# Patient Record
Sex: MALE | Race: WHITE | Employment: OTHER | URBAN - METROPOLITAN AREA
[De-identification: names, ages, dates, MRNs, and addresses within clinical notes are randomized per-mention and may not be internally consistent; named-entity substitution may affect disease eponyms.]

---

## 2017-01-02 ENCOUNTER — HOSPITAL ENCOUNTER (OUTPATIENT)
Dept: PHYSICAL THERAPY | Age: 72
Discharge: HOME | End: 2017-01-02
Payer: MEDICARE

## 2017-01-02 PROCEDURE — 97164 PT RE-EVAL EST PLAN CARE: CPT

## 2017-01-02 PROCEDURE — G8981 BODY POS CURRENT STATUS: HCPCS

## 2017-01-02 PROCEDURE — G8982 BODY POS GOAL STATUS: HCPCS

## 2017-01-02 PROCEDURE — 97110 THERAPEUTIC EXERCISES: CPT

## 2017-01-02 NOTE — PROGRESS NOTES
Angie Real   (ADZ:[id number]) [address]  at  [facility]  [address] [place] [address]  [phone]   [phone]           Outpatient PHYSICAL THERAPY: Daily Note, Re-evaluation and Recertification  Fall Risk Score: 2 (? 5 = High Risk)       ICD-10: Treatment Diagnosis: Low back pain, M54.5  Difficulty walking, not elsewhere classified, R26.2    REFERRING PHYSICIAN: Sonia Felix MD MD Orders: Evaluate and Treat  Return Physician Appointment: not known  MEDICAL/REFERRING DIAGNOSIS: Lumbosacral stenosis with neurogenic claudication, DDD, Lumbosacral spondylosis without myelopathy, other symptoms referrable to back  DATE OF ONSET: Chronic   PRIOR LEVEL OF FUNCTION: independent  PRECAUTIONS/ALLERGIES:   Allergies   Allergen Reactions    Other Medication Other (comments)     \"Some BP meds\" cause \"  SWELLING OF LEGS with Procardia and with current medicine    Statins-Hmg-Coa Reductase Inhibitors Other (comments)     Extreme nerve pain       ASSESSMENT:  ?????? ? ? This section established at most recent assessment??????????  Angie Real has been seen for 31 visits from 12/11/15 to 1/2/2017 for lumbar spine pain and spinal stenosis. Patient has performed therapeutic exercises, activities, and had manual therapy to increased strength, ROM and function. Patient has also used modalities for pain control in order to increase function. He is becoming more consistent with his workout program at the gym and is seeing benefits. He still has lack of core strength at this time. Balance has also become a difficulty for this patient due to neuropathy. Patient has shown an increase in function per the Modified Oswestry Disability Index score with scores of 15/50. Patient has progressed well toward their goals and will benefit from continuing skilled PT in order to address their impairments.   Huey Monterroso, PT, DPT, OCS  1/2/2017      PROBLEM LIST (Impairments causing functional limitations):  1. Decreased Strength affecting function  2. Decreased ADL/Functional Activities  3. Decreased Flexibility/joint mobility  4. Decreased transfer abilities  5. Increased Pain affecting function  6. Decreased Activity tolerance   7. Increased Fatigue affecting function  GOALS: (Goals have been discussed and agreed upon with patient.)  1. DISCHARGE GOALS: Time Frame: 12 weeks   2. Patient will report a greater than 50% improvement in overall symptoms in order to return to increased activity level(MET-4/25/16)  3. Patient will be independent in advanced core training exercises to return to functional mobility (ongoing)  4. Patient will show a greater than 5 point decrease on the Modified Oswestry Disability Index score in order to show an increase in function (ongoing)  5. Patient will report standing for greater than 30 min with work activity (MET-11/7/16)  6. NEW GOAL:  7. Patient will walk greater than 12 laps in the gym during gym session. (ongoing)  8. Patient will perform core exercises as part of his routine greater than 4 times a week  REHABILITATION POTENTIAL FOR STATED GOALS: GoodPLAN OF CARE:  INTERVENTIONS PLANNED: (Benefits and precautions of physical therapy have been discussed with the patient.)  1. balance exercise  2. bed mobility  3. cold  4. electrical stimulation  5. gait training  6. heat  7. manual therapy (including instrument assisted soft tissue mobilization)  8. neuromuscular re-education/strengthening  9. range of motion: active/assisted/passive  10. therapeutic activities  11. therapeutic exercise/strengthening  12. transfer training  13. Other: Aquatics  TREATMENT PLAN EFFECTIVE DATES: 1/2/17 TO 4/2/17  FREQUENCY/DURATION: Follow patient 1 time every other week for 12 weeks to address above goals. Regarding James Potocki's therapy, I certify that the treatment plan above will be carried out by a therapist or under their direction.   Thank you for this referral,  Chase Temple Blanca Moore, PT     Referring Physician Signature: Anitra Gage MD          Date                                SUBJECTIVE:    History of Present Injury/Illness (Reason for Referral): Patient report that he has had a long history of back issues. He first started to have health and difficulty functioning with a presence of gout for about a year, then had his hip replaced in April of this year. Now, his is trying to regain his back strength and function. He has difficulty walking for long periods and standing in one place. His goal is to become more active and functional as well as control his health. Patient had an injection a few weeks ago and had some relief from that. MRI results: Copy in paper chart showing:  Abdominal hernia surgery performed in   Present Symptoms: Patient reports that he is still consistent with the exercises, and just seems to have a little more pain in the right hip the last 3-4 days. He reports that he has not taken as many steps to incorporate his core work into his program, but is going to change that Pain at 2/10  Dominant Side: right  Past Medical History:    Active Ambulatory Problems     Diagnosis Date Noted    S/P total hip arthroplasty 04/06/2015    HTN (hypertension) 04/06/2015    HLD (hyperlipidemia) 04/06/2015    BPH (benign prostatic hyperplasia) 04/06/2015    Gout 04/06/2015    Anxiety 04/06/2015    Diabetes mellitus ([address] 04/06/2015     Resolved Ambulatory Problems     Diagnosis Date Noted    No Resolved Ambulatory Problems     Past Medical History   Diagnosis Date    Edema of both legs     Enlarged prostate     Hip pain     History of elevated glucose     History of seasonal allergies     Hypercholesterolemia     Hypertension     Keratoacanthoma     Skin neoplasm     Spinal stenosis      Past Surgical History   Procedure Laterality Date    Hx tonsillectomy  as child    Hx wisdom teeth extraction  as teenager     Current Medications:   Current Outpatient Prescriptions:     HYDROmorphone (DILAUDID) 2 mg tablet, Take 1 Tab by mouth every four (4) hours as needed. Max Daily Amount: 12 mg., Disp: 40 Tab, Rfl: 0    prazosin (MINIPRESS) 1 mg capsule, Take 1 mg by mouth three (3) times daily. TAKE AM OF SURGERY WITH SMALL SIP OF WATER   Indications: BENIGN PROSTATIC HYPERTROPHY, Disp: , Rfl:     amLODIPine (NORVASC) 10 mg tablet, Take 10 mg by mouth daily. TAKE AM OF SURGERY WITH SMALL SIP OF WATER, Disp: , Rfl:     labetalol (NORMODYNE) 200 mg tablet, Take 200 mg by mouth two (2) times a day. TAKE AM OF SURGERY WITH SMALL SIP OF WATER, Disp: , Rfl:     cloNIDine HCl (CATAPRES) 0.2 mg tablet, Take 0.2 mg by mouth nightly. Indications: HYPERTENSION, Disp: , Rfl:     finasteride (PROSCAR) 5 mg tablet, Take 5 mg by mouth daily. TAKE AM OF SURGERY WITH SMALL SIP OF WATER   Indications: BENIGN PROSTATIC HYPERTROPHY, Disp: , Rfl:     furosemide (LASIX) 40 mg tablet, Take 40 mg by mouth daily. , Disp: , Rfl:     Febuxostat (ULORIC) 80 mg tab tablet, Take 80 mg by mouth daily. TAKE AM OF SURGERY WITH SMALL SIP OF WATER   Indications: GOUT, Disp: , Rfl:     LORazepam (ATIVAN) 0.5 mg tablet, Take 0.5 mg by mouth daily as needed for Anxiety. TAKES 1/2 OF 1MG TABLET DAILY AS NEEDED, \"USUALLY EVERY DAY\", Disp: , Rfl:      Date Last Reviewed: 1/2/2017    Social History/Home Situation:   Social History     Social History    Marital status:      Spouse name: N/A    Number of children: N/A    Years of education: N/A     Occupational History    Not on file. Social History Main Topics    Smoking status: Never Smoker    Smokeless tobacco: Not on file    Alcohol use No    Drug use: Not on file    Sexual activity: Not on file     Other Topics Concern    Not on file     Social History Narrative           Work/Activity History: consultation  OBJECTIVE:    Outcome Measure:    Tool Used: Modified Oswestry Low Back Pain Questionnaire  Score:  Initial: 12/50 Most Recent: 15/50 (Date: 1/2/17 )   Interpretation of Score: Each section is scored on a 0-5 scale, 5 representing the greatest disability. The scores of each section are added together for a total score of 50. Score 0 1-10 11-20 21-30 31-40 41-49 50   Modifier CH CI CJ CK CL CM CN     ? Changing and Maintaining Body Position:     - CURRENT STATUS: CJ - 20%-39% impaired, limited or restricted    - GOAL STATUS:   - 0% impaired, limited or restricted    - D/C STATUS:  ---------------To be determined---------------  Tool Used: Lower Extremity Functional Scale (LEFS)  Score:  Initial: 49/80 Most Recent: 53/80 (Date: 1/2/17 )   Interpretation of Score: 20 questions each scored on a 5 point scale with 0 representing \"extreme difficulty or unable to perform\" and 4 representing \"no difficulty\". The lower the score, the greater the functional disability. 80/80 represents no disability. Minimal detectable change is 9 points. Score 80 79-63 62-48 47-32 31-16 15-1 0   Modifier CH CI CJ CK CL CM CN         Observation/Orthostatic Postural Assessment:    Patient is obese individual with increased abdominal mass and lordosis in standing. Patient shows slight right rotation from thoracic spine in standing. He reports that the popping has subsided during some of his activity. Palpation:    (re-cert-1/2/17) . Patient shows decreased mobility in right hip due to replacement earlier this year. He does show decreased R LE edema compared to his previous PT treatments. Core strength tests:  Vertical compression test (VCT)- grade 3/5 with dysfunction at TL junction and right rotation (able to show improved posture and strength with testing)  Elbow flexion test (EFT)- Grade 2/5 with sluggish initiation, and endurance present  Lumbar protective mechanism (LPM)- Left A-P is sluggish grade 2, P-A grade 2 and present; Right A-P is grade 2 sluggish, endurance present, P-A is 2- and present.   ROM: Slight limitation in hip range of motion, but mostly limited in right rotation and side bending left                           Strength: Good and functional LE strength                 Special Tests: Vertical compression test (VCT)- grade 3/5 with dysfunction at TL junction and right rotation  Elbow flexion test (EFT)- Grade 2+/5 with sluggish initiation, and endurance present  Lumbar protective mechanism (LPM)- Left A-P is sluggish grade 2, P-A grade 2 and present; Right A-P is grade 1 sluggish, endurance present, P-A is 2- and present. Neurological Screen:   Myotomes: intact                  Dermatomes: intact                  Reflexes: intact                  Neural Tension Tests: (-)  Functional Mobility:       Patient has difficulty with functional mobility, standing for greater than 30 min and with exercise activity  Balance:    fair- Patient unable to stand greater than 1 sec in single leg stance Bilaterally  TREATMENT:    (In addition to Assessment/Re-Assessment sessions the following treatments were rendered)  Therapeutic Exercise: (30 Minutes):  Exercises per grid below to improve mobility, strength, balance and coordination. Required minimal visual, verbal and manual cues to promote proper body alignment, promote proper body posture and promote proper body mechanics. Progressed resistance, range and repetitions as indicated.    Date:  1/2/2017     Activity/Exercise Parameters   SCI FIT Not today   Stretches  Hamstring bilaterally 3 x 30 sec  Piriformis bilaterally 3 x 20 sec  Hip flexor stretch   Education -15 min discussion on last two weeks workouts and where he can make a change and difference.  -Discussion using the RPE (rate of perceived exertion) scale for his workout instead of heartrate   Core training  Initiation x 2 min  -Bridge with glut set x 5 min  Marches-alternating with good core focus x 5 min   Treadmill (not today) Discussed x 2 min   Step up X 10 B    Glut training (not today) Bent over table with focus on form x 5 min   Sit to stand X 10 with good form   Balance            HEP-education on posture  Manual Therapy (    Soft Tissue Mobilization Duration  Duration: 5 Minutes):  Right and left hip stretches. (Used abbreviations: MET - muscle energy technique; PNF - proprioceptive neuromuscular facilitation; NMR - neuromuscular re-education; a/p - anterior to posterior; p/a - posterior to anterior)   Therapeutic Modalities:                                                                                               HEP: As above; handouts given to patient for all exercises. ______________________________________________________________________________________________________    Treatment Assessment:  Patient shows improved core initiation. Pain at 1/10. Started balance training and added to treatment diagnoses  Progression/Medical Necessity:   · Patient is expected to demonstrate progress in strength, range of motion, balance, coordination and functional technique to improve functional mobility. · Patient demonstrates good rehab potential due to higher previous functional level. · Skilled intervention continues to be required due to core weakness and instability. Compliance with Program/Exercises: compliant all of the time. Reason for Continuation of Services/Other Comments:  · Patient continues to require skilled intervention due to decreased function. Recommendations/Intent for next treatment session: \"Treatment next visit will focus on core training, endurance\".  And balance   Total Treatment Duration:  PT Patient Time In/Time Out  Time In: 1400  Time Out: [address] [facility], PT, DPT, OCS

## 2017-01-17 ENCOUNTER — HOSPITAL ENCOUNTER (OUTPATIENT)
Dept: PHYSICAL THERAPY | Age: 72
Discharge: HOME | End: 2017-01-17
Payer: MEDICARE

## 2017-01-17 PROCEDURE — 97110 THERAPEUTIC EXERCISES: CPT

## 2017-01-17 NOTE — PROGRESS NOTES
Wayne Hill   (EXD:[id number]) [address]  at  [facility]  [address] [place] [address]  [phone]   [phone]           Outpatient PHYSICAL THERAPY: Daily Note  Fall Risk Score: 2 (? 5 = High Risk)       ICD-10: Treatment Diagnosis: Low back pain, M54.5  Difficulty walking, not elsewhere classified, R26.2    REFERRING PHYSICIAN: Leia Gonzales MD MD Orders: Evaluate and Treat  Return Physician Appointment: not known  MEDICAL/REFERRING DIAGNOSIS: Lumbosacral stenosis with neurogenic claudication, DDD, Lumbosacral spondylosis without myelopathy, other symptoms referrable to back  DATE OF ONSET: Chronic   PRIOR LEVEL OF FUNCTION: independent  PRECAUTIONS/ALLERGIES:   Allergies   Allergen Reactions    Other Medication Other (comments)     \"Some BP meds\" cause \"  SWELLING OF LEGS with Procardia and with current medicine    Statins-Hmg-Coa Reductase Inhibitors Other (comments)     Extreme nerve pain       ASSESSMENT:  ?????? ? ? This section established at most recent assessment??????????  Wayne Hill has been seen for 31 visits from 12/11/15 to 1/17/2017 for lumbar spine pain and spinal stenosis. Patient has performed therapeutic exercises, activities, and had manual therapy to increased strength, ROM and function. Patient has also used modalities for pain control in order to increase function. He is becoming more consistent with his workout program at the gym and is seeing benefits. He still has lack of core strength at this time. Balance has also become a difficulty for this patient due to neuropathy. Patient has shown an increase in function per the Modified Oswestry Disability Index score with scores of 15/50. Patient has progressed well toward their goals and will benefit from continuing skilled PT in order to address their impairments. Hanh Worrell, PT, DPT, OCS  1/17/2017      PROBLEM LIST (Impairments causing functional limitations):  1.  Decreased Strength affecting function  2. Decreased ADL/Functional Activities  3. Decreased Flexibility/joint mobility  4. Decreased transfer abilities  5. Increased Pain affecting function  6. Decreased Activity tolerance   7. Increased Fatigue affecting function  GOALS: (Goals have been discussed and agreed upon with patient.)  1. DISCHARGE GOALS: Time Frame: 12 weeks   2. Patient will report a greater than 50% improvement in overall symptoms in order to return to increased activity level(MET-4/25/16)  3. Patient will be independent in advanced core training exercises to return to functional mobility (ongoing)  4. Patient will show a greater than 5 point decrease on the Modified Oswestry Disability Index score in order to show an increase in function (ongoing)  5. Patient will report standing for greater than 30 min with work activity (MET-11/7/16)  6. NEW GOAL:  7. Patient will walk greater than 12 laps in the gym during gym session. (ongoing)  8. Patient will perform core exercises as part of his routine greater than 4 times a week  REHABILITATION POTENTIAL FOR STATED GOALS: GoodPLAN OF CARE:  INTERVENTIONS PLANNED: (Benefits and precautions of physical therapy have been discussed with the patient.)  1. balance exercise  2. bed mobility  3. cold  4. electrical stimulation  5. gait training  6. heat  7. manual therapy (including instrument assisted soft tissue mobilization)  8. neuromuscular re-education/strengthening  9. range of motion: active/assisted/passive  10. therapeutic activities  11. therapeutic exercise/strengthening  12. transfer training  13. Other: Aquatics  TREATMENT PLAN EFFECTIVE DATES: 1/2/17 TO 4/2/17  FREQUENCY/DURATION: Follow patient 1 time every other week for 12 weeks to address above goals. Regarding James Potocki's therapy, I certify that the treatment plan above will be carried out by a therapist or under their direction.   Thank you for this referral,  Mary Anne Caicedo, PT     Referring Physician Signature: Les Llanes MD          Date                                SUBJECTIVE:    History of Present Injury/Illness (Reason for Referral): Patient report that he has had a long history of back issues. He first started to have health and difficulty functioning with a presence of gout for about a year, then had his hip replaced in April of this year. Now, his is trying to regain his back strength and function. He has difficulty walking for long periods and standing in one place. His goal is to become more active and functional as well as control his health. Patient had an injection a few weeks ago and had some relief from that. MRI results: Copy in paper chart showing:  Abdominal hernia surgery performed in   Present Symptoms: Patient reports that he is planning to go see his specialist soon to get some questions answered about how he is feeling. Pain at 2/10  Dominant Side: right  Past Medical History: Active Ambulatory Problems     Diagnosis Date Noted    S/P total hip arthroplasty 04/06/2015    HTN (hypertension) 04/06/2015    HLD (hyperlipidemia) 04/06/2015    BPH (benign prostatic hyperplasia) 04/06/2015    Gout 04/06/2015    Anxiety 04/06/2015    Diabetes mellitus ([facility] [address] 04/06/2015     Resolved Ambulatory Problems     Diagnosis Date Noted    No Resolved Ambulatory Problems     Past Medical History   Diagnosis Date    Edema of both legs     Enlarged prostate     Hip pain     History of elevated glucose     History of seasonal allergies     Hypercholesterolemia     Hypertension     Keratoacanthoma     Skin neoplasm     Spinal stenosis      Past Surgical History   Procedure Laterality Date    Hx tonsillectomy  as child    Hx wisdom teeth extraction  as teenager     Current Medications:   Current Outpatient Prescriptions:     HYDROmorphone (DILAUDID) 2 mg tablet, Take 1 Tab by mouth every four (4) hours as needed.  Max Daily Amount: 12 mg., Disp: 40 Tab, Rfl: 0   prazosin (MINIPRESS) 1 mg capsule, Take 1 mg by mouth three (3) times daily. TAKE AM OF SURGERY WITH SMALL SIP OF WATER   Indications: BENIGN PROSTATIC HYPERTROPHY, Disp: , Rfl:     amLODIPine (NORVASC) 10 mg tablet, Take 10 mg by mouth daily. TAKE AM OF SURGERY WITH SMALL SIP OF WATER, Disp: , Rfl:     labetalol (NORMODYNE) 200 mg tablet, Take 200 mg by mouth two (2) times a day. TAKE AM OF SURGERY WITH SMALL SIP OF WATER, Disp: , Rfl:     cloNIDine HCl (CATAPRES) 0.2 mg tablet, Take 0.2 mg by mouth nightly. Indications: HYPERTENSION, Disp: , Rfl:     finasteride (PROSCAR) 5 mg tablet, Take 5 mg by mouth daily. TAKE AM OF SURGERY WITH SMALL SIP OF WATER   Indications: BENIGN PROSTATIC HYPERTROPHY, Disp: , Rfl:     furosemide (LASIX) 40 mg tablet, Take 40 mg by mouth daily. , Disp: , Rfl:     Febuxostat (ULORIC) 80 mg tab tablet, Take 80 mg by mouth daily. TAKE AM OF SURGERY WITH SMALL SIP OF WATER   Indications: GOUT, Disp: , Rfl:     LORazepam (ATIVAN) 0.5 mg tablet, Take 0.5 mg by mouth daily as needed for Anxiety. TAKES 1/2 OF 1MG TABLET DAILY AS NEEDED, \"USUALLY EVERY DAY\", Disp: , Rfl:      Date Last Reviewed: 1/17/2017    Social History/Home Situation:   Social History     Social History    Marital status:      Spouse name: N/A    Number of children: N/A    Years of education: N/A     Occupational History    Not on file. Social History Main Topics    Smoking status: Never Smoker    Smokeless tobacco: Not on file    Alcohol use No    Drug use: Not on file    Sexual activity: Not on file     Other Topics Concern    Not on file     Social History Narrative           Work/Activity History: consultation  OBJECTIVE:    Outcome Measure: Tool Used: Modified Oswestry Low Back Pain Questionnaire  Score:  Initial: 12/50  Most Recent: 15/50 (Date: 1/2/17 )   Interpretation of Score: Each section is scored on a 0-5 scale, 5 representing the greatest disability.   The scores of each section are added together for a total score of 50. Score 0 1-10 11-20 21-30 31-40 41-49 50   Modifier CH CI CJ CK CL CM CN     ? Changing and Maintaining Body Position:     - CURRENT STATUS:  - 20%-39% impaired, limited or restricted    - GOAL STATUS:   - 0% impaired, limited or restricted    - D/C STATUS:  ---------------To be determined---------------  Tool Used: Lower Extremity Functional Scale (LEFS)  Score:  Initial: 49/80 Most Recent: 53/80 (Date: 1/2/17 )   Interpretation of Score: 20 questions each scored on a 5 point scale with 0 representing \"extreme difficulty or unable to perform\" and 4 representing \"no difficulty\". The lower the score, the greater the functional disability. 80/80 represents no disability. Minimal detectable change is 9 points. Score 80 79-63 62-48 47-32 31-16 15-1 0   Modifier CH CI CJ CK CL CM CN         Observation/Orthostatic Postural Assessment:    Patient is obese individual with increased abdominal mass and lordosis in standing. Patient shows slight right rotation from thoracic spine in standing. He reports that the popping has subsided during some of his activity. Palpation:    (re-cert-1/2/17) . Patient shows decreased mobility in right hip due to replacement earlier this year. He does show decreased R LE edema compared to his previous PT treatments. Core strength tests:  Vertical compression test (VCT)- grade 3/5 with dysfunction at TL junction and right rotation (able to show improved posture and strength with testing)  Elbow flexion test (EFT)- Grade 2/5 with sluggish initiation, and endurance present  Lumbar protective mechanism (LPM)- Left A-P is sluggish grade 2, P-A grade 2 and present; Right A-P is grade 2 sluggish, endurance present, P-A is 2- and present.   ROM: Slight limitation in hip range of motion, but mostly limited in right rotation and side bending left                           Strength: Good and functional LE strength Special Tests: Vertical compression test (VCT)- grade 3/5 with dysfunction at TL junction and right rotation  Elbow flexion test (EFT)- Grade 2+/5 with sluggish initiation, and endurance present  Lumbar protective mechanism (LPM)- Left A-P is sluggish grade 2, P-A grade 2 and present; Right A-P is grade 1 sluggish, endurance present, P-A is 2- and present. Neurological Screen:   Myotomes: intact                  Dermatomes: intact                  Reflexes: intact                  Neural Tension Tests: (-)  Functional Mobility:       Patient has difficulty with functional mobility, standing for greater than 30 min and with exercise activity  Balance:    fair- Patient unable to stand greater than 1 sec in single leg stance Bilaterally  TREATMENT:    (In addition to Assessment/Re-Assessment sessions the following treatments were rendered)  Therapeutic Exercise: (20 Minutes):  Exercises per grid below to improve mobility, strength, balance and coordination. Required minimal visual, verbal and manual cues to promote proper body alignment, promote proper body posture and promote proper body mechanics. Progressed resistance, range and repetitions as indicated. Date:  1/17/2017     Activity/Exercise Parameters   SCI FIT Not today   Stretches (reviewed) Hamstring bilaterally 3 x 30 sec  Piriformis bilaterally 3 x 20 sec  Hip flexor stretch   Education -20 min discussion on last two weeks workouts and where he can make a change and difference. Discussion on medication and effects with his body and with exercises.   -Discussion using the RPE (rate of perceived exertion) scale for his workout instead of heartrate   Core training (not today) Initiation x 2 min  -Bridge with glut set x 5 min  Marches-alternating with good core focus x 5 min   Treadmill (not today) Discussed x 2 min   Step up (not today) X 10 B    Glut training (not today) Bent over table with focus on form x 5 min   Sit to stand (not today) X 10 with good form   Balance            HEP-education on posture  Manual Therapy (     ):  Right and left hip stretches. (Used abbreviations: MET - muscle energy technique; PNF - proprioceptive neuromuscular facilitation; NMR - neuromuscular re-education; a/p - anterior to posterior; p/a - posterior to anterior)   Therapeutic Modalities:                                                                                               HEP: As above; handouts given to patient for all exercises. ______________________________________________________________________________________________________    Treatment Assessment:  Patient shows continued work on home program with discussion today on healthcare providers and going to see his specialist for his back and for his hear to get some things answered. PT reviewed medication and function with exercises and symptoms he is perceiving at this time. Progression/Medical Necessity:   · Patient is expected to demonstrate progress in strength, range of motion, balance, coordination and functional technique to improve functional mobility. · Patient demonstrates good rehab potential due to higher previous functional level. · Skilled intervention continues to be required due to core weakness and instability. Compliance with Program/Exercises: compliant all of the time. Reason for Continuation of Services/Other Comments:  · Patient continues to require skilled intervention due to decreased function. Recommendations/Intent for next treatment session: \"Treatment next visit will focus on core training, endurance\".  And balance   Total Treatment Duration:  PT Patient Time In/Time Out  Time In: 1357  Time Out: 1500    Hanh Worrell, PT, DPT, OCS

## 2017-01-30 ENCOUNTER — HOSPITAL ENCOUNTER (OUTPATIENT)
Dept: PHYSICAL THERAPY | Age: 72
Discharge: HOME | End: 2017-01-30
Payer: MEDICARE

## 2017-01-30 PROCEDURE — 97110 THERAPEUTIC EXERCISES: CPT

## 2017-01-30 PROCEDURE — 97140 MANUAL THERAPY 1/> REGIONS: CPT

## 2017-01-30 NOTE — PROGRESS NOTES
Ramila Braden   ([id number]) [address]  at  [facility]  [address] [place] [address]  [phone]   [phone]           Outpatient PHYSICAL THERAPY: Daily Note  Fall Risk Score: 2 (? 5 = High Risk)       ICD-10: Treatment Diagnosis: Low back pain, M54.5  Difficulty walking, not elsewhere classified, R26.2    REFERRING PHYSICIAN: Kourtney Byers MD MD Orders: Evaluate and Treat  Return Physician Appointment: not known  MEDICAL/REFERRING DIAGNOSIS: Lumbosacral stenosis with neurogenic claudication, DDD, Lumbosacral spondylosis without myelopathy, other symptoms referrable to back  DATE OF ONSET: Chronic   PRIOR LEVEL OF FUNCTION: independent  PRECAUTIONS/ALLERGIES:   Allergies   Allergen Reactions    Other Medication Other (comments)     \"Some BP meds\" cause \"  SWELLING OF LEGS with Procardia and with current medicine    Statins-Hmg-Coa Reductase Inhibitors Other (comments)     Extreme nerve pain       ASSESSMENT:  ?????? ? ? This section established at most recent assessment??????????  Ramila Braden has been seen for 31 visits from 12/11/15 to 2017 for lumbar spine pain and spinal stenosis. Patient has performed therapeutic exercises, activities, and had manual therapy to increased strength, ROM and function. Patient has also used modalities for pain control in order to increase function. He is becoming more consistent with his workout program at the gym and is seeing benefits. He still has lack of core strength at this time. Balance has also become a difficulty for this patient due to neuropathy. Patient has shown an increase in function per the Modified Oswestry Disability Index score with scores of 15/50. Patient has progressed well toward their goals and will benefit from continuing skilled PT in order to address their impairments. Ayala Shore, PT, DPT, OCS  2017      PROBLEM LIST (Impairments causing functional limitations):  1.  Decreased Strength affecting function  2. Decreased ADL/Functional Activities  3. Decreased Flexibility/joint mobility  4. Decreased transfer abilities  5. Increased Pain affecting function  6. Decreased Activity tolerance   7. Increased Fatigue affecting function  GOALS: (Goals have been discussed and agreed upon with patient.)  1. DISCHARGE GOALS: Time Frame: 12 weeks   2. Patient will report a greater than 50% improvement in overall symptoms in order to return to increased activity level(MET-4/25/16)  3. Patient will be independent in advanced core training exercises to return to functional mobility (ongoing)  4. Patient will show a greater than 5 point decrease on the Modified Oswestry Disability Index score in order to show an increase in function (ongoing)  5. Patient will report standing for greater than 30 min with work activity (MET-11/7/16)  6. NEW GOAL:  7. Patient will walk greater than 12 laps in the gym during gym session. (ongoing)  8. Patient will perform core exercises as part of his routine greater than 4 times a week  REHABILITATION POTENTIAL FOR STATED GOALS: GoodPLAN OF CARE:  INTERVENTIONS PLANNED: (Benefits and precautions of physical therapy have been discussed with the patient.)  1. balance exercise  2. bed mobility  3. cold  4. electrical stimulation  5. gait training  6. heat  7. manual therapy (including instrument assisted soft tissue mobilization)  8. neuromuscular re-education/strengthening  9. range of motion: active/assisted/passive  10. therapeutic activities  11. therapeutic exercise/strengthening  12. transfer training  13. Other: Aquatics  TREATMENT PLAN EFFECTIVE DATES: 1/2/17 TO 4/2/17  FREQUENCY/DURATION: Follow patient 1 time every other week for 12 weeks to address above goals. Regarding James Potocki's therapy, I certify that the treatment plan above will be carried out by a therapist or under their direction.   Thank you for this referral,  Barbara Morrell, PT     Referring Physician Signature: Estelle Hunt MD          Date                                SUBJECTIVE:    History of Present Injury/Illness (Reason for Referral): Patient report that he has had a long history of back issues. He first started to have health and difficulty functioning with a presence of gout for about a year, then had his hip replaced in April of this year. Now, his is trying to regain his back strength and function. He has difficulty walking for long periods and standing in one place. His goal is to become more active and functional as well as control his health. Patient had an injection a few weeks ago and had some relief from that. MRI results: Copy in paper chart showing:  Abdominal hernia surgery performed in   Present Symptoms: Patient reports that he felt a pull in his adductor or groin last week and did not know if he injured it. It has gotten better, so I think I am ok, but it still bothered me. I go back to the spine doctor next week after an MRI. Pain at 2/10  Dominant Side: right  Past Medical History:    Active Ambulatory Problems     Diagnosis Date Noted    S/P total hip arthroplasty 04/06/2015    HTN (hypertension) 04/06/2015    HLD (hyperlipidemia) 04/06/2015    BPH (benign prostatic hyperplasia) 04/06/2015    Gout 04/06/2015    Anxiety 04/06/2015    Diabetes mellitus ([facility] [address] 04/06/2015     Resolved Ambulatory Problems     Diagnosis Date Noted    No Resolved Ambulatory Problems     Past Medical History   Diagnosis Date    Edema of both legs     Enlarged prostate     Hip pain     History of elevated glucose     History of seasonal allergies     Hypercholesterolemia     Hypertension     Keratoacanthoma     Skin neoplasm     Spinal stenosis      Past Surgical History   Procedure Laterality Date    Hx tonsillectomy  as child    Hx wisdom teeth extraction  as teenager     Current Medications:   Current Outpatient Prescriptions:     HYDROmorphone (DILAUDID) 2 mg tablet, Take 1 Tab by mouth every four (4) hours as needed. Max Daily Amount: 12 mg., Disp: 40 Tab, Rfl: 0    prazosin (MINIPRESS) 1 mg capsule, Take 1 mg by mouth three (3) times daily. TAKE AM OF SURGERY WITH SMALL SIP OF WATER   Indications: BENIGN PROSTATIC HYPERTROPHY, Disp: , Rfl:     amLODIPine (NORVASC) 10 mg tablet, Take 10 mg by mouth daily. TAKE AM OF SURGERY WITH SMALL SIP OF WATER, Disp: , Rfl:     labetalol (NORMODYNE) 200 mg tablet, Take 200 mg by mouth two (2) times a day. TAKE AM OF SURGERY WITH SMALL SIP OF WATER, Disp: , Rfl:     cloNIDine HCl (CATAPRES) 0.2 mg tablet, Take 0.2 mg by mouth nightly. Indications: HYPERTENSION, Disp: , Rfl:     finasteride (PROSCAR) 5 mg tablet, Take 5 mg by mouth daily. TAKE AM OF SURGERY WITH SMALL SIP OF WATER   Indications: BENIGN PROSTATIC HYPERTROPHY, Disp: , Rfl:     furosemide (LASIX) 40 mg tablet, Take 40 mg by mouth daily. , Disp: , Rfl:     Febuxostat (ULORIC) 80 mg tab tablet, Take 80 mg by mouth daily. TAKE AM OF SURGERY WITH SMALL SIP OF WATER   Indications: GOUT, Disp: , Rfl:     LORazepam (ATIVAN) 0.5 mg tablet, Take 0.5 mg by mouth daily as needed for Anxiety. TAKES 1/2 OF 1MG TABLET DAILY AS NEEDED, \"USUALLY EVERY DAY\", Disp: , Rfl:      Date Last Reviewed: 1/30/2017    Social History/Home Situation:   Social History     Social History    Marital status:      Spouse name: N/A    Number of children: N/A    Years of education: N/A     Occupational History    Not on file. Social History Main Topics    Smoking status: Never Smoker    Smokeless tobacco: Not on file    Alcohol use No    Drug use: Not on file    Sexual activity: Not on file     Other Topics Concern    Not on file     Social History Narrative           Work/Activity History: consultation  OBJECTIVE:    Outcome Measure:    Tool Used: Modified Oswestry Low Back Pain Questionnaire  Score:  Initial: 12/50  Most Recent: 15/50 (Date: 1/2/17 )   Interpretation of Score: Each section is scored on a 0-5 scale, 5 representing the greatest disability. The scores of each section are added together for a total score of 50. Score 0 1-10 11-20 21-30 31-40 41-49 50   Modifier CH CI CJ CK CL CM CN     ? Changing and Maintaining Body Position:     - CURRENT STATUS:  - 20%-39% impaired, limited or restricted    - GOAL STATUS:   - 0% impaired, limited or restricted    - D/C STATUS:  ---------------To be determined---------------  Tool Used: Lower Extremity Functional Scale (LEFS)  Score:  Initial: 49/80 Most Recent: 53/80 (Date: 1/2/17 )   Interpretation of Score: 20 questions each scored on a 5 point scale with 0 representing \"extreme difficulty or unable to perform\" and 4 representing \"no difficulty\". The lower the score, the greater the functional disability. 80/80 represents no disability. Minimal detectable change is 9 points. Score 80 79-63 62-48 47-32 31-16 15-1 0   Modifier CH CI CJ CK CL CM CN         Observation/Orthostatic Postural Assessment:    Patient is obese individual with increased abdominal mass and lordosis in standing. Patient shows slight right rotation from thoracic spine in standing. He reports that the popping has subsided during some of his activity. Palpation:    (re-cert-1/2/17) . Patient shows decreased mobility in right hip due to replacement earlier this year. He does show decreased R LE edema compared to his previous PT treatments. Core strength tests:  Vertical compression test (VCT)- grade 3/5 with dysfunction at TL junction and right rotation (able to show improved posture and strength with testing)  Elbow flexion test (EFT)- Grade 2/5 with sluggish initiation, and endurance present  Lumbar protective mechanism (LPM)- Left A-P is sluggish grade 2, P-A grade 2 and present; Right A-P is grade 2 sluggish, endurance present, P-A is 2- and present.   ROM: Slight limitation in hip range of motion, but mostly limited in right rotation and side bending left                           Strength: Good and functional LE strength                 Special Tests: Vertical compression test (VCT)- grade 3/5 with dysfunction at TL junction and right rotation  Elbow flexion test (EFT)- Grade 2+/5 with sluggish initiation, and endurance present  Lumbar protective mechanism (LPM)- Left A-P is sluggish grade 2, P-A grade 2 and present; Right A-P is grade 1 sluggish, endurance present, P-A is 2- and present. Neurological Screen:   Myotomes: intact                  Dermatomes: intact                  Reflexes: intact                  Neural Tension Tests: (-)  Functional Mobility:       Patient has difficulty with functional mobility, standing for greater than 30 min and with exercise activity  Balance:    fair- Patient unable to stand greater than 1 sec in single leg stance Bilaterally  TREATMENT:    (In addition to Assessment/Re-Assessment sessions the following treatments were rendered)  Therapeutic Exercise: (20 Minutes):  Exercises per grid below to improve mobility, strength, balance and coordination. Required minimal visual, verbal and manual cues to promote proper body alignment, promote proper body posture and promote proper body mechanics. Progressed resistance, range and repetitions as indicated. Date:  1/30/2017     Activity/Exercise Parameters   SCI FIT Not today   Stretches  Hamstring bilaterally 3 x 30 sec  Piriformis bilaterally 3 x 20 sec  Hip flexor stretch   Education -10 min discussion on last two weeks workouts and where he can make a change and difference. Discussion on medication and effects with his body and with exercises.   -Discussion using the RPE (rate of perceived exertion) scale for his workout instead of heartrate   Core training  Initiation x 2 min  -pelvic tilt  Marches-alternating with good core focus x 5 min   Treadmill (not today) Discussed x 2 min   Step up (not today) X 10 B    Glut training (not today) Bent over table with focus on form x 5 min   Sit to stand (not today) X 10 with good form   Balance Talked about challenging balance           HEP-education on posture  Manual Therapy (    Soft Tissue Mobilization Duration  Duration: 25 Minutes):  Right and left hip stretches. STM to adductor and hamstring border. (Used abbreviations: MET - muscle energy technique; PNF - proprioceptive neuromuscular facilitation; NMR - neuromuscular re-education; a/p - anterior to posterior; p/a - posterior to anterior)   Therapeutic Modalities:                                                                                               HEP: As above; handouts given to patient for all exercises. ______________________________________________________________________________________________________    Treatment Assessment:  Patient shows improved core control and activation. Continue with balance and stability, Pain at 0/10  Progression/Medical Necessity:   · Patient is expected to demonstrate progress in strength, range of motion, balance, coordination and functional technique to improve functional mobility. · Patient demonstrates good rehab potential due to higher previous functional level. · Skilled intervention continues to be required due to core weakness and instability. Compliance with Program/Exercises: compliant all of the time. Reason for Continuation of Services/Other Comments:  · Patient continues to require skilled intervention due to decreased function. Recommendations/Intent for next treatment session: \"Treatment next visit will focus on core training, endurance\".  And balance   Total Treatment Duration:  PT Patient Time In/Time Out  Time In: 1400  Time Out: [address] Zapien, PT, DPT, OCS

## 2017-02-13 ENCOUNTER — HOSPITAL ENCOUNTER (OUTPATIENT)
Dept: PHYSICAL THERAPY | Age: 72
Discharge: HOME | End: 2017-02-13
Payer: MEDICARE

## 2017-02-13 PROCEDURE — 97110 THERAPEUTIC EXERCISES: CPT

## 2017-02-13 PROCEDURE — 97140 MANUAL THERAPY 1/> REGIONS: CPT

## 2017-02-13 NOTE — PROGRESS NOTES
Jarod Gann   ([id number]) [id number]  at  [facility]  [address] [place] [address]  [phone]   [phone]           Outpatient PHYSICAL THERAPY: Daily Note  Fall Risk Score: 2 (? 5 = High Risk)       ICD-10: Treatment Diagnosis: Low back pain, M54.5  Difficulty walking, not elsewhere classified, R26.2    REFERRING PHYSICIAN: Tiarra Price MD MD Orders: Evaluate and Treat  Return Physician Appointment: not known  MEDICAL/REFERRING DIAGNOSIS: Lumbosacral stenosis with neurogenic claudication, DDD, Lumbosacral spondylosis without myelopathy, other symptoms referrable to back  DATE OF ONSET: Chronic   PRIOR LEVEL OF FUNCTION: independent  PRECAUTIONS/ALLERGIES:   Allergies   Allergen Reactions    Other Medication Other (comments)     \"Some BP meds\" cause \"  SWELLING OF LEGS with Procardia and with current medicine    Statins-Hmg-Coa Reductase Inhibitors Other (comments)     Extreme nerve pain       ASSESSMENT:  ?????? ? ? This section established at most recent assessment??????????  Jarod Gann has been seen for 31 visits from 12/11/15 to 2/13/2017 for lumbar spine pain and spinal stenosis. Patient has performed therapeutic exercises, activities, and had manual therapy to increased strength, ROM and function. Patient has also used modalities for pain control in order to increase function. He is becoming more consistent with his workout program at the gym and is seeing benefits. He still has lack of core strength at this time. Balance has also become a difficulty for this patient due to neuropathy. Patient has shown an increase in function per the Modified Oswestry Disability Index score with scores of 15/50. Patient has progressed well toward their goals and will benefit from continuing skilled PT in order to address their impairments. Malaika Roland, PT, DPT, OCS  2/13/2017      PROBLEM LIST (Impairments causing functional limitations):  1.  Decreased Strength affecting function  2. Decreased ADL/Functional Activities  3. Decreased Flexibility/joint mobility  4. Decreased transfer abilities  5. Increased Pain affecting function  6. Decreased Activity tolerance   7. Increased Fatigue affecting function  GOALS: (Goals have been discussed and agreed upon with patient.)  1. DISCHARGE GOALS: Time Frame: 12 weeks   2. Patient will report a greater than 50% improvement in overall symptoms in order to return to increased activity level(MET-4/25/16)  3. Patient will be independent in advanced core training exercises to return to functional mobility (ongoing)  4. Patient will show a greater than 5 point decrease on the Modified Oswestry Disability Index score in order to show an increase in function (ongoing)  5. Patient will report standing for greater than 30 min with work activity (MET-11/7/16)  6. NEW GOAL:  7. Patient will walk greater than 12 laps in the gym during gym session. (ongoing)  8. Patient will perform core exercises as part of his routine greater than 4 times a week  REHABILITATION POTENTIAL FOR STATED GOALS: GoodPLAN OF CARE:  INTERVENTIONS PLANNED: (Benefits and precautions of physical therapy have been discussed with the patient.)  1. balance exercise  2. bed mobility  3. cold  4. electrical stimulation  5. gait training  6. heat  7. manual therapy (including instrument assisted soft tissue mobilization)  8. neuromuscular re-education/strengthening  9. range of motion: active/assisted/passive  10. therapeutic activities  11. therapeutic exercise/strengthening  12. transfer training  13. Other: Aquatics  TREATMENT PLAN EFFECTIVE DATES: 1/2/17 TO 4/2/17  FREQUENCY/DURATION: Follow patient 1 time every other week for 12 weeks to address above goals. Regarding James Potocki's therapy, I certify that the treatment plan above will be carried out by a therapist or under their direction.   Thank you for this referral,  Deann Diaz, PT     Referring Physician Signature: Carlitos Graff MD          Date                                SUBJECTIVE:    History of Present Injury/Illness (Reason for Referral): Patient report that he has had a long history of back issues. He first started to have health and difficulty functioning with a presence of gout for about a year, then had his hip replaced in April of this year. Now, his is trying to regain his back strength and function. He has difficulty walking for long periods and standing in one place. His goal is to become more active and functional as well as control his health. Patient had an injection a few weeks ago and had some relief from that. MRI results: Copy in paper chart showing:  Abdominal hernia surgery performed in   Present Symptoms: Patient reports that the right hip felt great after last time and the left glut area is feeling tight and sore the last few days. I haven't pushed anything more and I am maintaining my exercises though  Pain at 2/10  Dominant Side: right  Past Medical History:    Active Ambulatory Problems     Diagnosis Date Noted    S/P total hip arthroplasty 04/06/2015    HTN (hypertension) 04/06/2015    HLD (hyperlipidemia) 04/06/2015    BPH (benign prostatic hyperplasia) 04/06/2015    Gout 04/06/2015    Anxiety 04/06/2015    Diabetes mellitus ([facility] [address] 04/06/2015     Resolved Ambulatory Problems     Diagnosis Date Noted    No Resolved Ambulatory Problems     Past Medical History   Diagnosis Date    Edema of both legs     Enlarged prostate     Hip pain     History of elevated glucose     History of seasonal allergies     Hypercholesterolemia     Hypertension     Keratoacanthoma     Skin neoplasm     Spinal stenosis      Past Surgical History   Procedure Laterality Date    Hx tonsillectomy  as child    Hx wisdom teeth extraction  as teenager     Current Medications:   Current Outpatient Prescriptions:     HYDROmorphone (DILAUDID) 2 mg tablet, Take 1 Tab by mouth every four (4) hours as needed. Max Daily Amount: 12 mg., Disp: 40 Tab, Rfl: 0    prazosin (MINIPRESS) 1 mg capsule, Take 1 mg by mouth three (3) times daily. TAKE AM OF SURGERY WITH SMALL SIP OF WATER   Indications: BENIGN PROSTATIC HYPERTROPHY, Disp: , Rfl:     amLODIPine (NORVASC) 10 mg tablet, Take 10 mg by mouth daily. TAKE AM OF SURGERY WITH SMALL SIP OF WATER, Disp: , Rfl:     labetalol (NORMODYNE) 200 mg tablet, Take 200 mg by mouth two (2) times a day. TAKE AM OF SURGERY WITH SMALL SIP OF WATER, Disp: , Rfl:     cloNIDine HCl (CATAPRES) 0.2 mg tablet, Take 0.2 mg by mouth nightly. Indications: HYPERTENSION, Disp: , Rfl:     finasteride (PROSCAR) 5 mg tablet, Take 5 mg by mouth daily. TAKE AM OF SURGERY WITH SMALL SIP OF WATER   Indications: BENIGN PROSTATIC HYPERTROPHY, Disp: , Rfl:     furosemide (LASIX) 40 mg tablet, Take 40 mg by mouth daily. , Disp: , Rfl:     Febuxostat (ULORIC) 80 mg tab tablet, Take 80 mg by mouth daily. TAKE AM OF SURGERY WITH SMALL SIP OF WATER   Indications: GOUT, Disp: , Rfl:     LORazepam (ATIVAN) 0.5 mg tablet, Take 0.5 mg by mouth daily as needed for Anxiety. TAKES 1/2 OF 1MG TABLET DAILY AS NEEDED, \"USUALLY EVERY DAY\", Disp: , Rfl:      Date Last Reviewed: 2/13/2017    Social History/Home Situation:   Social History     Social History    Marital status:      Spouse name: N/A    Number of children: N/A    Years of education: N/A     Occupational History    Not on file. Social History Main Topics    Smoking status: Never Smoker    Smokeless tobacco: Not on file    Alcohol use No    Drug use: Not on file    Sexual activity: Not on file     Other Topics Concern    Not on file     Social History Narrative           Work/Activity History: consultation  OBJECTIVE:    Outcome Measure:    Tool Used: Modified Oswestry Low Back Pain Questionnaire  Score:  Initial: 12/50  Most Recent: 15/50 (Date: 1/2/17 )   Interpretation of Score: Each section is scored on a 0-5 scale, 5 representing the greatest disability. The scores of each section are added together for a total score of 50. Score 0 1-10 11-20 21-30 31-40 41-49 50   Modifier CH CI CJ CK CL CM CN     ? Changing and Maintaining Body Position:     - CURRENT STATUS:  - 20%-39% impaired, limited or restricted    - GOAL STATUS:   - 0% impaired, limited or restricted    - D/C STATUS:  ---------------To be determined---------------  Tool Used: Lower Extremity Functional Scale (LEFS)  Score:  Initial: 49/80 Most Recent: 53/80 (Date: 1/2/17 )   Interpretation of Score: 20 questions each scored on a 5 point scale with 0 representing \"extreme difficulty or unable to perform\" and 4 representing \"no difficulty\". The lower the score, the greater the functional disability. 80/80 represents no disability. Minimal detectable change is 9 points. Score 80 79-63 62-48 47-32 31-16 15-1 0   Modifier CH CI CJ CK CL CM CN         Observation/Orthostatic Postural Assessment:    Patient is obese individual with increased abdominal mass and lordosis in standing. Patient shows slight right rotation from thoracic spine in standing. He reports that the popping has subsided during some of his activity. Palpation:    (re-cert-1/2/17) . Patient shows decreased mobility in right hip due to replacement earlier this year. He does show decreased R LE edema compared to his previous PT treatments. Core strength tests:  Vertical compression test (VCT)- grade 3/5 with dysfunction at TL junction and right rotation (able to show improved posture and strength with testing)  Elbow flexion test (EFT)- Grade 2/5 with sluggish initiation, and endurance present  Lumbar protective mechanism (LPM)- Left A-P is sluggish grade 2, P-A grade 2 and present; Right A-P is grade 2 sluggish, endurance present, P-A is 2- and present.   ROM: Slight limitation in hip range of motion, but mostly limited in right rotation and side bending left Strength: Good and functional LE strength                 Special Tests: Vertical compression test (VCT)- grade 3/5 with dysfunction at TL junction and right rotation  Elbow flexion test (EFT)- Grade 2+/5 with sluggish initiation, and endurance present  Lumbar protective mechanism (LPM)- Left A-P is sluggish grade 2, P-A grade 2 and present; Right A-P is grade 1 sluggish, endurance present, P-A is 2- and present. Neurological Screen:   Myotomes: intact                  Dermatomes: intact                  Reflexes: intact                  Neural Tension Tests: (-)  Functional Mobility:       Patient has difficulty with functional mobility, standing for greater than 30 min and with exercise activity  Balance:    fair- Patient unable to stand greater than 1 sec in single leg stance Bilaterally  TREATMENT:    (In addition to Assessment/Re-Assessment sessions the following treatments were rendered)  Therapeutic Exercise: (15 Minutes):  Exercises per grid below to improve mobility, strength, balance and coordination. Required minimal visual, verbal and manual cues to promote proper body alignment, promote proper body posture and promote proper body mechanics. Progressed resistance, range and repetitions as indicated. Date:  2/13/2017     Activity/Exercise Parameters   SCI FIT Not today   Stretches  Hamstring bilaterally 3 x 30 sec  Piriformis bilaterally 3 x 20 sec  Hip flexor stretch   Education -5 min discussion on last two weeks workouts and where he can make a change and difference. Discussion on medication and effects with his body and with exercises.      Core training  Initiation x 2 min  -pelvic tilt  Marches-alternating with good core focus x 5 min   Treadmill (not today) Discussed x 2 min   Step up (not today) X 10 B    Glut training (not today) Bent over table with focus on form x 5 min   Sit to stand (not today) X 10 with good form   Balance Talked about challenging balance HEP-education on posture  Manual Therapy (    Soft Tissue Mobilization Duration  Duration: 30 Minutes):  Right and left hip stretches. STM to left glut med and min to border and bony contours  Worked border of sacrum and sacral sulcus  (Used abbreviations: MET - muscle energy technique; PNF - proprioceptive neuromuscular facilitation; NMR - neuromuscular re-education; a/p - anterior to posterior; p/a - posterior to anterior)   Therapeutic Modalities:                                                                                               HEP: As above; handouts given to patient for all exercises. ______________________________________________________________________________________________________    Treatment Assessment:  Patient shows improved core control and activation. Continue with balance and stability next treatment Pain at 0/10  Progression/Medical Necessity:   · Patient is expected to demonstrate progress in strength, range of motion, balance, coordination and functional technique to improve functional mobility. · Patient demonstrates good rehab potential due to higher previous functional level. · Skilled intervention continues to be required due to core weakness and instability. Compliance with Program/Exercises: compliant all of the time. Reason for Continuation of Services/Other Comments:  · Patient continues to require skilled intervention due to decreased function. Recommendations/Intent for next treatment session: \"Treatment next visit will focus on core training, endurance\".  And balance   Total Treatment Duration:  PT Patient Time In/Time Out  Time In: 1403  Time Out: 1700 Anselmo Mayovd, PT, DPT, OCS

## 2017-02-27 ENCOUNTER — HOSPITAL ENCOUNTER (OUTPATIENT)
Dept: PHYSICAL THERAPY | Age: 72
Discharge: HOME | End: 2017-02-27
Payer: MEDICARE

## 2017-02-27 PROCEDURE — 97110 THERAPEUTIC EXERCISES: CPT

## 2017-02-27 NOTE — PROGRESS NOTES
Celestino Howell   (KGQ:[id number]) [address]  at  [facility]  [address] [place] [address]  [phone]   [phone]           Outpatient PHYSICAL THERAPY: Daily Note  Fall Risk Score: 2 (? 5 = High Risk)       ICD-10: Treatment Diagnosis: Low back pain, M54.5  Difficulty walking, not elsewhere classified, R26.2    REFERRING PHYSICIAN: New Barahona MD MD Orders: Evaluate and Treat  Return Physician Appointment: not known  MEDICAL/REFERRING DIAGNOSIS: Lumbosacral stenosis with neurogenic claudication, DDD, Lumbosacral spondylosis without myelopathy, other symptoms referrable to back  DATE OF ONSET: Chronic   PRIOR LEVEL OF FUNCTION: independent  PRECAUTIONS/ALLERGIES:   Allergies   Allergen Reactions    Other Medication Other (comments)     \"Some BP meds\" cause \"  SWELLING OF LEGS with Procardia and with current medicine    Statins-Hmg-Coa Reductase Inhibitors Other (comments)     Extreme nerve pain       ASSESSMENT:  ?????? ? ? This section established at most recent assessment??????????  Celestino Howell has been seen for 31 visits from 12/11/15 to 2/27/2017 for lumbar spine pain and spinal stenosis. Patient has performed therapeutic exercises, activities, and had manual therapy to increased strength, ROM and function. Patient has also used modalities for pain control in order to increase function. He is becoming more consistent with his workout program at the gym and is seeing benefits. He still has lack of core strength at this time. Balance has also become a difficulty for this patient due to neuropathy. Patient has shown an increase in function per the Modified Oswestry Disability Index score with scores of 15/50. Patient has progressed well toward their goals and will benefit from continuing skilled PT in order to address their impairments. Jamaal Slaughter, PT, DPT, OCS  2/27/2017      PROBLEM LIST (Impairments causing functional limitations):  1.  Decreased Strength affecting function  2. Decreased ADL/Functional Activities  3. Decreased Flexibility/joint mobility  4. Decreased transfer abilities  5. Increased Pain affecting function  6. Decreased Activity tolerance   7. Increased Fatigue affecting function  GOALS: (Goals have been discussed and agreed upon with patient.)  1. DISCHARGE GOALS: Time Frame: 12 weeks   2. Patient will report a greater than 50% improvement in overall symptoms in order to return to increased activity level(MET-4/25/16)  3. Patient will be independent in advanced core training exercises to return to functional mobility (ongoing)  4. Patient will show a greater than 5 point decrease on the Modified Oswestry Disability Index score in order to show an increase in function (ongoing)  5. Patient will report standing for greater than 30 min with work activity (MET-11/7/16)  6. NEW GOAL:  7. Patient will walk greater than 12 laps in the gym during gym session. (ongoing)  8. Patient will perform core exercises as part of his routine greater than 4 times a week  REHABILITATION POTENTIAL FOR STATED GOALS: GoodPLAN OF CARE:  INTERVENTIONS PLANNED: (Benefits and precautions of physical therapy have been discussed with the patient.)  1. balance exercise  2. bed mobility  3. cold  4. electrical stimulation  5. gait training  6. heat  7. manual therapy (including instrument assisted soft tissue mobilization)  8. neuromuscular re-education/strengthening  9. range of motion: active/assisted/passive  10. therapeutic activities  11. therapeutic exercise/strengthening  12. transfer training  13. Other: Aquatics  TREATMENT PLAN EFFECTIVE DATES: 1/2/17 TO 4/2/17  FREQUENCY/DURATION: Follow patient 1 time every other week for 12 weeks to address above goals. Regarding James Potocki's therapy, I certify that the treatment plan above will be carried out by a therapist or under their direction.   Thank you for this referral,  Juan Pascual, PT     Referring Physician Signature: New Barahona MD          Date                                SUBJECTIVE:    History of Present Injury/Illness (Reason for Referral): Patient report that he has had a long history of back issues. He first started to have health and difficulty functioning with a presence of gout for about a year, then had his hip replaced in April of this year. Now, his is trying to regain his back strength and function. He has difficulty walking for long periods and standing in one place. His goal is to become more active and functional as well as control his health. Patient had an injection a few weeks ago and had some relief from that. MRI results: Copy in paper chart showing:  Abdominal hernia surgery performed in   Present Symptoms: Patient reports that he talked with Dr. Harpreet Simpson and he got the new MRI results showing some disc bulging at L5 into the nerve root. Overall I feel ok and still working hard on my other stuff. The balance is what is affecting me more now. Pain at 2/10  Dominant Side: right  Past Medical History:    Active Ambulatory Problems     Diagnosis Date Noted    S/P total hip arthroplasty 04/06/2015    HTN (hypertension) 04/06/2015    HLD (hyperlipidemia) 04/06/2015    BPH (benign prostatic hyperplasia) 04/06/2015    Gout 04/06/2015    Anxiety 04/06/2015    Diabetes mellitus ([facility] [address] 04/06/2015     Resolved Ambulatory Problems     Diagnosis Date Noted    No Resolved Ambulatory Problems     Past Medical History:   Diagnosis Date    Anxiety     Edema of both legs     Enlarged prostate     Gout     Hip pain     History of elevated glucose     History of seasonal allergies     Hypercholesterolemia     Hypertension     Keratoacanthoma     S/P total hip arthroplasty 4/6/2015    Skin neoplasm     Spinal stenosis      Past Surgical History:   Procedure Laterality Date    HX TONSILLECTOMY  as child    HX WISDOM TEETH EXTRACTION  as teenager     Current Medications: Current Outpatient Prescriptions:     HYDROmorphone (DILAUDID) 2 mg tablet, Take 1 Tab by mouth every four (4) hours as needed. Max Daily Amount: 12 mg., Disp: 40 Tab, Rfl: 0    prazosin (MINIPRESS) 1 mg capsule, Take 1 mg by mouth three (3) times daily. TAKE AM OF SURGERY WITH SMALL SIP OF WATER   Indications: BENIGN PROSTATIC HYPERTROPHY, Disp: , Rfl:     amLODIPine (NORVASC) 10 mg tablet, Take 10 mg by mouth daily. TAKE AM OF SURGERY WITH SMALL SIP OF WATER, Disp: , Rfl:     labetalol (NORMODYNE) 200 mg tablet, Take 200 mg by mouth two (2) times a day. TAKE AM OF SURGERY WITH SMALL SIP OF WATER, Disp: , Rfl:     cloNIDine HCl (CATAPRES) 0.2 mg tablet, Take 0.2 mg by mouth nightly. Indications: HYPERTENSION, Disp: , Rfl:     finasteride (PROSCAR) 5 mg tablet, Take 5 mg by mouth daily. TAKE AM OF SURGERY WITH SMALL SIP OF WATER   Indications: BENIGN PROSTATIC HYPERTROPHY, Disp: , Rfl:     furosemide (LASIX) 40 mg tablet, Take 40 mg by mouth daily. , Disp: , Rfl:     Febuxostat (ULORIC) 80 mg tab tablet, Take 80 mg by mouth daily. TAKE AM OF SURGERY WITH SMALL SIP OF WATER   Indications: GOUT, Disp: , Rfl:     LORazepam (ATIVAN) 0.5 mg tablet, Take 0.5 mg by mouth daily as needed for Anxiety. TAKES 1/2 OF 1MG TABLET DAILY AS NEEDED, \"USUALLY EVERY DAY\", Disp: , Rfl:      Date Last Reviewed: 2/27/2017    Social History/Home Situation:   Social History     Social History    Marital status:      Spouse name: N/A    Number of children: N/A    Years of education: N/A     Occupational History    Not on file. Social History Main Topics    Smoking status: Never Smoker    Smokeless tobacco: Not on file    Alcohol use No    Drug use: Not on file    Sexual activity: Not on file     Other Topics Concern    Not on file     Social History Narrative           Work/Activity History: consultation  OBJECTIVE:    Outcome Measure:    Tool Used: Modified Oswestry Low Back Pain Questionnaire  Score: Initial: 12/50  Most Recent: 15/50 (Date: 1/2/17 )   Interpretation of Score: Each section is scored on a 0-5 scale, 5 representing the greatest disability. The scores of each section are added together for a total score of 50. Score 0 1-10 11-20 21-30 31-40 41-49 50   Modifier CH CI CJ CK CL CM CN     ? Changing and Maintaining Body Position:     - CURRENT STATUS:  - 20%-39% impaired, limited or restricted    - GOAL STATUS:   - 0% impaired, limited or restricted    - D/C STATUS:  ---------------To be determined---------------  Tool Used: Lower Extremity Functional Scale (LEFS)  Score:  Initial: 49/80 Most Recent: 53/80 (Date: 1/2/17 )   Interpretation of Score: 20 questions each scored on a 5 point scale with 0 representing \"extreme difficulty or unable to perform\" and 4 representing \"no difficulty\". The lower the score, the greater the functional disability. 80/80 represents no disability. Minimal detectable change is 9 points. Score 80 79-63 62-48 47-32 31-16 15-1 0   Modifier CH CI CJ CK CL CM CN         Observation/Orthostatic Postural Assessment:    Patient is obese individual with increased abdominal mass and lordosis in standing. Patient shows slight right rotation from thoracic spine in standing. He reports that the popping has subsided during some of his activity. Palpation:    (re-cert-1/2/17) . Patient shows decreased mobility in right hip due to replacement earlier this year. He does show decreased R LE edema compared to his previous PT treatments. Core strength tests:  Vertical compression test (VCT)- grade 3/5 with dysfunction at TL junction and right rotation (able to show improved posture and strength with testing)  Elbow flexion test (EFT)- Grade 2/5 with sluggish initiation, and endurance present  Lumbar protective mechanism (LPM)- Left A-P is sluggish grade 2, P-A grade 2 and present; Right A-P is grade 2 sluggish, endurance present, P-A is 2- and present.   ROM: Slight limitation in hip range of motion, but mostly limited in right rotation and side bending left                           Strength: Good and functional LE strength                 Special Tests: Vertical compression test (VCT)- grade 3/5 with dysfunction at TL junction and right rotation  Elbow flexion test (EFT)- Grade 2+/5 with sluggish initiation, and endurance present  Lumbar protective mechanism (LPM)- Left A-P is sluggish grade 2, P-A grade 2 and present; Right A-P is grade 1 sluggish, endurance present, P-A is 2- and present. Neurological Screen:   Myotomes: intact                  Dermatomes: intact                  Reflexes: intact                  Neural Tension Tests: (-)  Functional Mobility:       Patient has difficulty with functional mobility, standing for greater than 30 min and with exercise activity  Balance:    fair- Patient unable to stand greater than 1 sec in single leg stance Bilaterally  TREATMENT:    (In addition to Assessment/Re-Assessment sessions the following treatments were rendered)  Therapeutic Exercise: (45 Minutes):  Exercises per grid below to improve mobility, strength, balance and coordination. Required minimal visual, verbal and manual cues to promote proper body alignment, promote proper body posture and promote proper body mechanics. Progressed resistance, range and repetitions as indicated. Date:  2/27/2017     Activity/Exercise Parameters   SCI FIT Not today   Stretches  Hamstring bilaterally 3 x 30 sec  Piriformis bilaterally 3 x 20 sec  Hip flexor stretch   Education -25 min discussion on last two weeks workouts and where he can make a change and difference. Discussion on medication and effects with his body and with exercises.      Core training  Initiation x 2 min  -pelvic tilt  Marches-alternating with good core focus x 5 min   Treadmill (not today) Discussed x 2 min   Step up (not today) X 10 B    Glut training (not today) Bent over table with focus on form x 5 min   Sit to stand (not today) X 10 with good form   Balance Talked about challenging balance x 5 min           HEP-education on posture  Manual Therapy (     ):  Right and left hip stretches. STM to left glut med and min to border and bony contours  Worked border of sacrum and sacral sulcus  (Used abbreviations: MET - muscle energy technique; PNF - proprioceptive neuromuscular facilitation; NMR - neuromuscular re-education; a/p - anterior to posterior; p/a - posterior to anterior)   Therapeutic Modalities:                                                                                               HEP: As above; handouts given to patient for all exercises. ______________________________________________________________________________________________________    Treatment Assessment:  Patient shows some increase in right hamstring and knee pain. Worked to mobilize that today. Will discuss and show balance activities next treatment. Pain at 0/10  Progression/Medical Necessity:   · Patient is expected to demonstrate progress in strength, range of motion, balance, coordination and functional technique to improve functional mobility. · Patient demonstrates good rehab potential due to higher previous functional level. · Skilled intervention continues to be required due to core weakness and instability. Compliance with Program/Exercises: compliant all of the time. Reason for Continuation of Services/Other Comments:  · Patient continues to require skilled intervention due to decreased function. Recommendations/Intent for next treatment session: \"Treatment next visit will focus on core training, endurance\".  And balance   Total Treatment Duration:  PT Patient Time In/Time Out  Time In: 1400  Time Out: 1500    Kian Banerjee, PT, DPT, OCS

## 2017-03-13 ENCOUNTER — HOSPITAL ENCOUNTER (OUTPATIENT)
Dept: PHYSICAL THERAPY | Age: 72
Discharge: HOME | End: 2017-03-13
Payer: MEDICARE

## 2017-03-13 PROCEDURE — 97140 MANUAL THERAPY 1/> REGIONS: CPT

## 2017-03-13 PROCEDURE — 97110 THERAPEUTIC EXERCISES: CPT

## 2017-03-13 NOTE — PROGRESS NOTES
Benoit Beasley   ([id number]) [address]  at  [facility]  [address] [place] [address]  [phone]   [phone]           Outpatient PHYSICAL THERAPY: Daily Note  Fall Risk Score: 2 (? 5 = High Risk)       ICD-10: Treatment Diagnosis: Low back pain, M54.5  Difficulty walking, not elsewhere classified, R26.2    REFERRING PHYSICIAN: Les Llanes MD MD Orders: Evaluate and Treat  Return Physician Appointment: not known  MEDICAL/REFERRING DIAGNOSIS: Lumbosacral stenosis with neurogenic claudication, DDD, Lumbosacral spondylosis without myelopathy, other symptoms referrable to back  DATE OF ONSET: Chronic   PRIOR LEVEL OF FUNCTION: independent  PRECAUTIONS/ALLERGIES:   Allergies   Allergen Reactions    Other Medication Other (comments)     \"Some BP meds\" cause \"  SWELLING OF LEGS with Procardia and with current medicine    Statins-Hmg-Coa Reductase Inhibitors Other (comments)     Extreme nerve pain       ASSESSMENT:  ?????? ? ? This section established at most recent assessment??????????  Benoit Beasley has been seen for 31 visits from 12/11/15 to 3/13/2017 for lumbar spine pain and spinal stenosis. Patient has performed therapeutic exercises, activities, and had manual therapy to increased strength, ROM and function. Patient has also used modalities for pain control in order to increase function. He is becoming more consistent with his workout program at the gym and is seeing benefits. He still has lack of core strength at this time. Balance has also become a difficulty for this patient due to neuropathy. Patient has shown an increase in function per the Modified Oswestry Disability Index score with scores of 15/50. Patient has progressed well toward their goals and will benefit from continuing skilled PT in order to address their impairments. Carlos Alvarado, PT, DPT, OCS  3/13/2017      PROBLEM LIST (Impairments causing functional limitations):  1.  Decreased Strength affecting function  2. Decreased ADL/Functional Activities  3. Decreased Flexibility/joint mobility  4. Decreased transfer abilities  5. Increased Pain affecting function  6. Decreased Activity tolerance   7. Increased Fatigue affecting function  GOALS: (Goals have been discussed and agreed upon with patient.)  1. DISCHARGE GOALS: Time Frame: 12 weeks   2. Patient will report a greater than 50% improvement in overall symptoms in order to return to increased activity level(MET-4/25/16)  3. Patient will be independent in advanced core training exercises to return to functional mobility (ongoing)  4. Patient will show a greater than 5 point decrease on the Modified Oswestry Disability Index score in order to show an increase in function (ongoing)  5. Patient will report standing for greater than 30 min with work activity (MET-11/7/16)  6. NEW GOAL:  7. Patient will walk greater than 12 laps in the gym during gym session. (ongoing)  8. Patient will perform core exercises as part of his routine greater than 4 times a week  REHABILITATION POTENTIAL FOR STATED GOALS: GoodPLAN OF CARE:  INTERVENTIONS PLANNED: (Benefits and precautions of physical therapy have been discussed with the patient.)  1. balance exercise  2. bed mobility  3. cold  4. electrical stimulation  5. gait training  6. heat  7. manual therapy (including instrument assisted soft tissue mobilization)  8. neuromuscular re-education/strengthening  9. range of motion: active/assisted/passive  10. therapeutic activities  11. therapeutic exercise/strengthening  12. transfer training  13. Other: Aquatics  TREATMENT PLAN EFFECTIVE DATES: 1/2/17 TO 4/2/17  FREQUENCY/DURATION: Follow patient 1 time every other week for 12 weeks to address above goals. Regarding James Potocki's therapy, I certify that the treatment plan above will be carried out by a therapist or under their direction.   Thank you for this referral,  Tena Regan, PT     Referring Physician Signature: Dalila Addison MD          Date                                SUBJECTIVE:    History of Present Injury/Illness (Reason for Referral): Patient report that he has had a long history of back issues. He first started to have health and difficulty functioning with a presence of gout for about a year, then had his hip replaced in April of this year. Now, his is trying to regain his back strength and function. He has difficulty walking for long periods and standing in one place. His goal is to become more active and functional as well as control his health. Patient had an injection a few weeks ago and had some relief from that. MRI results: Copy in paper chart showing:  Abdominal hernia surgery performed in   Present Symptoms: Patient reports he went to the foot doctor who only really gave him some suggestions of surgeries he could do and did not offer other suggestions for his left foot pain. Pain at 2/10  Dominant Side: right  Past Medical History:    Active Ambulatory Problems     Diagnosis Date Noted    S/P total hip arthroplasty 04/06/2015    HTN (hypertension) 04/06/2015    HLD (hyperlipidemia) 04/06/2015    BPH (benign prostatic hyperplasia) 04/06/2015    Gout 04/06/2015    Anxiety 04/06/2015    Diabetes mellitus ([facility] [address] 04/06/2015     Resolved Ambulatory Problems     Diagnosis Date Noted    No Resolved Ambulatory Problems     Past Medical History:   Diagnosis Date    Anxiety     Edema of both legs     Enlarged prostate     Gout     Hip pain     History of elevated glucose     History of seasonal allergies     Hypercholesterolemia     Hypertension     Keratoacanthoma     S/P total hip arthroplasty 4/6/2015    Skin neoplasm     Spinal stenosis      Past Surgical History:   Procedure Laterality Date    HX TONSILLECTOMY  as child    HX WISDOM TEETH EXTRACTION  as teenager     Current Medications:   Current Outpatient Prescriptions:     HYDROmorphone (DILAUDID) 2 mg tablet, Take 1 Tab by mouth every four (4) hours as needed. Max Daily Amount: 12 mg., Disp: 40 Tab, Rfl: 0    prazosin (MINIPRESS) 1 mg capsule, Take 1 mg by mouth three (3) times daily. TAKE AM OF SURGERY WITH SMALL SIP OF WATER   Indications: BENIGN PROSTATIC HYPERTROPHY, Disp: , Rfl:     amLODIPine (NORVASC) 10 mg tablet, Take 10 mg by mouth daily. TAKE AM OF SURGERY WITH SMALL SIP OF WATER, Disp: , Rfl:     labetalol (NORMODYNE) 200 mg tablet, Take 200 mg by mouth two (2) times a day. TAKE AM OF SURGERY WITH SMALL SIP OF WATER, Disp: , Rfl:     cloNIDine HCl (CATAPRES) 0.2 mg tablet, Take 0.2 mg by mouth nightly. Indications: HYPERTENSION, Disp: , Rfl:     finasteride (PROSCAR) 5 mg tablet, Take 5 mg by mouth daily. TAKE AM OF SURGERY WITH SMALL SIP OF WATER   Indications: BENIGN PROSTATIC HYPERTROPHY, Disp: , Rfl:     furosemide (LASIX) 40 mg tablet, Take 40 mg by mouth daily. , Disp: , Rfl:     Febuxostat (ULORIC) 80 mg tab tablet, Take 80 mg by mouth daily. TAKE AM OF SURGERY WITH SMALL SIP OF WATER   Indications: GOUT, Disp: , Rfl:     LORazepam (ATIVAN) 0.5 mg tablet, Take 0.5 mg by mouth daily as needed for Anxiety. TAKES 1/2 OF 1MG TABLET DAILY AS NEEDED, \"USUALLY EVERY DAY\", Disp: , Rfl:      Date Last Reviewed: 3/13/2017    Social History/Home Situation:   Social History     Social History    Marital status:      Spouse name: N/A    Number of children: N/A    Years of education: N/A     Occupational History    Not on file. Social History Main Topics    Smoking status: Never Smoker    Smokeless tobacco: Not on file    Alcohol use No    Drug use: Not on file    Sexual activity: Not on file     Other Topics Concern    Not on file     Social History Narrative           Work/Activity History: consultation  OBJECTIVE:    Outcome Measure:    Tool Used: Modified Oswestry Low Back Pain Questionnaire  Score:  Initial: 12/50  Most Recent: 15/50 (Date: 1/2/17 )   Interpretation of Score: Each section is scored on a 0-5 scale, 5 representing the greatest disability. The scores of each section are added together for a total score of 50. Score 0 1-10 11-20 21-30 31-40 41-49 50   Modifier CH CI CJ CK CL CM CN     ? Changing and Maintaining Body Position:     - CURRENT STATUS:  - 20%-39% impaired, limited or restricted    - GOAL STATUS:   - 0% impaired, limited or restricted    - D/C STATUS:  ---------------To be determined---------------  Tool Used: Lower Extremity Functional Scale (LEFS)  Score:  Initial: 49/80 Most Recent: 53/80 (Date: 1/2/17 )   Interpretation of Score: 20 questions each scored on a 5 point scale with 0 representing \"extreme difficulty or unable to perform\" and 4 representing \"no difficulty\". The lower the score, the greater the functional disability. 80/80 represents no disability. Minimal detectable change is 9 points. Score 80 79-63 62-48 47-32 31-16 15-1 0   Modifier CH CI CJ CK CL CM CN         Observation/Orthostatic Postural Assessment:    Patient is obese individual with increased abdominal mass and lordosis in standing. Patient shows slight right rotation from thoracic spine in standing. He reports that the popping has subsided during some of his activity. Palpation:    (re-cert-1/2/17) . Patient shows decreased mobility in right hip due to replacement earlier this year. He does show decreased R LE edema compared to his previous PT treatments. Core strength tests:  Vertical compression test (VCT)- grade 3/5 with dysfunction at TL junction and right rotation (able to show improved posture and strength with testing)  Elbow flexion test (EFT)- Grade 2/5 with sluggish initiation, and endurance present  Lumbar protective mechanism (LPM)- Left A-P is sluggish grade 2, P-A grade 2 and present; Right A-P is grade 2 sluggish, endurance present, P-A is 2- and present.   ROM: Slight limitation in hip range of motion, but mostly limited in right rotation and side bending left                           Strength: Good and functional LE strength                 Special Tests: Vertical compression test (VCT)- grade 3/5 with dysfunction at TL junction and right rotation  Elbow flexion test (EFT)- Grade 2+/5 with sluggish initiation, and endurance present  Lumbar protective mechanism (LPM)- Left A-P is sluggish grade 2, P-A grade 2 and present; Right A-P is grade 1 sluggish, endurance present, P-A is 2- and present. Neurological Screen:   Myotomes: intact                  Dermatomes: intact                  Reflexes: intact                  Neural Tension Tests: (-)  Functional Mobility:       Patient has difficulty with functional mobility, standing for greater than 30 min and with exercise activity  Balance:    fair- Patient unable to stand greater than 1 sec in single leg stance Bilaterally  TREATMENT:    (In addition to Assessment/Re-Assessment sessions the following treatments were rendered)  Therapeutic Exercise: (30 Minutes):  Exercises per grid below to improve mobility, strength, balance and coordination. Required minimal visual, verbal and manual cues to promote proper body alignment, promote proper body posture and promote proper body mechanics. Progressed resistance, range and repetitions as indicated.    Date:  3/13/2017     Activity/Exercise Parameters   SCI FIT Not today   Stretches  Hamstring bilaterally 3 x 30 sec  Piriformis bilaterally 3 x 20 sec  Hip flexor stretch   Education -15 min discussion on shoe fit,wear, orthotics and his foot issues that lead to his back pain     Core training  reviewed   Treadmill (not today) Discussed x 2 min   Step up (not today) X 10 B    Glut training (not today) Bent over table with focus on form x 5 min   Sit to stand (not today) X 10 with good form   Balance Talked about challenging balance x 5 min  Tandem stance work x 5 min           HEP-education on posture  Manual Therapy (    Soft Tissue Mobilization Duration  Duration: 15 Minutes):  Right and left hip stretches. STM to left glut med and min to border and bony contours  Worked border of sacrum and sacral sulcus  (Used abbreviations: MET - muscle energy technique; PNF - proprioceptive neuromuscular facilitation; NMR - neuromuscular re-education; a/p - anterior to posterior; p/a - posterior to anterior)   Therapeutic Modalities:                                                                                               HEP: As above; handouts given to patient for all exercises. ______________________________________________________________________________________________________    Treatment Assessment:  Patient shows understanding on what to start with on progression for balance and core training. Pain at 0/10  Progression/Medical Necessity:   · Patient is expected to demonstrate progress in strength, range of motion, balance, coordination and functional technique to improve functional mobility. · Patient demonstrates good rehab potential due to higher previous functional level. · Skilled intervention continues to be required due to core weakness and instability. Compliance with Program/Exercises: compliant all of the time. Reason for Continuation of Services/Other Comments:  · Patient continues to require skilled intervention due to decreased function. Recommendations/Intent for next treatment session: \"Treatment next visit will focus on core training, endurance\".  And balance   Total Treatment Duration:  PT Patient Time In/Time Out  Time In: 1355  Time Out: 1500    Annmarie Rodriguez, PT, DPT, OCS

## 2017-03-27 ENCOUNTER — APPOINTMENT (OUTPATIENT)
Dept: PHYSICAL THERAPY | Age: 72
End: 2017-03-27
Payer: MEDICARE

## 2017-04-04 ENCOUNTER — HOSPITAL ENCOUNTER (OUTPATIENT)
Dept: PHYSICAL THERAPY | Age: 72
Discharge: HOME | End: 2017-04-04
Payer: MEDICARE

## 2017-04-04 PROCEDURE — 97110 THERAPEUTIC EXERCISES: CPT

## 2017-04-04 PROCEDURE — 97164 PT RE-EVAL EST PLAN CARE: CPT

## 2017-04-04 PROCEDURE — G8982 BODY POS GOAL STATUS: HCPCS

## 2017-04-04 PROCEDURE — G8981 BODY POS CURRENT STATUS: HCPCS

## 2017-04-04 NOTE — PROGRESS NOTES
Marcela Patterson   (TFD:[id number]) [address]  at  [facility]  [address] [place] [address]  [phone]   [phone]           Outpatient PHYSICAL THERAPY: Daily Note, Re-evaluation and Recertification  Fall Risk Score: 2 (? 5 = High Risk)       ICD-10: Treatment Diagnosis: Low back pain, M54.5  Difficulty walking, not elsewhere classified, R26.2    REFERRING PHYSICIAN: Araceli Queen MD MD Orders: Evaluate and Treat  Return Physician Appointment: not known  MEDICAL/REFERRING DIAGNOSIS: Lumbosacral stenosis with neurogenic claudication, DDD, Lumbosacral spondylosis without myelopathy, other symptoms referrable to back  DATE OF ONSET: Chronic   PRIOR LEVEL OF FUNCTION: independent  PRECAUTIONS/ALLERGIES:   Allergies   Allergen Reactions    Other Medication Other (comments)     \"Some BP meds\" cause \"  SWELLING OF LEGS with Procardia and with current medicine    Statins-Hmg-Coa Reductase Inhibitors Other (comments)     Extreme nerve pain       ASSESSMENT:  ?????? ? ? This section established at most recent assessment??????????  Marcela Patterson has been seen for 37 visits from 12/11/15 to 4/4/2017 for lumbar spine pain and spinal stenosis. Patient has performed therapeutic exercises, activities, and had manual therapy to increased strength, ROM and function. Patient has also used modalities for pain control in order to increase function. He is becoming more consistent with his workout program at the gym and is seeing benefits. He still has lack of core strength at this time. Balance has also become a difficulty for this patient due to neuropathy. Patient has shown no change in function per the Modified Oswestry Disability Index score with scores of 18/50. Patient has progressed well toward their goals and will benefit from continuing skilled PT in order to address their impairments.   Leontine Angelucci, PT, DPT, OCS  4/4/2017      PROBLEM LIST (Impairments causing functional limitations):  1. Decreased Strength affecting function  2. Decreased ADL/Functional Activities  3. Decreased Flexibility/joint mobility  4. Decreased transfer abilities  5. Increased Pain affecting function  6. Decreased Activity tolerance   7. Increased Fatigue affecting function  GOALS: (Goals have been discussed and agreed upon with patient.)  1. DISCHARGE GOALS: Time Frame: 12 weeks   2. Patient will be independent in advanced core training exercises to return to functional mobility (ongoing)  3. Patient will show a greater than 5 point decrease on the Modified Oswestry Disability Index score in order to show an increase in function (ongoing)  4. Patient will report standing for greater than 30 min with work activity (MET-11/7/16)  5. NEW GOAL:  6. Patient will walk greater than 12 laps in the gym during gym session. (ongoing)  7. Patient will perform core exercises as part of his routine greater than 4 times a week (ongoing)  REHABILITATION POTENTIAL FOR STATED GOALS: GoodPLAN OF CARE:  INTERVENTIONS PLANNED: (Benefits and precautions of physical therapy have been discussed with the patient.)  1. balance exercise  2. bed mobility  3. cold  4. electrical stimulation  5. gait training  6. heat  7. manual therapy (including instrument assisted soft tissue mobilization)  8. neuromuscular re-education/strengthening  9. range of motion: active/assisted/passive  10. therapeutic activities  11. therapeutic exercise/strengthening  12. transfer training  13. Other: Aquatics  TREATMENT PLAN EFFECTIVE DATES: 4/4/17 TO 7/4/17  FREQUENCY/DURATION: Follow patient 1 time every other week for 12 weeks to address above goals. Regarding James Potocki's therapy, I certify that the treatment plan above will be carried out by a therapist or under their direction.   Thank you for this referral,  Charlotte Vela PT     Referring Physician Signature: Tunde Everett MD          Date SUBJECTIVE:    History of Present Injury/Illness (Reason for Referral): Patient report that he has had a long history of back issues. He first started to have health and difficulty functioning with a presence of gout for about a year, then had his hip replaced in April of this year. Now, his is trying to regain his back strength and function. He has difficulty walking for long periods and standing in one place. His goal is to become more active and functional as well as control his health. Patient had an injection a few weeks ago and had some relief from that. MRI results: Copy in paper chart showing:  Abdominal hernia surgery performed in   Present Symptoms: Patient reports he went to the foot doctor who only really gave him some suggestions of surgeries he could do and did not offer other suggestions for his left foot pain. Pain at 2/10  Dominant Side: right  Past Medical History: Active Ambulatory Problems     Diagnosis Date Noted    S/P total hip arthroplasty 04/06/2015    HTN (hypertension) 04/06/2015    HLD (hyperlipidemia) 04/06/2015    BPH (benign prostatic hyperplasia) 04/06/2015    Gout 04/06/2015    Anxiety 04/06/2015    Diabetes mellitus ([facility] [address] 04/06/2015     Resolved Ambulatory Problems     Diagnosis Date Noted    No Resolved Ambulatory Problems     Past Medical History:   Diagnosis Date    Anxiety     Edema of both legs     Enlarged prostate     Gout     Hip pain     History of elevated glucose     History of seasonal allergies     Hypercholesterolemia     Hypertension     Keratoacanthoma     S/P total hip arthroplasty 4/6/2015    Skin neoplasm     Spinal stenosis      Past Surgical History:   Procedure Laterality Date    HX TONSILLECTOMY  as child    HX WISDOM TEETH EXTRACTION  as teenager     Current Medications:   Current Outpatient Prescriptions:     HYDROmorphone (DILAUDID) 2 mg tablet, Take 1 Tab by mouth every four (4) hours as needed.  Max Daily Amount: 12 mg., Disp: 40 Tab, Rfl: 0    prazosin (MINIPRESS) 1 mg capsule, Take 1 mg by mouth three (3) times daily. TAKE AM OF SURGERY WITH SMALL SIP OF WATER   Indications: BENIGN PROSTATIC HYPERTROPHY, Disp: , Rfl:     amLODIPine (NORVASC) 10 mg tablet, Take 10 mg by mouth daily. TAKE AM OF SURGERY WITH SMALL SIP OF WATER, Disp: , Rfl:     labetalol (NORMODYNE) 200 mg tablet, Take 200 mg by mouth two (2) times a day. TAKE AM OF SURGERY WITH SMALL SIP OF WATER, Disp: , Rfl:     cloNIDine HCl (CATAPRES) 0.2 mg tablet, Take 0.2 mg by mouth nightly. Indications: HYPERTENSION, Disp: , Rfl:     finasteride (PROSCAR) 5 mg tablet, Take 5 mg by mouth daily. TAKE AM OF SURGERY WITH SMALL SIP OF WATER   Indications: BENIGN PROSTATIC HYPERTROPHY, Disp: , Rfl:     furosemide (LASIX) 40 mg tablet, Take 40 mg by mouth daily. , Disp: , Rfl:     Febuxostat (ULORIC) 80 mg tab tablet, Take 80 mg by mouth daily. TAKE AM OF SURGERY WITH SMALL SIP OF WATER   Indications: GOUT, Disp: , Rfl:     LORazepam (ATIVAN) 0.5 mg tablet, Take 0.5 mg by mouth daily as needed for Anxiety. TAKES 1/2 OF 1MG TABLET DAILY AS NEEDED, \"USUALLY EVERY DAY\", Disp: , Rfl:      Date Last Reviewed: 4/4/2017    Social History/Home Situation:   Social History     Social History    Marital status:      Spouse name: N/A    Number of children: N/A    Years of education: N/A     Occupational History    Not on file. Social History Main Topics    Smoking status: Never Smoker    Smokeless tobacco: Not on file    Alcohol use No    Drug use: Not on file    Sexual activity: Not on file     Other Topics Concern    Not on file     Social History Narrative           Work/Activity History: consultation  OBJECTIVE:    Outcome Measure: Tool Used: Modified Oswestry Low Back Pain Questionnaire  Score:  Initial: 12/50  Most Recent: 18/50 (Date: 4/4/17 )   Interpretation of Score: Each section is scored on a 0-5 scale, 5 representing the greatest disability.   The scores of each section are added together for a total score of 50. Score 0 1-10 11-20 21-30 31-40 41-49 50   Modifier CH CI CJ CK CL CM CN     ? Changing and Maintaining Body Position:     - CURRENT STATUS: CJ - 20%-39% impaired, limited or restricted    - GOAL STATUS:  CH - 0% impaired, limited or restricted    - D/C STATUS:  ---------------To be determined---------------  Tool Used: Lower Extremity Functional Scale (LEFS)  Score:  Initial: 49/80 Most Recent: 54/80 (Date: 4/4/17 )   Interpretation of Score: 20 questions each scored on a 5 point scale with 0 representing \"extreme difficulty or unable to perform\" and 4 representing \"no difficulty\". The lower the score, the greater the functional disability. 80/80 represents no disability. Minimal detectable change is 9 points. Score 80 79-63 62-48 47-32 31-16 15-1 0   Modifier CH CI CJ CK CL CM CN         Observation/Orthostatic Postural Assessment:    Patient is obese individual with increased abdominal mass and lordosis in standing. Patient shows slight right rotation from thoracic spine in standing. He reports that the popping has subsided during some of his activity. Palpation:    (re-cert-4/4/17) . Patient shows decreased mobility in right hip due to replacement earlier this year. He does show decreased R LE edema compared to his previous PT treatments. Core strength tests:  Vertical compression test (VCT)- grade 3/5 with dysfunction at TL junction and right rotation (able to show improved posture and strength with testing)  Elbow flexion test (EFT)- Grade 2/5 with sluggish initiation, and endurance present  Lumbar protective mechanism (LPM)- Left A-P is sluggish grade 2, P-A grade 2 and present; Right A-P is grade 2 sluggish, endurance present, P-A is 2- and present.   ROM: Slight limitation in hip range of motion, but mostly limited in right rotation and side bending left                           Strength: Good and functional LE strength                 Special Tests: Vertical compression test (VCT)- grade 3/5 with dysfunction at TL junction and right rotation  Elbow flexion test (EFT)- Grade 2+/5 with sluggish initiation, and endurance present  Lumbar protective mechanism (LPM)- Left A-P is sluggish grade 2, P-A grade 2 and present; Right A-P is grade 1 sluggish, endurance present, P-A is 2- and present. Neurological Screen:   Myotomes: intact                  Dermatomes: intact                  Reflexes: intact                  Neural Tension Tests: (-)  Functional Mobility:       Patient has difficulty with functional mobility, standing for greater than 30 min and with exercise activity  Balance:    fair- Patient unable to stand greater than 1 sec in single leg stance Bilaterally  TREATMENT:    (In addition to Assessment/Re-Assessment sessions the following treatments were rendered)  Therapeutic Exercise: (15 Minutes):  Exercises per grid below to improve mobility, strength, balance and coordination. Required minimal visual, verbal and manual cues to promote proper body alignment, promote proper body posture and promote proper body mechanics. Progressed resistance, range and repetitions as indicated. Date:  4/4/2017     Activity/Exercise Parameters   SCI FIT Not today   Stretches  Hamstring bilaterally 3 x 30 sec  Piriformis bilaterally 3 x 20 sec  Hip flexor stretch   Education      Core training  reviewed   Treadmill (not today) Discussed x 2 min   Step up (not today) X 10 B    Glut training (not today) Bent over table with focus on form x 5 min   Sit to stand (not today) X 10 with good form   Balance Talked about challenging balance x 5 min  Tandem stance work x 5 min           HEP-education on posture  Manual Therapy (     ):  Right and left hip stretches.   STM to left glut med and min to border and bony contours  Worked border of sacrum and sacral sulcus  (Used abbreviations: MET - muscle energy technique; PNF - proprioceptive neuromuscular facilitation; NMR - neuromuscular re-education; a/p - anterior to posterior; p/a - posterior to anterior)   Therapeutic Modalities:                                                                                               HEP: As above; handouts given to patient for all exercises. ______________________________________________________________________________________________________    Treatment Assessment:  Patient shows understanding on what to start with on progression for balance and core training. Continue to see patient every other week for continuation of core training Pain at 1/10  Progression/Medical Necessity:   · Patient is expected to demonstrate progress in strength, range of motion, balance, coordination and functional technique to improve functional mobility. · Patient demonstrates good rehab potential due to higher previous functional level. · Skilled intervention continues to be required due to core weakness and instability. Compliance with Program/Exercises: compliant all of the time. Reason for Continuation of Services/Other Comments:  · Patient continues to require skilled intervention due to decreased function. Recommendations/Intent for next treatment session: \"Treatment next visit will focus on core training, endurance\".  And balance   Total Treatment Duration:  PT Patient Time In/Time Out  Time In: 1032  Time Out: Emerson Ratliff PT, DPT, OCS

## 2017-04-18 ENCOUNTER — HOSPITAL ENCOUNTER (OUTPATIENT)
Dept: PHYSICAL THERAPY | Age: 72
Discharge: HOME | End: 2017-04-18
Payer: MEDICARE

## 2017-04-18 PROCEDURE — 97140 MANUAL THERAPY 1/> REGIONS: CPT

## 2017-04-18 PROCEDURE — 97110 THERAPEUTIC EXERCISES: CPT

## 2017-04-18 NOTE — PROGRESS NOTES
Jah Blake   ([id number]) [address]  at  [facility]  [address] [place] [address]  [phone]   [phone]           Outpatient PHYSICAL THERAPY: Daily Note  Fall Risk Score: 2 (? 5 = High Risk)       ICD-10: Treatment Diagnosis: Low back pain, M54.5  Difficulty walking, not elsewhere classified, R26.2    REFERRING PHYSICIAN: Lupe Pandya MD MD Orders: Evaluate and Treat  Return Physician Appointment: not known  MEDICAL/REFERRING DIAGNOSIS: Lumbosacral stenosis with neurogenic claudication, DDD, Lumbosacral spondylosis without myelopathy, other symptoms referrable to back  DATE OF ONSET: Chronic   PRIOR LEVEL OF FUNCTION: independent  PRECAUTIONS/ALLERGIES:   Allergies   Allergen Reactions    Other Medication Other (comments)     \"Some BP meds\" cause \"  SWELLING OF LEGS with Procardia and with current medicine    Statins-Hmg-Coa Reductase Inhibitors Other (comments)     Extreme nerve pain       ASSESSMENT:  ?????? ? ? This section established at most recent assessment??????????  Jah Blake has been seen for 37 visits from 12/11/15 to 4/18/2017 for lumbar spine pain and spinal stenosis. Patient has performed therapeutic exercises, activities, and had manual therapy to increased strength, ROM and function. Patient has also used modalities for pain control in order to increase function. He is becoming more consistent with his workout program at the gym and is seeing benefits. He still has lack of core strength at this time. Balance has also become a difficulty for this patient due to neuropathy. Patient has shown no change in function per the Modified Oswestry Disability Index score with scores of 18/50. Patient has progressed well toward their goals and will benefit from continuing skilled PT in order to address their impairments. Haseeb Little, PT, DPT, OCS  4/18/2017      PROBLEM LIST (Impairments causing functional limitations):  1.  Decreased Strength affecting function  2. Decreased ADL/Functional Activities  3. Decreased Flexibility/joint mobility  4. Decreased transfer abilities  5. Increased Pain affecting function  6. Decreased Activity tolerance   7. Increased Fatigue affecting function  GOALS: (Goals have been discussed and agreed upon with patient.)  1. DISCHARGE GOALS: Time Frame: 12 weeks   2. Patient will be independent in advanced core training exercises to return to functional mobility (ongoing)  3. Patient will show a greater than 5 point decrease on the Modified Oswestry Disability Index score in order to show an increase in function (ongoing)  4. Patient will report standing for greater than 30 min with work activity (MET-11/7/16)  5. NEW GOAL:  6. Patient will walk greater than 12 laps in the gym during gym session. (ongoing)  7. Patient will perform core exercises as part of his routine greater than 4 times a week (ongoing)  REHABILITATION POTENTIAL FOR STATED GOALS: GoodPLAN OF CARE:  INTERVENTIONS PLANNED: (Benefits and precautions of physical therapy have been discussed with the patient.)  1. balance exercise  2. bed mobility  3. cold  4. electrical stimulation  5. gait training  6. heat  7. manual therapy (including instrument assisted soft tissue mobilization)  8. neuromuscular re-education/strengthening  9. range of motion: active/assisted/passive  10. therapeutic activities  11. therapeutic exercise/strengthening  12. transfer training  13. Other: Aquatics  TREATMENT PLAN EFFECTIVE DATES: 4/4/17 TO 7/4/17  FREQUENCY/DURATION: Follow patient 1 time every other week for 12 weeks to address above goals. Regarding James Potocki's therapy, I certify that the treatment plan above will be carried out by a therapist or under their direction.   Thank you for this referral,  Glen Mena PT     Referring Physician Signature: Jameel Nava MD          Date                                SUBJECTIVE:    History of Present Injury/Illness (Reason for Referral): Patient report that he has had a long history of back issues. He first started to have health and difficulty functioning with a presence of gout for about a year, then had his hip replaced in April of this year. Now, his is trying to regain his back strength and function. He has difficulty walking for long periods and standing in one place. His goal is to become more active and functional as well as control his health. Patient had an injection a few weeks ago and had some relief from that. MRI results: Copy in paper chart showing:  Abdominal hernia surgery performed in   Present Symptoms: Patient reports he is doing pretty good with some right knee pain, but his activity is going steady. Pain at 2/10  Dominant Side: right  Past Medical History: Active Ambulatory Problems     Diagnosis Date Noted    S/P total hip arthroplasty 04/06/2015    HTN (hypertension) 04/06/2015    HLD (hyperlipidemia) 04/06/2015    BPH (benign prostatic hyperplasia) 04/06/2015    Gout 04/06/2015    Anxiety 04/06/2015    Diabetes mellitus ([facility] [address] 04/06/2015     Resolved Ambulatory Problems     Diagnosis Date Noted    No Resolved Ambulatory Problems     Past Medical History:   Diagnosis Date    Anxiety     Edema of both legs     Enlarged prostate     Gout     Hip pain     History of elevated glucose     History of seasonal allergies     Hypercholesterolemia     Hypertension     Keratoacanthoma     S/P total hip arthroplasty 4/6/2015    Skin neoplasm     Spinal stenosis      Past Surgical History:   Procedure Laterality Date    HX TONSILLECTOMY  as child    HX WISDOM TEETH EXTRACTION  as teenager     Current Medications:   Current Outpatient Prescriptions:     HYDROmorphone (DILAUDID) 2 mg tablet, Take 1 Tab by mouth every four (4) hours as needed. Max Daily Amount: 12 mg., Disp: 40 Tab, Rfl: 0    prazosin (MINIPRESS) 1 mg capsule, Take 1 mg by mouth three (3) times daily.  TAKE AM OF SURGERY WITH SMALL SIP OF WATER   Indications: BENIGN PROSTATIC HYPERTROPHY, Disp: , Rfl:     amLODIPine (NORVASC) 10 mg tablet, Take 10 mg by mouth daily. TAKE AM OF SURGERY WITH SMALL SIP OF WATER, Disp: , Rfl:     labetalol (NORMODYNE) 200 mg tablet, Take 200 mg by mouth two (2) times a day. TAKE AM OF SURGERY WITH SMALL SIP OF WATER, Disp: , Rfl:     cloNIDine HCl (CATAPRES) 0.2 mg tablet, Take 0.2 mg by mouth nightly. Indications: HYPERTENSION, Disp: , Rfl:     finasteride (PROSCAR) 5 mg tablet, Take 5 mg by mouth daily. TAKE AM OF SURGERY WITH SMALL SIP OF WATER   Indications: BENIGN PROSTATIC HYPERTROPHY, Disp: , Rfl:     furosemide (LASIX) 40 mg tablet, Take 40 mg by mouth daily. , Disp: , Rfl:     Febuxostat (ULORIC) 80 mg tab tablet, Take 80 mg by mouth daily. TAKE AM OF SURGERY WITH SMALL SIP OF WATER   Indications: GOUT, Disp: , Rfl:     LORazepam (ATIVAN) 0.5 mg tablet, Take 0.5 mg by mouth daily as needed for Anxiety. TAKES 1/2 OF 1MG TABLET DAILY AS NEEDED, \"USUALLY EVERY DAY\", Disp: , Rfl:      Date Last Reviewed: 4/18/2017    Social History/Home Situation:   Social History     Social History    Marital status:      Spouse name: N/A    Number of children: N/A    Years of education: N/A     Occupational History    Not on file. Social History Main Topics    Smoking status: Never Smoker    Smokeless tobacco: Not on file    Alcohol use No    Drug use: Not on file    Sexual activity: Not on file     Other Topics Concern    Not on file     Social History Narrative           Work/Activity History: consultation  OBJECTIVE:    Outcome Measure: Tool Used: Modified Oswestry Low Back Pain Questionnaire  Score:  Initial: 12/50  Most Recent: 18/50 (Date: 4/4/17 )   Interpretation of Score: Each section is scored on a 0-5 scale, 5 representing the greatest disability. The scores of each section are added together for a total score of 50.     Score 0 1-10 11-20 21-30 31-40 41-49 50   Modifier CH CI CJ CK CL CM CN     ? Changing and Maintaining Body Position:     - CURRENT STATUS: CJ - 20%-39% impaired, limited or restricted    - GOAL STATUS:  CH - 0% impaired, limited or restricted    - D/C STATUS:  ---------------To be determined---------------  Tool Used: Lower Extremity Functional Scale (LEFS)  Score:  Initial: 49/80 Most Recent: 54/80 (Date: 4/4/17 )   Interpretation of Score: 20 questions each scored on a 5 point scale with 0 representing \"extreme difficulty or unable to perform\" and 4 representing \"no difficulty\". The lower the score, the greater the functional disability. 80/80 represents no disability. Minimal detectable change is 9 points. Score 80 79-63 62-48 47-32 31-16 15-1 0   Modifier CH CI CJ CK CL CM CN         Observation/Orthostatic Postural Assessment:    Patient is obese individual with increased abdominal mass and lordosis in standing. Patient shows slight right rotation from thoracic spine in standing. He reports that the popping has subsided during some of his activity. Palpation:    (re-cert-4/4/17) . Patient shows decreased mobility in right hip due to replacement earlier this year. He does show decreased R LE edema compared to his previous PT treatments. Core strength tests:  Vertical compression test (VCT)- grade 3/5 with dysfunction at TL junction and right rotation (able to show improved posture and strength with testing)  Elbow flexion test (EFT)- Grade 2/5 with sluggish initiation, and endurance present  Lumbar protective mechanism (LPM)- Left A-P is sluggish grade 2, P-A grade 2 and present; Right A-P is grade 2 sluggish, endurance present, P-A is 2- and present.   ROM: Slight limitation in hip range of motion, but mostly limited in right rotation and side bending left                           Strength: Good and functional LE strength                 Special Tests: Vertical compression test (VCT)- grade 3/5 with dysfunction at TL junction and right rotation  Elbow flexion test (EFT)- Grade 2+/5 with sluggish initiation, and endurance present  Lumbar protective mechanism (LPM)- Left A-P is sluggish grade 2, P-A grade 2 and present; Right A-P is grade 1 sluggish, endurance present, P-A is 2- and present. Neurological Screen:   Myotomes: intact                  Dermatomes: intact                  Reflexes: intact                  Neural Tension Tests: (-)  Functional Mobility:       Patient has difficulty with functional mobility, standing for greater than 30 min and with exercise activity  Balance:    fair- Patient unable to stand greater than 1 sec in single leg stance Bilaterally  TREATMENT:    (In addition to Assessment/Re-Assessment sessions the following treatments were rendered)  Therapeutic Exercise: (15 Minutes):  Exercises per grid below to improve mobility, strength, balance and coordination. Required minimal visual, verbal and manual cues to promote proper body alignment, promote proper body posture and promote proper body mechanics. Progressed resistance, range and repetitions as indicated. Date:  4/18/2017     Activity/Exercise Parameters   SCI FIT Not today   Stretches  Hamstring bilaterally 3 x 30 sec  Piriformis bilaterally 3 x 20 sec  Hip flexor stretch   Education      Core training  Reviewed x 5 min   Treadmill (not today) Discussed x 2 min   Step up (not today) X 10 B    Glut training (not today) Bent over table with focus on form x 5 min   Sit to stand (not today) X 10 with good form   Balance Talked about challenging balance x 5 min  Tandem stance work x 5 min           HEP-education on posture  Manual Therapy (    Soft Tissue Mobilization Duration  Duration: 25 Minutes):  Right and left hip stretches. STM to left glut med and min to border and bony contours  -STM to right fibula, tibialis anterior and fibular mobilizations.   (Used abbreviations: MET - muscle energy technique; PNF - proprioceptive neuromuscular facilitation; NMR - neuromuscular re-education; a/p - anterior to posterior; p/a - posterior to anterior)   Therapeutic Modalities:                                                                                               HEP: As above; handouts given to patient for all exercises. ______________________________________________________________________________________________________    Treatment Assessment:  Patient shows understanding on what to start with on progression for balance and core training. Look more at right knee function next visit. Continue to see patient every other week for continuation of core training Pain at 1/10  Progression/Medical Necessity:   · Patient is expected to demonstrate progress in strength, range of motion, balance, coordination and functional technique to improve functional mobility. · Patient demonstrates good rehab potential due to higher previous functional level. · Skilled intervention continues to be required due to core weakness and instability. Compliance with Program/Exercises: compliant all of the time. Reason for Continuation of Services/Other Comments:  · Patient continues to require skilled intervention due to decreased function. Recommendations/Intent for next treatment session: \"Treatment next visit will focus on core training, endurance\".  And balance   Total Treatment Duration:  PT Patient Time In/Time Out  Time In: 1400  Time Out: [address] [facility], PT, DPT, OCS

## 2017-05-02 ENCOUNTER — HOSPITAL ENCOUNTER (OUTPATIENT)
Dept: PHYSICAL THERAPY | Age: 72
Discharge: HOME | End: 2017-05-02
Payer: MEDICARE

## 2017-05-02 PROCEDURE — 97110 THERAPEUTIC EXERCISES: CPT

## 2017-05-02 PROCEDURE — 97140 MANUAL THERAPY 1/> REGIONS: CPT

## 2017-05-02 NOTE — PROGRESS NOTES
Tank Pinon   (OIP:0/12/3928) [address]  at  [facility]  [address] [place] [address]  [phone]   [phone]           Outpatient PHYSICAL THERAPY: Daily Note  Fall Risk Score: 2 (? 5 = High Risk)       ICD-10: Treatment Diagnosis: Low back pain, M54.5  Difficulty walking, not elsewhere classified, R26.2    REFERRING PHYSICIAN: Lan Gonzales MD MD Orders: Evaluate and Treat  Return Physician Appointment: not known  MEDICAL/REFERRING DIAGNOSIS: Lumbosacral stenosis with neurogenic claudication, DDD, Lumbosacral spondylosis without myelopathy, other symptoms referrable to back  DATE OF ONSET: Chronic   PRIOR LEVEL OF FUNCTION: independent  PRECAUTIONS/ALLERGIES:   Allergies   Allergen Reactions    Other Medication Other (comments)     \"Some BP meds\" cause \"  SWELLING OF LEGS with Procardia and with current medicine    Statins-Hmg-Coa Reductase Inhibitors Other (comments)     Extreme nerve pain       ASSESSMENT:  ?????? ? ? This section established at most recent assessment??????????  Tank Pinon has been seen for 37 visits from 12/11/15 to 5/2/2017 for lumbar spine pain and spinal stenosis. Patient has performed therapeutic exercises, activities, and had manual therapy to increased strength, ROM and function. Patient has also used modalities for pain control in order to increase function. He is becoming more consistent with his workout program at the gym and is seeing benefits. He still has lack of core strength at this time. Balance has also become a difficulty for this patient due to neuropathy. Patient has shown no change in function per the Modified Oswestry Disability Index score with scores of 18/50. Patient has progressed well toward their goals and will benefit from continuing skilled PT in order to address their impairments. Renzo Bailey, PT, DPT, OCS  5/2/2017      PROBLEM LIST (Impairments causing functional limitations):  1.  Decreased Strength affecting function  2. Decreased ADL/Functional Activities  3. Decreased Flexibility/joint mobility  4. Decreased transfer abilities  5. Increased Pain affecting function  6. Decreased Activity tolerance   7. Increased Fatigue affecting function  GOALS: (Goals have been discussed and agreed upon with patient.)  1. DISCHARGE GOALS: Time Frame: 12 weeks   2. Patient will be independent in advanced core training exercises to return to functional mobility (ongoing)  3. Patient will show a greater than 5 point decrease on the Modified Oswestry Disability Index score in order to show an increase in function (ongoing)  4. Patient will report standing for greater than 30 min with work activity (MET-11/7/16)  5. NEW GOAL:  6. Patient will walk greater than 12 laps in the gym during gym session. (ongoing)  7. Patient will perform core exercises as part of his routine greater than 4 times a week (ongoing)  REHABILITATION POTENTIAL FOR STATED GOALS: GoodPLAN OF CARE:  INTERVENTIONS PLANNED: (Benefits and precautions of physical therapy have been discussed with the patient.)  1. balance exercise  2. bed mobility  3. cold  4. electrical stimulation  5. gait training  6. heat  7. manual therapy (including instrument assisted soft tissue mobilization)  8. neuromuscular re-education/strengthening  9. range of motion: active/assisted/passive  10. therapeutic activities  11. therapeutic exercise/strengthening  12. transfer training  13. Other: Aquatics  TREATMENT PLAN EFFECTIVE DATES: 4/4/17 TO 7/4/17  FREQUENCY/DURATION: Follow patient 1 time every other week for 12 weeks to address above goals. Regarding James Potocki's therapy, I certify that the treatment plan above will be carried out by a therapist or under their direction.   Thank you for this referral,  Hakeem Brock PT     Referring Physician Signature: Blake Koenig MD          Date                                SUBJECTIVE:    History of Present Injury/Illness (Reason for Referral): Patient report that he has had a long history of back issues. He first started to have health and difficulty functioning with a presence of gout for about a year, then had his hip replaced in April of this year. Now, his is trying to regain his back strength and function. He has difficulty walking for long periods and standing in one place. His goal is to become more active and functional as well as control his health. Patient had an injection a few weeks ago and had some relief from that. MRI results: Copy in paper chart showing:  Abdominal hernia surgery performed in   Present Symptoms: Patient reports he is doing pretty good and bumping up the training a little. I went to the doctor and have so far lost 30 lbs   Pain at 2/10  Dominant Side: right  Past Medical History: Active Ambulatory Problems     Diagnosis Date Noted    S/P total hip arthroplasty 04/06/2015    HTN (hypertension) 04/06/2015    HLD (hyperlipidemia) 04/06/2015    BPH (benign prostatic hyperplasia) 04/06/2015    Gout 04/06/2015    Anxiety 04/06/2015    Diabetes mellitus ([facility] [address] 04/06/2015     Resolved Ambulatory Problems     Diagnosis Date Noted    No Resolved Ambulatory Problems     Past Medical History:   Diagnosis Date    Anxiety     Edema of both legs     Enlarged prostate     Gout     Hip pain     History of elevated glucose     History of seasonal allergies     Hypercholesterolemia     Hypertension     Keratoacanthoma     S/P total hip arthroplasty 4/6/2015    Skin neoplasm     Spinal stenosis      Past Surgical History:   Procedure Laterality Date    HX TONSILLECTOMY  as child    HX WISDOM TEETH EXTRACTION  as teenager     Current Medications:   Current Outpatient Prescriptions:     HYDROmorphone (DILAUDID) 2 mg tablet, Take 1 Tab by mouth every four (4) hours as needed.  Max Daily Amount: 12 mg., Disp: 40 Tab, Rfl: 0    prazosin (MINIPRESS) 1 mg capsule, Take 1 mg by mouth three (3) times daily. TAKE AM OF SURGERY WITH SMALL SIP OF WATER   Indications: BENIGN PROSTATIC HYPERTROPHY, Disp: , Rfl:     amLODIPine (NORVASC) 10 mg tablet, Take 10 mg by mouth daily. TAKE AM OF SURGERY WITH SMALL SIP OF WATER, Disp: , Rfl:     labetalol (NORMODYNE) 200 mg tablet, Take 200 mg by mouth two (2) times a day. TAKE AM OF SURGERY WITH SMALL SIP OF WATER, Disp: , Rfl:     cloNIDine HCl (CATAPRES) 0.2 mg tablet, Take 0.2 mg by mouth nightly. Indications: HYPERTENSION, Disp: , Rfl:     finasteride (PROSCAR) 5 mg tablet, Take 5 mg by mouth daily. TAKE AM OF SURGERY WITH SMALL SIP OF WATER   Indications: BENIGN PROSTATIC HYPERTROPHY, Disp: , Rfl:     furosemide (LASIX) 40 mg tablet, Take 40 mg by mouth daily. , Disp: , Rfl:     Febuxostat (ULORIC) 80 mg tab tablet, Take 80 mg by mouth daily. TAKE AM OF SURGERY WITH SMALL SIP OF WATER   Indications: GOUT, Disp: , Rfl:     LORazepam (ATIVAN) 0.5 mg tablet, Take 0.5 mg by mouth daily as needed for Anxiety. TAKES 1/2 OF 1MG TABLET DAILY AS NEEDED, \"USUALLY EVERY DAY\", Disp: , Rfl:      Date Last Reviewed: 5/2/2017    Social History/Home Situation:   Social History     Social History    Marital status:      Spouse name: N/A    Number of children: N/A    Years of education: N/A     Occupational History    Not on file. Social History Main Topics    Smoking status: Never Smoker    Smokeless tobacco: Not on file    Alcohol use No    Drug use: Not on file    Sexual activity: Not on file     Other Topics Concern    Not on file     Social History Narrative           Work/Activity History: consultation  OBJECTIVE:    Outcome Measure: Tool Used: Modified Oswestry Low Back Pain Questionnaire  Score:  Initial: 12/50  Most Recent: 18/50 (Date: 4/4/17 )   Interpretation of Score: Each section is scored on a 0-5 scale, 5 representing the greatest disability. The scores of each section are added together for a total score of 50.     Score 0 1-10 11-20 21-30 31-40 41-49 50   Modifier CH CI CJ CK CL CM CN     ? Changing and Maintaining Body Position:     - CURRENT STATUS: CJ - 20%-39% impaired, limited or restricted    - GOAL STATUS:   - 0% impaired, limited or restricted    - D/C STATUS:  ---------------To be determined---------------  Tool Used: Lower Extremity Functional Scale (LEFS)  Score:  Initial: 49/80 Most Recent: 54/80 (Date: 4/4/17 )   Interpretation of Score: 20 questions each scored on a 5 point scale with 0 representing \"extreme difficulty or unable to perform\" and 4 representing \"no difficulty\". The lower the score, the greater the functional disability. 80/80 represents no disability. Minimal detectable change is 9 points. Score 80 79-63 62-48 47-32 31-16 15-1 0   Modifier CH CI CJ CK CL CM CN         Observation/Orthostatic Postural Assessment:    Patient is obese individual with increased abdominal mass and lordosis in standing. Patient shows slight right rotation from thoracic spine in standing. He reports that the popping has subsided during some of his activity. Palpation:    (re-cert-4/4/17) . Patient shows decreased mobility in right hip due to replacement earlier this year. He does show decreased R LE edema compared to his previous PT treatments. Core strength tests:  Vertical compression test (VCT)- grade 3/5 with dysfunction at TL junction and right rotation (able to show improved posture and strength with testing)  Elbow flexion test (EFT)- Grade 2/5 with sluggish initiation, and endurance present  Lumbar protective mechanism (LPM)- Left A-P is sluggish grade 2, P-A grade 2 and present; Right A-P is grade 2 sluggish, endurance present, P-A is 2- and present.   ROM: Slight limitation in hip range of motion, but mostly limited in right rotation and side bending left                           Strength: Good and functional LE strength                 Special Tests: Vertical compression test (VCT)- grade 3/5 with dysfunction at TL junction and right rotation  Elbow flexion test (EFT)- Grade 2+/5 with sluggish initiation, and endurance present  Lumbar protective mechanism (LPM)- Left A-P is sluggish grade 2, P-A grade 2 and present; Right A-P is grade 1 sluggish, endurance present, P-A is 2- and present. Neurological Screen:   Myotomes: intact                  Dermatomes: intact                  Reflexes: intact                  Neural Tension Tests: (-)  Functional Mobility:       Patient has difficulty with functional mobility, standing for greater than 30 min and with exercise activity  Balance:    fair- Patient unable to stand greater than 1 sec in single leg stance Bilaterally  TREATMENT:    (In addition to Assessment/Re-Assessment sessions the following treatments were rendered)  Therapeutic Exercise: (15 Minutes):  Exercises per grid below to improve mobility, strength, balance and coordination. Required minimal visual, verbal and manual cues to promote proper body alignment, promote proper body posture and promote proper body mechanics. Progressed resistance, range and repetitions as indicated. Date:  5/2/2017     Activity/Exercise Parameters   SCI FIT Not today   Stretches  Hamstring bilaterally 3 x 30 sec  Piriformis bilaterally 3 x 20 sec  Hip flexor stretch   Education      Core training  Reviewed x 5 min   Treadmill (not today) Discussed x 2 min   Step up (not today) X 10 B    Glut training (not today) Bent over table with focus on form x 5 min   Sit to stand (not today) X 10 with good form   Balance Talked about challenging balance x 5 min  Tandem stance work x 5 min   Hamstring activation Hook lying x 2 min right knee       HEP-education on posture  Manual Therapy (    Soft Tissue Mobilization Duration  Duration: 30 Minutes):  Right and left hip stretches. STM to left glut med and min to border and bony contours  -STM to right fibula, tibialis anterior and fibular mobilizations.   Mini-plunger to right patella  -STM to IT band  (Used abbreviations: MET - muscle energy technique; PNF - proprioceptive neuromuscular facilitation; NMR - neuromuscular re-education; a/p - anterior to posterior; p/a - posterior to anterior)   Therapeutic Modalities:                                                                                               HEP: As above; handouts given to patient for all exercises. ______________________________________________________________________________________________________    Treatment Assessment:  Patient shows improvement in adherance to core training. Continue to treat right knee for pain and mobility Pain at 1/10  Progression/Medical Necessity:   · Patient is expected to demonstrate progress in strength, range of motion, balance, coordination and functional technique to improve functional mobility. · Patient demonstrates good rehab potential due to higher previous functional level. · Skilled intervention continues to be required due to core weakness and instability. Compliance with Program/Exercises: compliant all of the time. Reason for Continuation of Services/Other Comments:  · Patient continues to require skilled intervention due to decreased function. Recommendations/Intent for next treatment session: \"Treatment next visit will focus on core training, endurance\".  And balance   Total Treatment Duration:  PT Patient Time In/Time Out  Time In: 4429  Time Out: 2578 [facility], PT, DPT, OCS

## 2017-05-16 ENCOUNTER — HOSPITAL ENCOUNTER (OUTPATIENT)
Dept: PHYSICAL THERAPY | Age: 72
Discharge: HOME | End: 2017-05-16
Payer: MEDICARE

## 2017-05-16 PROCEDURE — 97110 THERAPEUTIC EXERCISES: CPT

## 2017-05-16 NOTE — PROGRESS NOTES
Jenna Owen   ([facility]:[id number]) [address]  at  [facility]  [address] [place] [address]  [phone]   [phone]           Outpatient PHYSICAL THERAPY: Daily Note  Fall Risk Score: 2 (? 5 = High Risk)       ICD-10: Treatment Diagnosis: Low back pain, M54.5  Difficulty walking, not elsewhere classified, R26.2    REFERRING PHYSICIAN: Kevin Dunn MD MD Orders: Evaluate and Treat  Return Physician Appointment: not known  MEDICAL/REFERRING DIAGNOSIS: Lumbosacral stenosis with neurogenic claudication, DDD, Lumbosacral spondylosis without myelopathy, other symptoms referrable to back  DATE OF ONSET: Chronic   PRIOR LEVEL OF FUNCTION: independent  PRECAUTIONS/ALLERGIES:   Allergies   Allergen Reactions    Other Medication Other (comments)     \"Some BP meds\" cause \"  SWELLING OF LEGS with Procardia and with current medicine    Statins-Hmg-Coa Reductase Inhibitors Other (comments)     Extreme nerve pain       ASSESSMENT:  ?????? ? ? This section established at most recent assessment??????????  Jenna Owen has been seen for 37 visits from 12/11/15 to 5/16/2017 for lumbar spine pain and spinal stenosis. Patient has performed therapeutic exercises, activities, and had manual therapy to increased strength, ROM and function. Patient has also used modalities for pain control in order to increase function. He is becoming more consistent with his workout program at the gym and is seeing benefits. He still has lack of core strength at this time. Balance has also become a difficulty for this patient due to neuropathy. Patient has shown no change in function per the Modified Oswestry Disability Index score with scores of 18/50. Patient has progressed well toward their goals and will benefit from continuing skilled PT in order to address their impairments. [place], PT, DPT, OCS  5/16/2017      PROBLEM LIST (Impairments causing functional limitations):  1.  Decreased Strength affecting function  2. Decreased ADL/Functional Activities  3. Decreased Flexibility/joint mobility  4. Decreased transfer abilities  5. Increased Pain affecting function  6. Decreased Activity tolerance   7. Increased Fatigue affecting function  GOALS: (Goals have been discussed and agreed upon with patient.)  1. DISCHARGE GOALS: Time Frame: 12 weeks   2. Patient will be independent in advanced core training exercises to return to functional mobility (ongoing)  3. Patient will show a greater than 5 point decrease on the Modified Oswestry Disability Index score in order to show an increase in function (ongoing)  4. Patient will report standing for greater than 30 min with work activity (MET-11/7/16)  5. NEW GOAL:  6. Patient will walk greater than 12 laps in the gym during gym session. (ongoing)  7. Patient will perform core exercises as part of his routine greater than 4 times a week (ongoing)  REHABILITATION POTENTIAL FOR STATED GOALS: GoodPLAN OF CARE:  INTERVENTIONS PLANNED: (Benefits and precautions of physical therapy have been discussed with the patient.)  1. balance exercise  2. bed mobility  3. cold  4. electrical stimulation  5. gait training  6. heat  7. manual therapy (including instrument assisted soft tissue mobilization)  8. neuromuscular re-education/strengthening  9. range of motion: active/assisted/passive  10. therapeutic activities  11. therapeutic exercise/strengthening  12. transfer training  13. Other: Aquatics  TREATMENT PLAN EFFECTIVE DATES: 4/4/17 TO 7/4/17  FREQUENCY/DURATION: Follow patient 1 time every other week for 12 weeks to address above goals. Regarding Dexter Gossmandeep's therapy, I certify that the treatment plan above will be carried out by a therapist or under their direction.   Thank you for this referral,  Verna Holly PT     Referring Physician Signature: Lan Gonzales MD          Date                                SUBJECTIVE:    History of Present Injury/Illness (Reason for Referral): Patient report that he has had a long history of back issues. He first started to have health and difficulty functioning with a presence of gout for about a year, then had his hip replaced in April of this year. Now, his is trying to regain his back strength and function. He has difficulty walking for long periods and standing in one place. His goal is to become more active and functional as well as control his health. Patient had an injection a few weeks ago and had some relief from that. MRI results: Copy in paper chart showing:  Abdominal hernia surgery performed in   Present Symptoms: Patient reports he is doing well after his trip to [place] and was a little sore in the back, but he has kept up his exercises. He had some difficulty with the uneven terrain, but he was able to do it. Pain at 2/10  Dominant Side: right  Past Medical History: Active Ambulatory Problems     Diagnosis Date Noted    S/P total hip arthroplasty 04/06/2015    HTN (hypertension) 04/06/2015    HLD (hyperlipidemia) 04/06/2015    BPH (benign prostatic hyperplasia) 04/06/2015    Gout 04/06/2015    Anxiety 04/06/2015    Diabetes mellitus ([facility] [address] 04/06/2015     Resolved Ambulatory Problems     Diagnosis Date Noted    No Resolved Ambulatory Problems     Past Medical History:   Diagnosis Date    Anxiety     Edema of both legs     Enlarged prostate     Gout     Hip pain     History of elevated glucose     History of seasonal allergies     Hypercholesterolemia     Hypertension     Keratoacanthoma     S/P total hip arthroplasty 4/6/2015    Skin neoplasm     Spinal stenosis      Past Surgical History:   Procedure Laterality Date    HX TONSILLECTOMY  as child    HX WISDOM TEETH EXTRACTION  as teenager     Current Medications:   Current Outpatient Prescriptions:     HYDROmorphone (DILAUDID) 2 mg tablet, Take 1 Tab by mouth every four (4) hours as needed.  Max Daily Amount: 12 mg., Disp: 40 Tab, Rfl: 0    prazosin (MINIPRESS) 1 mg capsule, Take 1 mg by mouth three (3) times daily. TAKE AM OF SURGERY WITH SMALL SIP OF WATER   Indications: BENIGN PROSTATIC HYPERTROPHY, Disp: , Rfl:     amLODIPine (NORVASC) 10 mg tablet, Take 10 mg by mouth daily. TAKE AM OF SURGERY WITH SMALL SIP OF WATER, Disp: , Rfl:     labetalol (NORMODYNE) 200 mg tablet, Take 200 mg by mouth two (2) times a day. TAKE AM OF SURGERY WITH SMALL SIP OF WATER, Disp: , Rfl:     cloNIDine HCl (CATAPRES) 0.2 mg tablet, Take 0.2 mg by mouth nightly. Indications: HYPERTENSION, Disp: , Rfl:     finasteride (PROSCAR) 5 mg tablet, Take 5 mg by mouth daily. TAKE AM OF SURGERY WITH SMALL SIP OF WATER   Indications: BENIGN PROSTATIC HYPERTROPHY, Disp: , Rfl:     furosemide (LASIX) 40 mg tablet, Take 40 mg by mouth daily. , Disp: , Rfl:     Febuxostat (ULORIC) 80 mg tab tablet, Take 80 mg by mouth daily. TAKE AM OF SURGERY WITH SMALL SIP OF WATER   Indications: GOUT, Disp: , Rfl:     LORazepam (ATIVAN) 0.5 mg tablet, Take 0.5 mg by mouth daily as needed for Anxiety. TAKES 1/2 OF 1MG TABLET DAILY AS NEEDED, \"USUALLY EVERY DAY\", Disp: , Rfl:      Date Last Reviewed: 5/16/2017    Social History/Home Situation:   Social History     Social History    Marital status:      Spouse name: N/A    Number of children: N/A    Years of education: N/A     Occupational History    Not on file. Social History Main Topics    Smoking status: Never Smoker    Smokeless tobacco: Not on file    Alcohol use No    Drug use: Not on file    Sexual activity: Not on file     Other Topics Concern    Not on file     Social History Narrative           Work/Activity History: consultation  OBJECTIVE:    Outcome Measure: Tool Used: Modified Oswestry Low Back Pain Questionnaire  Score:  Initial: 12/50  Most Recent: 18/50 (Date: 4/4/17 )   Interpretation of Score: Each section is scored on a 0-5 scale, 5 representing the greatest disability.   The scores of each section are added together for a total score of 50. Score 0 1-10 11-20 21-30 31-40 41-49 50   Modifier CH CI CJ CK CL CM CN     ? Changing and Maintaining Body Position:     - CURRENT STATUS:  - 20%-39% impaired, limited or restricted    - GOAL STATUS:   - 0% impaired, limited or restricted    - D/C STATUS:  ---------------To be determined---------------  Tool Used: Lower Extremity Functional Scale (LEFS)  Score:  Initial: 49/80 Most Recent: 54/80 (Date: 4/4/17 )   Interpretation of Score: 20 questions each scored on a 5 point scale with 0 representing \"extreme difficulty or unable to perform\" and 4 representing \"no difficulty\". The lower the score, the greater the functional disability. 80/80 represents no disability. Minimal detectable change is 9 points. Score 80 79-63 62-48 47-32 31-16 15-1 0   Modifier CH CI CJ CK CL CM CN         Observation/Orthostatic Postural Assessment:    Patient is obese individual with increased abdominal mass and lordosis in standing. Patient shows slight right rotation from thoracic spine in standing. He reports that the popping has subsided during some of his activity. Palpation:    (re-cert-4/4/17) . Patient shows decreased mobility in right hip due to replacement earlier this year. He does show decreased R LE edema compared to his previous PT treatments. Core strength tests:  Vertical compression test (VCT)- grade 3/5 with dysfunction at TL junction and right rotation (able to show improved posture and strength with testing)  Elbow flexion test (EFT)- Grade 2/5 with sluggish initiation, and endurance present  Lumbar protective mechanism (LPM)- Left A-P is sluggish grade 2, P-A grade 2 and present; Right A-P is grade 2 sluggish, endurance present, P-A is 2- and present.   ROM: Slight limitation in hip range of motion, but mostly limited in right rotation and side bending left                           Strength: Good and functional LE strength                 Special Tests: Vertical compression test (VCT)- grade 3/5 with dysfunction at TL junction and right rotation  Elbow flexion test (EFT)- Grade 2+/5 with sluggish initiation, and endurance present  Lumbar protective mechanism (LPM)- Left A-P is sluggish grade 2, P-A grade 2 and present; Right A-P is grade 1 sluggish, endurance present, P-A is 2- and present. Neurological Screen:   Myotomes: intact                  Dermatomes: intact                  Reflexes: intact                  Neural Tension Tests: (-)  Functional Mobility:       Patient has difficulty with functional mobility, standing for greater than 30 min and with exercise activity  Balance:    fair- Patient unable to stand greater than 1 sec in single leg stance Bilaterally  TREATMENT:    (In addition to Assessment/Re-Assessment sessions the following treatments were rendered)  Therapeutic Exercise: (30 Minutes):  Exercises per grid below to improve mobility, strength, balance and coordination. Required minimal visual, verbal and manual cues to promote proper body alignment, promote proper body posture and promote proper body mechanics. Progressed resistance, range and repetitions as indicated. Date:  5/16/2017     Activity/Exercise Parameters   SCI FIT Not today   Stretches  Hamstring bilaterally 3 x 30 sec  Piriformis bilaterally 3 x 20 sec  Hip flexor stretch   Education 15 min on continuing his exercises and starting to work some exercises in at home and not just the gym     Core training  Reviewed x 5 min   Treadmill (not today) Discussed x 2 min   Step up (not today) X 10 B    Glut training (not today) Bent over table with focus on form x 5 min   Sit to stand (not today) X 10 with good form   Balance Talked about challenging balance x 5 min  Tandem stance work x 5 min   Hamstring activation Hook lying x 2 min right knee       HEP-education on posture  Manual Therapy (     ):  Right and left hip stretches.   STM to left glut med and min to border and bony contours  -STM to right fibula, tibialis anterior and fibular mobilizations. Mini-plunger to right patella  -STM to IT band  (Used abbreviations: MET - muscle energy technique; PNF - proprioceptive neuromuscular facilitation; NMR - neuromuscular re-education; a/p - anterior to posterior; p/a - posterior to anterior)   Therapeutic Modalities:                                                                                               HEP: As above; handouts given to patient for all exercises. ______________________________________________________________________________________________________    Treatment Assessment:  Patient shows improvement in adherance to core training. Continue to treat right knee for pain and mobility Pain at 1/10  Progression/Medical Necessity:   · Patient is expected to demonstrate progress in strength, range of motion, balance, coordination and functional technique to improve functional mobility. · Patient demonstrates good rehab potential due to higher previous functional level. · Skilled intervention continues to be required due to core weakness and instability. Compliance with Program/Exercises: compliant all of the time. Reason for Continuation of Services/Other Comments:  · Patient continues to require skilled intervention due to decreased function. Recommendations/Intent for next treatment session: \"Treatment next visit will focus on core training, endurance\".  And balance   Total Treatment Duration:  PT Patient Time In/Time Out  Time In: 1400  Time Out: 1500    Josefa Portillo, PT, DPT, OCS

## 2017-05-30 ENCOUNTER — HOSPITAL ENCOUNTER (OUTPATIENT)
Dept: PHYSICAL THERAPY | Age: 72
Discharge: HOME | End: 2017-05-30
Payer: MEDICARE

## 2017-05-30 PROCEDURE — 97140 MANUAL THERAPY 1/> REGIONS: CPT

## 2017-05-30 PROCEDURE — 97110 THERAPEUTIC EXERCISES: CPT

## 2017-05-30 NOTE — PROGRESS NOTES
Emma Solorzano   ([facility]:[id number]) [address]  at  [facility]  [address] [place] [address]  [phone]   [phone]           Outpatient PHYSICAL THERAPY: Daily Note  Fall Risk Score: 2 (? 5 = High Risk)       ICD-10: Treatment Diagnosis: Low back pain, M54.5  Difficulty walking, not elsewhere classified, R26.2    REFERRING PHYSICIAN: Tunde Everett MD MD Orders: Evaluate and Treat  Return Physician Appointment: not known  MEDICAL/REFERRING DIAGNOSIS: Lumbosacral stenosis with neurogenic claudication, DDD, Lumbosacral spondylosis without myelopathy, other symptoms referrable to back  DATE OF ONSET: Chronic   PRIOR LEVEL OF FUNCTION: independent  PRECAUTIONS/ALLERGIES:   Allergies   Allergen Reactions    Other Medication Other (comments)     \"Some BP meds\" cause \"  SWELLING OF LEGS with Procardia and with current medicine    Statins-Hmg-Coa Reductase Inhibitors Other (comments)     Extreme nerve pain       ASSESSMENT:  ?????? ? ? This section established at most recent assessment??????????  Emma Solorzano has been seen for 37 visits from 12/11/15 to 5/30/2017 for lumbar spine pain and spinal stenosis. Patient has performed therapeutic exercises, activities, and had manual therapy to increased strength, ROM and function. Patient has also used modalities for pain control in order to increase function. He is becoming more consistent with his workout program at the gym and is seeing benefits. He still has lack of core strength at this time. Balance has also become a difficulty for this patient due to neuropathy. Patient has shown no change in function per the Modified Oswestry Disability Index score with scores of 18/50. Patient has progressed well toward their goals and will benefit from continuing skilled PT in order to address their impairments. Jackson Farris, PT, DPT, OCS  5/30/2017      PROBLEM LIST (Impairments causing functional limitations):  1.  Decreased Strength affecting function  2. Decreased ADL/Functional Activities  3. Decreased Flexibility/joint mobility  4. Decreased transfer abilities  5. Increased Pain affecting function  6. Decreased Activity tolerance   7. Increased Fatigue affecting function  GOALS: (Goals have been discussed and agreed upon with patient.)  1. DISCHARGE GOALS: Time Frame: 12 weeks   2. Patient will be independent in advanced core training exercises to return to functional mobility (ongoing)  3. Patient will show a greater than 5 point decrease on the Modified Oswestry Disability Index score in order to show an increase in function (ongoing)  4. Patient will report standing for greater than 30 min with work activity (MET-11/7/16)  5. NEW GOAL:  6. Patient will walk greater than 12 laps in the gym during gym session. (ongoing)  7. Patient will perform core exercises as part of his routine greater than 4 times a week (ongoing)  REHABILITATION POTENTIAL FOR STATED GOALS: GoodPLAN OF CARE:  INTERVENTIONS PLANNED: (Benefits and precautions of physical therapy have been discussed with the patient.)  1. balance exercise  2. bed mobility  3. cold  4. electrical stimulation  5. gait training  6. heat  7. manual therapy (including instrument assisted soft tissue mobilization)  8. neuromuscular re-education/strengthening  9. range of motion: active/assisted/passive  10. therapeutic activities  11. therapeutic exercise/strengthening  12. transfer training  13. Other: Aquatics  TREATMENT PLAN EFFECTIVE DATES: 4/4/17 TO 7/4/17  FREQUENCY/DURATION: Follow patient 1 time every other week for 12 weeks to address above goals. Regarding James Potocki's therapy, I certify that the treatment plan above will be carried out by a therapist or under their direction.   Thank you for this referral,  Lew Rodrigez PT     Referring Physician Signature: Kevin Dunn MD          Date                                SUBJECTIVE:    History of Present Injury/Illness (Reason for Referral): Patient report that he has had a long history of back issues. He first started to have health and difficulty functioning with a presence of gout for about a year, then had his hip replaced in April of this year. Now, his is trying to regain his back strength and function. He has difficulty walking for long periods and standing in one place. His goal is to become more active and functional as well as control his health. Patient had an injection a few weeks ago and had some relief from that. MRI results: Copy in paper chart showing:  Abdominal hernia surgery performed in   Present Symptoms: Patient reports he is doing well with his program and wants to try to figure out how to push it more  Pain at 2/10  Dominant Side: right  Past Medical History: Active Ambulatory Problems     Diagnosis Date Noted    S/P total hip arthroplasty 04/06/2015    HTN (hypertension) 04/06/2015    HLD (hyperlipidemia) 04/06/2015    BPH (benign prostatic hyperplasia) 04/06/2015    Gout 04/06/2015    Anxiety 04/06/2015    Diabetes mellitus ([facility] [address] 04/06/2015     Resolved Ambulatory Problems     Diagnosis Date Noted    No Resolved Ambulatory Problems     Past Medical History:   Diagnosis Date    Anxiety     Edema of both legs     Enlarged prostate     Gout     Hip pain     History of elevated glucose     History of seasonal allergies     Hypercholesterolemia     Hypertension     Keratoacanthoma     S/P total hip arthroplasty 4/6/2015    Skin neoplasm     Spinal stenosis      Past Surgical History:   Procedure Laterality Date    HX TONSILLECTOMY  as child    HX WISDOM TEETH EXTRACTION  as teenager     Current Medications:   Current Outpatient Prescriptions:     HYDROmorphone (DILAUDID) 2 mg tablet, Take 1 Tab by mouth every four (4) hours as needed. Max Daily Amount: 12 mg., Disp: 40 Tab, Rfl: 0    prazosin (MINIPRESS) 1 mg capsule, Take 1 mg by mouth three (3) times daily.  TAKE AM OF SURGERY WITH SMALL SIP OF WATER   Indications: BENIGN PROSTATIC HYPERTROPHY, Disp: , Rfl:     amLODIPine (NORVASC) 10 mg tablet, Take 10 mg by mouth daily. TAKE AM OF SURGERY WITH SMALL SIP OF WATER, Disp: , Rfl:     labetalol (NORMODYNE) 200 mg tablet, Take 200 mg by mouth two (2) times a day. TAKE AM OF SURGERY WITH SMALL SIP OF WATER, Disp: , Rfl:     cloNIDine HCl (CATAPRES) 0.2 mg tablet, Take 0.2 mg by mouth nightly. Indications: HYPERTENSION, Disp: , Rfl:     finasteride (PROSCAR) 5 mg tablet, Take 5 mg by mouth daily. TAKE AM OF SURGERY WITH SMALL SIP OF WATER   Indications: BENIGN PROSTATIC HYPERTROPHY, Disp: , Rfl:     furosemide (LASIX) 40 mg tablet, Take 40 mg by mouth daily. , Disp: , Rfl:     Febuxostat (ULORIC) 80 mg tab tablet, Take 80 mg by mouth daily. TAKE AM OF SURGERY WITH SMALL SIP OF WATER   Indications: GOUT, Disp: , Rfl:     LORazepam (ATIVAN) 0.5 mg tablet, Take 0.5 mg by mouth daily as needed for Anxiety. TAKES 1/2 OF 1MG TABLET DAILY AS NEEDED, \"USUALLY EVERY DAY\", Disp: , Rfl:      Date Last Reviewed: 5/30/2017    Social History/Home Situation:   Social History     Social History    Marital status:      Spouse name: N/A    Number of children: N/A    Years of education: N/A     Occupational History    Not on file. Social History Main Topics    Smoking status: Never Smoker    Smokeless tobacco: Not on file    Alcohol use No    Drug use: Not on file    Sexual activity: Not on file     Other Topics Concern    Not on file     Social History Narrative           Work/Activity History: consultation  OBJECTIVE:    Outcome Measure: Tool Used: Modified Oswestry Low Back Pain Questionnaire  Score:  Initial: 12/50  Most Recent: 18/50 (Date: 4/4/17 )   Interpretation of Score: Each section is scored on a 0-5 scale, 5 representing the greatest disability. The scores of each section are added together for a total score of 50.     Score 0 1-10 11-20 21-30 31-40 41-49 50   Modifier CH CI CJ CK CL CM CN     ? Changing and Maintaining Body Position:     - CURRENT STATUS: CJ - 20%-39% impaired, limited or restricted    - GOAL STATUS:  CH - 0% impaired, limited or restricted    - D/C STATUS:  ---------------To be determined---------------  Tool Used: Lower Extremity Functional Scale (LEFS)  Score:  Initial: 49/80 Most Recent: 54/80 (Date: 4/4/17 )   Interpretation of Score: 20 questions each scored on a 5 point scale with 0 representing \"extreme difficulty or unable to perform\" and 4 representing \"no difficulty\". The lower the score, the greater the functional disability. 80/80 represents no disability. Minimal detectable change is 9 points. Score 80 79-63 62-48 47-32 31-16 15-1 0   Modifier CH CI CJ CK CL CM CN         Observation/Orthostatic Postural Assessment:    Patient is obese individual with increased abdominal mass and lordosis in standing. Patient shows slight right rotation from thoracic spine in standing. He reports that the popping has subsided during some of his activity. Palpation:    (re-cert-4/4/17) . Patient shows decreased mobility in right hip due to replacement earlier this year. He does show decreased R LE edema compared to his previous PT treatments. Core strength tests:  Vertical compression test (VCT)- grade 3/5 with dysfunction at TL junction and right rotation (able to show improved posture and strength with testing)  Elbow flexion test (EFT)- Grade 2/5 with sluggish initiation, and endurance present  Lumbar protective mechanism (LPM)- Left A-P is sluggish grade 2, P-A grade 2 and present; Right A-P is grade 2 sluggish, endurance present, P-A is 2- and present.   ROM: Slight limitation in hip range of motion, but mostly limited in right rotation and side bending left                           Strength: Good and functional LE strength                 Special Tests: Vertical compression test (VCT)- grade 3/5 with dysfunction at TL junction and right rotation  Elbow flexion test (EFT)- Grade 2+/5 with sluggish initiation, and endurance present  Lumbar protective mechanism (LPM)- Left A-P is sluggish grade 2, P-A grade 2 and present; Right A-P is grade 1 sluggish, endurance present, P-A is 2- and present. Neurological Screen:   Myotomes: intact                  Dermatomes: intact                  Reflexes: intact                  Neural Tension Tests: (-)  Functional Mobility:       Patient has difficulty with functional mobility, standing for greater than 30 min and with exercise activity  Balance:    fair- Patient unable to stand greater than 1 sec in single leg stance Bilaterally  TREATMENT:    (In addition to Assessment/Re-Assessment sessions the following treatments were rendered)  Therapeutic Exercise: (30 Minutes):  Exercises per grid below to improve mobility, strength, balance and coordination. Required minimal visual, verbal and manual cues to promote proper body alignment, promote proper body posture and promote proper body mechanics. Progressed resistance, range and repetitions as indicated. Date:  5/30/2017     Activity/Exercise Parameters   SCI FIT Not today   Stretches  Hamstring bilaterally 3 x 30 sec  Piriformis bilaterally 3 x 20 sec  Hip flexor stretch   Education 15 min on continuing his exercises and starting to work some exercises in at home and not just the gym     Core training  Reviewed x 5 min   Treadmill (not today) Discussed x 2 min   Step up (not today) X 10 B    Glut training (not today) Bent over table with focus on form x 5 min   Sit to stand (not today) X 10 with good form   Balance Talked about challenging balance x 5 min  Tandem stance work x 5 min   Hamstring activation Hook lying x 2 min right knee       HEP-education on posture  Manual Therapy (    Soft Tissue Mobilization Duration  Duration: 15 Minutes):  Right and left hip stretches.   STM to left glut med and min to border and bony contours  -STM to right fibula, tibialis anterior and fibular mobilizations. Mini-plunger to right patella  -STM to IT band  -Left side lie pelvic mobility and facilitation.  -Mass trunk flexion work for core training  (Used abbreviations: MET - muscle energy technique; PNF - proprioceptive neuromuscular facilitation; NMR - neuromuscular re-education; a/p - anterior to posterior; p/a - posterior to anterior)   Therapeutic Modalities:                                                                                               HEP: As above; handouts given to patient for all exercises. ______________________________________________________________________________________________________    Treatment Assessment:  Patient shows improvement in adherance to core training. Pain at 1/10  Progression/Medical Necessity:   · Patient is expected to demonstrate progress in strength, range of motion, balance, coordination and functional technique to improve functional mobility. · Patient demonstrates good rehab potential due to higher previous functional level. · Skilled intervention continues to be required due to core weakness and instability. Compliance with Program/Exercises: compliant all of the time. Reason for Continuation of Services/Other Comments:  · Patient continues to require skilled intervention due to decreased function. Recommendations/Intent for next treatment session: \"Treatment next visit will focus on core training, endurance\".  And balance   Total Treatment Duration:  PT Patient Time In/Time Out  Time In: 1357  Time Out: 1500    Trang Garcia, PT, DPT, OCS

## 2017-06-13 ENCOUNTER — HOSPITAL ENCOUNTER (OUTPATIENT)
Dept: PHYSICAL THERAPY | Age: 72
Discharge: HOME | End: 2017-06-13
Payer: MEDICARE

## 2017-06-13 PROCEDURE — 97110 THERAPEUTIC EXERCISES: CPT

## 2017-06-13 PROCEDURE — 97140 MANUAL THERAPY 1/> REGIONS: CPT

## 2017-06-13 NOTE — PROGRESS NOTES
Gogo Mendieta   (FTV:[id number]) [address]  at  [facility]  [address] [place] [address]  [phone]   [phone]           Outpatient PHYSICAL THERAPY: Daily Note  Fall Risk Score: 2 (? 5 = High Risk)       ICD-10: Treatment Diagnosis: Low back pain, M54.5  Difficulty walking, not elsewhere classified, R26.2    REFERRING PHYSICIAN: Tomy May MD MD Orders: Evaluate and Treat  Return Physician Appointment: not known  MEDICAL/REFERRING DIAGNOSIS: Lumbosacral stenosis with neurogenic claudication, DDD, Lumbosacral spondylosis without myelopathy, other symptoms referrable to back  DATE OF ONSET: Chronic   PRIOR LEVEL OF FUNCTION: independent  PRECAUTIONS/ALLERGIES:   Allergies   Allergen Reactions    Other Medication Other (comments)     \"Some BP meds\" cause \"  SWELLING OF LEGS with Procardia and with current medicine    Statins-Hmg-Coa Reductase Inhibitors Other (comments)     Extreme nerve pain       ASSESSMENT:  ?????? ? ? This section established at most recent assessment??????????  Gogo Mendieta has been seen for 37 visits from 12/11/15 to 6/13/2017 for lumbar spine pain and spinal stenosis. Patient has performed therapeutic exercises, activities, and had manual therapy to increased strength, ROM and function. Patient has also used modalities for pain control in order to increase function. He is becoming more consistent with his workout program at the gym and is seeing benefits. He still has lack of core strength at this time. Balance has also become a difficulty for this patient due to neuropathy. Patient has shown no change in function per the Modified Oswestry Disability Index score with scores of 18/50. Patient has progressed well toward their goals and will benefit from continuing skilled PT in order to address their impairments. Yesika Navarrete, PT, DPT, OCS  6/13/2017      PROBLEM LIST (Impairments causing functional limitations):  1.  Decreased Strength affecting function  2. Decreased ADL/Functional Activities  3. Decreased Flexibility/joint mobility  4. Decreased transfer abilities  5. Increased Pain affecting function  6. Decreased Activity tolerance   7. Increased Fatigue affecting function  GOALS: (Goals have been discussed and agreed upon with patient.)  1. DISCHARGE GOALS: Time Frame: 12 weeks   2. Patient will be independent in advanced core training exercises to return to functional mobility (ongoing)  3. Patient will show a greater than 5 point decrease on the Modified Oswestry Disability Index score in order to show an increase in function (ongoing)  4. Patient will report standing for greater than 30 min with work activity (MET-11/7/16)  5. NEW GOAL:  6. Patient will walk greater than 12 laps in the gym during gym session. (ongoing)  7. Patient will perform core exercises as part of his routine greater than 4 times a week (ongoing)  REHABILITATION POTENTIAL FOR STATED GOALS: GoodPLAN OF CARE:  INTERVENTIONS PLANNED: (Benefits and precautions of physical therapy have been discussed with the patient.)  1. balance exercise  2. bed mobility  3. cold  4. electrical stimulation  5. gait training  6. heat  7. manual therapy (including instrument assisted soft tissue mobilization)  8. neuromuscular re-education/strengthening  9. range of motion: active/assisted/passive  10. therapeutic activities  11. therapeutic exercise/strengthening  12. transfer training  13. Other: Aquatics  TREATMENT PLAN EFFECTIVE DATES: 4/4/17 TO 7/4/17  FREQUENCY/DURATION: Follow patient 1 time every other week for 12 weeks to address above goals. Regarding Dexter Gossmandeep's therapy, I certify that the treatment plan above will be carried out by a therapist or under their direction.   Thank you for this referral,  Keyonna Oreilly PT     Referring Physician Signature: Evgeny Mcmillan MD          Date                                SUBJECTIVE:    History of Present Injury/Illness (Reason for Referral): Patient report that he has had a long history of back issues. He first started to have health and difficulty functioning with a presence of gout for about a year, then had his hip replaced in April of this year. Now, his is trying to regain his back strength and function. He has difficulty walking for long periods and standing in one place. His goal is to become more active and functional as well as control his health. Patient had an injection a few weeks ago and had some relief from that. MRI results: Copy in paper chart showing:  Abdominal hernia surgery performed in   Present Symptoms: Patient reports he is doing well with his program He reports some cramp feeling from the right leg up to the shoulder blade more  Pain at 2/10  Dominant Side: right  Past Medical History: Active Ambulatory Problems     Diagnosis Date Noted    S/P total hip arthroplasty 04/06/2015    HTN (hypertension) 04/06/2015    HLD (hyperlipidemia) 04/06/2015    BPH (benign prostatic hyperplasia) 04/06/2015    Gout 04/06/2015    Anxiety 04/06/2015    Diabetes mellitus ([facility] [address] 04/06/2015     Resolved Ambulatory Problems     Diagnosis Date Noted    No Resolved Ambulatory Problems     Past Medical History:   Diagnosis Date    Anxiety     Edema of both legs     Enlarged prostate     Gout     Hip pain     History of elevated glucose     History of seasonal allergies     Hypercholesterolemia     Hypertension     Keratoacanthoma     S/P total hip arthroplasty 4/6/2015    Skin neoplasm     Spinal stenosis      Past Surgical History:   Procedure Laterality Date    HX TONSILLECTOMY  as child    HX WISDOM TEETH EXTRACTION  as teenager     Current Medications:   Current Outpatient Prescriptions:     HYDROmorphone (DILAUDID) 2 mg tablet, Take 1 Tab by mouth every four (4) hours as needed.  Max Daily Amount: 12 mg., Disp: 40 Tab, Rfl: 0    prazosin (MINIPRESS) 1 mg capsule, Take 1 mg by mouth three (3) times daily. TAKE AM OF SURGERY WITH SMALL SIP OF WATER   Indications: BENIGN PROSTATIC HYPERTROPHY, Disp: , Rfl:     amLODIPine (NORVASC) 10 mg tablet, Take 10 mg by mouth daily. TAKE AM OF SURGERY WITH SMALL SIP OF WATER, Disp: , Rfl:     labetalol (NORMODYNE) 200 mg tablet, Take 200 mg by mouth two (2) times a day. TAKE AM OF SURGERY WITH SMALL SIP OF WATER, Disp: , Rfl:     cloNIDine HCl (CATAPRES) 0.2 mg tablet, Take 0.2 mg by mouth nightly. Indications: HYPERTENSION, Disp: , Rfl:     finasteride (PROSCAR) 5 mg tablet, Take 5 mg by mouth daily. TAKE AM OF SURGERY WITH SMALL SIP OF WATER   Indications: BENIGN PROSTATIC HYPERTROPHY, Disp: , Rfl:     furosemide (LASIX) 40 mg tablet, Take 40 mg by mouth daily. , Disp: , Rfl:     Febuxostat (ULORIC) 80 mg tab tablet, Take 80 mg by mouth daily. TAKE AM OF SURGERY WITH SMALL SIP OF WATER   Indications: GOUT, Disp: , Rfl:     LORazepam (ATIVAN) 0.5 mg tablet, Take 0.5 mg by mouth daily as needed for Anxiety. TAKES 1/2 OF 1MG TABLET DAILY AS NEEDED, \"USUALLY EVERY DAY\", Disp: , Rfl:      Date Last Reviewed: 6/13/2017    Social History/Home Situation:   Social History     Social History    Marital status:      Spouse name: N/A    Number of children: N/A    Years of education: N/A     Occupational History    Not on file. Social History Main Topics    Smoking status: Never Smoker    Smokeless tobacco: Not on file    Alcohol use No    Drug use: Not on file    Sexual activity: Not on file     Other Topics Concern    Not on file     Social History Narrative           Work/Activity History: consultation  OBJECTIVE:    Outcome Measure: Tool Used: Modified Oswestry Low Back Pain Questionnaire  Score:  Initial: 12/50  Most Recent: 18/50 (Date: 4/4/17 )   Interpretation of Score: Each section is scored on a 0-5 scale, 5 representing the greatest disability. The scores of each section are added together for a total score of 50.     Score 0 1-10 11-20 21-30 31-40 41-49 50   Modifier CH CI CJ CK CL CM CN     ? Changing and Maintaining Body Position:     - CURRENT STATUS: CJ - 20%-39% impaired, limited or restricted    - GOAL STATUS:  CH - 0% impaired, limited or restricted    - D/C STATUS:  ---------------To be determined---------------  Tool Used: Lower Extremity Functional Scale (LEFS)  Score:  Initial: 49/80 Most Recent: 54/80 (Date: 4/4/17 )   Interpretation of Score: 20 questions each scored on a 5 point scale with 0 representing \"extreme difficulty or unable to perform\" and 4 representing \"no difficulty\". The lower the score, the greater the functional disability. 80/80 represents no disability. Minimal detectable change is 9 points. Score 80 79-63 62-48 47-32 31-16 15-1 0   Modifier CH CI CJ CK CL CM CN         Observation/Orthostatic Postural Assessment:    Patient is obese individual with increased abdominal mass and lordosis in standing. Patient shows slight right rotation from thoracic spine in standing. He reports that the popping has subsided during some of his activity. Palpation:    (re-cert-4/4/17) . Patient shows decreased mobility in right hip due to replacement earlier this year. He does show decreased R LE edema compared to his previous PT treatments. Core strength tests:  Vertical compression test (VCT)- grade 3/5 with dysfunction at TL junction and right rotation (able to show improved posture and strength with testing)  Elbow flexion test (EFT)- Grade 2/5 with sluggish initiation, and endurance present  Lumbar protective mechanism (LPM)- Left A-P is sluggish grade 2, P-A grade 2 and present; Right A-P is grade 2 sluggish, endurance present, P-A is 2- and present.   ROM: Slight limitation in hip range of motion, but mostly limited in right rotation and side bending left                           Strength: Good and functional LE strength                 Special Tests: Vertical compression test (VCT)- grade 3/5 with dysfunction at TL junction and right rotation  Elbow flexion test (EFT)- Grade 2+/5 with sluggish initiation, and endurance present  Lumbar protective mechanism (LPM)- Left A-P is sluggish grade 2, P-A grade 2 and present; Right A-P is grade 1 sluggish, endurance present, P-A is 2- and present. Neurological Screen:   Myotomes: intact                  Dermatomes: intact                  Reflexes: intact                  Neural Tension Tests: (-)  Functional Mobility:       Patient has difficulty with functional mobility, standing for greater than 30 min and with exercise activity  Balance:    fair- Patient unable to stand greater than 1 sec in single leg stance Bilaterally  TREATMENT:    (In addition to Assessment/Re-Assessment sessions the following treatments were rendered)  Therapeutic Exercise: (15 Minutes):  Exercises per grid below to improve mobility, strength, balance and coordination. Required minimal visual, verbal and manual cues to promote proper body alignment, promote proper body posture and promote proper body mechanics. Progressed resistance, range and repetitions as indicated.    Date:  6/13/2017     Activity/Exercise Parameters   SCI FIT Not today   Stretches  Hamstring bilaterally 3 x 30 sec  Piriformis bilaterally 3 x 20 sec  Hip flexor stretch   Education 1 min on continuing his exercises and starting to work some exercises in at home and not just the gym     Core training  Reviewed x 5 min   Treadmill (not today) Discussed x 2 min   Step up (not today) X 10 B    Glut training (not today) Bent over table with focus on form x 5 min   Sit to stand (not today) X 10 with good form   Balance Talked about challenging balance x 5 min  Tandem stance work x 5 min   Hamstring activation Hook lying x 2 min right knee     Physioball Hamstring curls x 20  Wall squat x 10           HEP-education on posture  Manual Therapy (    Soft Tissue Mobilization Duration  Duration: 30 Minutes):  Right and left hip stretches. STM to left glut med and min to border and bony contours  -STM to right fibula, tibialis anterior and fibular mobilizations. -STM to IT band  -Left side lie pelvic mobility and facilitation.  -Mass trunk flexion work for core training  (Used abbreviations: MET - muscle energy technique; PNF - proprioceptive neuromuscular facilitation; NMR - neuromuscular re-education; a/p - anterior to posterior; p/a - posterior to anterior)   Therapeutic Modalities:                                                                                               HEP: As above; handouts given to patient for all exercises. ______________________________________________________________________________________________________    Treatment Assessment:  Patient shows improvement in adherance to core training. Continue to progress as tolerated  Pain at 1/10  Progression/Medical Necessity:   · Patient is expected to demonstrate progress in strength, range of motion, balance, coordination and functional technique to improve functional mobility. · Patient demonstrates good rehab potential due to higher previous functional level. · Skilled intervention continues to be required due to core weakness and instability. Compliance with Program/Exercises: compliant all of the time. Reason for Continuation of Services/Other Comments:  · Patient continues to require skilled intervention due to decreased function. Recommendations/Intent for next treatment session: \"Treatment next visit will focus on core training, endurance\".  And balance   Total Treatment Duration:  PT Patient Time In/Time Out  Time In: 1354  Time Out: 1500    Jl Rouse, PT, DPT, OCS

## 2017-06-27 ENCOUNTER — HOSPITAL ENCOUNTER (OUTPATIENT)
Dept: PHYSICAL THERAPY | Age: 72
Discharge: HOME | End: 2017-06-27
Payer: MEDICARE

## 2017-06-27 PROCEDURE — 97164 PT RE-EVAL EST PLAN CARE: CPT

## 2017-06-27 PROCEDURE — G8982 BODY POS GOAL STATUS: HCPCS

## 2017-06-27 PROCEDURE — G8981 BODY POS CURRENT STATUS: HCPCS

## 2017-06-27 NOTE — PROGRESS NOTES
Castro Arizmendi   (T:[id number]) [address]  at  [facility]  [address] [place] [address]  [phone]   [phone]           Outpatient PHYSICAL THERAPY: Daily Note, Re-evaluation and Recertification  Fall Risk Score: 2 (? 5 = High Risk)       ICD-10: Treatment Diagnosis: Low back pain, M54.5  Difficulty walking, not elsewhere classified, R26.2  Cervicalgia, M54.2  REFERRING PHYSICIAN: Corrin Litten, MD MD Orders: Evaluate and Treat  Return Physician Appointment: not known  MEDICAL/REFERRING DIAGNOSIS: Lumbosacral stenosis with neurogenic claudication, DDD, Lumbosacral spondylosis without myelopathy, other symptoms referrable to back  DATE OF ONSET: Chronic   PRIOR LEVEL OF FUNCTION: independent  PRECAUTIONS/ALLERGIES:   Allergies   Allergen Reactions    Other Medication Other (comments)     \"Some BP meds\" cause \"  SWELLING OF LEGS with Procardia and with current medicine    Statins-Hmg-Coa Reductase Inhibitors Other (comments)     Extreme nerve pain       ASSESSMENT:  ?????? ? ? This section established at most recent assessment??????????  Castro Arizmendi has been seen for 37 visits from 12/11/15 to 6/27/2017 for lumbar spine pain and spinal stenosis. Patient has performed therapeutic exercises, activities, and had manual therapy to increased strength, ROM and function. Patient has also used modalities for pain control in order to increase function. He is becoming more consistent with his workout program at the gym and is seeing benefits. He still has lack of core strength at this time. Balance has also become a difficulty for this patient due to neuropathy. Patient has shown no change in function per the Modified Oswestry Disability Index score with scores of 18/50. He shows significant improvement in his weight loss goals as well as his balance goals.  Patient has progressed well toward their goals and will benefit from continuing skilled PT in order to address their impairments. Russell Friday, PT, DPT, OCS  6/27/2017      PROBLEM LIST (Impairments causing functional limitations):  1. Decreased Strength affecting function  2. Decreased ADL/Functional Activities  3. Decreased Flexibility/joint mobility  4. Decreased transfer abilities  5. Increased Pain affecting function  6. Decreased Activity tolerance   7. Increased Fatigue affecting function  GOALS: (Goals have been discussed and agreed upon with patient.)  1. DISCHARGE GOALS: Time Frame: 12 weeks   2. Patient will be independent in advanced core training exercises to return to functional mobility (ongoing)  3. Patient will show a greater than 5 point decrease on the Modified Oswestry Disability Index score in order to show an increase in function (ongoing)  4. Patient will report standing for greater than 30 min with work activity (MET-11/7/16)  5. NEW GOAL:  6. Patient will walk greater than 12 laps in the gym during gym session. (ongoing)  7. Patient will perform core exercises as part of his routine greater than 4 times a week (ongoing)  8. Patient will drive for longer than 2 hours without pain increase   REHABILITATION POTENTIAL FOR STATED GOALS: GoodPLAN OF CARE:  INTERVENTIONS PLANNED: (Benefits and precautions of physical therapy have been discussed with the patient.)  1. balance exercise  2. bed mobility  3. cold  4. electrical stimulation  5. gait training  6. heat  7. manual therapy (including instrument assisted soft tissue mobilization)  8. neuromuscular re-education/strengthening  9. range of motion: active/assisted/passive  10. therapeutic activities  11. therapeutic exercise/strengthening  12. transfer training  13. Other: Aquatics  TREATMENT PLAN EFFECTIVE DATES: 6/27/17 TO 9/27/17  FREQUENCY/DURATION: Follow patient 1 time a month for 3 months to address above goals.   Regarding Greg Hopes Potocki's therapy, I certify that the treatment plan above will be carried out by a therapist or under their direction. Thank you for this referral,  Zoya Young PT     Referring Physician Signature: Milana Phillips MD          Date                                SUBJECTIVE:    History of Present Injury/Illness (Reason for Referral): Patient report that he has had a long history of back issues. He first started to have health and difficulty functioning with a presence of gout for about a year, then had his hip replaced in April of this year. Now, his is trying to regain his back strength and function. He has difficulty walking for long periods and standing in one place. His goal is to become more active and functional as well as control his health. Patient had an injection a few weeks ago and had some relief from that. MRI results: Copy in paper chart showing:  Abdominal hernia surgery performed in   Present Symptoms: Patient reports he has a little more pain in the shoulder and neck today, but still has seen progress in his function from the balance and the strength training. Pain at 2/10  Dominant Side: right  Past Medical History:    Active Ambulatory Problems     Diagnosis Date Noted    S/P total hip arthroplasty 04/06/2015    HTN (hypertension) 04/06/2015    HLD (hyperlipidemia) 04/06/2015    BPH (benign prostatic hyperplasia) 04/06/2015    Gout 04/06/2015    Anxiety 04/06/2015    Diabetes mellitus ([facility] [address] 04/06/2015     Resolved Ambulatory Problems     Diagnosis Date Noted    No Resolved Ambulatory Problems     Past Medical History:   Diagnosis Date    Anxiety     Edema of both legs     Enlarged prostate     Gout     Hip pain     History of elevated glucose     History of seasonal allergies     Hypercholesterolemia     Hypertension     Keratoacanthoma     S/P total hip arthroplasty 4/6/2015    Skin neoplasm     Spinal stenosis      Past Surgical History:   Procedure Laterality Date    HX TONSILLECTOMY  as child    HX WISDOM TEETH EXTRACTION  as teenager     Current Medications: Current Outpatient Prescriptions:     HYDROmorphone (DILAUDID) 2 mg tablet, Take 1 Tab by mouth every four (4) hours as needed. Max Daily Amount: 12 mg., Disp: 40 Tab, Rfl: 0    prazosin (MINIPRESS) 1 mg capsule, Take 1 mg by mouth three (3) times daily. TAKE AM OF SURGERY WITH SMALL SIP OF WATER   Indications: BENIGN PROSTATIC HYPERTROPHY, Disp: , Rfl:     amLODIPine (NORVASC) 10 mg tablet, Take 10 mg by mouth daily. TAKE AM OF SURGERY WITH SMALL SIP OF WATER, Disp: , Rfl:     labetalol (NORMODYNE) 200 mg tablet, Take 200 mg by mouth two (2) times a day. TAKE AM OF SURGERY WITH SMALL SIP OF WATER, Disp: , Rfl:     cloNIDine HCl (CATAPRES) 0.2 mg tablet, Take 0.2 mg by mouth nightly. Indications: HYPERTENSION, Disp: , Rfl:     finasteride (PROSCAR) 5 mg tablet, Take 5 mg by mouth daily. TAKE AM OF SURGERY WITH SMALL SIP OF WATER   Indications: BENIGN PROSTATIC HYPERTROPHY, Disp: , Rfl:     furosemide (LASIX) 40 mg tablet, Take 40 mg by mouth daily. , Disp: , Rfl:     Febuxostat (ULORIC) 80 mg tab tablet, Take 80 mg by mouth daily. TAKE AM OF SURGERY WITH SMALL SIP OF WATER   Indications: GOUT, Disp: , Rfl:     LORazepam (ATIVAN) 0.5 mg tablet, Take 0.5 mg by mouth daily as needed for Anxiety. TAKES 1/2 OF 1MG TABLET DAILY AS NEEDED, \"USUALLY EVERY DAY\", Disp: , Rfl:      Date Last Reviewed: 6/27/2017    Social History/Home Situation:   Social History     Social History    Marital status:      Spouse name: N/A    Number of children: N/A    Years of education: N/A     Occupational History    Not on file. Social History Main Topics    Smoking status: Never Smoker    Smokeless tobacco: Not on file    Alcohol use No    Drug use: Not on file    Sexual activity: Not on file     Other Topics Concern    Not on file     Social History Narrative           Work/Activity History: consultation  OBJECTIVE:    Outcome Measure:    Tool Used: Modified Oswestry Low Back Pain Questionnaire  Score: Initial: 12/50  Most Recent: 14/50 (Date:6/27/17 )   Interpretation of Score: Each section is scored on a 0-5 scale, 5 representing the greatest disability. The scores of each section are added together for a total score of 50. Score 0 1-10 11-20 21-30 31-40 41-49 50   Modifier CH CI CJ CK CL CM CN     ? Changing and Maintaining Body Position:     - CURRENT STATUS:  - 20%-39% impaired, limited or restricted    - GOAL STATUS:   - 0% impaired, limited or restricted    - D/C STATUS:  ---------------To be determined---------------  Tool Used: Lower Extremity Functional Scale (LEFS)  Score:  Initial: 49/80 Most Recent: 52/80 (Date: 6/27/17 )   Interpretation of Score: 20 questions each scored on a 5 point scale with 0 representing \"extreme difficulty or unable to perform\" and 4 representing \"no difficulty\". The lower the score, the greater the functional disability. 80/80 represents no disability. Minimal detectable change is 9 points. Score 80 79-63 62-48 47-32 31-16 15-1 0   Modifier CH CI CJ CK CL CM CN         Observation/Orthostatic Postural Assessment:    Patient is obese individual with increased abdominal mass and lordosis in standing. Patient shows slight right rotation from thoracic spine in standing. He reports that the popping has subsided during some of his activity. Palpation:    (re-cert-6/27/17) . He does show decreased R LE edema compared to his previous PT treatments. Core strength tests:  Vertical compression test (VCT)- grade 3/5 with dysfunction at TL junction and right rotation (able to show improved posture and strength with testing)  Elbow flexion test (EFT)- Grade 2/5 with sluggish initiation, and endurance present  Lumbar protective mechanism (LPM)- Left A-P is sluggish grade 2, P-A grade 2 and present; Right A-P is grade 2 sluggish, endurance present, P-A is 2- and present.   ROM: Slight limitation in hip range of motion, but mostly limited in right rotation and side bending left   Cervical ROM shows 50% to the right and 70% left cervical rotation. Strength: Good and functional LE strength                 Special Tests: Vertical compression test (VCT)- grade 3/5 with dysfunction at TL junction and right rotation  Elbow flexion test (EFT)- Grade 2+/5 with sluggish initiation, and endurance present  Lumbar protective mechanism (LPM)- Left A-P is sluggish grade 2, P-A grade 2 and present; Right A-P is grade 1 sluggish, endurance present, P-A is 2- and present. Neurological Screen:   Myotomes: intact                  Dermatomes: intact                  Reflexes: intact                  Neural Tension Tests: (-)  Functional Mobility:       Patient has difficulty with functional mobility, standing for greater than 30 min and with exercise activity  Balance:    fair- Patient unable to stand greater than 1 sec in single leg stance Bilaterally  TREATMENT:    (In addition to Assessment/Re-Assessment sessions the following treatments were rendered)  Therapeutic Exercise: ( ):  Not todayExercises per grid below to improve mobility, strength, balance and coordination. Required minimal visual, verbal and manual cues to promote proper body alignment, promote proper body posture and promote proper body mechanics. Progressed resistance, range and repetitions as indicated.    Date:  6/27/2017     Activity/Exercise Parameters   SCI FIT Not today   Stretches  Hamstring bilaterally 3 x 30 sec  Piriformis bilaterally 3 x 20 sec  Hip flexor stretch   Education 1 min on continuing his exercises and starting to work some exercises in at home and not just the gym     Core training  Reviewed x 5 min   Treadmill (not today) Discussed x 2 min   Step up (not today) X 10 B    Glut training (not today) Bent over table with focus on form x 5 min   Sit to stand (not today) X 10 with good form   Balance Talked about challenging balance x 5 min  Tandem stance work x 5 min Hamstring activation Hook lying x 2 min right knee     Physioball Hamstring curls x 20  Wall squat x 10           HEP-education on posture  Manual Therapy (     ):(not today  Right and left hip stretches. STM to left glut med and min to border and bony contours  -STM to right fibula, tibialis anterior and fibular mobilizations. -STM to IT band  -Left side lie pelvic mobility and facilitation.  -Mass trunk flexion work for core training  (Used abbreviations: MET - muscle energy technique; PNF - proprioceptive neuromuscular facilitation; NMR - neuromuscular re-education; a/p - anterior to posterior; p/a - posterior to anterior)   Therapeutic Modalities:                                                                                               HEP: As above; handouts given to patient for all exercises. ______________________________________________________________________________________________________    Treatment Assessment:  Patient shows improvement in adherance to core training. Continue to progress as tolerated with more challenging exercises. He is benefiting from therapy to follow up and keep him motivated to lose weight, gain strength and return to functional activities like driving, fishing etc. Follow up once a month Pain at 1/10  Progression/Medical Necessity:   · Patient is expected to demonstrate progress in strength, range of motion, balance, coordination and functional technique to improve functional mobility. · Patient demonstrates good rehab potential due to higher previous functional level. · Skilled intervention continues to be required due to core weakness and instability. Compliance with Program/Exercises: compliant all of the time. Reason for Continuation of Services/Other Comments:  · Patient continues to require skilled intervention due to decreased function. Recommendations/Intent for next treatment session: \"Treatment next visit will focus on core training, endurance\".  And balance   Total Treatment Duration:Re-evaluation for entire session today (60 min)  PT Patient Time In/Time Out  Time In: 1400  Time Out: 1500    Arielle Matamoros, PT, DPT, OCS

## 2017-07-11 ENCOUNTER — HOSPITAL ENCOUNTER (OUTPATIENT)
Dept: PHYSICAL THERAPY | Age: 72
Discharge: HOME | End: 2017-07-11
Payer: MEDICARE

## 2017-07-11 PROCEDURE — 97110 THERAPEUTIC EXERCISES: CPT

## 2017-07-11 NOTE — PROGRESS NOTES
Elsy Mathias   ([id number]) [id number]  at  [facility]  [address] [place] [address]  [phone]   [phone]           Outpatient PHYSICAL THERAPY: Daily Note  Fall Risk Score: 2 (? 5 = High Risk)       ICD-10: Treatment Diagnosis: Low back pain, M54.5  Difficulty walking, not elsewhere classified, R26.2  Cervicalgia, M54.2  REFERRING PHYSICIAN: Madelin Ordaz MD MD Orders: Evaluate and Treat  Return Physician Appointment: not known  MEDICAL/REFERRING DIAGNOSIS: Lumbosacral stenosis with neurogenic claudication, DDD, Lumbosacral spondylosis without myelopathy, other symptoms referrable to back  DATE OF ONSET: Chronic   PRIOR LEVEL OF FUNCTION: independent  PRECAUTIONS/ALLERGIES:   Allergies   Allergen Reactions    Other Medication Other (comments)     \"Some BP meds\" cause \"  SWELLING OF LEGS with Procardia and with current medicine    Statins-Hmg-Coa Reductase Inhibitors Other (comments)     Extreme nerve pain       ASSESSMENT:  ?????? ? ? This section established at most recent assessment??????????  Elsy Mathias has been seen for 37 visits from 12/11/15 to 7/11/2017 for lumbar spine pain and spinal stenosis. Patient has performed therapeutic exercises, activities, and had manual therapy to increased strength, ROM and function. Patient has also used modalities for pain control in order to increase function. He is becoming more consistent with his workout program at the gym and is seeing benefits. He still has lack of core strength at this time. Balance has also become a difficulty for this patient due to neuropathy. Patient has shown no change in function per the Modified Oswestry Disability Index score with scores of 18/50. He shows significant improvement in his weight loss goals as well as his balance goals. Patient has progressed well toward their goals and will benefit from continuing skilled PT in order to address their impairments.   Leonela Salinas, PT, JIN, XAVIER  7/11/2017      PROBLEM LIST (Impairments causing functional limitations):  1. Decreased Strength affecting function  2. Decreased ADL/Functional Activities  3. Decreased Flexibility/joint mobility  4. Decreased transfer abilities  5. Increased Pain affecting function  6. Decreased Activity tolerance   7. Increased Fatigue affecting function  GOALS: (Goals have been discussed and agreed upon with patient.)  1. DISCHARGE GOALS: Time Frame: 12 weeks   2. Patient will be independent in advanced core training exercises to return to functional mobility (ongoing)  3. Patient will show a greater than 5 point decrease on the Modified Oswestry Disability Index score in order to show an increase in function (ongoing)  4. Patient will report standing for greater than 30 min with work activity (MET-11/7/16)  5. NEW GOAL:  6. Patient will walk greater than 12 laps in the gym during gym session. (ongoing)  7. Patient will perform core exercises as part of his routine greater than 4 times a week (ongoing)  8. Patient will drive for longer than 2 hours without pain increase   REHABILITATION POTENTIAL FOR STATED GOALS: GoodPLAN OF CARE:  INTERVENTIONS PLANNED: (Benefits and precautions of physical therapy have been discussed with the patient.)  1. balance exercise  2. bed mobility  3. cold  4. electrical stimulation  5. gait training  6. heat  7. manual therapy (including instrument assisted soft tissue mobilization)  8. neuromuscular re-education/strengthening  9. range of motion: active/assisted/passive  10. therapeutic activities  11. therapeutic exercise/strengthening  12. transfer training  13. Other: Aquatics  TREATMENT PLAN EFFECTIVE DATES: 6/27/17 TO 9/27/17  FREQUENCY/DURATION: Follow patient 1 time a month for 3 months to address above goals. Regarding James Potocki's therapy, I certify that the treatment plan above will be carried out by a therapist or under their direction.   Thank you for this referral,  Doug Beyer Lauren Mortensen, PT     Referring Physician Signature: Logan Unger MD          Date                                SUBJECTIVE:    History of Present Injury/Illness (Reason for Referral): Patient report that he has had a long history of back issues. He first started to have health and difficulty functioning with a presence of gout for about a year, then had his hip replaced in April of this year. Now, his is trying to regain his back strength and function. He has difficulty walking for long periods and standing in one place. His goal is to become more active and functional as well as control his health. Patient had an injection a few weeks ago and had some relief from that. MRI results: Copy in paper chart showing:  Abdominal hernia surgery performed in   Present Symptoms: Patient reports he has increased his treadmill time to 22 min at this time and is feeling pretty good. He has looked into starting with a  to continue to make improvements with his health and fitness. Pain at 2/10  Dominant Side: right  Past Medical History:    Active Ambulatory Problems     Diagnosis Date Noted    S/P total hip arthroplasty 04/06/2015    HTN (hypertension) 04/06/2015    HLD (hyperlipidemia) 04/06/2015    BPH (benign prostatic hyperplasia) 04/06/2015    Gout 04/06/2015    Anxiety 04/06/2015    Diabetes mellitus ([address] 04/06/2015     Resolved Ambulatory Problems     Diagnosis Date Noted    No Resolved Ambulatory Problems     Past Medical History:   Diagnosis Date    Anxiety     Edema of both legs     Enlarged prostate     Gout     Hip pain     History of elevated glucose     History of seasonal allergies     Hypercholesterolemia     Hypertension     Keratoacanthoma     S/P total hip arthroplasty 4/6/2015    Skin neoplasm     Spinal stenosis      Past Surgical History:   Procedure Laterality Date    HX TONSILLECTOMY  as child    HX WISDOM TEETH EXTRACTION  as teenager     Current Medications: Current Outpatient Prescriptions:     HYDROmorphone (DILAUDID) 2 mg tablet, Take 1 Tab by mouth every four (4) hours as needed. Max Daily Amount: 12 mg., Disp: 40 Tab, Rfl: 0    prazosin (MINIPRESS) 1 mg capsule, Take 1 mg by mouth three (3) times daily. TAKE AM OF SURGERY WITH SMALL SIP OF WATER   Indications: BENIGN PROSTATIC HYPERTROPHY, Disp: , Rfl:     amLODIPine (NORVASC) 10 mg tablet, Take 10 mg by mouth daily. TAKE AM OF SURGERY WITH SMALL SIP OF WATER, Disp: , Rfl:     labetalol (NORMODYNE) 200 mg tablet, Take 200 mg by mouth two (2) times a day. TAKE AM OF SURGERY WITH SMALL SIP OF WATER, Disp: , Rfl:     cloNIDine HCl (CATAPRES) 0.2 mg tablet, Take 0.2 mg by mouth nightly. Indications: HYPERTENSION, Disp: , Rfl:     finasteride (PROSCAR) 5 mg tablet, Take 5 mg by mouth daily. TAKE AM OF SURGERY WITH SMALL SIP OF WATER   Indications: BENIGN PROSTATIC HYPERTROPHY, Disp: , Rfl:     furosemide (LASIX) 40 mg tablet, Take 40 mg by mouth daily. , Disp: , Rfl:     Febuxostat (ULORIC) 80 mg tab tablet, Take 80 mg by mouth daily. TAKE AM OF SURGERY WITH SMALL SIP OF WATER   Indications: GOUT, Disp: , Rfl:     LORazepam (ATIVAN) 0.5 mg tablet, Take 0.5 mg by mouth daily as needed for Anxiety. TAKES 1/2 OF 1MG TABLET DAILY AS NEEDED, \"USUALLY EVERY DAY\", Disp: , Rfl:      Date Last Reviewed: 7/11/2017    Social History/Home Situation:   Social History     Social History    Marital status:      Spouse name: N/A    Number of children: N/A    Years of education: N/A     Occupational History    Not on file. Social History Main Topics    Smoking status: Never Smoker    Smokeless tobacco: Not on file    Alcohol use No    Drug use: Not on file    Sexual activity: Not on file     Other Topics Concern    Not on file     Social History Narrative           Work/Activity History: consultation  OBJECTIVE:    Outcome Measure:    Tool Used: Modified Oswestry Low Back Pain Questionnaire  Score: Initial: 12/50  Most Recent: 14/50 (Date:6/27/17 )   Interpretation of Score: Each section is scored on a 0-5 scale, 5 representing the greatest disability. The scores of each section are added together for a total score of 50. Score 0 1-10 11-20 21-30 31-40 41-49 50   Modifier CH CI CJ CK CL CM CN     ? Changing and Maintaining Body Position:     - CURRENT STATUS:  - 20%-39% impaired, limited or restricted    - GOAL STATUS:   - 0% impaired, limited or restricted    - D/C STATUS:  ---------------To be determined---------------  Tool Used: Lower Extremity Functional Scale (LEFS)  Score:  Initial: 49/80 Most Recent: 52/80 (Date: 6/27/17 )   Interpretation of Score: 20 questions each scored on a 5 point scale with 0 representing \"extreme difficulty or unable to perform\" and 4 representing \"no difficulty\". The lower the score, the greater the functional disability. 80/80 represents no disability. Minimal detectable change is 9 points. Score 80 79-63 62-48 47-32 31-16 15-1 0   Modifier CH CI CJ CK CL CM CN         Observation/Orthostatic Postural Assessment:    Patient is obese individual with increased abdominal mass and lordosis in standing. Patient shows slight right rotation from thoracic spine in standing. He reports that the popping has subsided during some of his activity. Palpation:    (re-cert-6/27/17) . He does show decreased R LE edema compared to his previous PT treatments. Core strength tests:  Vertical compression test (VCT)- grade 3/5 with dysfunction at TL junction and right rotation (able to show improved posture and strength with testing)  Elbow flexion test (EFT)- Grade 2/5 with sluggish initiation, and endurance present  Lumbar protective mechanism (LPM)- Left A-P is sluggish grade 2, P-A grade 2 and present; Right A-P is grade 2 sluggish, endurance present, P-A is 2- and present.   ROM: Slight limitation in hip range of motion, but mostly limited in right rotation and side bending left   Cervical ROM shows 50% to the right and 70% left cervical rotation. Strength: Good and functional LE strength                 Special Tests: Vertical compression test (VCT)- grade 3/5 with dysfunction at TL junction and right rotation  Elbow flexion test (EFT)- Grade 2+/5 with sluggish initiation, and endurance present  Lumbar protective mechanism (LPM)- Left A-P is sluggish grade 2, P-A grade 2 and present; Right A-P is grade 1 sluggish, endurance present, P-A is 2- and present. Neurological Screen:   Myotomes: intact                  Dermatomes: intact                  Reflexes: intact                  Neural Tension Tests: (-)  Functional Mobility:       Patient has difficulty with functional mobility, standing for greater than 30 min and with exercise activity  Balance:    fair- Patient unable to stand greater than 1 sec in single leg stance Bilaterally  TREATMENT:    (In addition to Assessment/Re-Assessment sessions the following treatments were rendered)  Therapeutic Exercise: (25 Minutes):  Not todayExercises per grid below to improve mobility, strength, balance and coordination. Required minimal visual, verbal and manual cues to promote proper body alignment, promote proper body posture and promote proper body mechanics. Progressed resistance, range and repetitions as indicated.    Date:  7/11/2017     Activity/Exercise Parameters   SCI FIT Not today   Stretches  Hamstring bilaterally 3 x 30 sec  Piriformis bilaterally 3 x 20 sec  Hip flexor stretch   Education 1 min on continuing his exercises and starting to work some exercises in at home and not just the gym     Core training  Reviewed x 5 min   Treadmill (not today) Discussed x 2 min   Step up (not today) X 10 B    Glut training (not today) Bent over table with focus on form x 5 min   Sit to stand (not today) X 10 with good form   Balance Talked about challenging balance x 5 min  Tandem stance work x 5 min Hamstring activation Hook lying x 2 min right knee     Physioball Hamstring curls x 20  Wall squat x 10       40 min discussion on health and wellness with gym exercises and how he can continue to accomplish his goals.  (indirect charge)    HEP-education on posture  Manual Therapy (     ):(not today)  Right and left hip stretches. STM to left glut med and min to border and bony contours  -STM to right fibula, tibialis anterior and fibular mobilizations. -STM to IT band  -Left side lie pelvic mobility and facilitation.  -Mass trunk flexion work for core training  (Used abbreviations: MET - muscle energy technique; PNF - proprioceptive neuromuscular facilitation; NMR - neuromuscular re-education; a/p - anterior to posterior; p/a - posterior to anterior)   Therapeutic Modalities:                                                                                               HEP: As above; handouts given to patient for all exercises. ______________________________________________________________________________________________________    Treatment Assessment:  Patient shows improvement with increases in exercise training. Pain at 1/10  Progression/Medical Necessity:   · Patient is expected to demonstrate progress in strength, range of motion, balance, coordination and functional technique to improve functional mobility. · Patient demonstrates good rehab potential due to higher previous functional level. · Skilled intervention continues to be required due to core weakness and instability. Compliance with Program/Exercises: compliant all of the time. Reason for Continuation of Services/Other Comments:  · Patient continues to require skilled intervention due to decreased function. Recommendations/Intent for next treatment session: \"Treatment next visit will focus on core training, endurance\".  And balance   Total Treatment Duration:  PT Patient Time In/Time Out  Time In: 1400  Time Out: 408 Tre Li, PT, DPT, OCS

## 2017-08-08 ENCOUNTER — HOSPITAL ENCOUNTER (OUTPATIENT)
Dept: PHYSICAL THERAPY | Age: 72
Discharge: HOME | End: 2017-08-08
Payer: MEDICARE

## 2017-08-08 PROCEDURE — 97140 MANUAL THERAPY 1/> REGIONS: CPT

## 2017-08-08 NOTE — PROGRESS NOTES
Gamal Plaza   (TTP:9/85/2212) [address]  at  [facility]  [address] [place] [address]  [phone]   [phone]           Outpatient PHYSICAL THERAPY: Daily Note  Fall Risk Score: 2 (? 5 = High Risk)       ICD-10: Treatment Diagnosis: Low back pain, M54.5  Difficulty walking, not elsewhere classified, R26.2  Cervicalgia, M54.2  REFERRING PHYSICIAN: Best Mederos MD MD Orders: Evaluate and Treat  Return Physician Appointment: not known  MEDICAL/REFERRING DIAGNOSIS: Lumbosacral stenosis with neurogenic claudication, DDD, Lumbosacral spondylosis without myelopathy, other symptoms referrable to back  DATE OF ONSET: Chronic   PRIOR LEVEL OF FUNCTION: independent  PRECAUTIONS/ALLERGIES:   Allergies   Allergen Reactions    Other Medication Other (comments)     \"Some BP meds\" cause \"  SWELLING OF LEGS with Procardia and with current medicine    Statins-Hmg-Coa Reductase Inhibitors Other (comments)     Extreme nerve pain       ASSESSMENT:  ?????? ? ? This section established at most recent assessment??????????  Gamal Plaza has been seen for 43 visits from 12/11/15 to 8/8/2017 for lumbar spine pain and spinal stenosis. Patient has performed therapeutic exercises, activities, and had manual therapy to increased strength, ROM and function. Patient has also used modalities for pain control in order to increase function. He is becoming more consistent with his workout program at the gym and is seeing benefits. He still has lack of core strength at this time. Balance has also become a difficulty for this patient due to neuropathy. Patient has shown no change in function per the Modified Oswestry Disability Index score with scores of 18/50. He shows significant improvement in his weight loss goals as well as his balance goals. Patient has progressed well toward their goals and will benefit from continuing skilled PT in order to address their impairments.   Jacob Umanzor, PT, JIN, XAVIER  8/8/2017      PROBLEM LIST (Impairments causing functional limitations):  1. Decreased Strength affecting function  2. Decreased ADL/Functional Activities  3. Decreased Flexibility/joint mobility  4. Decreased transfer abilities  5. Increased Pain affecting function  6. Decreased Activity tolerance   7. Increased Fatigue affecting function  GOALS: (Goals have been discussed and agreed upon with patient.)  1. DISCHARGE GOALS: Time Frame: 12 weeks   2. Patient will be independent in advanced core training exercises to return to functional mobility (ongoing)  3. Patient will show a greater than 5 point decrease on the Modified Oswestry Disability Index score in order to show an increase in function (ongoing)  4. Patient will report standing for greater than 30 min with work activity (MET-11/7/16)  5. NEW GOAL:  6. Patient will walk greater than 12 laps in the gym during gym session. (ongoing)  7. Patient will perform core exercises as part of his routine greater than 4 times a week (ongoing)  8. Patient will drive for longer than 2 hours without pain increase   REHABILITATION POTENTIAL FOR STATED GOALS: GoodPLAN OF CARE:  INTERVENTIONS PLANNED: (Benefits and precautions of physical therapy have been discussed with the patient.)  1. balance exercise  2. bed mobility  3. cold  4. electrical stimulation  5. gait training  6. heat  7. manual therapy (including instrument assisted soft tissue mobilization)  8. neuromuscular re-education/strengthening  9. range of motion: active/assisted/passive  10. therapeutic activities  11. therapeutic exercise/strengthening  12. transfer training  13. Other: Aquatics  TREATMENT PLAN EFFECTIVE DATES: 6/27/17 TO 9/27/17  FREQUENCY/DURATION: Follow patient 1 time a month for 3 months to address above goals. Regarding James Potocki's therapy, I certify that the treatment plan above will be carried out by a therapist or under their direction.   Thank you for this referral,  Maria Elena Hurtado Isis Munoz, SURINDER     Referring Physician Signature: Griselda Sellar, MD          Date                                SUBJECTIVE:    History of Present Injury/Illness (Reason for Referral): Patient report that he has had a long history of back issues. He first started to have health and difficulty functioning with a presence of gout for about a year, then had his hip replaced in April of this year. Now, his is trying to regain his back strength and function. He has difficulty walking for long periods and standing in one place. His goal is to become more active and functional as well as control his health. Patient had an injection a few weeks ago and had some relief from that. MRI results: Copy in paper chart showing:  Abdominal hernia surgery performed in   Present Symptoms: Patient reports he has increased his treadmill time to 22 min at this time and is feeling pretty good. He has looked into starting with a  to continue to make improvements with his health and fitness. Pain at 2/10  Dominant Side: right  Past Medical History:    Active Ambulatory Problems     Diagnosis Date Noted    S/P total hip arthroplasty 04/06/2015    HTN (hypertension) 04/06/2015    HLD (hyperlipidemia) 04/06/2015    BPH (benign prostatic hyperplasia) 04/06/2015    Gout 04/06/2015    Anxiety 04/06/2015    Diabetes mellitus ([address] 04/06/2015     Resolved Ambulatory Problems     Diagnosis Date Noted    No Resolved Ambulatory Problems     Past Medical History:   Diagnosis Date    Anxiety     Edema of both legs     Enlarged prostate     Gout     Hip pain     History of elevated glucose     History of seasonal allergies     Hypercholesterolemia     Hypertension     Keratoacanthoma     S/P total hip arthroplasty 4/6/2015    Skin neoplasm     Spinal stenosis      Past Surgical History:   Procedure Laterality Date    HX TONSILLECTOMY  as child    HX WISDOM TEETH EXTRACTION  as teenager     Current Medications: Current Outpatient Prescriptions:     HYDROmorphone (DILAUDID) 2 mg tablet, Take 1 Tab by mouth every four (4) hours as needed. Max Daily Amount: 12 mg., Disp: 40 Tab, Rfl: 0    prazosin (MINIPRESS) 1 mg capsule, Take 1 mg by mouth three (3) times daily. TAKE AM OF SURGERY WITH SMALL SIP OF WATER   Indications: BENIGN PROSTATIC HYPERTROPHY, Disp: , Rfl:     amLODIPine (NORVASC) 10 mg tablet, Take 10 mg by mouth daily. TAKE AM OF SURGERY WITH SMALL SIP OF WATER, Disp: , Rfl:     labetalol (NORMODYNE) 200 mg tablet, Take 200 mg by mouth two (2) times a day. TAKE AM OF SURGERY WITH SMALL SIP OF WATER, Disp: , Rfl:     cloNIDine HCl (CATAPRES) 0.2 mg tablet, Take 0.2 mg by mouth nightly. Indications: HYPERTENSION, Disp: , Rfl:     finasteride (PROSCAR) 5 mg tablet, Take 5 mg by mouth daily. TAKE AM OF SURGERY WITH SMALL SIP OF WATER   Indications: BENIGN PROSTATIC HYPERTROPHY, Disp: , Rfl:     furosemide (LASIX) 40 mg tablet, Take 40 mg by mouth daily. , Disp: , Rfl:     Febuxostat (ULORIC) 80 mg tab tablet, Take 80 mg by mouth daily. TAKE AM OF SURGERY WITH SMALL SIP OF WATER   Indications: GOUT, Disp: , Rfl:     LORazepam (ATIVAN) 0.5 mg tablet, Take 0.5 mg by mouth daily as needed for Anxiety. TAKES 1/2 OF 1MG TABLET DAILY AS NEEDED, \"USUALLY EVERY DAY\", Disp: , Rfl:      Date Last Reviewed: 8/8/2017    Social History/Home Situation:   Social History     Social History    Marital status:      Spouse name: N/A    Number of children: N/A    Years of education: N/A     Occupational History    Not on file. Social History Main Topics    Smoking status: Never Smoker    Smokeless tobacco: Not on file    Alcohol use No    Drug use: Not on file    Sexual activity: Not on file     Other Topics Concern    Not on file     Social History Narrative           Work/Activity History: consultation  OBJECTIVE:    Outcome Measure:    Tool Used: Modified Oswestry Low Back Pain Questionnaire  Score: Initial: 12/50  Most Recent: 14/50 (Date:6/27/17 )   Interpretation of Score: Each section is scored on a 0-5 scale, 5 representing the greatest disability. The scores of each section are added together for a total score of 50. Score 0 1-10 11-20 21-30 31-40 41-49 50   Modifier CH CI CJ CK CL CM CN     ? Changing and Maintaining Body Position:     - CURRENT STATUS:  - 20%-39% impaired, limited or restricted    - GOAL STATUS:   - 0% impaired, limited or restricted    - D/C STATUS:  ---------------To be determined---------------  Tool Used: Lower Extremity Functional Scale (LEFS)  Score:  Initial: 49/80 Most Recent: 52/80 (Date: 6/27/17 )   Interpretation of Score: 20 questions each scored on a 5 point scale with 0 representing \"extreme difficulty or unable to perform\" and 4 representing \"no difficulty\". The lower the score, the greater the functional disability. 80/80 represents no disability. Minimal detectable change is 9 points. Score 80 79-63 62-48 47-32 31-16 15-1 0   Modifier CH CI CJ CK CL CM CN         Observation/Orthostatic Postural Assessment:    Patient is obese individual with increased abdominal mass and lordosis in standing. Patient shows slight right rotation from thoracic spine in standing. He reports that the popping has subsided during some of his activity. Palpation:    (re-cert-6/27/17) . He does show decreased R LE edema compared to his previous PT treatments. Core strength tests:  Vertical compression test (VCT)- grade 3/5 with dysfunction at TL junction and right rotation (able to show improved posture and strength with testing)  Elbow flexion test (EFT)- Grade 2/5 with sluggish initiation, and endurance present  Lumbar protective mechanism (LPM)- Left A-P is sluggish grade 2, P-A grade 2 and present; Right A-P is grade 2 sluggish, endurance present, P-A is 2- and present.   ROM: Slight limitation in hip range of motion, but mostly limited in right rotation and side bending left   Cervical ROM shows 50% to the right and 70% left cervical rotation. Strength: Good and functional LE strength                 Special Tests: Vertical compression test (VCT)- grade 3/5 with dysfunction at TL junction and right rotation  Elbow flexion test (EFT)- Grade 2+/5 with sluggish initiation, and endurance present  Lumbar protective mechanism (LPM)- Left A-P is sluggish grade 2, P-A grade 2 and present; Right A-P is grade 1 sluggish, endurance present, P-A is 2- and present. Neurological Screen:   Myotomes: intact                  Dermatomes: intact                  Reflexes: intact                  Neural Tension Tests: (-)  Functional Mobility:       Patient has difficulty with functional mobility, standing for greater than 30 min and with exercise activity  Balance:    fair- Patient unable to stand greater than 1 sec in single leg stance Bilaterally  TREATMENT:    (In addition to Assessment/Re-Assessment sessions the following treatments were rendered)  Therapeutic Exercise: ( ):  Not todayExercises per grid below to improve mobility, strength, balance and coordination. Required minimal visual, verbal and manual cues to promote proper body alignment, promote proper body posture and promote proper body mechanics. Progressed resistance, range and repetitions as indicated.    Date:  8/8/2017     Activity/Exercise Parameters   SCI FIT Not today   Stretches  Hamstring bilaterally 3 x 30 sec  Piriformis bilaterally 3 x 20 sec  Hip flexor stretch   Education 1 min on continuing his exercises and starting to work some exercises in at home and not just the gym     Core training  Reviewed x 5 min   Treadmill (not today) Discussed x 2 min   Step up (not today) X 10 B    Glut training (not today) Bent over table with focus on form x 5 min   Sit to stand (not today) X 10 with good form   Balance Talked about challenging balance x 5 min  Tandem stance work x 5 min   Hamstring activation Hook lying x 2 min right knee     Physioball Hamstring curls x 20  Wall squat x 10       25 min discussion on health and wellness with gym exercises and how he can continue to accomplish his goals. Instruction to continue to progress his aerobic maintenance and continue to work on leg strength (indirect charge)    HEP-education on posture  Manual Therapy (    Soft Tissue Mobilization Duration  Duration: 30 Minutes):(not today)  Right and left hip stretches. STM to left glut med and min to border and bony contours  -STM to right fibula, tibialis anterior and fibular mobilizations. -STM to IT band  -Left side lie pelvic mobility and facilitation.  -Mass trunk flexion work for core training  -worked through upper spine mobility in supine as well  (Used abbreviations: MET - muscle energy technique; PNF - proprioceptive neuromuscular facilitation; NMR - neuromuscular re-education; a/p - anterior to posterior; p/a - posterior to anterior)   Therapeutic Modalities:                                                                                               HEP: As above; handouts given to patient for all exercises. ______________________________________________________________________________________________________    Treatment Assessment:  Patient shows some improvement with right knee and patella mobility today. Progression/Medical Necessity:   · Patient is expected to demonstrate progress in strength, range of motion, balance, coordination and functional technique to improve functional mobility. · Patient demonstrates good rehab potential due to higher previous functional level. · Skilled intervention continues to be required due to core weakness and instability. Compliance with Program/Exercises: compliant all of the time. Reason for Continuation of Services/Other Comments:  · Patient continues to require skilled intervention due to decreased function.   Recommendations/Intent for next treatment session: \"Treatment next visit will focus on core training, endurance\".  And balance   Total Treatment Duration:  PT Patient Time In/Time Out  Time In: 1405  Time Out: 1500    Connor Jones, PT, DPT, OCS

## 2017-09-05 ENCOUNTER — HOSPITAL ENCOUNTER (OUTPATIENT)
Dept: PHYSICAL THERAPY | Age: 72
Discharge: HOME | End: 2017-09-05
Payer: MEDICARE

## 2017-09-05 PROCEDURE — 97140 MANUAL THERAPY 1/> REGIONS: CPT

## 2017-09-05 PROCEDURE — 97110 THERAPEUTIC EXERCISES: CPT

## 2017-09-05 NOTE — PROGRESS NOTES
Patty Stover   ([id number]) [address]  at  [facility]  [address] [place] [address]  [phone]   [phone]           Outpatient PHYSICAL THERAPY: Daily Note  Fall Risk Score: 2 (? 5 = High Risk)       ICD-10: Treatment Diagnosis: Low back pain, M54.5  Difficulty walking, not elsewhere classified, R26.2  Cervicalgia, M54.2  REFERRING PHYSICIAN: Winnie Villanueva MD MD Orders: Evaluate and Treat  Return Physician Appointment: not known  MEDICAL/REFERRING DIAGNOSIS: Lumbosacral stenosis with neurogenic claudication, DDD, Lumbosacral spondylosis without myelopathy, other symptoms referrable to back  DATE OF ONSET: Chronic   PRIOR LEVEL OF FUNCTION: independent  PRECAUTIONS/ALLERGIES:   Allergies   Allergen Reactions    Other Medication Other (comments)     \"Some BP meds\" cause \"  SWELLING OF LEGS with Procardia and with current medicine    Statins-Hmg-Coa Reductase Inhibitors Other (comments)     Extreme nerve pain       ASSESSMENT:  ?????? ? ? This section established at most recent assessment??????????  Patty Stover has been seen for 43 visits from 12/11/15 to 9/5/2017 for lumbar spine pain and spinal stenosis. Patient has performed therapeutic exercises, activities, and had manual therapy to increased strength, ROM and function. Patient has also used modalities for pain control in order to increase function. He is becoming more consistent with his workout program at the gym and is seeing benefits. He still has lack of core strength at this time. Balance has also become a difficulty for this patient due to neuropathy. Patient has shown no change in function per the Modified Oswestry Disability Index score with scores of 18/50. He shows significant improvement in his weight loss goals as well as his balance goals. Patient has progressed well toward their goals and will benefit from continuing skilled PT in order to address their impairments.   Rocky Ricks, PT, [facility]  9/5/2017      PROBLEM LIST (Impairments causing functional limitations):  1. Decreased Strength affecting function  2. Decreased ADL/Functional Activities  3. Decreased Flexibility/joint mobility  4. Decreased transfer abilities  5. Increased Pain affecting function  6. Decreased Activity tolerance   7. Increased Fatigue affecting function  GOALS: (Goals have been discussed and agreed upon with patient.)  1. DISCHARGE GOALS: Time Frame: 12 weeks   2. Patient will be independent in advanced core training exercises to return to functional mobility (ongoing)  3. Patient will show a greater than 5 point decrease on the Modified Oswestry Disability Index score in order to show an increase in function (ongoing)  4. Patient will report standing for greater than 30 min with work activity (MET-11/7/16)  5. NEW GOAL:  6. Patient will walk greater than 12 laps in the gym during gym session. (ongoing)  7. Patient will perform core exercises as part of his routine greater than 4 times a week (ongoing)  8. Patient will drive for longer than 2 hours without pain increase   REHABILITATION POTENTIAL FOR STATED GOALS: GoodPLAN OF CARE:  INTERVENTIONS PLANNED: (Benefits and precautions of physical therapy have been discussed with the patient.)  1. balance exercise  2. bed mobility  3. cold  4. electrical stimulation  5. gait training  6. heat  7. manual therapy (including instrument assisted soft tissue mobilization)  8. neuromuscular re-education/strengthening  9. range of motion: active/assisted/passive  10. therapeutic activities  11. therapeutic exercise/strengthening  12. transfer training  13. Other: Aquatics  TREATMENT PLAN EFFECTIVE DATES: 6/27/17 TO 9/27/17  FREQUENCY/DURATION: Follow patient 1 time a month for 3 months to address above goals. Regarding James Potocki's therapy, I certify that the treatment plan above will be carried out by a therapist or under their direction.   Thank you for this referral,  Ashu Causey Kev Egan PT     Referring Physician Signature: Ty Casey MD          Date                                SUBJECTIVE:    History of Present Injury/Illness (Reason for Referral): Patient report that he has had a long history of back issues. He first started to have health and difficulty functioning with a presence of gout for about a year, then had his hip replaced in April of this year. Now, his is trying to regain his back strength and function. He has difficulty walking for long periods and standing in one place. His goal is to become more active and functional as well as control his health. Patient had an injection a few weeks ago and had some relief from that. MRI results: Copy in paper chart showing:  Abdominal hernia surgery performed in   Present Symptoms: Patient reports he has started his exercise program with [employer] and is wanting to make sure that the things he is doing are not hurting him. Pain at 2/10  Dominant Side: right  Past Medical History:    Active Ambulatory Problems     Diagnosis Date Noted    S/P total hip arthroplasty 04/06/2015    HTN (hypertension) 04/06/2015    HLD (hyperlipidemia) 04/06/2015    BPH (benign prostatic hyperplasia) 04/06/2015    Gout 04/06/2015    Anxiety 04/06/2015    Diabetes mellitus ([facility] [address] 04/06/2015     Resolved Ambulatory Problems     Diagnosis Date Noted    No Resolved Ambulatory Problems     Past Medical History:   Diagnosis Date    Anxiety     Edema of both legs     Enlarged prostate     Gout     Hip pain     History of elevated glucose     History of seasonal allergies     Hypercholesterolemia     Hypertension     Keratoacanthoma     S/P total hip arthroplasty 4/6/2015    Skin neoplasm     Spinal stenosis      Past Surgical History:   Procedure Laterality Date    HX TONSILLECTOMY  as child    HX WISDOM TEETH EXTRACTION  as teenager     Current Medications:   Current Outpatient Prescriptions:     HYDROmorphone (DILAUDID) 2 mg tablet, Take 1 Tab by mouth every four (4) hours as needed. Max Daily Amount: 12 mg., Disp: 40 Tab, Rfl: 0    prazosin (MINIPRESS) 1 mg capsule, Take 1 mg by mouth three (3) times daily. TAKE AM OF SURGERY WITH SMALL SIP OF WATER   Indications: BENIGN PROSTATIC HYPERTROPHY, Disp: , Rfl:     amLODIPine (NORVASC) 10 mg tablet, Take 10 mg by mouth daily. TAKE AM OF SURGERY WITH SMALL SIP OF WATER, Disp: , Rfl:     labetalol (NORMODYNE) 200 mg tablet, Take 200 mg by mouth two (2) times a day. TAKE AM OF SURGERY WITH SMALL SIP OF WATER, Disp: , Rfl:     cloNIDine HCl (CATAPRES) 0.2 mg tablet, Take 0.2 mg by mouth nightly. Indications: HYPERTENSION, Disp: , Rfl:     finasteride (PROSCAR) 5 mg tablet, Take 5 mg by mouth daily. TAKE AM OF SURGERY WITH SMALL SIP OF WATER   Indications: BENIGN PROSTATIC HYPERTROPHY, Disp: , Rfl:     furosemide (LASIX) 40 mg tablet, Take 40 mg by mouth daily. , Disp: , Rfl:     Febuxostat (ULORIC) 80 mg tab tablet, Take 80 mg by mouth daily. TAKE AM OF SURGERY WITH SMALL SIP OF WATER   Indications: GOUT, Disp: , Rfl:     LORazepam (ATIVAN) 0.5 mg tablet, Take 0.5 mg by mouth daily as needed for Anxiety. TAKES 1/2 OF 1MG TABLET DAILY AS NEEDED, \"USUALLY EVERY DAY\", Disp: , Rfl:      Date Last Reviewed: 9/5/2017    Social History/Home Situation:   Social History     Social History    Marital status:      Spouse name: N/A    Number of children: N/A    Years of education: N/A     Occupational History    Not on file. Social History Main Topics    Smoking status: Never Smoker    Smokeless tobacco: Not on file    Alcohol use No    Drug use: Not on file    Sexual activity: Not on file     Other Topics Concern    Not on file     Social History Narrative           Work/Activity History: consultation  OBJECTIVE:    Outcome Measure:    Tool Used: Modified Oswestry Low Back Pain Questionnaire  Score:  Initial: 12/50  Most Recent: 14/50 (Date:6/27/17 )   Interpretation of Score: Each section is scored on a 0-5 scale, 5 representing the greatest disability. The scores of each section are added together for a total score of 50. Score 0 1-10 11-20 21-30 31-40 41-49 50   Modifier CH CI CJ CK CL CM CN     ? Changing and Maintaining Body Position:     - CURRENT STATUS:  - 20%-39% impaired, limited or restricted    - GOAL STATUS:   - 0% impaired, limited or restricted    - D/C STATUS:  ---------------To be determined---------------  Tool Used: Lower Extremity Functional Scale (LEFS)  Score:  Initial: 49/80 Most Recent: 52/80 (Date: 6/27/17 )   Interpretation of Score: 20 questions each scored on a 5 point scale with 0 representing \"extreme difficulty or unable to perform\" and 4 representing \"no difficulty\". The lower the score, the greater the functional disability. 80/80 represents no disability. Minimal detectable change is 9 points. Score 80 79-63 62-48 47-32 31-16 15-1 0   Modifier CH CI CJ CK CL CM CN         Observation/Orthostatic Postural Assessment:    Patient is obese individual with increased abdominal mass and lordosis in standing. Patient shows slight right rotation from thoracic spine in standing. He reports that the popping has subsided during some of his activity. Palpation:    (re-cert-6/27/17) . He does show decreased R LE edema compared to his previous PT treatments. Core strength tests:  Vertical compression test (VCT)- grade 3/5 with dysfunction at TL junction and right rotation (able to show improved posture and strength with testing)  Elbow flexion test (EFT)- Grade 2/5 with sluggish initiation, and endurance present  Lumbar protective mechanism (LPM)- Left A-P is sluggish grade 2, P-A grade 2 and present; Right A-P is grade 2 sluggish, endurance present, P-A is 2- and present.   ROM: Slight limitation in hip range of motion, but mostly limited in right rotation and side bending left   Cervical ROM shows 50% to the right and 70% left cervical rotation. Strength: Good and functional LE strength                 Special Tests: Vertical compression test (VCT)- grade 3/5 with dysfunction at TL junction and right rotation  Elbow flexion test (EFT)- Grade 2+/5 with sluggish initiation, and endurance present  Lumbar protective mechanism (LPM)- Left A-P is sluggish grade 2, P-A grade 2 and present; Right A-P is grade 1 sluggish, endurance present, P-A is 2- and present. Neurological Screen:   Myotomes: intact                  Dermatomes: intact                  Reflexes: intact                  Neural Tension Tests: (-)  Functional Mobility:       Patient has difficulty with functional mobility, standing for greater than 30 min and with exercise activity  Balance:    fair- Patient unable to stand greater than 1 sec in single leg stance Bilaterally  TREATMENT:    (In addition to Assessment/Re-Assessment sessions the following treatments were rendered)  Therapeutic Exercise: (15 Minutes):  Not todayExercises per grid below to improve mobility, strength, balance and coordination. Required minimal visual, verbal and manual cues to promote proper body alignment, promote proper body posture and promote proper body mechanics. Progressed resistance, range and repetitions as indicated.    Date:  9/5/2017     Activity/Exercise Parameters   SCI FIT Not today   Stretches  Hamstring bilaterally 3 x 30 sec  Piriformis bilaterally 3 x 20 sec  Hip flexor stretch   Education 1 min on continuing his exercises and starting to work some exercises in at home and not just the gym     Core training  Reviewed x 5 min   Treadmill (not today) Discussed x 2 min   Step up (not today) X 10 B    Glut training (not today) Bent over table with focus on form x 5 min   Sit to stand (not today) X 10 with good form   Balance Talked about challenging balance x 5 min  Tandem stance work x 5 min   Hamstring activation Hook lying x 2 min right knee     Physioball Hamstring curls x 20  Wall squat x 10       15 min discussion on health and wellness with gym exercises and how he can continue to accomplish his goals. Instruction to continue to progress his aerobic maintenance and continue to work on leg strength (indirect charge)    HEP-education on posture  Manual Therapy (    Soft Tissue Mobilization Duration  Duration: 30 Minutes):(not today)  Right and left hip stretches. STM to left glut med and min to border and bony contours  -STM to right fibula, tibialis anterior and fibular mobilizations. -STM to IT band and patella with plunger  -worked through upper spine mobility in supine as well  (Used abbreviations: MET - muscle energy technique; PNF - proprioceptive neuromuscular facilitation; NMR - neuromuscular re-education; a/p - anterior to posterior; p/a - posterior to anterior)   Therapeutic Modalities:                                                                                               HEP: As above; handouts given to patient for all exercises. ______________________________________________________________________________________________________    Treatment Assessment:  Patient shows some improvement with right knee and patella mobility today. Follow up in three weeks for re-evaluation from new training routine. Pain at 1/10  Progression/Medical Necessity:   · Patient is expected to demonstrate progress in strength, range of motion, balance, coordination and functional technique to improve functional mobility. · Patient demonstrates good rehab potential due to higher previous functional level. · Skilled intervention continues to be required due to core weakness and instability. Compliance with Program/Exercises: compliant all of the time. Reason for Continuation of Services/Other Comments:  · Patient continues to require skilled intervention due to decreased function.   Recommendations/Intent for next treatment session: \"Treatment next visit will focus on core training, endurance\".  And balance   Total Treatment Duration:  PT Patient Time In/Time Out  Time In: 1402  Time Out: [address], PT, DPT, OCS

## 2017-09-19 ENCOUNTER — HOSPITAL ENCOUNTER (OUTPATIENT)
Dept: PHYSICAL THERAPY | Age: 72
Discharge: HOME | End: 2017-09-19
Payer: MEDICARE

## 2017-09-19 PROCEDURE — G8982 BODY POS GOAL STATUS: HCPCS

## 2017-09-19 PROCEDURE — G8981 BODY POS CURRENT STATUS: HCPCS

## 2017-09-19 PROCEDURE — 97164 PT RE-EVAL EST PLAN CARE: CPT

## 2017-09-19 PROCEDURE — 97140 MANUAL THERAPY 1/> REGIONS: CPT

## 2017-09-19 NOTE — PROGRESS NOTES
Phil Yoo   (FYJ:[birth year]) [address]  at  [facility]  [address] [place] [address]  [phone]   [phone]           Outpatient PHYSICAL THERAPY: Daily Note, Re-evaluation and Recertification  Fall Risk Score: 2 (? 5 = High Risk)       ICD-10: Treatment Diagnosis: Low back pain, M54.5  Difficulty walking, not elsewhere classified, R26.2  Cervicalgia, M54.2  REFERRING PHYSICIAN: Ivy Eden MD MD Orders: Evaluate and Treat  Return Physician Appointment: not known  MEDICAL/REFERRING DIAGNOSIS: Lumbosacral stenosis with neurogenic claudication, DDD, Lumbosacral spondylosis without myelopathy, other symptoms referrable to back  DATE OF ONSET: Chronic   PRIOR LEVEL OF FUNCTION: independent  PRECAUTIONS/ALLERGIES:   Allergies   Allergen Reactions    Other Medication Other (comments)     \"Some BP meds\" cause \"  SWELLING OF LEGS with Procardia and with current medicine    Statins-Hmg-Coa Reductase Inhibitors Other (comments)     Extreme nerve pain       ASSESSMENT:  ?????? ? ? This section established at most recent assessment??????????  Phil Yoo has been seen for 52 visits from 12/11/15 to 9/19/2017 for lumbar spine pain and spinal stenosis. Patient has performed therapeutic exercises, activities, and had manual therapy to increased strength, ROM and function. Patient has also used modalities for pain control in order to increase function. He is becoming more consistent with his workout program at the gym and is seeing benefits. He still has lack of core strength at this time. Patient has shown no change in function per the Modified Oswestry Disability Index score with scores of 14/50, but does show change with the LEFS to 54/80. He shows significant improvement in his weight loss goals as well as his balance goals.  He has now started with a  in the gym to help his progress and PT will follow up once a month to address lingering functional issue related to his goals. Patient has progressed well toward their goals and will benefit from continuing skilled PT in order to address their impairments. Johann Lynn, PT, DPT, OCS  9/19/2017      PROBLEM LIST (Impairments causing functional limitations):  1. Decreased Strength affecting function  2. Decreased ADL/Functional Activities  3. Decreased Flexibility/joint mobility  4. Decreased transfer abilities  5. Increased Pain affecting function  6. Decreased Activity tolerance   7. Increased Fatigue affecting function  GOALS: (Goals have been discussed and agreed upon with patient.)  1. DISCHARGE GOALS: Time Frame: 12 weeks   2. Patient will be independent in advanced core training exercises to return to functional mobility (ongoing)  3. Patient will show a greater than 5 point decrease on the Modified Oswestry Disability Index score in order to show an increase in function (ongoing)  4. Patient will report standing for greater than 30 min with work activity (MET-11/7/16)  5. NEW GOAL:  6. Patient will walk greater than 12 laps in the gym during gym session. (ongoing)  7. Patient will perform core exercises as part of his routine greater than 4 times a week (ongoing)  8. Patient will drive for longer than 2 hours without pain increase (MET)  REHABILITATION POTENTIAL FOR STATED GOALS: Julien Kimbrough OF CARE:  INTERVENTIONS PLANNED: (Benefits and precautions of physical therapy have been discussed with the patient.)  1. balance exercise  2. bed mobility  3. cold  4. electrical stimulation  5. gait training  6. heat  7. manual therapy (including instrument assisted soft tissue mobilization)  8. neuromuscular re-education/strengthening  9. range of motion: active/assisted/passive  10. therapeutic activities  11. therapeutic exercise/strengthening  12. transfer training  13.  Other: Aquatics  TREATMENT PLAN EFFECTIVE DATES: 9/19/17 TO 12/19/17  FREQUENCY/DURATION: Follow patient 1 time a month for 3 months to address above goals.  Regarding Lyndy Pilling Potocki's therapy, I certify that the treatment plan above will be carried out by a therapist or under their direction. Thank you for this referral,  Belmore Sandy, PT     Referring Physician Signature: Ludy Marreor MD          Date                                SUBJECTIVE:    History of Present Injury/Illness (Reason for Referral): Patient report that he has had a long history of back issues. He first started to have health and difficulty functioning with a presence of gout for about a year, then had his hip replaced in April of this year. Now, his is trying to regain his back strength and function. He has difficulty walking for long periods and standing in one place. His goal is to become more active and functional as well as control his health. Patient had an injection a few weeks ago and had some relief from that. MRI results: Copy in paper chart showing:  Abdominal hernia surgery performed in   Present Symptoms: Patient reports he has started his exercise program with [employer] and is wanting to make sure that the things he is doing are not hurting him. He reports that he still gets sore, but it is a good sore. Pain at 1/10  Dominant Side: right  Past Medical History:    Active Ambulatory Problems     Diagnosis Date Noted    S/P total hip arthroplasty 04/06/2015    HTN (hypertension) 04/06/2015    HLD (hyperlipidemia) 04/06/2015    BPH (benign prostatic hyperplasia) 04/06/2015    Gout 04/06/2015    Anxiety 04/06/2015    Diabetes mellitus ([facility] [address] 04/06/2015     Resolved Ambulatory Problems     Diagnosis Date Noted    No Resolved Ambulatory Problems     Past Medical History:   Diagnosis Date    Anxiety     Edema of both legs     Enlarged prostate     Gout     Hip pain     History of elevated glucose     History of seasonal allergies     Hypercholesterolemia     Hypertension     Keratoacanthoma     S/P total hip arthroplasty 4/6/2015    Skin neoplasm     Spinal stenosis      Past Surgical History:   Procedure Laterality Date    HX TONSILLECTOMY  as child    HX WISDOM TEETH EXTRACTION  as teenager     Current Medications:   Current Outpatient Prescriptions:     HYDROmorphone (DILAUDID) 2 mg tablet, Take 1 Tab by mouth every four (4) hours as needed. Max Daily Amount: 12 mg., Disp: 40 Tab, Rfl: 0    prazosin (MINIPRESS) 1 mg capsule, Take 1 mg by mouth three (3) times daily. TAKE AM OF SURGERY WITH SMALL SIP OF WATER   Indications: BENIGN PROSTATIC HYPERTROPHY, Disp: , Rfl:     amLODIPine (NORVASC) 10 mg tablet, Take 10 mg by mouth daily. TAKE AM OF SURGERY WITH SMALL SIP OF WATER, Disp: , Rfl:     labetalol (NORMODYNE) 200 mg tablet, Take 200 mg by mouth two (2) times a day. TAKE AM OF SURGERY WITH SMALL SIP OF WATER, Disp: , Rfl:     cloNIDine HCl (CATAPRES) 0.2 mg tablet, Take 0.2 mg by mouth nightly. Indications: HYPERTENSION, Disp: , Rfl:     finasteride (PROSCAR) 5 mg tablet, Take 5 mg by mouth daily. TAKE AM OF SURGERY WITH SMALL SIP OF WATER   Indications: BENIGN PROSTATIC HYPERTROPHY, Disp: , Rfl:     furosemide (LASIX) 40 mg tablet, Take 40 mg by mouth daily. , Disp: , Rfl:     Febuxostat (ULORIC) 80 mg tab tablet, Take 80 mg by mouth daily. TAKE AM OF SURGERY WITH SMALL SIP OF WATER   Indications: GOUT, Disp: , Rfl:     LORazepam (ATIVAN) 0.5 mg tablet, Take 0.5 mg by mouth daily as needed for Anxiety. TAKES 1/2 OF 1MG TABLET DAILY AS NEEDED, \"USUALLY EVERY DAY\", Disp: , Rfl:      Date Last Reviewed: 9/19/2017    Social History/Home Situation:   Social History     Social History    Marital status:      Spouse name: N/A    Number of children: N/A    Years of education: N/A     Occupational History    Not on file.      Social History Main Topics    Smoking status: Never Smoker    Smokeless tobacco: Not on file    Alcohol use No    Drug use: Not on file    Sexual activity: Not on file     Other Topics Concern    Not on file Social History Narrative           Work/Activity History: consultation  OBJECTIVE:    Outcome Measure: Tool Used: Modified Oswestry Low Back Pain Questionnaire  Score:  Initial: 12/50  Most Recent: 14/50 (Date:9/19/17 )   Interpretation of Score: Each section is scored on a 0-5 scale, 5 representing the greatest disability. The scores of each section are added together for a total score of 50. Score 0 1-10 11-20 21-30 31-40 41-49 50   Modifier CH CI CJ CK CL CM CN     ? Changing and Maintaining Body Position:     - CURRENT STATUS: CJ - 20%-39% impaired, limited or restricted    - GOAL STATUS:  CH - 0% impaired, limited or restricted    - D/C STATUS:  ---------------To be determined---------------  Tool Used: Lower Extremity Functional Scale (LEFS)  Score:  Initial: 49/80 Most Recent: 54/80 (Date: 9/19/17 )   Interpretation of Score: 20 questions each scored on a 5 point scale with 0 representing \"extreme difficulty or unable to perform\" and 4 representing \"no difficulty\". The lower the score, the greater the functional disability. 80/80 represents no disability. Minimal detectable change is 9 points. Score 80 79-63 62-48 47-32 31-16 15-1 0   Modifier CH CI CJ CK CL CM CN         Observation/Orthostatic Postural Assessment:    Patient is obese individual with increased abdominal mass and lordosis in standing. Patient shows slight right rotation from thoracic spine in standing. He reports that the popping has subsided during some of his activity. Palpation:    (re-cert-9/19/17) . He does show decreased R LE edema compared to his previous PT treatments.   Core strength tests:  Vertical compression test (VCT)- grade 3/5 with dysfunction at TL junction and right rotation (able to show improved posture and strength with testing)  Elbow flexion test (EFT)- Grade 2/5 with sluggish initiation, and endurance present  Lumbar protective mechanism (LPM)- Left A-P is sluggish grade 2, P-A grade 2 and present; Right A-P is grade 2 sluggish, endurance present, P-A is 2- and present. ROM: Slight limitation in hip range of motion, but mostly limited in right rotation and side bending left   Cervical ROM shows 50% to the right and 70% left cervical rotation. Strength: Good and functional LE strength                 Special Tests: Vertical compression test (VCT)- grade 3/5 with dysfunction at TL junction and right rotation  Elbow flexion test (EFT)- Grade 2+/5 with sluggish initiation, and endurance present  Lumbar protective mechanism (LPM)- Left A-P is sluggish grade 2, P-A grade 2 and present; Right A-P is grade 1 sluggish, endurance present, P-A is 2- and present. Neurological Screen:   Myotomes: intact                  Dermatomes: intact                  Reflexes: intact                  Neural Tension Tests: (-)  Functional Mobility:       Patient has difficulty with functional mobility, standing for greater than 30 min and with exercise activity  Balance:    fair- Patient unable to stand greater than 1 sec in single leg stance Bilaterally  TREATMENT:    (In addition to Assessment/Re-Assessment sessions the following treatments were rendered)  Therapeutic Exercise: ( ):  Not todayExercises per grid below to improve mobility, strength, balance and coordination. Required minimal visual, verbal and manual cues to promote proper body alignment, promote proper body posture and promote proper body mechanics. Progressed resistance, range and repetitions as indicated.    Date:  9/19/2017     Activity/Exercise Parameters   SCI FIT Not today   Stretches  Hamstring bilaterally 3 x 30 sec  Piriformis bilaterally 3 x 20 sec  Hip flexor stretch   Education 1 min on continuing his exercises and starting to work some exercises in at home and not just the gym     Core training  Reviewed x 5 min   Treadmill (not today) Discussed x 2 min   Step up (not today) X 10 B    Glut training (not today) Bent over table with focus on form x 5 min   Sit to stand (not today) X 10 with good form   Balance Talked about challenging balance x 5 min  Tandem stance work x 5 min   Hamstring activation Hook lying x 2 min right knee     Physioball Hamstring curls x 20  Wall squat x 10       15 min discussion on health and wellness with gym exercises and how he can continue to accomplish his goals. Instruction to continue to progress his aerobic maintenance and continue to work on leg strength (indirect charge)    HEP-education on posture  Manual Therapy (    Soft Tissue Mobilization Duration  Duration: 15 Minutes):(not today)  Right and left hip stretches. STM to left glut med and min to border and bony contours  -STM to right fibula, tibialis anterior and fibular mobilizations. -STM to IT band and patella with plunger  -worked through upper spine mobility in supine as well  (Used abbreviations: MET - muscle energy technique; PNF - proprioceptive neuromuscular facilitation; NMR - neuromuscular re-education; a/p - anterior to posterior; p/a - posterior to anterior)   Therapeutic Modalities:                                                                                               HEP: As above; handouts given to patient for all exercises. ______________________________________________________________________________________________________    Treatment Assessment:  Patient shows some improvement with right knee and patella mobility today. He continues to show limitations in knee extension and lateral patella mobility. He is making good gains with his training program in the gym and has started to work with a . Pain at 1/10  Progression/Medical Necessity:   · Patient is expected to demonstrate progress in strength, range of motion, balance, coordination and functional technique to improve functional mobility. · Patient demonstrates good rehab potential due to higher previous functional level.   · Skilled intervention continues to be required due to core weakness and instability. Compliance with Program/Exercises: compliant all of the time. Reason for Continuation of Services/Other Comments:  · Patient continues to require skilled intervention due to decreased function. Recommendations/Intent for next treatment session: \"Treatment next visit will focus on core training, endurance\".  And balance   Total Treatment Duration:Re-evaluation [id number], treatment 2662-2651  PT Patient Time In/Time Out  Time In: 1403  Time Out: 1500    Russell Friday, PT, DPT, OCS

## 2017-10-10 ENCOUNTER — HOSPITAL ENCOUNTER (OUTPATIENT)
Dept: PHYSICAL THERAPY | Age: 72
Discharge: HOME | End: 2017-10-10
Payer: MEDICARE

## 2017-10-10 PROCEDURE — 97140 MANUAL THERAPY 1/> REGIONS: CPT

## 2017-10-10 NOTE — PROGRESS NOTES
Eduardo Arce   ([facility]:9/[id number]/[id number]) [address]  at  [facility]  [address] [place] [address]  [phone]   [phone]           Outpatient PHYSICAL THERAPY: Daily Note  Fall Risk Score: 2 (? 5 = High Risk)       ICD-10: Treatment Diagnosis: Low back pain, M54.5  Difficulty walking, not elsewhere classified, R26.2  Cervicalgia, M54.2  REFERRING PHYSICIAN: Clara Lay MD MD Orders: Evaluate and Treat  Return Physician Appointment: not known  MEDICAL/REFERRING DIAGNOSIS: Lumbosacral stenosis with neurogenic claudication, DDD, Lumbosacral spondylosis without myelopathy, other symptoms referrable to back  DATE OF ONSET: Chronic   PRIOR LEVEL OF FUNCTION: independent  PRECAUTIONS/ALLERGIES:   Allergies   Allergen Reactions    Other Medication Other (comments)     \"Some BP meds\" cause \"  SWELLING OF LEGS with Procardia and with current medicine    Statins-Hmg-Coa Reductase Inhibitors Other (comments)     Extreme nerve pain       ASSESSMENT:  ?????? ? ? This section established at most recent assessment??????????  Eduardo Arce has been seen for 52 visits from 12/11/15 to 10/10/2017 for lumbar spine pain and spinal stenosis. Patient has performed therapeutic exercises, activities, and had manual therapy to increased strength, ROM and function. Patient has also used modalities for pain control in order to increase function. He is becoming more consistent with his workout program at the gym and is seeing benefits. He still has lack of core strength at this time. Patient has shown no change in function per the Modified Oswestry Disability Index score with scores of 14/50, but does show change with the LEFS to 54/80. He shows significant improvement in his weight loss goals as well as his balance goals. He has now started with a  in the gym to help his progress and PT will follow up once a month to address lingering functional issue related to his goals.  Patient has progressed well toward their goals and will benefit from continuing skilled PT in order to address their impairments. Dao Tobin, PT, DPT, OCS  10/10/2017      PROBLEM LIST (Impairments causing functional limitations):  1. Decreased Strength affecting function  2. Decreased ADL/Functional Activities  3. Decreased Flexibility/joint mobility  4. Decreased transfer abilities  5. Increased Pain affecting function  6. Decreased Activity tolerance   7. Increased Fatigue affecting function  GOALS: (Goals have been discussed and agreed upon with patient.)  1. DISCHARGE GOALS: Time Frame: 12 weeks   2. Patient will be independent in advanced core training exercises to return to functional mobility (ongoing)  3. Patient will show a greater than 5 point decrease on the Modified Oswestry Disability Index score in order to show an increase in function (ongoing)  4. Patient will report standing for greater than 30 min with work activity (MET-11/7/16)  5. NEW GOAL:  6. Patient will walk greater than 12 laps in the gym during gym session. (ongoing)  7. Patient will perform core exercises as part of his routine greater than 4 times a week (ongoing)  8. Patient will drive for longer than 2 hours without pain increase (MET)  REHABILITATION POTENTIAL FOR STATED GOALS: [facility] CARE:  INTERVENTIONS PLANNED: (Benefits and precautions of physical therapy have been discussed with the patient.)  1. balance exercise  2. bed mobility  3. cold  4. electrical stimulation  5. gait training  6. heat  7. manual therapy (including instrument assisted soft tissue mobilization)  8. neuromuscular re-education/strengthening  9. range of motion: active/assisted/passive  10. therapeutic activities  11. therapeutic exercise/strengthening  12. transfer training  13. Other: Aquatics  TREATMENT PLAN EFFECTIVE DATES: 9/19/17 TO 12/19/17  FREQUENCY/DURATION: Follow patient 1 time a month for 3 months to address above goals.   Regarding Maryruth Nevins Potocki's therapy, I certify that the treatment plan above will be carried out by a therapist or under their direction. Thank you for this referral,  Haley Roque PT     Referring Physician Signature: Daylin Bains MD          Date                                SUBJECTIVE:    History of Present Injury/Illness (Reason for Referral): Patient report that he has had a long history of back issues. He first started to have health and difficulty functioning with a presence of gout for about a year, then had his hip replaced in April of this year. Now, his is trying to regain his back strength and function. He has difficulty walking for long periods and standing in one place. His goal is to become more active and functional as well as control his health. Patient had an injection a few weeks ago and had some relief from that. MRI results: Copy in paper chart showing:  Abdominal hernia surgery performed in   Present Symptoms: Patient reports he has continued to improve with exercises and his program.  He had an injection last week in the right knee that has seemed to help the pain out a lot at this time  Pain at 1/10  Dominant Side: right  Past Medical History:    Active Ambulatory Problems     Diagnosis Date Noted    S/P total hip arthroplasty 04/06/2015    HTN (hypertension) 04/06/2015    HLD (hyperlipidemia) 04/06/2015    BPH (benign prostatic hyperplasia) 04/06/2015    Gout 04/06/2015    Anxiety 04/06/2015    Diabetes mellitus ([address] 04/06/2015     Resolved Ambulatory Problems     Diagnosis Date Noted    No Resolved Ambulatory Problems     Past Medical History:   Diagnosis Date    Anxiety     Edema of both legs     Enlarged prostate     Gout     Hip pain     History of elevated glucose     History of seasonal allergies     Hypercholesterolemia     Hypertension     Keratoacanthoma     S/P total hip arthroplasty 4/6/2015    Skin neoplasm     Spinal stenosis      Past Surgical History:   Procedure Laterality Date    HX TONSILLECTOMY  as child    HX WISDOM TEETH EXTRACTION  as teenager     Current Medications:   Current Outpatient Prescriptions:     HYDROmorphone (DILAUDID) 2 mg tablet, Take 1 Tab by mouth every four (4) hours as needed. Max Daily Amount: 12 mg., Disp: 40 Tab, Rfl: 0    prazosin (MINIPRESS) 1 mg capsule, Take 1 mg by mouth three (3) times daily. TAKE AM OF SURGERY WITH SMALL SIP OF WATER   Indications: BENIGN PROSTATIC HYPERTROPHY, Disp: , Rfl:     amLODIPine (NORVASC) 10 mg tablet, Take 10 mg by mouth daily. TAKE AM OF SURGERY WITH SMALL SIP OF WATER, Disp: , Rfl:     labetalol (NORMODYNE) 200 mg tablet, Take 200 mg by mouth two (2) times a day. TAKE AM OF SURGERY WITH SMALL SIP OF WATER, Disp: , Rfl:     cloNIDine HCl (CATAPRES) 0.2 mg tablet, Take 0.2 mg by mouth nightly. Indications: HYPERTENSION, Disp: , Rfl:     finasteride (PROSCAR) 5 mg tablet, Take 5 mg by mouth daily. TAKE AM OF SURGERY WITH SMALL SIP OF WATER   Indications: BENIGN PROSTATIC HYPERTROPHY, Disp: , Rfl:     furosemide (LASIX) 40 mg tablet, Take 40 mg by mouth daily. , Disp: , Rfl:     Febuxostat (ULORIC) 80 mg tab tablet, Take 80 mg by mouth daily. TAKE AM OF SURGERY WITH SMALL SIP OF WATER   Indications: GOUT, Disp: , Rfl:     LORazepam (ATIVAN) 0.5 mg tablet, Take 0.5 mg by mouth daily as needed for Anxiety. TAKES 1/2 OF 1MG TABLET DAILY AS NEEDED, \"USUALLY EVERY DAY\", Disp: , Rfl:      Date Last Reviewed: 10/10/2017    Social History/Home Situation:   Social History     Social History    Marital status:      Spouse name: N/A    Number of children: N/A    Years of education: N/A     Occupational History    Not on file.      Social History Main Topics    Smoking status: Never Smoker    Smokeless tobacco: Not on file    Alcohol use No    Drug use: Not on file    Sexual activity: Not on file     Other Topics Concern    Not on file     Social History Narrative           Work/Activity History: consultation  OBJECTIVE:    Outcome Measure: Tool Used: Modified Oswestry Low Back Pain Questionnaire  Score:  Initial: 12/50  Most Recent: 14/50 (Date:9/19/17 )   Interpretation of Score: Each section is scored on a 0-5 scale, 5 representing the greatest disability. The scores of each section are added together for a total score of 50. Score 0 1-10 11-20 21-30 31-40 41-49 50   Modifier CH CI CJ CK CL CM CN     ? Changing and Maintaining Body Position:     - CURRENT STATUS:  - 20%-39% impaired, limited or restricted    - GOAL STATUS:   - 0% impaired, limited or restricted    - D/C STATUS:  ---------------To be determined---------------  Tool Used: Lower Extremity Functional Scale (LEFS)  Score:  Initial: 49/80 Most Recent: 54/80 (Date: 9/19/17 )   Interpretation of Score: 20 questions each scored on a 5 point scale with 0 representing \"extreme difficulty or unable to perform\" and 4 representing \"no difficulty\". The lower the score, the greater the functional disability. 80/80 represents no disability. Minimal detectable change is 9 points. Score 80 79-63 62-48 47-32 31-16 15-1 0   Modifier CH CI CJ CK CL CM CN         Observation/Orthostatic Postural Assessment:    Patient is obese individual with increased abdominal mass and lordosis in standing. Patient shows slight right rotation from thoracic spine in standing. He reports that the popping has subsided during some of his activity. Palpation:    (re-cert-9/19/17) . He does show decreased R LE edema compared to his previous PT treatments.   Core strength tests:  Vertical compression test (VCT)- grade 3/5 with dysfunction at TL junction and right rotation (able to show improved posture and strength with testing)  Elbow flexion test (EFT)- Grade 2/5 with sluggish initiation, and endurance present  Lumbar protective mechanism (LPM)- Left A-P is sluggish grade 2, P-A grade 2 and present; Right A-P is grade 2 sluggish, endurance present, P-A is 2- and present. ROM: Slight limitation in hip range of motion, but mostly limited in right rotation and side bending left   Cervical ROM shows 50% to the right and 70% left cervical rotation. Strength: Good and functional LE strength                 Special Tests: Vertical compression test (VCT)- grade 3/5 with dysfunction at TL junction and right rotation  Elbow flexion test (EFT)- Grade 2+/5 with sluggish initiation, and endurance present  Lumbar protective mechanism (LPM)- Left A-P is sluggish grade 2, P-A grade 2 and present; Right A-P is grade 1 sluggish, endurance present, P-A is 2- and present. Neurological Screen:   Myotomes: intact                  Dermatomes: intact                  Reflexes: intact                  Neural Tension Tests: (-)  Functional Mobility:       Patient has difficulty with functional mobility, standing for greater than 30 min and with exercise activity  Balance:    fair- Patient unable to stand greater than 1 sec in single leg stance Bilaterally  TREATMENT:    (In addition to Assessment/Re-Assessment sessions the following treatments were rendered)  Therapeutic Exercise: ( ):  Not todayExercises per grid below to improve mobility, strength, balance and coordination. Required minimal visual, verbal and manual cues to promote proper body alignment, promote proper body posture and promote proper body mechanics. Progressed resistance, range and repetitions as indicated.    Date:  10/10/2017     Activity/Exercise Parameters   SCI FIT Not today   Stretches  Hamstring bilaterally 3 x 30 sec  Piriformis bilaterally 3 x 20 sec  Hip flexor stretch   Education 1 min on continuing his exercises and starting to work some exercises in at home and not just the gym     Core training  Reviewed x 5 min   Treadmill (not today) Discussed x 2 min   Step up (not today) X 10 B    Glut training (not today) Bent over table with focus on form x 5 min   Sit to stand (not today) X 10 with good form   Balance Talked about challenging balance x 5 min  Tandem stance work x 5 min   Hamstring activation Hook lying x 2 min right knee     Physioball Hamstring curls x 20  Wall squat x 10       25 min discussion on health and wellness with gym exercises and how he can continue to accomplish his goals. Instruction to continue to progress his aerobic maintenance and continue to work on leg strength  Footwear and his left foot discussed today as well (indirect charge)    HEP-education on posture  Manual Therapy (    Soft Tissue Mobilization Duration  Duration: 30 Minutes):(not today)  Right and left hip stretches. STM to left glut med and min to border and bony contours  -STM to right fibula, tibialis anterior and fibular mobilizations. -STM to IT band and patella with plunger  -worked through left foot mobility x 10 min  (Used abbreviations: MET - muscle energy technique; PNF - proprioceptive neuromuscular facilitation; NMR - neuromuscular re-education; a/p - anterior to posterior; p/a - posterior to anterior)   Therapeutic Modalities:                                                                                               HEP: As above; handouts given to patient for all exercises. ______________________________________________________________________________________________________    Treatment Assessment:  Patient shows improved knee pain since the injection last week. He still has left foot problems and we will follow up in two weeks to figure out other options for footwear. Pain at 1/10  Progression/Medical Necessity:   · Patient is expected to demonstrate progress in strength, range of motion, balance, coordination and functional technique to improve functional mobility. · Patient demonstrates good rehab potential due to higher previous functional level. · Skilled intervention continues to be required due to core weakness and instability.   Compliance with Program/Exercises: compliant all of the time. Reason for Continuation of Services/Other Comments:  · Patient continues to require skilled intervention due to decreased function. Recommendations/Intent for next treatment session: \"Treatment next visit will focus on core training, endurance\".  And balance   Total Treatment Duration:  PT Patient Time In/Time Out  Time In: 1400  Time Out: [address] [facility], PT, DPT, OCS

## 2017-10-24 ENCOUNTER — HOSPITAL ENCOUNTER (OUTPATIENT)
Dept: PHYSICAL THERAPY | Age: 72
Discharge: HOME | End: 2017-10-24
Payer: MEDICARE

## 2017-10-24 PROCEDURE — 97110 THERAPEUTIC EXERCISES: CPT

## 2017-10-24 NOTE — PROGRESS NOTES
Cristobal Gaming   (BOP:[birth year]) [address]  at  [facility]  [address] [place] [address]  [phone]   [phone]           Outpatient PHYSICAL THERAPY: Daily Note  Fall Risk Score: 2 (? 5 = High Risk)       ICD-10: Treatment Diagnosis: Low back pain, M54.5  Difficulty walking, not elsewhere classified, R26.2  Cervicalgia, M54.2  REFERRING PHYSICIAN: Juaquin Lopez MD MD Orders: Evaluate and Treat  Return Physician Appointment: not known  MEDICAL/REFERRING DIAGNOSIS: Lumbosacral stenosis with neurogenic claudication, DDD, Lumbosacral spondylosis without myelopathy, other symptoms referrable to back  DATE OF ONSET: Chronic   PRIOR LEVEL OF FUNCTION: independent  PRECAUTIONS/ALLERGIES:   Allergies   Allergen Reactions    Other Medication Other (comments)     \"Some BP meds\" cause \"  SWELLING OF LEGS with Procardia and with current medicine    Statins-Hmg-Coa Reductase Inhibitors Other (comments)     Extreme nerve pain       ASSESSMENT:  ?????? ? ? This section established at most recent assessment??????????  Cristobal Gaming has been seen for 52 visits from 12/11/15 to 10/24/2017 for lumbar spine pain and spinal stenosis. Patient has performed therapeutic exercises, activities, and had manual therapy to increased strength, ROM and function. Patient has also used modalities for pain control in order to increase function. He is becoming more consistent with his workout program at the gym and is seeing benefits. He still has lack of core strength at this time. Patient has shown no change in function per the Modified Oswestry Disability Index score with scores of 14/50, but does show change with the LEFS to 54/80. He shows significant improvement in his weight loss goals as well as his balance goals. He has now started with a  in the gym to help his progress and PT will follow up once a month to address lingering functional issue related to his goals.  Patient has progressed well toward their goals and will benefit from continuing skilled PT in order to address their impairments. Sury Galeana, PT, DPT, OCS  10/24/2017      PROBLEM LIST (Impairments causing functional limitations):  1. Decreased Strength affecting function  2. Decreased ADL/Functional Activities  3. Decreased Flexibility/joint mobility  4. Decreased transfer abilities  5. Increased Pain affecting function  6. Decreased Activity tolerance   7. Increased Fatigue affecting function  GOALS: (Goals have been discussed and agreed upon with patient.)  1. DISCHARGE GOALS: Time Frame: 12 weeks   2. Patient will be independent in advanced core training exercises to return to functional mobility (ongoing)  3. Patient will show a greater than 5 point decrease on the Modified Oswestry Disability Index score in order to show an increase in function (ongoing)  4. Patient will report standing for greater than 30 min with work activity (MET-11/7/16)  5. NEW GOAL:  6. Patient will walk greater than 12 laps in the gym during gym session. (ongoing)  7. Patient will perform core exercises as part of his routine greater than 4 times a week (ongoing)  8. Patient will drive for longer than 2 hours without pain increase (MET)  REHABILITATION POTENTIAL FOR STATED GOALS: Ginger [place] OF CARE:  INTERVENTIONS PLANNED: (Benefits and precautions of physical therapy have been discussed with the patient.)  1. balance exercise  2. bed mobility  3. cold  4. electrical stimulation  5. gait training  6. heat  7. manual therapy (including instrument assisted soft tissue mobilization)  8. neuromuscular re-education/strengthening  9. range of motion: active/assisted/passive  10. therapeutic activities  11. therapeutic exercise/strengthening  12. transfer training  13. Other: Aquatics  TREATMENT PLAN EFFECTIVE DATES: 9/19/17 TO 12/19/17  FREQUENCY/DURATION: Follow patient 1 time a month for 3 months to address above goals.   Regarding [place]'s therapy, I certify that the treatment plan above will be carried out by a therapist or under their direction. Thank you for this referral,  Janneth Bermeo PT     Referring Physician Signature: Leia Gonzales MD          Date                                SUBJECTIVE:    History of Present Injury/Illness (Reason for Referral): Patient report that he has had a long history of back issues. He first started to have health and difficulty functioning with a presence of gout for about a year, then had his hip replaced in April of this year. Now, his is trying to regain his back strength and function. He has difficulty walking for long periods and standing in one place. His goal is to become more active and functional as well as control his health. Patient had an injection a few weeks ago and had some relief from that. MRI results: Copy in paper chart showing:  Abdominal hernia surgery performed in   Present Symptoms: Patient reports that he got a new pair of shoes that seems to help the left foot out, but he is not sure how the right foot will change that. For now, it feels good, but I will look out for what works best Pain at 1/10  Dominant Side: right  Past Medical History:    Active Ambulatory Problems     Diagnosis Date Noted    S/P total hip arthroplasty 04/06/2015    HTN (hypertension) 04/06/2015    HLD (hyperlipidemia) 04/06/2015    BPH (benign prostatic hyperplasia) 04/06/2015    Gout 04/06/2015    Anxiety 04/06/2015    Diabetes mellitus ([facility] [address] 04/06/2015     Resolved Ambulatory Problems     Diagnosis Date Noted    No Resolved Ambulatory Problems     Past Medical History:   Diagnosis Date    Anxiety     Edema of both legs     Enlarged prostate     Gout     Hip pain     History of elevated glucose     History of seasonal allergies     Hypercholesterolemia     Hypertension     Keratoacanthoma     S/P total hip arthroplasty 4/6/2015    Skin neoplasm     Spinal stenosis      Past Surgical History:   Procedure Laterality Date    HX TONSILLECTOMY  as child    HX WISDOM TEETH EXTRACTION  as teenager     Current Medications:   Current Outpatient Prescriptions:     HYDROmorphone (DILAUDID) 2 mg tablet, Take 1 Tab by mouth every four (4) hours as needed. Max Daily Amount: 12 mg., Disp: 40 Tab, Rfl: 0    prazosin (MINIPRESS) 1 mg capsule, Take 1 mg by mouth three (3) times daily. TAKE AM OF SURGERY WITH SMALL SIP OF WATER   Indications: BENIGN PROSTATIC HYPERTROPHY, Disp: , Rfl:     amLODIPine (NORVASC) 10 mg tablet, Take 10 mg by mouth daily. TAKE AM OF SURGERY WITH SMALL SIP OF WATER, Disp: , Rfl:     labetalol (NORMODYNE) 200 mg tablet, Take 200 mg by mouth two (2) times a day. TAKE AM OF SURGERY WITH SMALL SIP OF WATER, Disp: , Rfl:     cloNIDine HCl (CATAPRES) 0.2 mg tablet, Take 0.2 mg by mouth nightly. Indications: HYPERTENSION, Disp: , Rfl:     finasteride (PROSCAR) 5 mg tablet, Take 5 mg by mouth daily. TAKE AM OF SURGERY WITH SMALL SIP OF WATER   Indications: BENIGN PROSTATIC HYPERTROPHY, Disp: , Rfl:     furosemide (LASIX) 40 mg tablet, Take 40 mg by mouth daily. , Disp: , Rfl:     Febuxostat (ULORIC) 80 mg tab tablet, Take 80 mg by mouth daily. TAKE AM OF SURGERY WITH SMALL SIP OF WATER   Indications: GOUT, Disp: , Rfl:     LORazepam (ATIVAN) 0.5 mg tablet, Take 0.5 mg by mouth daily as needed for Anxiety. TAKES 1/2 OF 1MG TABLET DAILY AS NEEDED, \"USUALLY EVERY DAY\", Disp: , Rfl:      Date Last Reviewed: 10/24/2017    Social History/Home Situation:   Social History     Social History    Marital status:      Spouse name: N/A    Number of children: N/A    Years of education: N/A     Occupational History    Not on file.      Social History Main Topics    Smoking status: Never Smoker    Smokeless tobacco: Not on file    Alcohol use No    Drug use: Not on file    Sexual activity: Not on file     Other Topics Concern    Not on file     Social History Narrative Work/Activity History: consultation  OBJECTIVE:    Outcome Measure: Tool Used: Modified Oswestry Low Back Pain Questionnaire  Score:  Initial: 12/50  Most Recent: 14/50 (Date:9/19/17 )   Interpretation of Score: Each section is scored on a 0-5 scale, 5 representing the greatest disability. The scores of each section are added together for a total score of 50. Score 0 1-10 11-20 21-30 31-40 41-49 50   Modifier CH CI CJ CK CL CM CN     ? Changing and Maintaining Body Position:     - CURRENT STATUS: CJ - 20%-39% impaired, limited or restricted    - GOAL STATUS:  CH - 0% impaired, limited or restricted    - D/C STATUS:  ---------------To be determined---------------  Tool Used: Lower Extremity Functional Scale (LEFS)  Score:  Initial: 49/80 Most Recent: 54/80 (Date: 9/19/17 )   Interpretation of Score: 20 questions each scored on a 5 point scale with 0 representing \"extreme difficulty or unable to perform\" and 4 representing \"no difficulty\". The lower the score, the greater the functional disability. 80/80 represents no disability. Minimal detectable change is 9 points. Score 80 79-63 62-48 47-32 31-16 15-1 0   Modifier CH CI CJ CK CL CM CN         Observation/Orthostatic Postural Assessment:    Patient is obese individual with increased abdominal mass and lordosis in standing. Patient shows slight right rotation from thoracic spine in standing. He reports that the popping has subsided during some of his activity. Palpation:    (re-cert-9/19/17) . He does show decreased R LE edema compared to his previous PT treatments.   Core strength tests:  Vertical compression test (VCT)- grade 3/5 with dysfunction at TL junction and right rotation (able to show improved posture and strength with testing)  Elbow flexion test (EFT)- Grade 2/5 with sluggish initiation, and endurance present  Lumbar protective mechanism (LPM)- Left A-P is sluggish grade 2, P-A grade 2 and present; Right A-P is grade 2 sluggish, endurance present, P-A is 2- and present. ROM: Slight limitation in hip range of motion, but mostly limited in right rotation and side bending left   Cervical ROM shows 50% to the right and 70% left cervical rotation. Strength: Good and functional LE strength                 Special Tests: Vertical compression test (VCT)- grade 3/5 with dysfunction at TL junction and right rotation  Elbow flexion test (EFT)- Grade 2+/5 with sluggish initiation, and endurance present  Lumbar protective mechanism (LPM)- Left A-P is sluggish grade 2, P-A grade 2 and present; Right A-P is grade 1 sluggish, endurance present, P-A is 2- and present. Neurological Screen:   Myotomes: intact                  Dermatomes: intact                  Reflexes: intact                  Neural Tension Tests: (-)  Functional Mobility:       Patient has difficulty with functional mobility, standing for greater than 30 min and with exercise activity  Balance:    fair- Patient unable to stand greater than 1 sec in single leg stance Bilaterally  TREATMENT:    (In addition to Assessment/Re-Assessment sessions the following treatments were rendered)  Therapeutic Exercise: (40 Minutes):  Not todayExercises per grid below to improve mobility, strength, balance and coordination. Required minimal visual, verbal and manual cues to promote proper body alignment, promote proper body posture and promote proper body mechanics. Progressed resistance, range and repetitions as indicated.    Date:  10/24/2017     Activity/Exercise Parameters   SCI FIT Not today   Stretches  Hamstring bilaterally 3 x 30 sec  Piriformis bilaterally 3 x 20 sec  Hip flexor stretch   Education 10 min on continuing his exercises and starting to work some exercises in at home and not just the gym     Core training  Reviewed x 5 min  Wall stance with leg up x 3 min  Changed to stairs x 2 min with heel lift   Treadmill (not today) Discussed x 2 min   Step up X 10 B    Glut training (not today) Bent over table with focus on form x 5 min   Sit to stand (reviewed X 10 with good form   Balance Talked about challenging balance x 5 min  Tandem stance work x 5 min   Hamstring activation (not today) Hook lying x 2 min right knee     Physioball Hamstring curls x 20  Wall squat x 10       15 min discussion on health and wellness with gym exercises and how he can continue to accomplish his goals. Instruction to continue to progress his aerobic maintenance and continue to work on leg strength  Footwear and his left foot discussed today as well (indirect charge)    HEP-education on posture  Manual Therapy (     ):(not today)  Right and left hip stretches. STM to left glut med and min to border and bony contours  -STM to right fibula, tibialis anterior and fibular mobilizations. -STM to IT band and patella with plunger  -worked through left foot mobility x 10 min  (Used abbreviations: MET - muscle energy technique; PNF - proprioceptive neuromuscular facilitation; NMR - neuromuscular re-education; a/p - anterior to posterior; p/a - posterior to anterior)   Therapeutic Modalities:                                                                                               HEP: As above; handouts given to patient for all exercises. ______________________________________________________________________________________________________    Treatment Assessment:  Patient shows improvement in left foot issues and pain with new pair of shoes. Discussed and worked on gait training and using his core with walking. Pain at 1/10  Progression/Medical Necessity:   · Patient is expected to demonstrate progress in strength, range of motion, balance, coordination and functional technique to improve functional mobility. · Patient demonstrates good rehab potential due to higher previous functional level.   · Skilled intervention continues to be required due to core weakness and instability. Compliance with Program/Exercises: compliant all of the time. Reason for Continuation of Services/Other Comments:  · Patient continues to require skilled intervention due to decreased function. Recommendations/Intent for next treatment session: \"Treatment next visit will focus on core training, endurance\".  And balance   Total Treatment Duration:  PT Patient Time In/Time Out  Time In: 1300  Time Out: [address], PT, DPT, OCS

## 2017-11-07 ENCOUNTER — HOSPITAL ENCOUNTER (OUTPATIENT)
Dept: PHYSICAL THERAPY | Age: 72
Discharge: HOME | End: 2017-11-07
Payer: MEDICARE

## 2017-11-07 PROCEDURE — 97110 THERAPEUTIC EXERCISES: CPT

## 2017-11-07 NOTE — PROGRESS NOTES
Cameron Loco   (NFN:6/33/1939) [address]  at  [facility]  [address] [place] [address]  [phone]   [phone]           Outpatient PHYSICAL THERAPY: Daily Note  Fall Risk Score: 2 (? 5 = High Risk)       ICD-10: Treatment Diagnosis: Low back pain, M54.5  Difficulty walking, not elsewhere classified, R26.2  Cervicalgia, M54.2  REFERRING PHYSICIAN: Reinaldo Moscoso MD MD Orders: Evaluate and Treat  Return Physician Appointment: not known  MEDICAL/REFERRING DIAGNOSIS: Lumbosacral stenosis with neurogenic claudication, DDD, Lumbosacral spondylosis without myelopathy, other symptoms referrable to back  DATE OF ONSET: Chronic   PRIOR LEVEL OF FUNCTION: independent  PRECAUTIONS/ALLERGIES:   Allergies   Allergen Reactions    Other Medication Other (comments)     \"Some BP meds\" cause \"  SWELLING OF LEGS with Procardia and with current medicine    Statins-Hmg-Coa Reductase Inhibitors Other (comments)     Extreme nerve pain       ASSESSMENT:  ?????? ? ? This section established at most recent assessment??????????  Cameron Loco has been seen for 52 visits from 12/11/15 to 11/7/2017 for lumbar spine pain and spinal stenosis. Patient has performed therapeutic exercises, activities, and had manual therapy to increased strength, ROM and function. Patient has also used modalities for pain control in order to increase function. He is becoming more consistent with his workout program at the gym and is seeing benefits. He still has lack of core strength at this time. Patient has shown no change in function per the Modified Oswestry Disability Index score with scores of 14/50, but does show change with the LEFS to 54/80. He shows significant improvement in his weight loss goals as well as his balance goals. He has now started with a  in the gym to help his progress and PT will follow up once a month to address lingering functional issue related to his goals.  Patient has progressed well toward their goals and will benefit from continuing skilled PT in order to address their impairments. Patricia Mtz, PT, DPT, OCS  11/7/2017      PROBLEM LIST (Impairments causing functional limitations):  1. Decreased Strength affecting function  2. Decreased ADL/Functional Activities  3. Decreased Flexibility/joint mobility  4. Decreased transfer abilities  5. Increased Pain affecting function  6. Decreased Activity tolerance   7. Increased Fatigue affecting function  GOALS: (Goals have been discussed and agreed upon with patient.)  1. DISCHARGE GOALS: Time Frame: 12 weeks   2. Patient will be independent in advanced core training exercises to return to functional mobility (ongoing)  3. Patient will show a greater than 5 point decrease on the Modified Oswestry Disability Index score in order to show an increase in function (ongoing)  4. Patient will report standing for greater than 30 min with work activity (MET-11/7/16)  5. NEW GOAL:  6. Patient will walk greater than 12 laps in the gym during gym session. (ongoing)  7. Patient will perform core exercises as part of his routine greater than 4 times a week (ongoing)  8. Patient will drive for longer than 2 hours without pain increase (MET)  REHABILITATION POTENTIAL FOR STATED GOALS: [place] [place] OF CARE:  INTERVENTIONS PLANNED: (Benefits and precautions of physical therapy have been discussed with the patient.)  1. balance exercise  2. bed mobility  3. cold  4. electrical stimulation  5. gait training  6. heat  7. manual therapy (including instrument assisted soft tissue mobilization)  8. neuromuscular re-education/strengthening  9. range of motion: active/assisted/passive  10. therapeutic activities  11. therapeutic exercise/strengthening  12. transfer training  13. Other: Aquatics  TREATMENT PLAN EFFECTIVE DATES: 9/19/17 TO 12/19/17  FREQUENCY/DURATION: Follow patient 1 time a month for 3 months to address above goals.   Regarding Elfrieda Smoke Potocki's therapy, I certify that the treatment plan above will be carried out by a therapist or under their direction. Thank you for this referral,  Celsa Aguilar PT     Referring Physician Signature: Jose Skinner MD          Date                                SUBJECTIVE:    History of Present Injury/Illness (Reason for Referral): Patient report that he has had a long history of back issues. He first started to have health and difficulty functioning with a presence of gout for about a year, then had his hip replaced in April of this year. Now, his is trying to regain his back strength and function. He has difficulty walking for long periods and standing in one place. His goal is to become more active and functional as well as control his health. Patient had an injection a few weeks ago and had some relief from that. MRI results: Copy in paper chart showing:  Abdominal hernia surgery performed in   Present Symptoms: Patient reports that he got a new pair of shoes that seems to help the left foot out, but he is not sure how the right foot will change that. For now, it feels good, but I will look out for what works best Pain at 1/10  Dominant Side: right  Past Medical History:    Active Ambulatory Problems     Diagnosis Date Noted    S/P total hip arthroplasty 04/06/2015    HTN (hypertension) 04/06/2015    HLD (hyperlipidemia) 04/06/2015    BPH (benign prostatic hyperplasia) 04/06/2015    Gout 04/06/2015    Anxiety 04/06/2015    Diabetes mellitus ([facility] [address] 04/06/2015     Resolved Ambulatory Problems     Diagnosis Date Noted    No Resolved Ambulatory Problems     Past Medical History:   Diagnosis Date    Anxiety     Edema of both legs     Enlarged prostate     Gout     Hip pain     History of elevated glucose     History of seasonal allergies     Hypercholesterolemia     Hypertension     Keratoacanthoma     S/P total hip arthroplasty 4/6/2015    Skin neoplasm     Spinal stenosis      Past Surgical History:   Procedure Laterality Date    HX TONSILLECTOMY  as child    HX WISDOM TEETH EXTRACTION  as teenager     Current Medications:   Current Outpatient Prescriptions:     HYDROmorphone (DILAUDID) 2 mg tablet, Take 1 Tab by mouth every four (4) hours as needed. Max Daily Amount: 12 mg., Disp: 40 Tab, Rfl: 0    prazosin (MINIPRESS) 1 mg capsule, Take 1 mg by mouth three (3) times daily. TAKE AM OF SURGERY WITH SMALL SIP OF WATER   Indications: BENIGN PROSTATIC HYPERTROPHY, Disp: , Rfl:     amLODIPine (NORVASC) 10 mg tablet, Take 10 mg by mouth daily. TAKE AM OF SURGERY WITH SMALL SIP OF WATER, Disp: , Rfl:     labetalol (NORMODYNE) 200 mg tablet, Take 200 mg by mouth two (2) times a day. TAKE AM OF SURGERY WITH SMALL SIP OF WATER, Disp: , Rfl:     cloNIDine HCl (CATAPRES) 0.2 mg tablet, Take 0.2 mg by mouth nightly. Indications: HYPERTENSION, Disp: , Rfl:     finasteride (PROSCAR) 5 mg tablet, Take 5 mg by mouth daily. TAKE AM OF SURGERY WITH SMALL SIP OF WATER   Indications: BENIGN PROSTATIC HYPERTROPHY, Disp: , Rfl:     furosemide (LASIX) 40 mg tablet, Take 40 mg by mouth daily. , Disp: , Rfl:     Febuxostat (ULORIC) 80 mg tab tablet, Take 80 mg by mouth daily. TAKE AM OF SURGERY WITH SMALL SIP OF WATER   Indications: GOUT, Disp: , Rfl:     LORazepam (ATIVAN) 0.5 mg tablet, Take 0.5 mg by mouth daily as needed for Anxiety. TAKES 1/2 OF 1MG TABLET DAILY AS NEEDED, \"USUALLY EVERY DAY\", Disp: , Rfl:      Date Last Reviewed: 11/7/2017    Social History/Home Situation:   Social History     Social History    Marital status:      Spouse name: N/A    Number of children: N/A    Years of education: N/A     Occupational History    Not on file.      Social History Main Topics    Smoking status: Never Smoker    Smokeless tobacco: Not on file    Alcohol use No    Drug use: Not on file    Sexual activity: Not on file     Other Topics Concern    Not on file     Social History Narrative Work/Activity History: consultation  OBJECTIVE:    Outcome Measure: Tool Used: Modified Oswestry Low Back Pain Questionnaire  Score:  Initial: 12/50  Most Recent: 14/50 (Date:9/19/17 )   Interpretation of Score: Each section is scored on a 0-5 scale, 5 representing the greatest disability. The scores of each section are added together for a total score of 50. Score 0 1-10 11-20 21-30 31-40 41-49 50   Modifier CH CI CJ CK CL CM CN     ? Changing and Maintaining Body Position:     - CURRENT STATUS: CJ - 20%-39% impaired, limited or restricted    - GOAL STATUS:  CH - 0% impaired, limited or restricted    - D/C STATUS:  ---------------To be determined---------------  Tool Used: Lower Extremity Functional Scale (LEFS)  Score:  Initial: 49/80 Most Recent: 54/80 (Date: 9/19/17 )   Interpretation of Score: 20 questions each scored on a 5 point scale with 0 representing \"extreme difficulty or unable to perform\" and 4 representing \"no difficulty\". The lower the score, the greater the functional disability. 80/80 represents no disability. Minimal detectable change is 9 points. Score 80 79-63 62-48 47-32 31-16 15-1 0   Modifier CH CI CJ CK CL CM CN         Observation/Orthostatic Postural Assessment:    Patient is obese individual with increased abdominal mass and lordosis in standing. Patient shows slight right rotation from thoracic spine in standing. He reports that the popping has subsided during some of his activity. Palpation:    (re-cert-9/19/17) . He does show decreased R LE edema compared to his previous PT treatments.   Core strength tests:  Vertical compression test (VCT)- grade 3/5 with dysfunction at TL junction and right rotation (able to show improved posture and strength with testing)  Elbow flexion test (EFT)- Grade 2/5 with sluggish initiation, and endurance present  Lumbar protective mechanism (LPM)- Left A-P is sluggish grade 2, P-A grade 2 and present; Right A-P is grade 2 sluggish, endurance present, P-A is 2- and present. ROM: Slight limitation in hip range of motion, but mostly limited in right rotation and side bending left   Cervical ROM shows 50% to the right and 70% left cervical rotation. Strength: Good and functional LE strength                 Special Tests: Vertical compression test (VCT)- grade 3/5 with dysfunction at TL junction and right rotation  Elbow flexion test (EFT)- Grade 2+/5 with sluggish initiation, and endurance present  Lumbar protective mechanism (LPM)- Left A-P is sluggish grade 2, P-A grade 2 and present; Right A-P is grade 1 sluggish, endurance present, P-A is 2- and present. Neurological Screen:   Myotomes: intact                  Dermatomes: intact                  Reflexes: intact                  Neural Tension Tests: (-)  Functional Mobility:       Patient has difficulty with functional mobility, standing for greater than 30 min and with exercise activity  Balance:    fair- Patient unable to stand greater than 1 sec in single leg stance Bilaterally  TREATMENT:    (In addition to Assessment/Re-Assessment sessions the following treatments were rendered)  Therapeutic Exercise: (20 Minutes):  Not todayExercises per grid below to improve mobility, strength, balance and coordination. Required minimal visual, verbal and manual cues to promote proper body alignment, promote proper body posture and promote proper body mechanics. Progressed resistance, range and repetitions as indicated.    Date:  11/7/2017     Activity/Exercise Parameters   SCI FIT Not today   Stretches  Hamstring bilaterally 3 x 30 sec  Piriformis bilaterally 3 x 20 sec  Hip flexor stretch   Education 10 min on continuing his exercises and starting to work some exercises in at home and not just the gym     Core training  Reviewed x 5 min  Wall stance with leg up x 3 min  Changed to stairs x 2 min with heel lift   Treadmill (not today) Discussed x 2 min   Step up X 10 B    Glut training (not today) Bent over table with focus on form x 5 min   Sit to stand (reviewed X 10 with good form   Balance (not today) Talked about challenging balance x 5 min  Tandem stance work x 5 min   Hamstring activation (not today) Hook lying x 2 min right knee     Physioball Hamstring curls x 20  Wall squat x 10       35 min discussion on health and wellness with gym exercises and how he can continue to accomplish his goals. Instruction to continue to progress his aerobic maintenance and continue to work on leg strength  Footwear and his left foot discussed today as well (indirect charge)    HEP-education on posture  Manual Therapy (     ):(not today)  Right and left hip stretches. STM to left glut med and min to border and bony contours  -STM to right fibula, tibialis anterior and fibular mobilizations. -STM to IT band and patella with plunger  -worked through left foot mobility x 10 min  (Used abbreviations: MET - muscle energy technique; PNF - proprioceptive neuromuscular facilitation; NMR - neuromuscular re-education; a/p - anterior to posterior; p/a - posterior to anterior)   Therapeutic Modalities:                                                                                               HEP: As above; handouts given to patient for all exercises. ______________________________________________________________________________________________________    Treatment Assessment:  Patient shows good connection to core with exercises. Follow up in one month. Pain at 1/10  Progression/Medical Necessity:   · Patient is expected to demonstrate progress in strength, range of motion, balance, coordination and functional technique to improve functional mobility. · Patient demonstrates good rehab potential due to higher previous functional level. · Skilled intervention continues to be required due to core weakness and instability. Compliance with Program/Exercises: compliant all of the time. Reason for Continuation of Services/Other Comments:  · Patient continues to require skilled intervention due to decreased function. Recommendations/Intent for next treatment session: \"Treatment next visit will focus on core training, endurance\".  And balance   Total Treatment Duration:  PT Patient Time In/Time Out  Time In: 1400  Time Out: 1500    Dyan Espinoza, PT, DPT, OCS

## 2017-12-05 ENCOUNTER — HOSPITAL ENCOUNTER (OUTPATIENT)
Dept: PHYSICAL THERAPY | Age: 72
Discharge: HOME | End: 2017-12-05
Payer: MEDICARE

## 2017-12-05 PROCEDURE — G8983 BODY POS D/C STATUS: HCPCS

## 2017-12-05 PROCEDURE — G8982 BODY POS GOAL STATUS: HCPCS

## 2017-12-05 PROCEDURE — 97164 PT RE-EVAL EST PLAN CARE: CPT

## 2017-12-05 NOTE — THERAPY RECERTIFICATION
Eber Rose  : 1945  [place]  Secondary:  [facility] at [address] [place] [address]  [phone]   [phone]        Outpatient PHYSICAL THERAPY: Daily Note, Re-evaluation and Recertification  Fall Risk Score: 2 (? 5 = High Risk)       ICD-10: Treatment Diagnosis: Low back pain, M54.5  Difficulty walking, not elsewhere classified, R26.2  Cervicalgia, M54.2  REFERRING PHYSICIAN: Shy Almazan MD MD Orders: Evaluate and Treat  Return Physician Appointment: not known  MEDICAL/REFERRING DIAGNOSIS: Lumbosacral stenosis with neurogenic claudication, DDD, Lumbosacral spondylosis without myelopathy, other symptoms referrable to back  DATE OF ONSET: Chronic   PRIOR LEVEL OF FUNCTION: independent  PRECAUTIONS/ALLERGIES:   Allergies   Allergen Reactions    Other Medication Other (comments)     \"Some BP meds\" cause \"  SWELLING OF LEGS with Procardia and with current medicine    Statins-Hmg-Coa Reductase Inhibitors Other (comments)     Extreme nerve pain       ASSESSMENT:  ?????? ? ? This section established at most recent assessment??????????  Eber Rose has been seen for 46 visits from 12/11/15 to 2017 for lumbar spine pain and spinal stenosis. Patient has performed therapeutic exercises, activities, and had manual therapy to increased strength, ROM and function. Patient has also used modalities for pain control in order to increase function. He is becoming more consistent with his workout program at the gym and is seeing benefits. He still has lack of core strength at this time. Patient has shown an increase in function per the Modified Oswestry Disability Index score with scores of 9/50, and shows improvement with the LEFS to 58/80. He shows significant improvement in his weight loss goals as well as his balance goals.  He has now started with a  in the gym to help his progress and PT will follow up once a month to address lingering functional issue related to his goals. Patient has progressed well toward their goals and will benefit from continuing skilled PT in order to address their impairments. Ayala Shore, PT, DPT, OCS  12/5/2017      PROBLEM LIST (Impairments causing functional limitations):  1. Decreased Strength affecting function  2. Decreased ADL/Functional Activities  3. Decreased Flexibility/joint mobility  4. Decreased transfer abilities  5. Increased Pain affecting function  6. Decreased Activity tolerance   7. Increased Fatigue affecting function  GOALS: (Goals have been discussed and agreed upon with patient.)  1. DISCHARGE GOALS: Time Frame: 12 weeks   2. Patient will be independent in advanced core training exercises to return to functional mobility (ongoing)  3. Patient will show a greater than 5 point decrease on the Modified Oswestry Disability Index score in order to show an increase in function (ongoing)  4. Patient will report standing for greater than 30 min with work activity (MET-11/7/16)  5. NEW GOAL:  6. Patient will walk greater than 12 laps in the gym during gym session. (MET)  7. Patient will perform core exercises as part of his routine greater than 4 times a week (ongoing)  8. Patient will drive for longer than 2 hours without pain increase (MET)  9. Patient will show a greater than 5 point increase on the LEFS in order to show an increase in walking function  REHABILITATION POTENTIAL FOR STATED GOALS: GoodPLAN OF CARE:  INTERVENTIONS PLANNED: (Benefits and precautions of physical therapy have been discussed with the patient.)  1. balance exercise  2. bed mobility  3. cold  4. electrical stimulation  5. gait training  6. heat  7. manual therapy (including instrument assisted soft tissue mobilization)  8. neuromuscular re-education/strengthening  9. range of motion: active/assisted/passive  10. therapeutic activities  11. therapeutic exercise/strengthening  12.  transfer training  13. Other: Aquatics  TREATMENT PLAN EFFECTIVE DATES: 12/5/17 TO 3/5/18  FREQUENCY/DURATION: Follow patient 1 time a month for 3 months to address above goals. SUBJECTIVE:    History of Present Injury/Illness (Reason for Referral): Patient report that he has had a long history of back issues. He first started to have health and difficulty functioning with a presence of gout for about a year, then had his hip replaced in April of this year. Now, his is trying to regain his back strength and function. He has difficulty walking for long periods and standing in one place. His goal is to become more active and functional as well as control his health. Patient had an injection a few weeks ago and had some relief from that. MRI results: Copy in paper chart showing:  Abdominal hernia surgery performed in   Present Symptoms: Patient reports that the outside of the right knee has been bothering him more the last 3 weeks. He has started to get joint injections this past week and has two more   Pain at 2/10  Dominant Side: right  Past Medical History:    Active Ambulatory Problems     Diagnosis Date Noted    S/P total hip arthroplasty 04/06/2015    HTN (hypertension) 04/06/2015    HLD (hyperlipidemia) 04/06/2015    BPH (benign prostatic hyperplasia) 04/06/2015    Gout 04/06/2015    Anxiety 04/06/2015    Diabetes mellitus ([facility] [address] 04/06/2015     Resolved Ambulatory Problems     Diagnosis Date Noted    No Resolved Ambulatory Problems     Past Medical History:   Diagnosis Date    Anxiety     Edema of both legs     Enlarged prostate     Gout     Hip pain     History of elevated glucose     History of seasonal allergies     Hypercholesterolemia     Hypertension     Keratoacanthoma     S/P total hip arthroplasty 4/6/2015    Skin neoplasm     Spinal stenosis      Past Surgical History:   Procedure Laterality Date    HX TONSILLECTOMY  as child    HX WISDOM TEETH EXTRACTION  as teenager     Current Medications:   Current Outpatient Prescriptions:     HYDROmorphone (DILAUDID) 2 mg tablet, Take 1 Tab by mouth every four (4) hours as needed. Max Daily Amount: 12 mg., Disp: 40 Tab, Rfl: 0    prazosin (MINIPRESS) 1 mg capsule, Take 1 mg by mouth three (3) times daily. TAKE AM OF SURGERY WITH SMALL SIP OF WATER   Indications: BENIGN PROSTATIC HYPERTROPHY, Disp: , Rfl:     amLODIPine (NORVASC) 10 mg tablet, Take 10 mg by mouth daily. TAKE AM OF SURGERY WITH SMALL SIP OF WATER, Disp: , Rfl:     labetalol (NORMODYNE) 200 mg tablet, Take 200 mg by mouth two (2) times a day. TAKE AM OF SURGERY WITH SMALL SIP OF WATER, Disp: , Rfl:     cloNIDine HCl (CATAPRES) 0.2 mg tablet, Take 0.2 mg by mouth nightly. Indications: HYPERTENSION, Disp: , Rfl:     finasteride (PROSCAR) 5 mg tablet, Take 5 mg by mouth daily. TAKE AM OF SURGERY WITH SMALL SIP OF WATER   Indications: BENIGN PROSTATIC HYPERTROPHY, Disp: , Rfl:     furosemide (LASIX) 40 mg tablet, Take 40 mg by mouth daily. , Disp: , Rfl:     Febuxostat (ULORIC) 80 mg tab tablet, Take 80 mg by mouth daily. TAKE AM OF SURGERY WITH SMALL SIP OF WATER   Indications: GOUT, Disp: , Rfl:     LORazepam (ATIVAN) 0.5 mg tablet, Take 0.5 mg by mouth daily as needed for Anxiety. TAKES 1/2 OF 1MG TABLET DAILY AS NEEDED, \"USUALLY EVERY DAY\", Disp: , Rfl:      Date Last Reviewed: 12/5/2017    Social History/Home Situation:   Social History     Social History    Marital status:      Spouse name: N/A    Number of children: N/A    Years of education: N/A     Occupational History    Not on file. Social History Main Topics    Smoking status: Never Smoker    Smokeless tobacco: Not on file    Alcohol use No    Drug use: Not on file    Sexual activity: Not on file     Other Topics Concern    Not on file     Social History Narrative           Work/Activity History: consultation  OBJECTIVE:    Outcome Measure:    Tool Used: Modified Oswestry Low Back Pain Questionnaire  Score:  Initial: 12/50  Most Recent: 9/50 (Date:12/5/17 )   Interpretation of Score: Each section is scored on a 0-5 scale, 5 representing the greatest disability. The scores of each section are added together for a total score of 50. Score 0 1-10 11-20 21-30 31-40 41-49 50   Modifier CH CI CJ CK CL CM CN     ? Changing and Maintaining Body Position:    [id number] - CURRENT STATUS: CI - 1%-19% impaired, limited or restricted    - GOAL STATUS:  CH - 0% impaired, limited or restricted    - D/C STATUS:  CI - 1%-19% impaired, limited or restricted  Tool Used: Lower Extremity Functional Scale (LEFS)  Score:  Initial: 49/80 Most Recent: 54/80 (Date: 12/5/17 )   Interpretation of Score: 20 questions each scored on a 5 point scale with 0 representing \"extreme difficulty or unable to perform\" and 4 representing \"no difficulty\". The lower the score, the greater the functional disability. 80/80 represents no disability. Minimal detectable change is 9 points. Score 80 79-63 62-48 47-32 31-16 15-1 0   Modifier CH CI CJ CK CL CM CN   NEW G-Code next treatment      Observation/Orthostatic Postural Assessment:    Patient is obese individual with increased abdominal mass and lordosis in standing. Patient shows slight right rotation from thoracic spine in standing. He reports that the popping has subsided during some of his activity. Palpation:    (re-cert-9/19/17) . He does show decreased R LE edema compared to his previous PT treatments. Core strength tests:  Vertical compression test (VCT)- grade 3/5 with dysfunction at TL junction and right rotation (able to show improved posture and strength with testing)    ROM: Slight limitation in hip range of motion, but mostly limited in right rotation and side bending left   Cervical ROM shows 50% to the right and 70% left cervical rotation.                           Strength: Good and functional LE strength                 Special Tests: Vertical compression test (VCT)- grade 3/5 with dysfunction at TL junction and right rotation  Elbow flexion test (EFT)- Grade 3/5 with sluggish initiation, and endurance present  Lumbar protective mechanism (LPM)- Left A-P is present grade 3, P-A grade 3 and present; Right A-P is grade 2 present, endurance present, P-A is 3 and present. Neurological Screen:   Myotomes: intact                  Dermatomes: intact                  Reflexes: intact                  Neural Tension Tests: (-)  Functional Mobility:       Patient has difficulty with functional mobility, standing for greater than 30 min and with exercise activity  Balance:    fair- Patient unable to stand greater than 1 sec in single leg stance Bilaterally  TREATMENT:    (In addition to Assessment/Re-Assessment sessions the following treatments were rendered)  Therapeutic Exercise: ( ):  Not today (reviewed for HEP) Exercises per grid below to improve mobility, strength, balance and coordination. Required minimal visual, verbal and manual cues to promote proper body alignment, promote proper body posture and promote proper body mechanics. Progressed resistance, range and repetitions as indicated.    Date:  12/5/2017     Activity/Exercise Parameters   SCI FIT Not today   Stretches  Hamstring bilaterally 3 x 30 sec  Piriformis bilaterally 3 x 20 sec  Hip flexor stretch   Education 10 min on continuing his exercises and starting to work some exercises in at home and not just the gym     Core training  Reviewed x 5 min  Wall stance with leg up x 3 min  Changed to stairs x 2 min with heel lift   Treadmill (not today) Discussed x 2 min   Step up  X 10 B    Glut training (not today) Bent over table with focus on form x 5 min   Sit to stand (reviewed X 10 with good form   Balance (not today) Talked about challenging balance x 5 min  Tandem stance work x 5 min   Hamstring activation (not today) Hook lying x 2 min right knee     Physioball Hamstring curls x 20  Wall squat x 10           HEP-education on posture  Manual Therapy (     ):(not today)  Right and left hip stretches. STM to left glut med and min to border and bony contours  -STM to right fibula, tibialis anterior and fibular mobilizations. -STM to IT band and patella with plunger  -worked through left foot mobility x 10 min  (Used abbreviations: MET - muscle energy technique; PNF - proprioceptive neuromuscular facilitation; NMR - neuromuscular re-education; a/p - anterior to posterior; p/a - posterior to anterior)   Therapeutic Modalities:                                                                                               HEP: As above; handouts given to patient for all exercises. ______________________________________________________________________________________________________    Treatment Assessment:  Patient shows good connection to core with exercises. Follow up in one month. Pain at 1/10  Progression/Medical Necessity:   · Patient is expected to demonstrate progress in strength, range of motion, balance, coordination and functional technique to improve functional mobility. · Patient demonstrates good rehab potential due to higher previous functional level. · Skilled intervention continues to be required due to core weakness and instability. Compliance with Program/Exercises: compliant all of the time. Reason for Continuation of Services/Other Comments:  · Patient continues to require skilled intervention due to decreased function. Recommendations/Intent for next treatment session: \"Treatment next visit will focus on core training, endurance\".  And balance   Total Treatment Duration:Re-evaluation [id number]  PT Patient Time In/Time Out  Time In: 1400  Time Out: 1500    Genesis Lucero, PT, DPT, OCS

## 2018-01-02 ENCOUNTER — HOSPITAL ENCOUNTER (OUTPATIENT)
Dept: PHYSICAL THERAPY | Age: 73
Discharge: HOME | End: 2018-01-02
Payer: MEDICARE

## 2018-01-02 PROCEDURE — 97110 THERAPEUTIC EXERCISES: CPT

## 2018-01-02 PROCEDURE — 97140 MANUAL THERAPY 1/> REGIONS: CPT

## 2018-01-02 NOTE — PROGRESS NOTES
Kaylan Hoff  : 1945  [place]  Secondary:  [facility] at [address] [place] [address]  [phone]   [phone]        Outpatient PHYSICAL THERAPY: Daily Note  Fall Risk Score: 2 (? 5 = High Risk)       ICD-10: Treatment Diagnosis: Low back pain, M54.5  Difficulty walking, not elsewhere classified, R26.2  Cervicalgia, M54.2  REFERRING PHYSICIAN: Iain Mcdermott MD MD Orders: Evaluate and Treat  Return Physician Appointment: not known  MEDICAL/REFERRING DIAGNOSIS: Lumbosacral stenosis with neurogenic claudication, DDD, Lumbosacral spondylosis without myelopathy, other symptoms referrable to back  DATE OF ONSET: Chronic   PRIOR LEVEL OF FUNCTION: independent  PRECAUTIONS/ALLERGIES:   Allergies   Allergen Reactions    Other Medication Other (comments)     \"Some BP meds\" cause \"  SWELLING OF LEGS with Procardia and with current medicine    Statins-Hmg-Coa Reductase Inhibitors Other (comments)     Extreme nerve pain       ASSESSMENT:  ?????? ? ? This section established at most recent assessment??????????  Kaylan Hoff has been seen for 46 visits from 12/11/15 to 2018 for lumbar spine pain and spinal stenosis. Patient has performed therapeutic exercises, activities, and had manual therapy to increased strength, ROM and function. Patient has also used modalities for pain control in order to increase function. He is becoming more consistent with his workout program at the gym and is seeing benefits. He still has lack of core strength at this time. Patient has shown an increase in function per the Modified Oswestry Disability Index score with scores of 9/50, and shows improvement with the LEFS to 58/80. He shows significant improvement in his weight loss goals as well as his balance goals.  He has now started with a  in the gym to help his progress and PT will follow up once a month to address lingering functional issue related to his goals. Patient has progressed well toward their goals and will benefit from continuing skilled PT in order to address their impairments. David Stewart, PT, DPT, OCS  1/2/2018      PROBLEM LIST (Impairments causing functional limitations):  1. Decreased Strength affecting function  2. Decreased ADL/Functional Activities  3. Decreased Flexibility/joint mobility  4. Decreased transfer abilities  5. Increased Pain affecting function  6. Decreased Activity tolerance   7. Increased Fatigue affecting function  GOALS: (Goals have been discussed and agreed upon with patient.)  1. DISCHARGE GOALS: Time Frame: 12 weeks   2. Patient will be independent in advanced core training exercises to return to functional mobility (ongoing)  3. Patient will show a greater than 5 point decrease on the Modified Oswestry Disability Index score in order to show an increase in function (ongoing)  4. Patient will report standing for greater than 30 min with work activity (MET-11/7/16)  5. NEW GOAL:  6. Patient will walk greater than 12 laps in the gym during gym session. (MET)  7. Patient will perform core exercises as part of his routine greater than 4 times a week (ongoing)  8. Patient will drive for longer than 2 hours without pain increase (MET)  9. Patient will show a greater than 5 point increase on the LEFS in order to show an increase in walking function  REHABILITATION POTENTIAL FOR STATED GOALS: GoodPLAN OF CARE:  INTERVENTIONS PLANNED: (Benefits and precautions of physical therapy have been discussed with the patient.)  1. balance exercise  2. bed mobility  3. cold  4. electrical stimulation  5. gait training  6. heat  7. manual therapy (including instrument assisted soft tissue mobilization)  8. neuromuscular re-education/strengthening  9. range of motion: active/assisted/passive  10. therapeutic activities  11. therapeutic exercise/strengthening  12. transfer training  13.  Other: Aquatics  TREATMENT PLAN EFFECTIVE DATES: 12/5/17 TO 3/5/18  FREQUENCY/DURATION: Follow patient 1 time a month for 3 months to address above goals. SUBJECTIVE:    History of Present Injury/Illness (Reason for Referral): Patient report that he has had a long history of back issues. He first started to have health and difficulty functioning with a presence of gout for about a year, then had his hip replaced in April of this year. Now, his is trying to regain his back strength and function. He has difficulty walking for long periods and standing in one place. His goal is to become more active and functional as well as control his health. Patient had an injection a few weeks ago and had some relief from that. MRI results: Copy in paper chart showing:  Abdominal hernia surgery performed in   Present Symptoms: Patient reports that the outside of the right knee has been bothering him more the last 3 weeks. He has started to get joint injections this past week and has two more   Pain at 2/10  Dominant Side: right  Past Medical History:    Active Ambulatory Problems     Diagnosis Date Noted    S/P total hip arthroplasty 04/06/2015    HTN (hypertension) 04/06/2015    HLD (hyperlipidemia) 04/06/2015    BPH (benign prostatic hyperplasia) 04/06/2015    Gout 04/06/2015    Anxiety 04/06/2015    Diabetes mellitus ([facility] [address] 04/06/2015     Resolved Ambulatory Problems     Diagnosis Date Noted    No Resolved Ambulatory Problems     Past Medical History:   Diagnosis Date    Anxiety     Edema of both legs     Enlarged prostate     Gout     Hip pain     History of elevated glucose     History of seasonal allergies     Hypercholesterolemia     Hypertension     Keratoacanthoma     S/P total hip arthroplasty 4/6/2015    Skin neoplasm     Spinal stenosis      Past Surgical History:   Procedure Laterality Date    HX TONSILLECTOMY  as child    HX WISDOM TEETH EXTRACTION  as teenager     Current Medications:   Current Outpatient Prescriptions:     HYDROmorphone (DILAUDID) 2 mg tablet, Take 1 Tab by mouth every four (4) hours as needed. Max Daily Amount: 12 mg., Disp: 40 Tab, Rfl: 0    prazosin (MINIPRESS) 1 mg capsule, Take 1 mg by mouth three (3) times daily. TAKE AM OF SURGERY WITH SMALL SIP OF WATER   Indications: BENIGN PROSTATIC HYPERTROPHY, Disp: , Rfl:     amLODIPine (NORVASC) 10 mg tablet, Take 10 mg by mouth daily. TAKE AM OF SURGERY WITH SMALL SIP OF WATER, Disp: , Rfl:     labetalol (NORMODYNE) 200 mg tablet, Take 200 mg by mouth two (2) times a day. TAKE AM OF SURGERY WITH SMALL SIP OF WATER, Disp: , Rfl:     cloNIDine HCl (CATAPRES) 0.2 mg tablet, Take 0.2 mg by mouth nightly. Indications: HYPERTENSION, Disp: , Rfl:     finasteride (PROSCAR) 5 mg tablet, Take 5 mg by mouth daily. TAKE AM OF SURGERY WITH SMALL SIP OF WATER   Indications: BENIGN PROSTATIC HYPERTROPHY, Disp: , Rfl:     furosemide (LASIX) 40 mg tablet, Take 40 mg by mouth daily. , Disp: , Rfl:     Febuxostat (ULORIC) 80 mg tab tablet, Take 80 mg by mouth daily. TAKE AM OF SURGERY WITH SMALL SIP OF WATER   Indications: GOUT, Disp: , Rfl:     LORazepam (ATIVAN) 0.5 mg tablet, Take 0.5 mg by mouth daily as needed for Anxiety. TAKES 1/2 OF 1MG TABLET DAILY AS NEEDED, \"USUALLY EVERY DAY\", Disp: , Rfl:      Date Last Reviewed: 1/2/2018    Social History/Home Situation:   Social History     Social History    Marital status:      Spouse name: N/A    Number of children: N/A    Years of education: N/A     Occupational History    Not on file. Social History Main Topics    Smoking status: Never Smoker    Smokeless tobacco: Not on file    Alcohol use No    Drug use: Not on file    Sexual activity: Not on file     Other Topics Concern    Not on file     Social History Narrative           Work/Activity History: consultation  OBJECTIVE:    Outcome Measure:    Tool Used: Modified Oswestry Low Back Pain Questionnaire  Score:  Initial: 12/50 Most Recent: 9/50 (Date:12/5/17 )   Interpretation of Score: Each section is scored on a 0-5 scale, 5 representing the greatest disability. The scores of each section are added together for a total score of 50. Score 0 1-10 11-20 21-30 31-40 41-49 50   Modifier CH CI CJ CK CL CM CN     ? Changing and Maintaining Body Position:    [id number] - CURRENT STATUS: CI - 1%-19% impaired, limited or restricted    - GOAL STATUS:  CH - 0% impaired, limited or restricted    - D/C STATUS:  CI - 1%-19% impaired, limited or restricted  Tool Used: Lower Extremity Functional Scale (LEFS)  Score:  Initial: 49/80 Most Recent: 54/80 (Date: 12/5/17 )   Interpretation of Score: 20 questions each scored on a 5 point scale with 0 representing \"extreme difficulty or unable to perform\" and 4 representing \"no difficulty\". The lower the score, the greater the functional disability. 80/80 represents no disability. Minimal detectable change is 9 points. Score 80 79-63 62-48 47-32 31-16 15-1 0   Modifier CH CI CJ CK CL CM CN   ? Mobility - Walking and Moving Around:     - CURRENT STATUS: CJ - 20%-39% impaired, limited or restricted    - GOAL STATUS: CI - 1%-19% impaired, limited or restricted    - D/C STATUS:  ---------------To be determined---------------        Observation/Orthostatic Postural Assessment:    Patient is obese individual with increased abdominal mass and lordosis in standing. Patient shows slight right rotation from thoracic spine in standing. He reports that the popping has subsided during some of his activity. Palpation:    (re-cert-9/19/17) . He does show decreased R LE edema compared to his previous PT treatments. Core strength tests:  Vertical compression test (VCT)- grade 3/5 with dysfunction at TL junction and right rotation (able to show improved posture and strength with testing)  Right knee shows increased tension and tightness over TFL vastus lateralis and IT band.   Tenderness goes into the lateral right patella. Limitation with pain into flexion in posterior knee and possible meniscal pathology  -Patient does show full knee extension at this time. ROM: Slight limitation in hip range of motion, but mostly limited in right rotation and side bending left   Cervical ROM shows 50% to the right and 70% left cervical rotation. Strength: Good and functional LE strength                 Special Tests: Vertical compression test (VCT)- grade 3/5 with dysfunction at TL junction and right rotation  Elbow flexion test (EFT)- Grade 3/5 with sluggish initiation, and endurance present  Lumbar protective mechanism (LPM)- Left A-P is present grade 3, P-A grade 3 and present; Right A-P is grade 2 present, endurance present, P-A is 3 and present. Neurological Screen:   Myotomes: intact                  Dermatomes: intact                  Reflexes: intact                  Neural Tension Tests: (-)  Functional Mobility:       Patient has difficulty with functional mobility, standing for greater than 30 min and with exercise activity  Balance:    fair- Patient unable to stand greater than 1 sec in single leg stance Bilaterally  TREATMENT:    (In addition to Assessment/Re-Assessment sessions the following treatments were rendered)  Therapeutic Exercise: (15 Minutes):  Not today (reviewed for HEP) Exercises per grid below to improve mobility, strength, balance and coordination. Required minimal visual, verbal and manual cues to promote proper body alignment, promote proper body posture and promote proper body mechanics. Progressed resistance, range and repetitions as indicated.    Date:  1/2/2018     Activity/Exercise Parameters   SCI FIT Not today   Stretches  Hamstring bilaterally 3 x 30 sec  Piriformis bilaterally 3 x 20 sec  Hip flexor stretch and rectus femoris stretch   Education 10 min on continuing his exercises and starting to work some exercises in at home and not just the gym     Core training  Reviewed x 5 min  Wall stance with leg up x 3 min  Changed to stairs x 2 min with heel lift   Treadmill (not today) Discussed x 2 min   Step up  X 10 B    Glut training (not today) Bent over table with focus on form x 5 min   Sit to stand (reviewed X 10 with good form   Balance (not today) Talked about challenging balance x 5 min  Tandem stance work x 5 min   Hamstring activation  Hook lying x 2 min right knee     Physioball Hamstring curls x 20  Wall squat x 10           HEP-education on posture  Manual Therapy (    Soft Tissue Mobilization Duration  Duration: 30 Minutes):(not today)  Right and left hip stretches. STM to left glut med and min to border and bony contours  -STM to right fibula, tibialis anterior and fibular mobilizations. -STM to IT band and patella with plunger    (Used abbreviations: MET - muscle energy technique; PNF - proprioceptive neuromuscular facilitation; NMR - neuromuscular re-education; a/p - anterior to posterior; p/a - posterior to anterior)   Therapeutic Modalities:                                                                                               HEP: As above; handouts given to patient for all exercises. ______________________________________________________________________________________________________    Treatment Assessment:  Patient shows some improvement in vastus lateralis mobility. Pain at 1/10  Progression/Medical Necessity:   · Patient is expected to demonstrate progress in strength, range of motion, balance, coordination and functional technique to improve functional mobility. · Patient demonstrates good rehab potential due to higher previous functional level. · Skilled intervention continues to be required due to core weakness and instability. Compliance with Program/Exercises: compliant all of the time.    Reason for Continuation of Services/Other Comments:  · Patient continues to require skilled intervention due to decreased function. Recommendations/Intent for next treatment session: \"Treatment next visit will focus on core training, endurance\".  And balance   Total Treatment Duration:  PT Patient Time In/Time Out  Time In: 1405  Time Out: 1500    Dwaine Puentes, PT, DPT, OCS

## 2018-01-16 ENCOUNTER — HOSPITAL ENCOUNTER (OUTPATIENT)
Dept: PHYSICAL THERAPY | Age: 73
Discharge: HOME | End: 2018-01-16
Payer: MEDICARE

## 2018-01-16 PROCEDURE — 97140 MANUAL THERAPY 1/> REGIONS: CPT

## 2018-01-16 PROCEDURE — 97110 THERAPEUTIC EXERCISES: CPT

## 2018-01-16 NOTE — PROGRESS NOTES
Ember Majano  : 1945  [place]  Secondary:  [facility]rapy Center at [address] [place] [address], [facility] [place], [address]  [phone]   [phone]        Outpatient PHYSICAL THERAPY: Daily Note  Fall Risk Score: 2 (? 5 = High Risk)       ICD-10: Treatment Diagnosis: Low back pain, M54.5  Difficulty walking, not elsewhere classified, R26.2  Cervicalgia, M54.2  REFERRING PHYSICIAN: Hetal Courtney MD MD Orders: Evaluate and Treat  Return Physician Appointment: not known  MEDICAL/REFERRING DIAGNOSIS: Lumbosacral stenosis with neurogenic claudication, DDD, Lumbosacral spondylosis without myelopathy, other symptoms referrable to back  DATE OF ONSET: Chronic   PRIOR LEVEL OF FUNCTION: independent  PRECAUTIONS/ALLERGIES:   Allergies   Allergen Reactions    Other Medication Other (comments)     \"Some BP meds\" cause \"  SWELLING OF LEGS with Procardia and with current medicine    Statins-Hmg-Coa Reductase Inhibitors Other (comments)     Extreme nerve pain       ASSESSMENT:  ?????? ? ? This section established at most recent assessment??????????  Ember Majano has been seen for 46 visits from 12/11/15 to 2018 for lumbar spine pain and spinal stenosis. Patient has performed therapeutic exercises, activities, and had manual therapy to increased strength, ROM and function. Patient has also used modalities for pain control in order to increase function. He is becoming more consistent with his workout program at the gym and is seeing benefits. He still has lack of core strength at this time. Patient has shown an increase in function per the Modified Oswestry Disability Index score with scores of 9/50, and shows improvement with the LEFS to 58/80. He shows significant improvement in his weight loss goals as well as his balance goals.  He has now started with a  in the gym to help his progress and PT will follow up once a month to address lingering functional issue related to his goals. Patient has progressed well toward their goals and will benefit from continuing skilled PT in order to address their impairments. Luiza Villela, PT, DPT, OCS  1/16/2018      PROBLEM LIST (Impairments causing functional limitations):  1. Decreased Strength affecting function  2. Decreased ADL/Functional Activities  3. Decreased Flexibility/joint mobility  4. Decreased transfer abilities  5. Increased Pain affecting function  6. Decreased Activity tolerance   7. Increased Fatigue affecting function  GOALS: (Goals have been discussed and agreed upon with patient.)  1. DISCHARGE GOALS: Time Frame: 12 weeks   2. Patient will be independent in advanced core training exercises to return to functional mobility (ongoing)  3. Patient will show a greater than 5 point decrease on the Modified Oswestry Disability Index score in order to show an increase in function (ongoing)  4. Patient will report standing for greater than 30 min with work activity (MET-11/7/16)  5. NEW GOAL:  6. Patient will walk greater than 12 laps in the gym during gym session. (MET)  7. Patient will perform core exercises as part of his routine greater than 4 times a week (ongoing)  8. Patient will drive for longer than 2 hours without pain increase (MET)  9. Patient will show a greater than 5 point increase on the LEFS in order to show an increase in walking function  REHABILITATION POTENTIAL FOR STATED GOALS: GoodPLAN OF CARE:  INTERVENTIONS PLANNED: (Benefits and precautions of physical therapy have been discussed with the patient.)  1. balance exercise  2. bed mobility  3. cold  4. electrical stimulation  5. gait training  6. heat  7. manual therapy (including instrument assisted soft tissue mobilization)  8. neuromuscular re-education/strengthening  9. range of motion: active/assisted/passive  10. therapeutic activities  11. therapeutic exercise/strengthening  12. transfer training  13.  Other: Aquatics  TREATMENT PLAN EFFECTIVE DATES: 12/5/17 TO 3/5/18  FREQUENCY/DURATION: Follow patient 1 time a month for 3 months to address above goals. SUBJECTIVE:    History of Present Injury/Illness (Reason for Referral): Patient report that he has had a long history of back issues. He first started to have health and difficulty functioning with a presence of gout for about a year, then had his hip replaced in April of this year. Now, his is trying to regain his back strength and function. He has difficulty walking for long periods and standing in one place. His goal is to become more active and functional as well as control his health. Patient had an injection a few weeks ago and had some relief from that. MRI results: Copy in paper chart showing:  Abdominal hernia surgery performed in   Present Symptoms: Patient reports that the IT band work seems to hit the nail on the head. He reports that it got much better after the last time. Pain at 2/10  Dominant Side: right  Past Medical History:    Active Ambulatory Problems     Diagnosis Date Noted    S/P total hip arthroplasty 04/06/2015    HTN (hypertension) 04/06/2015    HLD (hyperlipidemia) 04/06/2015    BPH (benign prostatic hyperplasia) 04/06/2015    Gout 04/06/2015    Anxiety 04/06/2015    Diabetes mellitus ([address] 04/06/2015     Resolved Ambulatory Problems     Diagnosis Date Noted    No Resolved Ambulatory Problems     Past Medical History:   Diagnosis Date    Anxiety     Edema of both legs     Enlarged prostate     Gout     Hip pain     History of elevated glucose     History of seasonal allergies     Hypercholesterolemia     Hypertension     Keratoacanthoma     S/P total hip arthroplasty 4/6/2015    Skin neoplasm     Spinal stenosis      Past Surgical History:   Procedure Laterality Date    HX TONSILLECTOMY  as child    HX WISDOM TEETH EXTRACTION  as teenager     Current Medications:   Current Outpatient Prescriptions:     HYDROmorphone (DILAUDID) 2 mg tablet, Take 1 Tab by mouth every four (4) hours as needed. Max Daily Amount: 12 mg., Disp: 40 Tab, Rfl: 0    prazosin (MINIPRESS) 1 mg capsule, Take 1 mg by mouth three (3) times daily. TAKE AM OF SURGERY WITH SMALL SIP OF WATER   Indications: BENIGN PROSTATIC HYPERTROPHY, Disp: , Rfl:     amLODIPine (NORVASC) 10 mg tablet, Take 10 mg by mouth daily. TAKE AM OF SURGERY WITH SMALL SIP OF WATER, Disp: , Rfl:     labetalol (NORMODYNE) 200 mg tablet, Take 200 mg by mouth two (2) times a day. TAKE AM OF SURGERY WITH SMALL SIP OF WATER, Disp: , Rfl:     cloNIDine HCl (CATAPRES) 0.2 mg tablet, Take 0.2 mg by mouth nightly. Indications: HYPERTENSION, Disp: , Rfl:     finasteride (PROSCAR) 5 mg tablet, Take 5 mg by mouth daily. TAKE AM OF SURGERY WITH SMALL SIP OF WATER   Indications: BENIGN PROSTATIC HYPERTROPHY, Disp: , Rfl:     furosemide (LASIX) 40 mg tablet, Take 40 mg by mouth daily. , Disp: , Rfl:     Febuxostat (ULORIC) 80 mg tab tablet, Take 80 mg by mouth daily. TAKE AM OF SURGERY WITH SMALL SIP OF WATER   Indications: GOUT, Disp: , Rfl:     LORazepam (ATIVAN) 0.5 mg tablet, Take 0.5 mg by mouth daily as needed for Anxiety. TAKES 1/2 OF 1MG TABLET DAILY AS NEEDED, \"USUALLY EVERY DAY\", Disp: , Rfl:      Date Last Reviewed: 1/16/2018    Social History/Home Situation:   Social History     Social History    Marital status:      Spouse name: N/A    Number of children: N/A    Years of education: N/A     Occupational History    Not on file. Social History Main Topics    Smoking status: Never Smoker    Smokeless tobacco: Not on file    Alcohol use No    Drug use: Not on file    Sexual activity: Not on file     Other Topics Concern    Not on file     Social History Narrative           Work/Activity History: consultation  OBJECTIVE:    Outcome Measure:    Tool Used: Modified Oswestry Low Back Pain Questionnaire  Score:  Initial: 12/50  Most Recent: 9/50 (Date:12/5/17 ) Interpretation of Score: Each section is scored on a 0-5 scale, 5 representing the greatest disability. The scores of each section are added together for a total score of 50. Score 0 1-10 11-20 21-30 31-40 41-49 50   Modifier CH CI CJ CK CL CM CN     ? Changing and Maintaining Body Position:    [id number] - CURRENT STATUS: CI - 1%-19% impaired, limited or restricted    - GOAL STATUS:  CH - 0% impaired, limited or restricted    - D/C STATUS:  CI - 1%-19% impaired, limited or restricted  Tool Used: Lower Extremity Functional Scale (LEFS)  Score:  Initial: 49/80 Most Recent: 54/80 (Date: 12/5/17 )   Interpretation of Score: 20 questions each scored on a 5 point scale with 0 representing \"extreme difficulty or unable to perform\" and 4 representing \"no difficulty\". The lower the score, the greater the functional disability. 80/80 represents no disability. Minimal detectable change is 9 points. Score 80 79-63 62-48 47-32 31-16 15-1 0   Modifier CH CI CJ CK CL CM CN   ? Mobility - Walking and Moving Around:     - CURRENT STATUS: CJ - 20%-39% impaired, limited or restricted    - GOAL STATUS: CI - 1%-19% impaired, limited or restricted    - D/C STATUS:  ---------------To be determined---------------        Observation/Orthostatic Postural Assessment:    Patient is obese individual with increased abdominal mass and lordosis in standing. Patient shows slight right rotation from thoracic spine in standing. He reports that the popping has subsided during some of his activity. Palpation:    (re-cert-9/19/17) . He does show decreased R LE edema compared to his previous PT treatments. Core strength tests:  Vertical compression test (VCT)- grade 3/5 with dysfunction at TL junction and right rotation (able to show improved posture and strength with testing)  Right knee shows increased tension and tightness over TFL vastus lateralis and IT band. Tenderness goes into the lateral right patella. Limitation with pain into flexion in posterior knee and possible meniscal pathology  -Patient does show full knee extension at this time. ROM: Slight limitation in hip range of motion, but mostly limited in right rotation and side bending left   Cervical ROM shows 50% to the right and 70% left cervical rotation. Strength: Good and functional LE strength                 Special Tests: Vertical compression test (VCT)- grade 3/5 with dysfunction at TL junction and right rotation  Elbow flexion test (EFT)- Grade 3/5 with sluggish initiation, and endurance present  Lumbar protective mechanism (LPM)- Left A-P is present grade 3, P-A grade 3 and present; Right A-P is grade 2 present, endurance present, P-A is 3 and present. Neurological Screen:   Myotomes: intact                  Dermatomes: intact                  Reflexes: intact                  Neural Tension Tests: (-)  Functional Mobility:       Patient has difficulty with functional mobility, standing for greater than 30 min and with exercise activity  Balance:    fair- Patient unable to stand greater than 1 sec in single leg stance Bilaterally  TREATMENT:    (In addition to Assessment/Re-Assessment sessions the following treatments were rendered)  Therapeutic Exercise: (15 Minutes): ) Exercises per grid below to improve mobility, strength, balance and coordination. Required minimal visual, verbal and manual cues to promote proper body alignment, promote proper body posture and promote proper body mechanics. Progressed resistance, range and repetitions as indicated.    Date:  1/16/2018     Activity/Exercise Parameters   SCI FIT Not today   Stretches  IT BAND review stretches x 5 min   Education 5 min on continuing his exercises and starting to work some exercises in at home and not just the gym     Core training  Reviewed x 5 min  Wall stance with leg up x 3 min  Changed to stairs x 2 min with heel lift     Indirect discussion on health and stress relief x 30 min  HEP-education on posture  Manual Therapy (    Soft Tissue Mobilization Duration  Duration: 15 Minutes):()  Right and left hip stretches. STM to left glut med and min to border and bony contours  -STM to right fibula, tibialis anterior and fibular mobilizations. -STM to IT band     (Used abbreviations: MET - muscle energy technique; PNF - proprioceptive neuromuscular facilitation; NMR - neuromuscular re-education; a/p - anterior to posterior; p/a - posterior to anterior)   Therapeutic Modalities:                                                                                               HEP: As above; handouts given to patient for all exercises. ______________________________________________________________________________________________________    Treatment Assessment:  Patient shows some improvement in vastus lateralis mobility. Continues to have relief with IT band work. Pain at 1/10  Progression/Medical Necessity:   · Patient is expected to demonstrate progress in strength, range of motion, balance, coordination and functional technique to improve functional mobility. · Patient demonstrates good rehab potential due to higher previous functional level. · Skilled intervention continues to be required due to core weakness and instability. Compliance with Program/Exercises: compliant all of the time. Reason for Continuation of Services/Other Comments:  · Patient continues to require skilled intervention due to decreased function. Recommendations/Intent for next treatment session: \"Treatment next visit will focus on core training, endurance\".  And balance   Total Treatment Duration:  PT Patient Time In/Time Out  Time In: 1402  Time Out: 1500    Sky Shi, PT, DPT, OCS

## 2018-01-30 ENCOUNTER — APPOINTMENT (OUTPATIENT)
Dept: PHYSICAL THERAPY | Age: 73
End: 2018-01-30
Payer: MEDICARE

## 2018-02-13 ENCOUNTER — HOSPITAL ENCOUNTER (OUTPATIENT)
Dept: PHYSICAL THERAPY | Age: 73
Discharge: HOME | End: 2018-02-13
Payer: MEDICARE

## 2018-02-13 PROCEDURE — 97140 MANUAL THERAPY 1/> REGIONS: CPT

## 2018-02-13 NOTE — PROGRESS NOTES
Tray Mcclain  : 1945  [place]  Secondary:  [facility] Center at [address] [place] [address]  [phone]   [phone]        Outpatient PHYSICAL THERAPY: Daily Note  Fall Risk Score: 2 (? 5 = High Risk)       ICD-10: Treatment Diagnosis: Low back pain, M54.5  Difficulty walking, not elsewhere classified, R26.2  Cervicalgia, M54.2  REFERRING PHYSICIAN: Keily March MD MD Orders: Evaluate and Treat  Return Physician Appointment: not known  MEDICAL/REFERRING DIAGNOSIS: Lumbosacral stenosis with neurogenic claudication, DDD, Lumbosacral spondylosis without myelopathy, other symptoms referrable to back  DATE OF ONSET: Chronic   PRIOR LEVEL OF FUNCTION: independent  PRECAUTIONS/ALLERGIES:   Allergies   Allergen Reactions    Other Medication Other (comments)     \"Some BP meds\" cause \"  SWELLING OF LEGS with Procardia and with current medicine    Statins-Hmg-Coa Reductase Inhibitors Other (comments)     Extreme nerve pain       ASSESSMENT:  ?????? ? ? This section established at most recent assessment??????????  Tray Mcclain has been seen for 46 visits from 12/11/15 to 2018 for lumbar spine pain and spinal stenosis. Patient has performed therapeutic exercises, activities, and had manual therapy to increased strength, ROM and function. Patient has also used modalities for pain control in order to increase function. He is becoming more consistent with his workout program at the gym and is seeing benefits. He still has lack of core strength at this time. Patient has shown an increase in function per the Modified Oswestry Disability Index score with scores of 9/50, and shows improvement with the LEFS to 58/80. He shows significant improvement in his weight loss goals as well as his balance goals.  He has now started with a  in the gym to help his progress and PT will follow up once a month to address lingering functional issue related to his goals. Patient has progressed well toward their goals and will benefit from continuing skilled PT in order to address their impairments. Marlo Frances, PT, DPT, OCS  2/13/2018      PROBLEM LIST (Impairments causing functional limitations):  1. Decreased Strength affecting function  2. Decreased ADL/Functional Activities  3. Decreased Flexibility/joint mobility  4. Decreased transfer abilities  5. Increased Pain affecting function  6. Decreased Activity tolerance   7. Increased Fatigue affecting function  GOALS: (Goals have been discussed and agreed upon with patient.)  1. DISCHARGE GOALS: Time Frame: 12 weeks   2. Patient will be independent in advanced core training exercises to return to functional mobility (ongoing)  3. Patient will show a greater than 5 point decrease on the Modified Oswestry Disability Index score in order to show an increase in function (ongoing)  4. Patient will report standing for greater than 30 min with work activity (MET-11/7/16)  5. NEW GOAL:  6. Patient will walk greater than 12 laps in the gym during gym session. (MET)  7. Patient will perform core exercises as part of his routine greater than 4 times a week (ongoing)  8. Patient will drive for longer than 2 hours without pain increase (MET)  9. Patient will show a greater than 5 point increase on the LEFS in order to show an increase in walking function  REHABILITATION POTENTIAL FOR STATED GOALS: GoodPLAN OF CARE:  INTERVENTIONS PLANNED: (Benefits and precautions of physical therapy have been discussed with the patient.)  1. balance exercise  2. bed mobility  3. cold  4. electrical stimulation  5. gait training  6. heat  7. manual therapy (including instrument assisted soft tissue mobilization)  8. neuromuscular re-education/strengthening  9. range of motion: active/assisted/passive  10. therapeutic activities  11. therapeutic exercise/strengthening  12. transfer training  13.  Other: Aquatics  TREATMENT PLAN EFFECTIVE DATES: 12/5/17 TO 3/5/18  FREQUENCY/DURATION: Follow patient 1 time a month for 3 months to address above goals. SUBJECTIVE:    History of Present Injury/Illness (Reason for Referral): Patient report that he has had a long history of back issues. He first started to have health and difficulty functioning with a presence of gout for about a year, then had his hip replaced in April of this year. Now, his is trying to regain his back strength and function. He has difficulty walking for long periods and standing in one place. His goal is to become more active and functional as well as control his health. Patient had an injection a few weeks ago and had some relief from that. MRI results: Copy in paper chart showing:  Abdominal hernia surgery performed in   Present Symptoms: Patient reports that the IT band work seems to hit the nail on the head. He reports that it got much better after the last time. Pain at 2/10  Dominant Side: right  Past Medical History:    Active Ambulatory Problems     Diagnosis Date Noted    S/P total hip arthroplasty 04/06/2015    HTN (hypertension) 04/06/2015    HLD (hyperlipidemia) 04/06/2015    BPH (benign prostatic hyperplasia) 04/06/2015    Gout 04/06/2015    Anxiety 04/06/2015    Diabetes mellitus ([address] 04/06/2015     Resolved Ambulatory Problems     Diagnosis Date Noted    No Resolved Ambulatory Problems     Past Medical History:   Diagnosis Date    Anxiety     Edema of both legs     Enlarged prostate     Gout     Hip pain     History of elevated glucose     History of seasonal allergies     Hypercholesterolemia     Hypertension     Keratoacanthoma     S/P total hip arthroplasty 4/6/2015    Skin neoplasm     Spinal stenosis      Past Surgical History:   Procedure Laterality Date    HX TONSILLECTOMY  as child    HX WISDOM TEETH EXTRACTION  as teenager     Current Medications:   Current Outpatient Prescriptions:     HYDROmorphone (DILAUDID) 2 mg tablet, Take 1 Tab by mouth every four (4) hours as needed. Max Daily Amount: 12 mg., Disp: 40 Tab, Rfl: 0    prazosin (MINIPRESS) 1 mg capsule, Take 1 mg by mouth three (3) times daily. TAKE AM OF SURGERY WITH SMALL SIP OF WATER   Indications: BENIGN PROSTATIC HYPERTROPHY, Disp: , Rfl:     amLODIPine (NORVASC) 10 mg tablet, Take 10 mg by mouth daily. TAKE AM OF SURGERY WITH SMALL SIP OF WATER, Disp: , Rfl:     labetalol (NORMODYNE) 200 mg tablet, Take 200 mg by mouth two (2) times a day. TAKE AM OF SURGERY WITH SMALL SIP OF WATER, Disp: , Rfl:     cloNIDine HCl (CATAPRES) 0.2 mg tablet, Take 0.2 mg by mouth nightly. Indications: HYPERTENSION, Disp: , Rfl:     finasteride (PROSCAR) 5 mg tablet, Take 5 mg by mouth daily. TAKE AM OF SURGERY WITH SMALL SIP OF WATER   Indications: BENIGN PROSTATIC HYPERTROPHY, Disp: , Rfl:     furosemide (LASIX) 40 mg tablet, Take 40 mg by mouth daily. , Disp: , Rfl:     Febuxostat (ULORIC) 80 mg tab tablet, Take 80 mg by mouth daily. TAKE AM OF SURGERY WITH SMALL SIP OF WATER   Indications: GOUT, Disp: , Rfl:     LORazepam (ATIVAN) 0.5 mg tablet, Take 0.5 mg by mouth daily as needed for Anxiety. TAKES 1/2 OF 1MG TABLET DAILY AS NEEDED, \"USUALLY EVERY DAY\", Disp: , Rfl:      Date Last Reviewed: 2/13/2018    Social History/Home Situation:   Social History     Social History    Marital status:      Spouse name: N/A    Number of children: N/A    Years of education: N/A     Occupational History    Not on file. Social History Main Topics    Smoking status: Never Smoker    Smokeless tobacco: Not on file    Alcohol use No    Drug use: Not on file    Sexual activity: Not on file     Other Topics Concern    Not on file     Social History Narrative           Work/Activity History: consultation  OBJECTIVE:    Outcome Measure:    Tool Used: Modified Oswestry Low Back Pain Questionnaire  Score:  Initial: 12/50  Most Recent: 9/50 (Date:12/5/17 ) Interpretation of Score: Each section is scored on a 0-5 scale, 5 representing the greatest disability. The scores of each section are added together for a total score of 50. Score 0 1-10 11-20 21-30 31-40 41-49 50   Modifier CH CI CJ CK CL CM CN     ? Changing and Maintaining Body Position:    [id number] - CURRENT STATUS: CI - 1%-19% impaired, limited or restricted    - GOAL STATUS:  CH - 0% impaired, limited or restricted    - D/C STATUS:  CI - 1%-19% impaired, limited or restricted  Tool Used: Lower Extremity Functional Scale (LEFS)  Score:  Initial: 49/80 Most Recent: 54/80 (Date: 12/5/17 )   Interpretation of Score: 20 questions each scored on a 5 point scale with 0 representing \"extreme difficulty or unable to perform\" and 4 representing \"no difficulty\". The lower the score, the greater the functional disability. 80/80 represents no disability. Minimal detectable change is 9 points. Score 80 79-63 62-48 47-32 31-16 15-1 0   Modifier CH CI CJ CK CL CM CN   ? Mobility - Walking and Moving Around:     - CURRENT STATUS: CJ - 20%-39% impaired, limited or restricted    - GOAL STATUS: CI - 1%-19% impaired, limited or restricted    - D/C STATUS:  ---------------To be determined---------------        Observation/Orthostatic Postural Assessment:    Patient is obese individual with increased abdominal mass and lordosis in standing. Patient shows slight right rotation from thoracic spine in standing. He reports that the popping has subsided during some of his activity. Palpation:    (re-cert-9/19/17) . He does show decreased R LE edema compared to his previous PT treatments. Core strength tests:  Vertical compression test (VCT)- grade 3/5 with dysfunction at TL junction and right rotation (able to show improved posture and strength with testing)  Right knee shows increased tension and tightness over TFL vastus lateralis and IT band. Tenderness goes into the lateral right patella. Limitation with pain into flexion in posterior knee and possible meniscal pathology  -Patient does show full knee extension at this time. ROM: Slight limitation in hip range of motion, but mostly limited in right rotation and side bending left   Cervical ROM shows 50% to the right and 70% left cervical rotation. Strength: Good and functional LE strength                 Special Tests: Vertical compression test (VCT)- grade 3/5 with dysfunction at TL junction and right rotation  Elbow flexion test (EFT)- Grade 3/5 with sluggish initiation, and endurance present  Lumbar protective mechanism (LPM)- Left A-P is present grade 3, P-A grade 3 and present; Right A-P is grade 2 present, endurance present, P-A is 3 and present. Neurological Screen:   Myotomes: intact                  Dermatomes: intact                  Reflexes: intact                  Neural Tension Tests: (-)  Functional Mobility:       Patient has difficulty with functional mobility, standing for greater than 30 min and with exercise activity  Balance:    fair- Patient unable to stand greater than 1 sec in single leg stance Bilaterally  TREATMENT:    (In addition to Assessment/Re-Assessment sessions the following treatments were rendered)  Therapeutic Exercise: ( ): ) Exercises per grid below to improve mobility, strength, balance and coordination. Required minimal visual, verbal and manual cues to promote proper body alignment, promote proper body posture and promote proper body mechanics. Progressed resistance, range and repetitions as indicated.    Date:  2/13/2018     Activity/Exercise Parameters   SCI FIT Not today   Stretches  IT BAND review stretches x 5 min   Education 5 min on continuing his exercises and starting to work some exercises in at home and not just the gym     Core training  Reviewed x 5 min  Wall stance with leg up x 3 min  Changed to stairs x 2 min with heel lift     Indirect discussion on IT band  x 30 min  HEP-education on posture  Manual Therapy (    Soft Tissue Mobilization Duration  Duration: 25 Minutes):()  Right and left hip stretches. STM to left glut med and min to border and bony contours  -STM to right fibula, tibialis anterior and fibular mobilizations. -STM to IT band     (Used abbreviations: MET - muscle energy technique; PNF - proprioceptive neuromuscular facilitation; NMR - neuromuscular re-education; a/p - anterior to posterior; p/a - posterior to anterior)   Therapeutic Modalities:                                                                                               HEP: As above; handouts given to patient for all exercises. ______________________________________________________________________________________________________    Treatment Assessment:  Patient shows some improvement in vastus lateralis mobility. Continues to have relief with IT band work. Pain at 1/10  Progression/Medical Necessity:   · Patient is expected to demonstrate progress in strength, range of motion, balance, coordination and functional technique to improve functional mobility. · Patient demonstrates good rehab potential due to higher previous functional level. · Skilled intervention continues to be required due to core weakness and instability. Compliance with Program/Exercises: compliant all of the time. Reason for Continuation of Services/Other Comments:  · Patient continues to require skilled intervention due to decreased function. Recommendations/Intent for next treatment session: \"Treatment next visit will focus on core training, endurance\".  And balance   Total Treatment Duration:  PT Patient Time In/Time Out  Time In: 1502  Time Out: 1600    Carlos Waldron, PT, DPT, OCS

## 2018-02-28 ENCOUNTER — HOSPITAL ENCOUNTER (OUTPATIENT)
Dept: PHYSICAL THERAPY | Age: 73
Discharge: HOME | End: 2018-02-28
Payer: MEDICARE

## 2018-02-28 PROCEDURE — G8978 MOBILITY CURRENT STATUS: HCPCS

## 2018-02-28 PROCEDURE — 97140 MANUAL THERAPY 1/> REGIONS: CPT

## 2018-02-28 PROCEDURE — 97164 PT RE-EVAL EST PLAN CARE: CPT

## 2018-02-28 PROCEDURE — G8979 MOBILITY GOAL STATUS: HCPCS

## 2018-02-28 NOTE — THERAPY RECERTIFICATION
Rita Neal  : 1945  [place]  Secondary:  [facility] at [address] [place] [address]  [phone]   [phone]        Outpatient PHYSICAL THERAPY: Daily Note, Re-evaluation and Recertification  Fall Risk Score: 2 (? 5 = High Risk)       ICD-10: Treatment Diagnosis: Low back pain, M54.5  Difficulty walking, not elsewhere classified, R26.2  Cervicalgia, M54.2  REFERRING PHYSICIAN: Allison Guerrero MD MD Orders: Evaluate and Treat  Return Physician Appointment: not known  MEDICAL/REFERRING DIAGNOSIS: Lumbosacral stenosis with neurogenic claudication, DDD, Lumbosacral spondylosis without myelopathy, other symptoms referrable to back  DATE OF ONSET: Chronic   PRIOR LEVEL OF FUNCTION: independent  PRECAUTIONS/ALLERGIES:   Allergies   Allergen Reactions    Other Medication Other (comments)     \"Some BP meds\" cause \"  SWELLING OF LEGS with Procardia and with current medicine    Statins-Hmg-Coa Reductase Inhibitors Other (comments)     Extreme nerve pain       ASSESSMENT:  ?????? ? ? This section established at most recent assessment??????????  Rita Neal has been seen for 54 visits from 12/11/15 to 2018 for lumbar spine pain and spinal stenosis. Patient has performed therapeutic exercises, activities, and had manual therapy to increased strength, ROM and function. Patient has also used modalities for pain control in order to increase function. He is becoming more consistent with his workout program at the gym and is seeing benefits. He still has lack of core strength at this time. Patient has shown some decrease this time with the LEFS to 51/80. He shows significant improvement in his weight loss goals as well as his balance goals. He reports that he is not getting as far with his  as he continues to feel a good difference with the IT band treatment.  Patient has progressed well toward their goals and will benefit from continuing skilled PT in order to address their impairments. Ines Yates, PT, DPT, OCS  2/28/2018      PROBLEM LIST (Impairments causing functional limitations):  1. Decreased Strength affecting function  2. Decreased ADL/Functional Activities  3. Decreased Flexibility/joint mobility  4. Decreased transfer abilities  5. Increased Pain affecting function  6. Decreased Activity tolerance   7. Increased Fatigue affecting function  GOALS: (Goals have been discussed and agreed upon with patient.)  1. DISCHARGE GOALS: Time Frame: 12 weeks   2. Patient will be independent in advanced core training exercises to return to functional mobility (ongoing)  3. Patient will show a greater than 5 point decrease on the Modified Oswestry Disability Index score in order to show an increase in function (ongoing)  4. Patient will report standing for greater than 30 min with work activity (MET-11/7/16)  5. NEW GOAL:  6. Patient will walk greater than 12 laps in the gym during gym session. (MET)  7. Patient will perform core exercises as part of his routine greater than 4 times a week (ongoing)  8. Patient will drive for longer than 2 hours without pain increase (MET)  9. Patient will show a greater than 5 point increase on the LEFS in order to show an increase in walking function (ongoing)  REHABILITATION POTENTIAL FOR STATED GOALS: GoodPLAN OF CARE:  INTERVENTIONS PLANNED: (Benefits and precautions of physical therapy have been discussed with the patient.)  1. balance exercise  2. bed mobility  3. cold  4. electrical stimulation  5. gait training  6. heat  7. manual therapy (including instrument assisted soft tissue mobilization)  8. neuromuscular re-education/strengthening  9. range of motion: active/assisted/passive  10. therapeutic activities  11. therapeutic exercise/strengthening  12. transfer training  13.  Other: Aquatics  TREATMENT PLAN EFFECTIVE DATES: 2/28/18 TO 5/28/18  FREQUENCY/DURATION: Follow patient 2 time a month for 3 months to address above goals. SUBJECTIVE:    History of Present Injury/Illness (Reason for Referral): Patient report that he has had a long history of back issues. He first started to have health and difficulty functioning with a presence of gout for about a year, then had his hip replaced in April of this year. Now, his is trying to regain his back strength and function. He has difficulty walking for long periods and standing in one place. His goal is to become more active and functional as well as control his health. Patient had an injection a few weeks ago and had some relief from that. MRI results: Copy in paper chart showing:  Abdominal hernia surgery performed in   Present Symptoms: Patient reports that the IT band work seems to hit the nail on the head. He also had a incident where he tried to foam roll and went to his chest on it and felt a severe pain. He still has some pain along the rib, but it has subsided. .   Pain at 2/10  Dominant Side: right  Past Medical History:    Active Ambulatory Problems     Diagnosis Date Noted    S/P total hip arthroplasty 04/06/2015    HTN (hypertension) 04/06/2015    HLD (hyperlipidemia) 04/06/2015    BPH (benign prostatic hyperplasia) 04/06/2015    Gout 04/06/2015    Anxiety 04/06/2015    Diabetes mellitus ([facility] [address] 04/06/2015     Resolved Ambulatory Problems     Diagnosis Date Noted    No Resolved Ambulatory Problems     Past Medical History:   Diagnosis Date    Anxiety     Edema of both legs     Enlarged prostate     Gout     Hip pain     History of elevated glucose     History of seasonal allergies     Hypercholesterolemia     Hypertension     Keratoacanthoma     S/P total hip arthroplasty 4/6/2015    Skin neoplasm     Spinal stenosis      Past Surgical History:   Procedure Laterality Date    HX TONSILLECTOMY  as child    HX WISDOM TEETH EXTRACTION  as teenager     Current Medications:   Current Outpatient Prescriptions:     HYDROmorphone (DILAUDID) 2 mg tablet, Take 1 Tab by mouth every four (4) hours as needed. Max Daily Amount: 12 mg., Disp: 40 Tab, Rfl: 0    prazosin (MINIPRESS) 1 mg capsule, Take 1 mg by mouth three (3) times daily. TAKE AM OF SURGERY WITH SMALL SIP OF WATER   Indications: BENIGN PROSTATIC HYPERTROPHY, Disp: , Rfl:     amLODIPine (NORVASC) 10 mg tablet, Take 10 mg by mouth daily. TAKE AM OF SURGERY WITH SMALL SIP OF WATER, Disp: , Rfl:     labetalol (NORMODYNE) 200 mg tablet, Take 200 mg by mouth two (2) times a day. TAKE AM OF SURGERY WITH SMALL SIP OF WATER, Disp: , Rfl:     cloNIDine HCl (CATAPRES) 0.2 mg tablet, Take 0.2 mg by mouth nightly. Indications: HYPERTENSION, Disp: , Rfl:     finasteride (PROSCAR) 5 mg tablet, Take 5 mg by mouth daily. TAKE AM OF SURGERY WITH SMALL SIP OF WATER   Indications: BENIGN PROSTATIC HYPERTROPHY, Disp: , Rfl:     furosemide (LASIX) 40 mg tablet, Take 40 mg by mouth daily. , Disp: , Rfl:     Febuxostat (ULORIC) 80 mg tab tablet, Take 80 mg by mouth daily. TAKE AM OF SURGERY WITH SMALL SIP OF WATER   Indications: GOUT, Disp: , Rfl:     LORazepam (ATIVAN) 0.5 mg tablet, Take 0.5 mg by mouth daily as needed for Anxiety. TAKES 1/2 OF 1MG TABLET DAILY AS NEEDED, \"USUALLY EVERY DAY\", Disp: , Rfl:      Date Last Reviewed: 2/28/2018    Social History/Home Situation:   Social History     Social History    Marital status:      Spouse name: N/A    Number of children: N/A    Years of education: N/A     Occupational History    Not on file. Social History Main Topics    Smoking status: Never Smoker    Smokeless tobacco: Not on file    Alcohol use No    Drug use: Not on file    Sexual activity: Not on file     Other Topics Concern    Not on file     Social History Narrative           Work/Activity History: consultation  OBJECTIVE:    Outcome Measure:    Tool Used: Modified Oswestry Low Back Pain Questionnaire  Score:  Initial: 12/50  Most Recent: 9/50 (Date:12/5/17 )   Interpretation of Score: Each section is scored on a 0-5 scale, 5 representing the greatest disability. The scores of each section are added together for a total score of 50. Score 0 1-10 11-20 21-30 31-40 41-49 50   Modifier CH CI CJ CK CL CM CN     ? Changing and Maintaining Body Position:    [id number] - CURRENT STATUS: CI - 1%-19% impaired, limited or restricted    - GOAL STATUS:  CH - 0% impaired, limited or restricted    - D/C STATUS:  CI - 1%-19% impaired, limited or restricted  Tool Used: Lower Extremity Functional Scale (LEFS)  Score:  Initial: 49/80 Most Recent: 51/80 (Date: 2/28/18 )   Interpretation of Score: 20 questions each scored on a 5 point scale with 0 representing \"extreme difficulty or unable to perform\" and 4 representing \"no difficulty\". The lower the score, the greater the functional disability. 80/80 represents no disability. Minimal detectable change is 9 points. Score 80 79-63 62-48 47-32 31-16 15-1 0   Modifier CH CI CJ CK CL CM CN   ? Mobility - Walking and Moving Around:     - CURRENT STATUS: CJ - 20%-39% impaired, limited or restricted    - GOAL STATUS: CI - 1%-19% impaired, limited or restricted    - D/C STATUS:  ---------------To be determined---------------        Observation/Orthostatic Postural Assessment:    Patient is obese individual with increased abdominal mass and lordosis in standing. Patient shows slight right rotation from thoracic spine in standing. He reports that the popping has subsided during some of his activity. Palpation:    Patient continues to shows some tightness at vastus lateralis on R LE and hip. Spine shows improvement with core control and function. ROM: Slight limitation in hip range of motion, but mostly limited in right rotation and side bending left   Cervical ROM shows 50% to the right and 70% left cervical rotation.                           Strength: Good and functional LE strength Special Tests: Vertical compression test (VCT)- grade 3/5 with dysfunction at TL junction and right rotation  Elbow flexion test (EFT)- Grade 3/5 with sluggish initiation, and endurance present  Lumbar protective mechanism (LPM)- Left A-P is present grade 3, P-A grade 3 and present; Right A-P is grade 2 present, endurance present, P-A is 3 and present. Neurological Screen:   Myotomes: intact                  Dermatomes: intact                  Reflexes: intact                  Neural Tension Tests: (-)  Functional Mobility:       Patient has difficulty with functional mobility, standing for greater than 30 min and with exercise activity  Balance:    fair- Patient unable to stand greater than 1 sec in single leg stance Bilaterally  TREATMENT:    (In addition to Assessment/Re-Assessment sessions the following treatments were rendered)  Therapeutic Exercise: ( ): ) Exercises per grid below to improve mobility, strength, balance and coordination. Required minimal visual, verbal and manual cues to promote proper body alignment, promote proper body posture and promote proper body mechanics. Progressed resistance, range and repetitions as indicated. Date:  2/28/2018     Activity/Exercise Parameters   SCI FIT Not today   Stretches  IT BAND review stretches x 5 min   Education 5 min on continuing his exercises and starting to work some exercises in at home and not just the gym     Core training  Reviewed x 5 min  Wall stance with leg up x 3 min  Changed to stairs x 2 min with heel lift     Indirect discussion on IT band  x 30 min  HEP-education on posture  Manual Therapy (    Soft Tissue Mobilization Duration  Duration: 15 Minutes):()  Right and left hip stretches. STM to left glut med and min to border and bony contours  -STM to right fibula, tibialis anterior and fibular mobilizations.   -STM to IT band with mini plunger    (Used abbreviations: MET - muscle energy technique; PNF - proprioceptive neuromuscular facilitation; NMR - neuromuscular re-education; a/p - anterior to posterior; p/a - posterior to anterior)   Therapeutic Modalities:                                                                                               HEP: As above; handouts given to patient for all exercises. ______________________________________________________________________________________________________    Treatment Assessment:  Patient shows improvement in right lateral leg symptoms. He did have a pain incident with a foam roll on his chest and ribs last week and continues to have some pain, but it has lessened. Continue to follow up every two weeksPain at 1/10  Progression/Medical Necessity:   · Patient is expected to demonstrate progress in strength, range of motion, balance, coordination and functional technique to improve functional mobility. · Patient demonstrates good rehab potential due to higher previous functional level. · Skilled intervention continues to be required due to core weakness and instability. Compliance with Program/Exercises: compliant all of the time. Reason for Continuation of Services/Other Comments:  · Patient continues to require skilled intervention due to decreased function. Recommendations/Intent for next treatment session: \"Treatment next visit will focus on core training, endurance\".  And balance   Total Treatment Duration:Re-evaluation [id number], Treatment [id number]  PT Patient Time In/Time Out  Time In: 1400  Time Out: 1500    Jose Flowers, PT, DPT, OCS

## 2018-03-12 ENCOUNTER — HOSPITAL ENCOUNTER (OUTPATIENT)
Dept: PHYSICAL THERAPY | Age: 73
Discharge: HOME | End: 2018-03-12
Payer: MEDICARE

## 2018-03-12 PROCEDURE — 97110 THERAPEUTIC EXERCISES: CPT

## 2018-03-26 ENCOUNTER — HOSPITAL ENCOUNTER (OUTPATIENT)
Dept: PHYSICAL THERAPY | Age: 73
Discharge: HOME | End: 2018-03-26
Payer: MEDICARE

## 2018-03-26 PROCEDURE — 97110 THERAPEUTIC EXERCISES: CPT

## 2018-03-26 NOTE — PROGRESS NOTES
Kami Miller  : 1945  [place]  Secondary:  [facility] Center at [address] [place] [address]  [phone]   [phone]        Outpatient PHYSICAL THERAPY: Daily Note  Fall Risk Score: 2 (? 5 = High Risk)       ICD-10: Treatment Diagnosis: Low back pain, M54.5  Difficulty walking, not elsewhere classified, R26.2  Cervicalgia, M54.2  REFERRING PHYSICIAN: Jennyfer Kuhn MD MD Orders: Evaluate and Treat  Return Physician Appointment: not known  MEDICAL/REFERRING DIAGNOSIS: Lumbosacral stenosis with neurogenic claudication, DDD, Lumbosacral spondylosis without myelopathy, other symptoms referrable to back  DATE OF ONSET: Chronic   PRIOR LEVEL OF FUNCTION: independent  PRECAUTIONS/ALLERGIES:   Allergies   Allergen Reactions    Other Medication Other (comments)     \"Some BP meds\" cause \"  SWELLING OF LEGS with Procardia and with current medicine    Statins-Hmg-Coa Reductase Inhibitors Other (comments)     Extreme nerve pain       ASSESSMENT:  ?????? ? ? This section established at most recent assessment??????????  Kami Miller has been seen for 54 visits from 12/11/15 to 3/26/2018 for lumbar spine pain and spinal stenosis. Patient has performed therapeutic exercises, activities, and had manual therapy to increased strength, ROM and function. Patient has also used modalities for pain control in order to increase function. He is becoming more consistent with his workout program at the gym and is seeing benefits. He still has lack of core strength at this time. Patient has shown some decrease this time with the LEFS to 51/80. He shows significant improvement in his weight loss goals as well as his balance goals. He reports that he is not getting as far with his  as he continues to feel a good difference with the IT band treatment.  Patient has progressed well toward their goals and will benefit from continuing skilled PT in order to address their impairments. Ebony Rivera, PT, DPT, OCS  3/26/2018      PROBLEM LIST (Impairments causing functional limitations):  1. Decreased Strength affecting function  2. Decreased ADL/Functional Activities  3. Decreased Flexibility/joint mobility  4. Decreased transfer abilities  5. Increased Pain affecting function  6. Decreased Activity tolerance   7. Increased Fatigue affecting function  GOALS: (Goals have been discussed and agreed upon with patient.)  1. DISCHARGE GOALS: Time Frame: 12 weeks   2. Patient will be independent in advanced core training exercises to return to functional mobility (ongoing)  3. Patient will show a greater than 5 point decrease on the Modified Oswestry Disability Index score in order to show an increase in function (ongoing)  4. Patient will report standing for greater than 30 min with work activity (MET-11/7/16)  5. NEW GOAL:  6. Patient will walk greater than 12 laps in the gym during gym session. (MET)  7. Patient will perform core exercises as part of his routine greater than 4 times a week (ongoing)  8. Patient will drive for longer than 2 hours without pain increase (MET)  9. Patient will show a greater than 5 point increase on the LEFS in order to show an increase in walking function (ongoing)  REHABILITATION POTENTIAL FOR STATED GOALS: GoodPLAN OF CARE:  INTERVENTIONS PLANNED: (Benefits and precautions of physical therapy have been discussed with the patient.)  1. balance exercise  2. bed mobility  3. cold  4. electrical stimulation  5. gait training  6. heat  7. manual therapy (including instrument assisted soft tissue mobilization)  8. neuromuscular re-education/strengthening  9. range of motion: active/assisted/passive  10. therapeutic activities  11. therapeutic exercise/strengthening  12. transfer training  13.  Other: Aquatics  TREATMENT PLAN EFFECTIVE DATES: 2/28/18 TO 5/28/18  FREQUENCY/DURATION: Follow patient 2 time a month for 3 months to address above goals. SUBJECTIVE:    History of Present Injury/Illness (Reason for Referral): Patient report that he has had a long history of back issues. He first started to have health and difficulty functioning with a presence of gout for about a year, then had his hip replaced in April of this year. Now, his is trying to regain his back strength and function. He has difficulty walking for long periods and standing in one place. His goal is to become more active and functional as well as control his health. Patient had an injection a few weeks ago and had some relief from that. MRI results: Copy in paper chart showing:  Abdominal hernia surgery performed in   Present Symptoms: Patient reports that the knee is still his limiting factor  Pain at 2/10  Dominant Side: right  Past Medical History: Active Ambulatory Problems     Diagnosis Date Noted    S/P total hip arthroplasty 04/06/2015    HTN (hypertension) 04/06/2015    HLD (hyperlipidemia) 04/06/2015    BPH (benign prostatic hyperplasia) 04/06/2015    Gout 04/06/2015    Anxiety 04/06/2015    Diabetes mellitus ([facility] [address] 04/06/2015     Resolved Ambulatory Problems     Diagnosis Date Noted    No Resolved Ambulatory Problems     Past Medical History:   Diagnosis Date    Anxiety     Edema of both legs     Enlarged prostate     Gout     Hip pain     History of elevated glucose     History of seasonal allergies     Hypercholesterolemia     Hypertension     Keratoacanthoma     S/P total hip arthroplasty 4/6/2015    Skin neoplasm     Spinal stenosis      Past Surgical History:   Procedure Laterality Date    HX TONSILLECTOMY  as child    HX WISDOM TEETH EXTRACTION  as teenager     Current Medications:   Current Outpatient Prescriptions:     HYDROmorphone (DILAUDID) 2 mg tablet, Take 1 Tab by mouth every four (4) hours as needed.  Max Daily Amount: 12 mg., Disp: 40 Tab, Rfl: 0    prazosin (MINIPRESS) 1 mg capsule, Take 1 mg by mouth three (3) times daily. TAKE AM OF SURGERY WITH SMALL SIP OF WATER   Indications: BENIGN PROSTATIC HYPERTROPHY, Disp: , Rfl:     amLODIPine (NORVASC) 10 mg tablet, Take 10 mg by mouth daily. TAKE AM OF SURGERY WITH SMALL SIP OF WATER, Disp: , Rfl:     labetalol (NORMODYNE) 200 mg tablet, Take 200 mg by mouth two (2) times a day. TAKE AM OF SURGERY WITH SMALL SIP OF WATER, Disp: , Rfl:     cloNIDine HCl (CATAPRES) 0.2 mg tablet, Take 0.2 mg by mouth nightly. Indications: HYPERTENSION, Disp: , Rfl:     finasteride (PROSCAR) 5 mg tablet, Take 5 mg by mouth daily. TAKE AM OF SURGERY WITH SMALL SIP OF WATER   Indications: BENIGN PROSTATIC HYPERTROPHY, Disp: , Rfl:     furosemide (LASIX) 40 mg tablet, Take 40 mg by mouth daily. , Disp: , Rfl:     Febuxostat (ULORIC) 80 mg tab tablet, Take 80 mg by mouth daily. TAKE AM OF SURGERY WITH SMALL SIP OF WATER   Indications: GOUT, Disp: , Rfl:     LORazepam (ATIVAN) 0.5 mg tablet, Take 0.5 mg by mouth daily as needed for Anxiety. TAKES 1/2 OF 1MG TABLET DAILY AS NEEDED, \"USUALLY EVERY DAY\", Disp: , Rfl:      Date Last Reviewed: 3/26/2018    Social History/Home Situation:   Social History     Social History    Marital status:      Spouse name: N/A    Number of children: N/A    Years of education: N/A     Occupational History    Not on file. Social History Main Topics    Smoking status: Never Smoker    Smokeless tobacco: Not on file    Alcohol use No    Drug use: Not on file    Sexual activity: Not on file     Other Topics Concern    Not on file     Social History Narrative           Work/Activity History: consultation  OBJECTIVE:    Outcome Measure: Tool Used: Modified Oswestry Low Back Pain Questionnaire  Score:  Initial: 12/50  Most Recent: 9/50 (Date:12/5/17 )   Interpretation of Score: Each section is scored on a 0-5 scale, 5 representing the greatest disability. The scores of each section are added together for a total score of 50.     Score 0 1-10 11-20 21-30 31-40 41-49 50   Modifier CH CI CJ CK CL CM CN     ? Changing and Maintaining Body Position:    [id number] - CURRENT STATUS: CI - 1%-19% impaired, limited or restricted    - GOAL STATUS:   - 0% impaired, limited or restricted    - D/C STATUS:  CI - 1%-19% impaired, limited or restricted  Tool Used: Lower Extremity Functional Scale (LEFS)  Score:  Initial: 49/80 Most Recent: 51/80 (Date: 2/28/18 )   Interpretation of Score: 20 questions each scored on a 5 point scale with 0 representing \"extreme difficulty or unable to perform\" and 4 representing \"no difficulty\". The lower the score, the greater the functional disability. 80/80 represents no disability. Minimal detectable change is 9 points. Score 80 79-63 62-48 47-32 31-16 15-1 0   Modifier CH CI CJ CK CL CM CN   ? Mobility - Walking and Moving Around:     - CURRENT STATUS: CJ - 20%-39% impaired, limited or restricted    - GOAL STATUS: CI - 1%-19% impaired, limited or restricted    - D/C STATUS:  ---------------To be determined---------------        Observation/Orthostatic Postural Assessment:    Patient is obese individual with increased abdominal mass and lordosis in standing. Patient shows slight right rotation from thoracic spine in standing. He reports that the popping has subsided during some of his activity. Palpation:    Patient continues to shows some tightness at vastus lateralis on R LE and hip. Spine shows improvement with core control and function. ROM: Slight limitation in hip range of motion, but mostly limited in right rotation and side bending left   Cervical ROM shows 50% to the right and 70% left cervical rotation.                           Strength: Good and functional LE strength                 Special Tests: Vertical compression test (VCT)- grade 3/5 with dysfunction at TL junction and right rotation  Elbow flexion test (EFT)- Grade 3/5 with sluggish initiation, and endurance present  Lumbar protective mechanism (LPM)- Left A-P is present grade 3, P-A grade 3 and present; Right A-P is grade 2 present, endurance present, P-A is 3 and present. Neurological Screen:   Myotomes: intact                  Dermatomes: intact                  Reflexes: intact                  Neural Tension Tests: (-)  Functional Mobility:       Patient has difficulty with functional mobility, standing for greater than 30 min and with exercise activity  Balance:    fair- Patient unable to stand greater than 1 sec in single leg stance Bilaterally  TREATMENT:    (In addition to Assessment/Re-Assessment sessions the following treatments were rendered)  Therapeutic Exercise: (45 Minutes): ) Exercises per grid below to improve mobility, strength, balance and coordination. Required minimal visual, verbal and manual cues to promote proper body alignment, promote proper body posture and promote proper body mechanics. Progressed resistance, range and repetitions as indicated. Date:  3/26/2018     Activity/Exercise Parameters   SCI FIT Not today   Stretches  IT BAND review stretches x 5 min   Education 25 min on continuing his exercises and starting to work some exercises in at home and not just the gym  -Shoe wear and orthotic use     Core training  Wall stance with leg up x 3 min  Changed to stairs x 2 min with heel lift  Posterior depression x 5 min     Indirect discussion on health and following through with -40 min. HEP-education on posture  Manual Therapy (     ):()  Right and left hip stretches. STM to left glut med and min to border and bony contours  -STM to right fibula, tibialis anterior and fibular mobilizations.   -STM to IT band with mini plunger    (Used abbreviations: MET - muscle energy technique; PNF - proprioceptive neuromuscular facilitation; NMR - neuromuscular re-education; a/p - anterior to posterior; p/a - posterior to anterior)   Therapeutic Modalities: HEP: As above; handouts given to patient for all exercises. ______________________________________________________________________________________________________    Treatment Assessment:  Patient shows some continued IT band issues with walking. He is doing well at getting relief with rolling  Pain at 1/10  Progression/Medical Necessity:   · Patient is expected to demonstrate progress in strength, range of motion, balance, coordination and functional technique to improve functional mobility. · Patient demonstrates good rehab potential due to higher previous functional level. · Skilled intervention continues to be required due to core weakness and instability. Compliance with Program/Exercises: compliant all of the time. Reason for Continuation of Services/Other Comments:  · Patient continues to require skilled intervention due to decreased function. Recommendations/Intent for next treatment session: \"Treatment next visit will focus on core training, endurance\".  And balance   Total Treatment Duration:  PT Patient Time In/Time Out  Time In: 1400  Time Out: [address], PT, DPT, OCS

## 2018-04-09 ENCOUNTER — HOSPITAL ENCOUNTER (OUTPATIENT)
Dept: PHYSICAL THERAPY | Age: 73
Discharge: HOME | End: 2018-04-09
Payer: MEDICARE

## 2018-04-09 PROCEDURE — 97110 THERAPEUTIC EXERCISES: CPT

## 2018-04-09 NOTE — PROGRESS NOTES
Celeste Alatorre  : 1945  [place]  Secondary:  [facility] at [address] [place] [address]  [phone]   [phone]        Outpatient PHYSICAL THERAPY: Daily Note  Fall Risk Score: 2 (? 5 = High Risk)       ICD-10: Treatment Diagnosis: Low back pain, M54.5  Difficulty walking, not elsewhere classified, R26.2  Cervicalgia, M54.2  REFERRING PHYSICIAN: Carie Jolly MD MD Orders: Evaluate and Treat  Return Physician Appointment: not known  MEDICAL/REFERRING DIAGNOSIS: Lumbosacral stenosis with neurogenic claudication, DDD, Lumbosacral spondylosis without myelopathy, other symptoms referrable to back  DATE OF ONSET: Chronic   PRIOR LEVEL OF FUNCTION: independent  PRECAUTIONS/ALLERGIES:   Allergies   Allergen Reactions    Other Medication Other (comments)     \"Some BP meds\" cause \"  SWELLING OF LEGS with Procardia and with current medicine    Statins-Hmg-Coa Reductase Inhibitors Other (comments)     Extreme nerve pain       ASSESSMENT:  ?????? ? ? This section established at most recent assessment??????????  Celeste Alatorre has been seen for 54 visits from 12/11/15 to 2018 for lumbar spine pain and spinal stenosis. Patient has performed therapeutic exercises, activities, and had manual therapy to increased strength, ROM and function. Patient has also used modalities for pain control in order to increase function. He is becoming more consistent with his workout program at the gym and is seeing benefits. He still has lack of core strength at this time. Patient has shown some decrease this time with the LEFS to 51/80. He shows significant improvement in his weight loss goals as well as his balance goals. He reports that he is not getting as far with his  as he continues to feel a good difference with the IT band treatment.  Patient has progressed well toward their goals and will benefit from continuing skilled PT in order to address their impairments. Linda Vasquez, PT, DPT, OCS  4/9/2018      PROBLEM LIST (Impairments causing functional limitations):  1. Decreased Strength affecting function  2. Decreased ADL/Functional Activities  3. Decreased Flexibility/joint mobility  4. Decreased transfer abilities  5. Increased Pain affecting function  6. Decreased Activity tolerance   7. Increased Fatigue affecting function  GOALS: (Goals have been discussed and agreed upon with patient.)  1. DISCHARGE GOALS: Time Frame: 12 weeks   2. Patient will be independent in advanced core training exercises to return to functional mobility (ongoing)  3. Patient will show a greater than 5 point decrease on the Modified Oswestry Disability Index score in order to show an increase in function (ongoing)  4. Patient will report standing for greater than 30 min with work activity (MET-11/7/16)  5. NEW GOAL:  6. Patient will walk greater than 12 laps in the gym during gym session. (MET)  7. Patient will perform core exercises as part of his routine greater than 4 times a week (ongoing)  8. Patient will drive for longer than 2 hours without pain increase (MET)  9. Patient will show a greater than 5 point increase on the LEFS in order to show an increase in walking function (ongoing)  REHABILITATION POTENTIAL FOR STATED GOALS: GoodPLAN OF CARE:  INTERVENTIONS PLANNED: (Benefits and precautions of physical therapy have been discussed with the patient.)  1. balance exercise  2. bed mobility  3. cold  4. electrical stimulation  5. gait training  6. heat  7. manual therapy (including instrument assisted soft tissue mobilization)  8. neuromuscular re-education/strengthening  9. range of motion: active/assisted/passive  10. therapeutic activities  11. therapeutic exercise/strengthening  12. transfer training  13.  Other: Aquatics  TREATMENT PLAN EFFECTIVE DATES: 2/28/18 TO 5/28/18  FREQUENCY/DURATION: Follow patient 2 time a month for 3 months to address above goals.                    SUBJECTIVE:    History of Present Injury/Illness (Reason for Referral): Patient report that he has had a long history of back issues. He first started to have health and difficulty functioning with a presence of gout for about a year, then had his hip replaced in April of this year. Now, his is trying to regain his back strength and function. He has difficulty walking for long periods and standing in one place. His goal is to become more active and functional as well as control his health. Patient had an injection a few weeks ago and had some relief from that. MRI results: Copy in paper chart showing:  Abdominal hernia surgery performed in   Present Symptoms: Patient reports that the knee is still his limiting factor  And that he needs to change the way he thinks about things. Pain at 2/10  Dominant Side: right  Past Medical History: Active Ambulatory Problems     Diagnosis Date Noted    S/P total hip arthroplasty 04/06/2015    HTN (hypertension) 04/06/2015    HLD (hyperlipidemia) 04/06/2015    BPH (benign prostatic hyperplasia) 04/06/2015    Gout 04/06/2015    Anxiety 04/06/2015    Diabetes mellitus ([address] 04/06/2015     Resolved Ambulatory Problems     Diagnosis Date Noted    No Resolved Ambulatory Problems     Past Medical History:   Diagnosis Date    Anxiety     Edema of both legs     Enlarged prostate     Gout     Hip pain     History of elevated glucose     History of seasonal allergies     Hypercholesterolemia     Hypertension     Keratoacanthoma     S/P total hip arthroplasty 4/6/2015    Skin neoplasm     Spinal stenosis      Past Surgical History:   Procedure Laterality Date    HX TONSILLECTOMY  as child    HX WISDOM TEETH EXTRACTION  as teenager     Current Medications:   Current Outpatient Prescriptions:     HYDROmorphone (DILAUDID) 2 mg tablet, Take 1 Tab by mouth every four (4) hours as needed.  Max Daily Amount: 12 mg., Disp: 40 Tab, Rfl: 0   prazosin (MINIPRESS) 1 mg capsule, Take 1 mg by mouth three (3) times daily. TAKE AM OF SURGERY WITH SMALL SIP OF WATER   Indications: BENIGN PROSTATIC HYPERTROPHY, Disp: , Rfl:     amLODIPine (NORVASC) 10 mg tablet, Take 10 mg by mouth daily. TAKE AM OF SURGERY WITH SMALL SIP OF WATER, Disp: , Rfl:     labetalol (NORMODYNE) 200 mg tablet, Take 200 mg by mouth two (2) times a day. TAKE AM OF SURGERY WITH SMALL SIP OF WATER, Disp: , Rfl:     cloNIDine HCl (CATAPRES) 0.2 mg tablet, Take 0.2 mg by mouth nightly. Indications: HYPERTENSION, Disp: , Rfl:     finasteride (PROSCAR) 5 mg tablet, Take 5 mg by mouth daily. TAKE AM OF SURGERY WITH SMALL SIP OF WATER   Indications: BENIGN PROSTATIC HYPERTROPHY, Disp: , Rfl:     furosemide (LASIX) 40 mg tablet, Take 40 mg by mouth daily. , Disp: , Rfl:     Febuxostat (ULORIC) 80 mg tab tablet, Take 80 mg by mouth daily. TAKE AM OF SURGERY WITH SMALL SIP OF WATER   Indications: GOUT, Disp: , Rfl:     LORazepam (ATIVAN) 0.5 mg tablet, Take 0.5 mg by mouth daily as needed for Anxiety. TAKES 1/2 OF 1MG TABLET DAILY AS NEEDED, \"USUALLY EVERY DAY\", Disp: , Rfl:      Date Last Reviewed: 4/9/2018    Social History/Home Situation:   Social History     Social History    Marital status:      Spouse name: N/A    Number of children: N/A    Years of education: N/A     Occupational History    Not on file. Social History Main Topics    Smoking status: Never Smoker    Smokeless tobacco: Not on file    Alcohol use No    Drug use: Not on file    Sexual activity: Not on file     Other Topics Concern    Not on file     Social History Narrative           Work/Activity History: consultation  OBJECTIVE:    Outcome Measure: Tool Used: Modified Oswestry Low Back Pain Questionnaire  Score:  Initial: 12/50  Most Recent: 9/50 (Date:12/5/17 )   Interpretation of Score: Each section is scored on a 0-5 scale, 5 representing the greatest disability.   The scores of each section are added together for a total score of 50. Score 0 1-10 11-20 21-30 31-40 41-49 50   Modifier CH CI CJ CK CL CM CN     ? Changing and Maintaining Body Position:    [id number] - CURRENT STATUS: CI - 1%-19% impaired, limited or restricted    - GOAL STATUS:  CH - 0% impaired, limited or restricted    - D/C STATUS:  CI - 1%-19% impaired, limited or restricted  Tool Used: Lower Extremity Functional Scale (LEFS)  Score:  Initial: 49/80 Most Recent: 51/80 (Date: 2/28/18 )   Interpretation of Score: 20 questions each scored on a 5 point scale with 0 representing \"extreme difficulty or unable to perform\" and 4 representing \"no difficulty\". The lower the score, the greater the functional disability. 80/80 represents no disability. Minimal detectable change is 9 points. Score 80 79-63 62-48 47-32 31-16 15-1 0   Modifier CH CI CJ CK CL CM CN   ? Mobility - Walking and Moving Around:     - CURRENT STATUS: CJ - 20%-39% impaired, limited or restricted    - GOAL STATUS: CI - 1%-19% impaired, limited or restricted    - D/C STATUS:  ---------------To be determined---------------        Observation/Orthostatic Postural Assessment:    Patient is obese individual with increased abdominal mass and lordosis in standing. Patient shows slight right rotation from thoracic spine in standing. He reports that the popping has subsided during some of his activity. Palpation:    Patient continues to shows some tightness at vastus lateralis on R LE and hip. Spine shows improvement with core control and function. ROM: Slight limitation in hip range of motion, but mostly limited in right rotation and side bending left   Cervical ROM shows 50% to the right and 70% left cervical rotation.                           Strength: Good and functional LE strength                 Special Tests: Vertical compression test (VCT)- grade 3/5 with dysfunction at TL junction and right rotation  Elbow flexion test (EFT)- Grade 3/5 with sluggish initiation, and endurance present  Lumbar protective mechanism (LPM)- Left A-P is present grade 3, P-A grade 3 and present; Right A-P is grade 2 present, endurance present, P-A is 3 and present. Neurological Screen:   Myotomes: intact                  Dermatomes: intact                  Reflexes: intact                  Neural Tension Tests: (-)  Functional Mobility:       Patient has difficulty with functional mobility, standing for greater than 30 min and with exercise activity  Balance:    fair- Patient unable to stand greater than 1 sec in single leg stance Bilaterally  TREATMENT:    (In addition to Assessment/Re-Assessment sessions the following treatments were rendered)  Therapeutic Exercise: (15 Minutes): ) Exercises per grid below to improve mobility, strength, balance and coordination. Required minimal visual, verbal and manual cues to promote proper body alignment, promote proper body posture and promote proper body mechanics. Progressed resistance, range and repetitions as indicated. Date:  4/9/2018     Activity/Exercise Parameters   SCI FIT Not today   Stretches  IT BAND review stretches x 1 min   Education 15 min on continuing his exercises and starting to work some exercises in at home and not just the gym  -Mini-plunger use and mobilization     Core training (not today) Wall stance with leg up x 3 min  Changed to stairs x 2 min with heel lift  Posterior depression x 5 min     Indirect discussion on health and following through with  as well as knee function and position with gait training  Patient was further evaluated for his gait showing decreased stability through mid stance-40 min. HEP-education on posture  Manual Therapy (     ):()  Right and left hip stretches. STM to left glut med and min to border and bony contours  -STM to right fibula, tibialis anterior and fibular mobilizations.   -STM to IT band with mini plunger    (Used abbreviations: MET - muscle energy technique; PNF - proprioceptive neuromuscular facilitation; NMR - neuromuscular re-education; a/p - anterior to posterior; p/a - posterior to anterior)   Therapeutic Modalities:                                                                                               HEP: As above; handouts given to patient for all exercises. ______________________________________________________________________________________________________    Treatment Assessment:  Patient shows some continued IT band issues with walking. We discussed and used mini-plunger for mobilization Pain at 1/10  Progression/Medical Necessity:   · Patient is expected to demonstrate progress in strength, range of motion, balance, coordination and functional technique to improve functional mobility. · Patient demonstrates good rehab potential due to higher previous functional level. · Skilled intervention continues to be required due to core weakness and instability. Compliance with Program/Exercises: compliant all of the time. Reason for Continuation of Services/Other Comments:  · Patient continues to require skilled intervention due to decreased function. Recommendations/Intent for next treatment session: \"Treatment next visit will focus on core training, endurance\".  And balance   Total Treatment Duration: Treatment time:15 min with 40 min of indirect education  PT Patient Time In/Time Out  Time In: 5349  Time Out: 52 Margo Zapien, PT, DPT, OCS

## 2018-04-23 ENCOUNTER — APPOINTMENT (OUTPATIENT)
Dept: PHYSICAL THERAPY | Age: 73
End: 2018-04-23
Payer: MEDICARE

## 2018-04-30 ENCOUNTER — HOSPITAL ENCOUNTER (OUTPATIENT)
Dept: PHYSICAL THERAPY | Age: 73
Discharge: HOME | End: 2018-04-30
Payer: MEDICARE

## 2018-04-30 PROCEDURE — 97140 MANUAL THERAPY 1/> REGIONS: CPT

## 2018-04-30 NOTE — PROGRESS NOTES
Radha Muhammad  : 1945  [place]  Secondary:  [facility] at [address] [place] [address]  [phone]   [phone]        Outpatient PHYSICAL THERAPY: Daily Note  Fall Risk Score: 2 (? 5 = High Risk)       ICD-10: Treatment Diagnosis: Low back pain, M54.5  Difficulty walking, not elsewhere classified, R26.2  Cervicalgia, M54.2  REFERRING PHYSICIAN: Barrington Albright MD MD Orders: Evaluate and Treat  Return Physician Appointment: not known  MEDICAL/REFERRING DIAGNOSIS: Lumbosacral stenosis with neurogenic claudication, DDD, Lumbosacral spondylosis without myelopathy, other symptoms referrable to back  DATE OF ONSET: Chronic   PRIOR LEVEL OF FUNCTION: independent  PRECAUTIONS/ALLERGIES:   Allergies   Allergen Reactions    Other Medication Other (comments)     \"Some BP meds\" cause \"  SWELLING OF LEGS with Procardia and with current medicine    Statins-Hmg-Coa Reductase Inhibitors Other (comments)     Extreme nerve pain       ASSESSMENT:  ?????? ? ? This section established at most recent assessment??????????  Radha Muhammad has been seen for 54 visits from 12/11/15 to 2018 for lumbar spine pain and spinal stenosis. Patient has performed therapeutic exercises, activities, and had manual therapy to increased strength, ROM and function. Patient has also used modalities for pain control in order to increase function. He is becoming more consistent with his workout program at the gym and is seeing benefits. He still has lack of core strength at this time. Patient has shown some decrease this time with the LEFS to 51/80. He shows significant improvement in his weight loss goals as well as his balance goals. He reports that he is not getting as far with his  as he continues to feel a good difference with the IT band treatment.  Patient has progressed well toward their goals and will benefit from continuing skilled PT in order to address their impairments. Katerine Garcia, PT, DPT, OCS  4/30/2018      PROBLEM LIST (Impairments causing functional limitations):  1. Decreased Strength affecting function  2. Decreased ADL/Functional Activities  3. Decreased Flexibility/joint mobility  4. Decreased transfer abilities  5. Increased Pain affecting function  6. Decreased Activity tolerance   7. Increased Fatigue affecting function  GOALS: (Goals have been discussed and agreed upon with patient.)  1. DISCHARGE GOALS: Time Frame: 12 weeks   2. Patient will be independent in advanced core training exercises to return to functional mobility (ongoing)  3. Patient will show a greater than 5 point decrease on the Modified Oswestry Disability Index score in order to show an increase in function (ongoing)  4. Patient will report standing for greater than 30 min with work activity (MET-11/7/16)  5. NEW GOAL:  6. Patient will walk greater than 12 laps in the gym during gym session. (MET)  7. Patient will perform core exercises as part of his routine greater than 4 times a week (ongoing)  8. Patient will drive for longer than 2 hours without pain increase (MET)  9. Patient will show a greater than 5 point increase on the LEFS in order to show an increase in walking function (ongoing)  REHABILITATION POTENTIAL FOR STATED GOALS: GoodPLAN OF CARE:  INTERVENTIONS PLANNED: (Benefits and precautions of physical therapy have been discussed with the patient.)  1. balance exercise  2. bed mobility  3. cold  4. electrical stimulation  5. gait training  6. heat  7. manual therapy (including instrument assisted soft tissue mobilization)  8. neuromuscular re-education/strengthening  9. range of motion: active/assisted/passive  10. therapeutic activities  11. therapeutic exercise/strengthening  12. transfer training  13.  Other: Aquatics  TREATMENT PLAN EFFECTIVE DATES: 2/28/18 TO 5/28/18  FREQUENCY/DURATION: Follow patient 2 time a month for 3 months to address above goals. SUBJECTIVE:    History of Present Injury/Illness (Reason for Referral): Patient report that he has had a long history of back issues. He first started to have health and difficulty functioning with a presence of gout for about a year, then had his hip replaced in April of this year. Now, his is trying to regain his back strength and function. He has difficulty walking for long periods and standing in one place. His goal is to become more active and functional as well as control his health. Patient had an injection a few weeks ago and had some relief from that. MRI results: Copy in paper chart showing:  Abdominal hernia surgery performed in   Present Symptoms: Patient reports that he had his MRI over the last week and found out there was extensive joint damage and arthritis with torn meniscus. He has continued to have pain and did have a fall yesterday in the gym with the right LE giving out on him. He does not fee pain from it, but does have a different feel or lump feel on his right hip. He is scheduled to go to the orthopedic on Thursday. Pain at 3/10  Dominant Side: right  Past Medical History:    Active Ambulatory Problems     Diagnosis Date Noted    S/P total hip arthroplasty 04/06/2015    HTN (hypertension) 04/06/2015    HLD (hyperlipidemia) 04/06/2015    BPH (benign prostatic hyperplasia) 04/06/2015    Gout 04/06/2015    Anxiety 04/06/2015    Diabetes mellitus ([facility] [address] 04/06/2015     Resolved Ambulatory Problems     Diagnosis Date Noted    No Resolved Ambulatory Problems     Past Medical History:   Diagnosis Date    Anxiety     Edema of both legs     Enlarged prostate     Gout     Hip pain     History of elevated glucose     History of seasonal allergies     Hypercholesterolemia     Hypertension     Keratoacanthoma     S/P total hip arthroplasty 4/6/2015    Skin neoplasm     Spinal stenosis      Past Surgical History:   Procedure Laterality Date    HX TONSILLECTOMY  as child    HX WISDOM TEETH EXTRACTION  as teenager     Current Medications:   Current Outpatient Prescriptions:     HYDROmorphone (DILAUDID) 2 mg tablet, Take 1 Tab by mouth every four (4) hours as needed. Max Daily Amount: 12 mg., Disp: 40 Tab, Rfl: 0    prazosin (MINIPRESS) 1 mg capsule, Take 1 mg by mouth three (3) times daily. TAKE AM OF SURGERY WITH SMALL SIP OF WATER   Indications: BENIGN PROSTATIC HYPERTROPHY, Disp: , Rfl:     amLODIPine (NORVASC) 10 mg tablet, Take 10 mg by mouth daily. TAKE AM OF SURGERY WITH SMALL SIP OF WATER, Disp: , Rfl:     labetalol (NORMODYNE) 200 mg tablet, Take 200 mg by mouth two (2) times a day. TAKE AM OF SURGERY WITH SMALL SIP OF WATER, Disp: , Rfl:     cloNIDine HCl (CATAPRES) 0.2 mg tablet, Take 0.2 mg by mouth nightly. Indications: HYPERTENSION, Disp: , Rfl:     finasteride (PROSCAR) 5 mg tablet, Take 5 mg by mouth daily. TAKE AM OF SURGERY WITH SMALL SIP OF WATER   Indications: BENIGN PROSTATIC HYPERTROPHY, Disp: , Rfl:     furosemide (LASIX) 40 mg tablet, Take 40 mg by mouth daily. , Disp: , Rfl:     Febuxostat (ULORIC) 80 mg tab tablet, Take 80 mg by mouth daily. TAKE AM OF SURGERY WITH SMALL SIP OF WATER   Indications: GOUT, Disp: , Rfl:     LORazepam (ATIVAN) 0.5 mg tablet, Take 0.5 mg by mouth daily as needed for Anxiety. TAKES 1/2 OF 1MG TABLET DAILY AS NEEDED, \"USUALLY EVERY DAY\", Disp: , Rfl:      Date Last Reviewed: 4/30/2018    Social History/Home Situation:   Social History     Social History    Marital status:      Spouse name: N/A    Number of children: N/A    Years of education: N/A     Occupational History    Not on file.      Social History Main Topics    Smoking status: Never Smoker    Smokeless tobacco: Not on file    Alcohol use No    Drug use: Not on file    Sexual activity: Not on file     Other Topics Concern    Not on file     Social History Narrative           Work/Activity History: consultation  OBJECTIVE:    Outcome Measure: Tool Used: Modified Oswestry Low Back Pain Questionnaire  Score:  Initial: 12/50  Most Recent: 9/50 (Date:12/5/17 )   Interpretation of Score: Each section is scored on a 0-5 scale, 5 representing the greatest disability. The scores of each section are added together for a total score of 50. Score 0 1-10 11-20 21-30 31-40 41-49 50   Modifier CH CI CJ CK CL CM CN     ? Changing and Maintaining Body Position:    [id number] - CURRENT STATUS: CI - 1%-19% impaired, limited or restricted    - GOAL STATUS:  CH - 0% impaired, limited or restricted    - D/C STATUS:  CI - 1%-19% impaired, limited or restricted  Tool Used: Lower Extremity Functional Scale (LEFS)  Score:  Initial: 49/80 Most Recent: 51/80 (Date: 2/28/18 )   Interpretation of Score: 20 questions each scored on a 5 point scale with 0 representing \"extreme difficulty or unable to perform\" and 4 representing \"no difficulty\". The lower the score, the greater the functional disability. 80/80 represents no disability. Minimal detectable change is 9 points. Score 80 79-63 62-48 47-32 31-16 15-1 0   Modifier CH CI CJ CK CL CM CN   ? Mobility - Walking and Moving Around:     - CURRENT STATUS: CJ - 20%-39% impaired, limited or restricted    - GOAL STATUS: CI - 1%-19% impaired, limited or restricted    - D/C STATUS:  ---------------To be determined---------------        Observation/Orthostatic Postural Assessment:    Patient is obese individual with increased abdominal mass and lordosis in standing. Patient shows slight right rotation from thoracic spine in standing. He reports that the popping has subsided during some of his activity. Palpation:    Patient continues to shows some tightness at vastus lateralis on R LE and hip. Spine shows improvement with core control and function.   ROM: Slight limitation in hip range of motion, but mostly limited in right rotation and side bending left Cervical ROM shows 50% to the right and 70% left cervical rotation. Strength: Good and functional LE strength                 Special Tests: Vertical compression test (VCT)- grade 3/5 with dysfunction at TL junction and right rotation  Elbow flexion test (EFT)- Grade 3/5 with sluggish initiation, and endurance present  Lumbar protective mechanism (LPM)- Left A-P is present grade 3, P-A grade 3 and present; Right A-P is grade 2 present, endurance present, P-A is 3 and present. Neurological Screen:   Myotomes: intact                  Dermatomes: intact                  Reflexes: intact                  Neural Tension Tests: (-)  Functional Mobility:       Patient has difficulty with functional mobility, standing for greater than 30 min and with exercise activity  Balance:    fair- Patient unable to stand greater than 1 sec in single leg stance Bilaterally  TREATMENT:    (In addition to Assessment/Re-Assessment sessions the following treatments were rendered)  Therapeutic Exercise: ( ): ) Exercises per grid below to improve mobility, strength, balance and coordination. Required minimal visual, verbal and manual cues to promote proper body alignment, promote proper body posture and promote proper body mechanics. Progressed resistance, range and repetitions as indicated. Date:  4/30/2018     Activity/Exercise Parameters   SCI FIT Not today   Stretches  IT BAND review stretches x 1 min   Education 15 min on continuing his exercises and starting to work some exercises in at home and not just the gym  -Mini-plunger use and mobilization     Core training (not today) Wall stance with leg up x 3 min  Changed to stairs x 2 min with heel lift  Posterior depression x 5 min     Indirect discussion on health and recent MRI findings and what to do with proceeding from here. He will see his orthopedic on Thursday to discuss options.   We discussed in the meantime to keep moving on non-weight bearing equipment more in the pool or bicycle. 40 min. HEP-education on posture  Manual Therapy (    Soft Tissue Mobilization Duration  Duration: 20 Minutes):()  Right and left hip stretches. STM to left glut med and min to border and bony contours  -STM to right fibula, tibialis anterior and fibular mobilizations. -STM to IT band with mini plunger    (Used abbreviations: MET - muscle energy technique; PNF - proprioceptive neuromuscular facilitation; NMR - neuromuscular re-education; a/p - anterior to posterior; p/a - posterior to anterior)   Therapeutic Modalities:                                                                                               HEP: As above; handouts given to patient for all exercises. ______________________________________________________________________________________________________    Treatment Assessment:  Patient is still concerned with where his health is due to the knee pain and walking dysfunction. We will follow up in two weeks to discuss plan per MD.  Pain at 1/10  Progression/Medical Necessity:   · Patient is expected to demonstrate progress in strength, range of motion, balance, coordination and functional technique to improve functional mobility. · Patient demonstrates good rehab potential due to higher previous functional level. · Skilled intervention continues to be required due to core weakness and instability. Compliance with Program/Exercises: compliant all of the time. Reason for Continuation of Services/Other Comments:  · Patient continues to require skilled intervention due to decreased function. Recommendations/Intent for next treatment session: \"Treatment next visit will focus on core training, endurance\".  And balance   Total Treatment Duration: Treatment time:20 min with 40 min of indirect education   PT Patient Time In/Time Out  Time In: 1402  Time Out: [address], PT, DPT, OCS

## 2018-05-14 ENCOUNTER — HOSPITAL ENCOUNTER (OUTPATIENT)
Dept: PHYSICAL THERAPY | Age: 73
Discharge: HOME | End: 2018-05-14
Payer: MEDICARE

## 2018-05-14 PROCEDURE — 97110 THERAPEUTIC EXERCISES: CPT

## 2018-05-14 NOTE — PROGRESS NOTES
Cecily De La Cruz  : 1945  [place]  Secondary:  [facility] Center at [address] [place] [address]  [phone]   [phone]        Outpatient PHYSICAL THERAPY: Daily Note  Fall Risk Score: 2 (? 5 = High Risk)       ICD-10: Treatment Diagnosis: Low back pain, M54.5  Difficulty walking, not elsewhere classified, R26.2  Cervicalgia, M54.2  REFERRING PHYSICIAN: Milana Phillips MD MD Orders: Evaluate and Treat  Return Physician Appointment: not known  MEDICAL/REFERRING DIAGNOSIS: Lumbosacral stenosis with neurogenic claudication, DDD, Lumbosacral spondylosis without myelopathy, other symptoms referrable to back  DATE OF ONSET: Chronic   PRIOR LEVEL OF FUNCTION: independent  PRECAUTIONS/ALLERGIES:   Allergies   Allergen Reactions    Other Medication Other (comments)     \"Some BP meds\" cause \"  SWELLING OF LEGS with Procardia and with current medicine    Statins-Hmg-Coa Reductase Inhibitors Other (comments)     Extreme nerve pain       ASSESSMENT:  ?????? ? ? This section established at most recent assessment??????????  Cecily De La Cruz has been seen for 54 visits from 12/11/15 to 2018 for lumbar spine pain and spinal stenosis. Patient has performed therapeutic exercises, activities, and had manual therapy to increased strength, ROM and function. Patient has also used modalities for pain control in order to increase function. He is becoming more consistent with his workout program at the gym and is seeing benefits. He still has lack of core strength at this time. Patient has shown some decrease this time with the LEFS to 51/80. He shows significant improvement in his weight loss goals as well as his balance goals. He reports that he is not getting as far with his  as he continues to feel a good difference with the IT band treatment.  Patient has progressed well toward their goals and will benefit from continuing skilled PT in order to address their impairments. Vassie Mcburney, PT, DPT, OCS  5/14/2018      PROBLEM LIST (Impairments causing functional limitations):  1. Decreased Strength affecting function  2. Decreased ADL/Functional Activities  3. Decreased Flexibility/joint mobility  4. Decreased transfer abilities  5. Increased Pain affecting function  6. Decreased Activity tolerance   7. Increased Fatigue affecting function  GOALS: (Goals have been discussed and agreed upon with patient.)  1. DISCHARGE GOALS: Time Frame: 12 weeks   2. Patient will be independent in advanced core training exercises to return to functional mobility (ongoing)  3. Patient will show a greater than 5 point decrease on the Modified Oswestry Disability Index score in order to show an increase in function (ongoing)  4. Patient will report standing for greater than 30 min with work activity (MET-11/7/16)  5. NEW GOAL:  6. Patient will walk greater than 12 laps in the gym during gym session. (MET)  7. Patient will perform core exercises as part of his routine greater than 4 times a week (ongoing)  8. Patient will drive for longer than 2 hours without pain increase (MET)  9. Patient will show a greater than 5 point increase on the LEFS in order to show an increase in walking function (ongoing)  REHABILITATION POTENTIAL FOR STATED GOALS: GoodPLAN OF CARE:  INTERVENTIONS PLANNED: (Benefits and precautions of physical therapy have been discussed with the patient.)  1. balance exercise  2. bed mobility  3. cold  4. electrical stimulation  5. gait training  6. heat  7. manual therapy (including instrument assisted soft tissue mobilization)  8. neuromuscular re-education/strengthening  9. range of motion: active/assisted/passive  10. therapeutic activities  11. therapeutic exercise/strengthening  12. transfer training  13.  Other: Aquatics  TREATMENT PLAN EFFECTIVE DATES: 2/28/18 TO 5/28/18  FREQUENCY/DURATION: Follow patient 2 time a month for 3 months to address above goals. SUBJECTIVE:    History of Present Injury/Illness (Reason for Referral): Patient report that he has had a long history of back issues. He first started to have health and difficulty functioning with a presence of gout for about a year, then had his hip replaced in April of this year. Now, his is trying to regain his back strength and function. He has difficulty walking for long periods and standing in one place. His goal is to become more active and functional as well as control his health. Patient had an injection a few weeks ago and had some relief from that. MRI results: Copy in paper chart showing:  Abdominal hernia surgery performed in   Present Symptoms: Patient reports that he started to take Celebrex that helped extremely well. He also walked around more at the convention and did not feel as much pain. His hip was cleared of any issues after his fall. Pain at 3/10  Dominant Side: right  Past Medical History:    Active Ambulatory Problems     Diagnosis Date Noted    S/P total hip arthroplasty 04/06/2015    HTN (hypertension) 04/06/2015    HLD (hyperlipidemia) 04/06/2015    BPH (benign prostatic hyperplasia) 04/06/2015    Gout 04/06/2015    Anxiety 04/06/2015    Diabetes mellitus ([facility] [address] 04/06/2015     Resolved Ambulatory Problems     Diagnosis Date Noted    No Resolved Ambulatory Problems     Past Medical History:   Diagnosis Date    Anxiety     Edema of both legs     Enlarged prostate     Gout     Hip pain     History of elevated glucose     History of seasonal allergies     Hypercholesterolemia     Hypertension     Keratoacanthoma     S/P total hip arthroplasty 4/6/2015    Skin neoplasm     Spinal stenosis      Past Surgical History:   Procedure Laterality Date    HX TONSILLECTOMY  as child    HX WISDOM TEETH EXTRACTION  as teenager     Current Medications:   Current Outpatient Prescriptions:     HYDROmorphone (DILAUDID) 2 mg tablet, Take 1 Tab by mouth every four (4) hours as needed. Max Daily Amount: 12 mg., Disp: 40 Tab, Rfl: 0    prazosin (MINIPRESS) 1 mg capsule, Take 1 mg by mouth three (3) times daily. TAKE AM OF SURGERY WITH SMALL SIP OF WATER   Indications: BENIGN PROSTATIC HYPERTROPHY, Disp: , Rfl:     amLODIPine (NORVASC) 10 mg tablet, Take 10 mg by mouth daily. TAKE AM OF SURGERY WITH SMALL SIP OF WATER, Disp: , Rfl:     labetalol (NORMODYNE) 200 mg tablet, Take 200 mg by mouth two (2) times a day. TAKE AM OF SURGERY WITH SMALL SIP OF WATER, Disp: , Rfl:     cloNIDine HCl (CATAPRES) 0.2 mg tablet, Take 0.2 mg by mouth nightly. Indications: HYPERTENSION, Disp: , Rfl:     finasteride (PROSCAR) 5 mg tablet, Take 5 mg by mouth daily. TAKE AM OF SURGERY WITH SMALL SIP OF WATER   Indications: BENIGN PROSTATIC HYPERTROPHY, Disp: , Rfl:     furosemide (LASIX) 40 mg tablet, Take 40 mg by mouth daily. , Disp: , Rfl:     Febuxostat (ULORIC) 80 mg tab tablet, Take 80 mg by mouth daily. TAKE AM OF SURGERY WITH SMALL SIP OF WATER   Indications: GOUT, Disp: , Rfl:     LORazepam (ATIVAN) 0.5 mg tablet, Take 0.5 mg by mouth daily as needed for Anxiety. TAKES 1/2 OF 1MG TABLET DAILY AS NEEDED, \"USUALLY EVERY DAY\", Disp: , Rfl:      Date Last Reviewed: 5/14/2018    Social History/Home Situation:   Social History     Social History    Marital status:      Spouse name: N/A    Number of children: N/A    Years of education: N/A     Occupational History    Not on file. Social History Main Topics    Smoking status: Never Smoker    Smokeless tobacco: Not on file    Alcohol use No    Drug use: Not on file    Sexual activity: Not on file     Other Topics Concern    Not on file     Social History Narrative           Work/Activity History: consultation  OBJECTIVE:    Outcome Measure:    Tool Used: Modified Oswestry Low Back Pain Questionnaire  Score:  Initial: 12/50  Most Recent: 9/50 (Date:12/5/17 )   Interpretation of Score: Each section is scored on a 0-5 scale, 5 representing the greatest disability. The scores of each section are added together for a total score of 50. Score 0 1-10 11-20 21-30 31-40 41-49 50   Modifier CH CI CJ CK CL CM CN     ? Changing and Maintaining Body Position:    [id number] - CURRENT STATUS: CI - 1%-19% impaired, limited or restricted    - GOAL STATUS:  CH - 0% impaired, limited or restricted    - D/C STATUS:  CI - 1%-19% impaired, limited or restricted  Tool Used: Lower Extremity Functional Scale (LEFS)  Score:  Initial: 49/80 Most Recent: 51/80 (Date: 2/28/18 )   Interpretation of Score: 20 questions each scored on a 5 point scale with 0 representing \"extreme difficulty or unable to perform\" and 4 representing \"no difficulty\". The lower the score, the greater the functional disability. 80/80 represents no disability. Minimal detectable change is 9 points. Score 80 79-63 62-48 47-32 31-16 15-1 0   Modifier CH CI CJ CK CL CM CN   ? Mobility - Walking and Moving Around:     - CURRENT STATUS: CJ - 20%-39% impaired, limited or restricted    - GOAL STATUS: CI - 1%-19% impaired, limited or restricted    - D/C STATUS:  ---------------To be determined---------------        Observation/Orthostatic Postural Assessment:    Patient is obese individual with increased abdominal mass and lordosis in standing. Patient shows slight right rotation from thoracic spine in standing. He reports that the popping has subsided during some of his activity. Palpation:    Patient continues to shows some tightness at vastus lateralis on R LE and hip. Spine shows improvement with core control and function. ROM: Slight limitation in hip range of motion, but mostly limited in right rotation and side bending left   Cervical ROM shows 50% to the right and 70% left cervical rotation.                           Strength: Good and functional LE strength                 Special Tests: Vertical compression test (VCT)- grade 3/5 with dysfunction at TL junction and right rotation  Elbow flexion test (EFT)- Grade 3/5 with sluggish initiation, and endurance present  Lumbar protective mechanism (LPM)- Left A-P is present grade 3, P-A grade 3 and present; Right A-P is grade 2 present, endurance present, P-A is 3 and present. Neurological Screen:   Myotomes: intact                  Dermatomes: intact                  Reflexes: intact                  Neural Tension Tests: (-)  Functional Mobility:       Patient has difficulty with functional mobility, standing for greater than 30 min and with exercise activity  Balance:    fair- Patient unable to stand greater than 1 sec in single leg stance Bilaterally  TREATMENT:    (In addition to Assessment/Re-Assessment sessions the following treatments were rendered)  Therapeutic Exercise: (30 Minutes): ) Exercises per grid below to improve mobility, strength, balance and coordination. Required minimal visual, verbal and manual cues to promote proper body alignment, promote proper body posture and promote proper body mechanics. Progressed resistance, range and repetitions as indicated. Date:  5/14/2018     Activity/Exercise Parameters   SCI FIT Not today   Stretches  IT BAND review stretches x 1 min   Education 15 min on continuing his exercises and starting to work some exercises in at home and not just the gym  -Mini-plunger use and mobilization     Core training (not today) Wall stance with leg up x 3 min  Changed to stairs x 2 min with heel lift  Posterior depression x 5 min     Indirect discussion on health and recent talk with MD about right knee pain. He brought x-rays to look at and explain. Discussed position of the knees and hips and what to do for gym exercises. .30 min. HEP-education on posture  Manual Therapy (     ):()  Right and left hip stretches. STM to left glut med and min to border and bony contours  -STM to right fibula, tibialis anterior and fibular mobilizations.   -STM to IT band with mini plunger    (Used abbreviations: MET - muscle energy technique; PNF - proprioceptive neuromuscular facilitation; NMR - neuromuscular re-education; a/p - anterior to posterior; p/a - posterior to anterior)   Therapeutic Modalities:                                                                                               HEP: As above; handouts given to patient for all exercises. ______________________________________________________________________________________________________    Treatment Assessment:  Patient shows better understanding on his knees today and what exercises to start. He started with Celebrex this past week and it has made a significant pain change already. Pain at 1/10  Progression/Medical Necessity:   · Patient is expected to demonstrate progress in strength, range of motion, balance, coordination and functional technique to improve functional mobility. · Patient demonstrates good rehab potential due to higher previous functional level. · Skilled intervention continues to be required due to core weakness and instability. Compliance with Program/Exercises: compliant all of the time. Reason for Continuation of Services/Other Comments:  · Patient continues to require skilled intervention due to decreased function. Recommendations/Intent for next treatment session: \"Treatment next visit will focus on core training, endurance\".  And balance   Total Treatment Duration: Treatment time:30 min with 30 min of indirect education   PT Patient Time In/Time Out  Time In: 8502  Time Out: [address] [facility] [address], PT, DPT, OCS

## 2018-05-30 ENCOUNTER — HOSPITAL ENCOUNTER (OUTPATIENT)
Dept: PHYSICAL THERAPY | Age: 73
Discharge: HOME | End: 2018-05-30
Payer: MEDICARE

## 2018-05-30 PROCEDURE — G8978 MOBILITY CURRENT STATUS: HCPCS

## 2018-05-30 PROCEDURE — G8979 MOBILITY GOAL STATUS: HCPCS

## 2018-05-30 PROCEDURE — 97164 PT RE-EVAL EST PLAN CARE: CPT

## 2018-05-30 NOTE — THERAPY RECERTIFICATION
Milton Dan  : 1945  [place]  Secondary:  [facility]rapy Center at [address] [place] [address]  [phone]   [phone]        Outpatient PHYSICAL THERAPY: Daily Note, Re-evaluation and Recertification  Fall Risk Score: 2 (? 5 = High Risk)       ICD-10: Treatment Diagnosis: Low back pain, M54.5  Difficulty walking, not elsewhere classified, R26.2  Cervicalgia, M54.2  Pain in joint, right and left knee, M25.561, M25.562  REFERRING PHYSICIAN: MD Dr. Maribel Brenner MD Orders: Evaluate and Treat  Return Physician Appointment: not known  MEDICAL/REFERRING DIAGNOSIS: Lumbosacral stenosis with neurogenic claudication, DDD, Lumbosacral spondylosis without myelopathy, other symptoms referrable to back  DATE OF ONSET: Chronic   PRIOR LEVEL OF FUNCTION: independent  PRECAUTIONS/ALLERGIES:   Allergies   Allergen Reactions    Other Medication Other (comments)     \"Some BP meds\" cause \"  SWELLING OF LEGS with Procardia and with current medicine    Statins-Hmg-Coa Reductase Inhibitors Other (comments)     Extreme nerve pain       ASSESSMENT:  ?????? ? ? This section established at most recent assessment??????????  Milton Dan has been seen for 60 visits from 12/11/15 to 2018 for lumbar spine pain and spinal stenosis. Patient has performed therapeutic exercises, activities, and had manual therapy to increased strength, ROM and function. Patient has also used modalities for pain control in order to increase function. He is becoming more consistent with his workout program at the gym and is seeing benefits. He still has lack of core strength at this time. Patient has shown some decrease this time with the LEFS to 51/80. Patient has reported that he wants to get back to his old routine with some help for his LE strength. We will start to focus on LE strength and functional mobility more at this time.  Patient has progressed well toward their goals and will benefit from continuing skilled PT in order to address their impairments. Rocky Ricks, PT, DPT, OCS  5/30/2018      PROBLEM LIST (Impairments causing functional limitations):  1. Decreased Strength affecting function  2. Decreased ADL/Functional Activities  3. Decreased Flexibility/joint mobility  4. Decreased transfer abilities  5. Increased Pain affecting function  6. Decreased Activity tolerance   7. Increased Fatigue affecting function  GOALS: (Goals have been discussed and agreed upon with patient.)  1. DISCHARGE GOALS: Time Frame: 12 weeks   2. Patient will be independent in advanced core training exercises to return to functional mobility (ongoing)  3. Patient will report standing for greater than 30 min with work activity (MET-11/7/16)  4. NEW GOAL:  5. Patient will walk greater than 12 laps in the gym during gym session. (ongoing))  6. Patient will perform core exercises as part of his routine greater than 4-5 times a week (ongoing)  7. Patient will perform knee extension machine without pain at least 30 repetitions. (ongoing)  8. Patient will drive for longer than 2 hours without pain increase (MET)  9. Patient will show a greater than 5 point increase on the LEFS in order to show an increase in walking function (ongoing)  REHABILITATION POTENTIAL FOR STATED GOALS: GoodPLAN OF CARE:  INTERVENTIONS PLANNED: (Benefits and precautions of physical therapy have been discussed with the patient.)  1. balance exercise  2. bed mobility  3. cold  4. electrical stimulation  5. gait training  6. heat  7. manual therapy (including instrument assisted soft tissue mobilization)  8. neuromuscular re-education/strengthening  9. range of motion: active/assisted/passive  10. therapeutic activities  11. therapeutic exercise/strengthening  12. transfer training  13.  Other: Aquatics  TREATMENT PLAN EFFECTIVE DATES: 5/30/18 TO 8/30/18  FREQUENCY/DURATION: Follow patient 2 time a month for 3 months to address above goals. SUBJECTIVE:    History of Present Injury/Illness (Reason for Referral): Patient report that he has had a long history of back issues. He first started to have health and difficulty functioning with a presence of gout for about a year, then had his hip replaced in April of this year. Now, his is trying to regain his back strength and function. He has difficulty walking for long periods and standing in one place. His goal is to become more active and functional as well as control his health. Patient had an injection a few weeks ago and had some relief from that. MRI results: Copy in paper chart showing:  Abdominal hernia surgery performed in   -Patient has been coming to therapy for a while now and started to have increased knee pain through the first few months of the year related to arthritic changes and increased load with working out with a . He has now changed his routine and looks for guidance on LE strength to avoid surgery. Present Symptoms: Patient reports that he has been doing much better over the last few weeks and is taking it a little easier. He wants to work on getting back into his old routine and get back to feeling good with exercises. .  Pain at 2/10  Dominant Side: right  Past Medical History:    Active Ambulatory Problems     Diagnosis Date Noted    S/P total hip arthroplasty 04/06/2015    HTN (hypertension) 04/06/2015    HLD (hyperlipidemia) 04/06/2015    BPH (benign prostatic hyperplasia) 04/06/2015    Gout 04/06/2015    Anxiety 04/06/2015    Diabetes mellitus ([facility] [address] 04/06/2015     Resolved Ambulatory Problems     Diagnosis Date Noted    No Resolved Ambulatory Problems     Past Medical History:   Diagnosis Date    Anxiety     Edema of both legs     Enlarged prostate     Gout     Hip pain     History of elevated glucose     History of seasonal allergies     Hypercholesterolemia     Hypertension     Keratoacanthoma  S/P total hip arthroplasty 4/6/2015    Skin neoplasm     Spinal stenosis      Past Surgical History:   Procedure Laterality Date    HX TONSILLECTOMY  as child    HX WISDOM TEETH EXTRACTION  as teenager     Current Medications:   Current Outpatient Prescriptions:     HYDROmorphone (DILAUDID) 2 mg tablet, Take 1 Tab by mouth every four (4) hours as needed. Max Daily Amount: 12 mg., Disp: 40 Tab, Rfl: 0    prazosin (MINIPRESS) 1 mg capsule, Take 1 mg by mouth three (3) times daily. TAKE AM OF SURGERY WITH SMALL SIP OF WATER   Indications: BENIGN PROSTATIC HYPERTROPHY, Disp: , Rfl:     amLODIPine (NORVASC) 10 mg tablet, Take 10 mg by mouth daily. TAKE AM OF SURGERY WITH SMALL SIP OF WATER, Disp: , Rfl:     labetalol (NORMODYNE) 200 mg tablet, Take 200 mg by mouth two (2) times a day. TAKE AM OF SURGERY WITH SMALL SIP OF WATER, Disp: , Rfl:     cloNIDine HCl (CATAPRES) 0.2 mg tablet, Take 0.2 mg by mouth nightly. Indications: HYPERTENSION, Disp: , Rfl:     finasteride (PROSCAR) 5 mg tablet, Take 5 mg by mouth daily. TAKE AM OF SURGERY WITH SMALL SIP OF WATER   Indications: BENIGN PROSTATIC HYPERTROPHY, Disp: , Rfl:     furosemide (LASIX) 40 mg tablet, Take 40 mg by mouth daily. , Disp: , Rfl:     Febuxostat (ULORIC) 80 mg tab tablet, Take 80 mg by mouth daily. TAKE AM OF SURGERY WITH SMALL SIP OF WATER   Indications: GOUT, Disp: , Rfl:     LORazepam (ATIVAN) 0.5 mg tablet, Take 0.5 mg by mouth daily as needed for Anxiety. TAKES 1/2 OF 1MG TABLET DAILY AS NEEDED, \"USUALLY EVERY DAY\", Disp: , Rfl:   Celebrex-generic   Date Last Reviewed: 5/30/2018    Social History/Home Situation:   Social History     Social History    Marital status:      Spouse name: N/A    Number of children: N/A    Years of education: N/A     Occupational History    Not on file.      Social History Main Topics    Smoking status: Never Smoker    Smokeless tobacco: Not on file    Alcohol use No    Drug use: Not on file    Sexual activity: Not on file     Other Topics Concern    Not on file     Social History Narrative           Work/Activity History: consultation  OBJECTIVE:    Outcome Measure: Tool Used: Modified Oswestry Low Back Pain Questionnaire  Score:  Initial: 12/50  Most Recent: 9/50 (Date:12/5/17 )   Interpretation of Score: Each section is scored on a 0-5 scale, 5 representing the greatest disability. The scores of each section are added together for a total score of 50. Score 0 1-10 11-20 21-30 31-40 41-49 50   Modifier CH CI CJ CK CL CM CN     ? Changing and Maintaining Body Position:    [id number] - CURRENT STATUS: CI - 1%-19% impaired, limited or restricted    - GOAL STATUS:  CH - 0% impaired, limited or restricted    - D/C STATUS:  CI - 1%-19% impaired, limited or restricted  Tool Used: Lower Extremity Functional Scale (LEFS)  Score:  Initial: 49/80 Most Recent: 50/80 (Date: 5/30/18 )   Interpretation of Score: 20 questions each scored on a 5 point scale with 0 representing \"extreme difficulty or unable to perform\" and 4 representing \"no difficulty\". The lower the score, the greater the functional disability. 80/80 represents no disability. Minimal detectable change is 9 points. Score 80 79-63 62-48 47-32 31-16 15-1 0   Modifier CH CI CJ CK CL CM CN   ? Mobility - Walking and Moving Around:     - CURRENT STATUS: CJ - 20%-39% impaired, limited or restricted    - GOAL STATUS: CI - 1%-19% impaired, limited or restricted    - D/C STATUS:  ---------------To be determined---------------        Observation/Orthostatic Postural Assessment:    Patient is obese individual with increased abdominal mass and lordosis in standing. Patient shows slight right rotation from thoracic spine in standing. He reports that the popping has subsided during some of his activity. Palpation:    Patient continues to shows some tightness at vastus lateralis on R LE and hip.   Spine shows improvement with core control and function. ROM: Slight limitation in hip range of motion, but mostly limited in right rotation and side bending left   Cervical ROM shows 50% to the right and 70% left cervical rotation. Strength: Good and functional LE strength but with pain through knee extension                 Special Tests: Vertical compression test (VCT)- grade 3/5 with dysfunction at TL junction and right rotation  Elbow flexion test (EFT)- Grade 3/5 with sluggish initiation, and endurance present  Lumbar protective mechanism (LPM)- Left A-P is present grade 3, P-A grade 3 and present; Right A-P is grade 2 present, endurance present, P-A is 3 and present. (-)  Functional Mobility:       Patient has difficulty with functional mobility, standing for greater than 30 min and with exercise activity  Balance:    fair- Patient unable to stand greater than 1 sec in single leg stance Bilaterally  TREATMENT:    (In addition to Assessment/Re-Assessment sessions the following treatments were rendered)  Therapeutic Exercise: ( ): ) Exercises per grid below to improve mobility, strength, balance and coordination. Required minimal visual, verbal and manual cues to promote proper body alignment, promote proper body posture and promote proper body mechanics. Progressed resistance, range and repetitions as indicated. Date:  5/30/2018     Activity/Exercise Parameters   SCI FIT Not today   Stretches  IT BAND review stretches x 1 min   Education 15 min on continuing his exercises and starting to work some exercises in at home and not just the gym  -Mini-plunger use and mobilization     Core training (not today) Wall stance with leg up x 3 min  Changed to stairs x 2 min with heel lift  Posterior depression x 5 min     Indirect discussion on health and recent talk with MD about right knee pain. He brought x-rays to look at and explain. Discussed position of the knees and hips and what to do for gym exercises. .30 min.       HEP-education on posture  Manual Therapy (     ):()  Right and left hip stretches. STM to left glut med and min to border and bony contours  -STM to right fibula, tibialis anterior and fibular mobilizations. -STM to IT band with mini plunger    (Used abbreviations: MET - muscle energy technique; PNF - proprioceptive neuromuscular facilitation; NMR - neuromuscular re-education; a/p - anterior to posterior; p/a - posterior to anterior)   Therapeutic Modalities:                                                                                               HEP: As above; handouts given to patient for all exercises. ______________________________________________________________________________________________________    Treatment Assessment:  Patient shows better understanding on his knees today and what exercises to start. He will continue to benefit from therapy  Progression/Medical Necessity:   · Patient is expected to demonstrate progress in strength, range of motion, balance, coordination and functional technique to improve functional mobility. · Patient demonstrates good rehab potential due to higher previous functional level. · Skilled intervention continues to be required due to core weakness and instability. Compliance with Program/Exercises: compliant all of the time. Reason for Continuation of Services/Other Comments:  · Patient continues to require skilled intervention due to decreased function. Recommendations/Intent for next treatment session: \"Treatment next visit will focus on core training, endurance\".  And balance   Total Treatment Duration: Re-evaluation: 60 min  PT Patient Time In/Time Out  Time In: 1303  Time Out: [id number] [facility] Yann, PT, DPT, OCS

## 2018-06-11 ENCOUNTER — HOSPITAL ENCOUNTER (OUTPATIENT)
Dept: PHYSICAL THERAPY | Age: 73
Discharge: HOME | End: 2018-06-11
Payer: MEDICARE

## 2018-06-11 PROCEDURE — 97110 THERAPEUTIC EXERCISES: CPT

## 2018-06-11 NOTE — PROGRESS NOTES
Phil Yoo  : 1945  [place]  Secondary:  [facility]rapy Center at [address] [place] [address]  [phone]   [phone]        Outpatient PHYSICAL THERAPY: Daily Note  Fall Risk Score: 2 (? 5 = High Risk)       ICD-10: Treatment Diagnosis: Low back pain, M54.5  Difficulty walking, not elsewhere classified, R26.2  Cervicalgia, M54.2  Pain in joint, right and left knee, M25.561, M25.562  REFERRING PHYSICIAN: MD Dr. Freida Blount MD Orders: Evaluate and Treat  Return Physician Appointment: not known  MEDICAL/REFERRING DIAGNOSIS: Lumbosacral stenosis with neurogenic claudication, DDD, Lumbosacral spondylosis without myelopathy, other symptoms referrable to back  DATE OF ONSET: Chronic   PRIOR LEVEL OF FUNCTION: independent  PRECAUTIONS/ALLERGIES:   Allergies   Allergen Reactions    Other Medication Other (comments)     \"Some BP meds\" cause \"  SWELLING OF LEGS with Procardia and with current medicine    Statins-Hmg-Coa Reductase Inhibitors Other (comments)     Extreme nerve pain       ASSESSMENT:  ?????? ? ? This section established at most recent assessment??????????  Phil Yoo has been seen for 60 visits from 12/11/15 to 2018 for lumbar spine pain and spinal stenosis. Patient has performed therapeutic exercises, activities, and had manual therapy to increased strength, ROM and function. Patient has also used modalities for pain control in order to increase function. He is becoming more consistent with his workout program at the gym and is seeing benefits. He still has lack of core strength at this time. Patient has shown some decrease this time with the LEFS to 51/80. Patient has reported that he wants to get back to his old routine with some help for his LE strength. We will start to focus on LE strength and functional mobility more at this time.  Patient has progressed well toward their goals and will benefit from continuing skilled PT in order to address their impairments. Leda Geiger, PT, DPT, OCS  6/11/2018      PROBLEM LIST (Impairments causing functional limitations):  1. Decreased Strength affecting function  2. Decreased ADL/Functional Activities  3. Decreased Flexibility/joint mobility  4. Decreased transfer abilities  5. Increased Pain affecting function  6. Decreased Activity tolerance   7. Increased Fatigue affecting function  GOALS: (Goals have been discussed and agreed upon with patient.)  1. DISCHARGE GOALS: Time Frame: 12 weeks   2. Patient will be independent in advanced core training exercises to return to functional mobility (ongoing)  3. Patient will report standing for greater than 30 min with work activity (MET-11/7/16)  4. NEW GOAL:  5. Patient will walk greater than 12 laps in the gym during gym session. (ongoing))  6. Patient will perform core exercises as part of his routine greater than 4-5 times a week (ongoing)  7. Patient will perform knee extension machine without pain at least 30 repetitions. (ongoing)  8. Patient will drive for longer than 2 hours without pain increase (MET)  9. Patient will show a greater than 5 point increase on the LEFS in order to show an increase in walking function (ongoing)  REHABILITATION POTENTIAL FOR STATED GOALS: GoodPLAN OF CARE:  INTERVENTIONS PLANNED: (Benefits and precautions of physical therapy have been discussed with the patient.)  1. balance exercise  2. bed mobility  3. cold  4. electrical stimulation  5. gait training  6. heat  7. manual therapy (including instrument assisted soft tissue mobilization)  8. neuromuscular re-education/strengthening  9. range of motion: active/assisted/passive  10. therapeutic activities  11. therapeutic exercise/strengthening  12. transfer training  13. Other: Aquatics  TREATMENT PLAN EFFECTIVE DATES: 5/30/18 TO 8/30/18  FREQUENCY/DURATION: Follow patient 2 time a month for 3 months to address above goals. SUBJECTIVE:    History of Present Injury/Illness (Reason for Referral): Patient report that he has had a long history of back issues. He first started to have health and difficulty functioning with a presence of gout for about a year, then had his hip replaced in April of this year. Now, his is trying to regain his back strength and function. He has difficulty walking for long periods and standing in one place. His goal is to become more active and functional as well as control his health. Patient had an injection a few weeks ago and had some relief from that. MRI results: Copy in paper chart showing:  Abdominal hernia surgery performed in   -Patient has been coming to therapy for a while now and started to have increased knee pain through the first few months of the year related to arthritic changes and increased load with working out with a . He has now changed his routine and looks for guidance on LE strength to avoid surgery. Present Symptoms: Patient reports that he has been doing much better over the last few weeks and is taking it a little easier. He wants to work on getting back into his old routine and get back to feeling good with exercises. .  Pain at 2/10  Dominant Side: right  Past Medical History:    Active Ambulatory Problems     Diagnosis Date Noted    S/P total hip arthroplasty 04/06/2015    HTN (hypertension) 04/06/2015    HLD (hyperlipidemia) 04/06/2015    BPH (benign prostatic hyperplasia) 04/06/2015    Gout 04/06/2015    Anxiety 04/06/2015    Diabetes mellitus ([facility] [address] 04/06/2015     Resolved Ambulatory Problems     Diagnosis Date Noted    No Resolved Ambulatory Problems     Past Medical History:   Diagnosis Date    Anxiety     Edema of both legs     Enlarged prostate     Gout     Hip pain     History of elevated glucose     History of seasonal allergies     Hypercholesterolemia     Hypertension     Keratoacanthoma     S/P total hip arthroplasty 4/6/2015    Skin neoplasm     Spinal stenosis      Past Surgical History:   Procedure Laterality Date    HX TONSILLECTOMY  as child    HX WISDOM TEETH EXTRACTION  as teenager     Current Medications:   Current Outpatient Prescriptions:     HYDROmorphone (DILAUDID) 2 mg tablet, Take 1 Tab by mouth every four (4) hours as needed. Max Daily Amount: 12 mg., Disp: 40 Tab, Rfl: 0    prazosin (MINIPRESS) 1 mg capsule, Take 1 mg by mouth three (3) times daily. TAKE AM OF SURGERY WITH SMALL SIP OF WATER   Indications: BENIGN PROSTATIC HYPERTROPHY, Disp: , Rfl:     amLODIPine (NORVASC) 10 mg tablet, Take 10 mg by mouth daily. TAKE AM OF SURGERY WITH SMALL SIP OF WATER, Disp: , Rfl:     labetalol (NORMODYNE) 200 mg tablet, Take 200 mg by mouth two (2) times a day. TAKE AM OF SURGERY WITH SMALL SIP OF WATER, Disp: , Rfl:     cloNIDine HCl (CATAPRES) 0.2 mg tablet, Take 0.2 mg by mouth nightly. Indications: HYPERTENSION, Disp: , Rfl:     finasteride (PROSCAR) 5 mg tablet, Take 5 mg by mouth daily. TAKE AM OF SURGERY WITH SMALL SIP OF WATER   Indications: BENIGN PROSTATIC HYPERTROPHY, Disp: , Rfl:     furosemide (LASIX) 40 mg tablet, Take 40 mg by mouth daily. , Disp: , Rfl:     Febuxostat (ULORIC) 80 mg tab tablet, Take 80 mg by mouth daily. TAKE AM OF SURGERY WITH SMALL SIP OF WATER   Indications: GOUT, Disp: , Rfl:     LORazepam (ATIVAN) 0.5 mg tablet, Take 0.5 mg by mouth daily as needed for Anxiety. TAKES 1/2 OF 1MG TABLET DAILY AS NEEDED, \"USUALLY EVERY DAY\", Disp: , Rfl:   Celebrex-generic   Date Last Reviewed: 6/11/2018    Social History/Home Situation:   Social History     Social History    Marital status:      Spouse name: N/A    Number of children: N/A    Years of education: N/A     Occupational History    Not on file.      Social History Main Topics    Smoking status: Never Smoker    Smokeless tobacco: Not on file    Alcohol use No    Drug use: Not on file    Sexual activity: Not on file Other Topics Concern    Not on file     Social History Narrative           Work/Activity History: consultation  OBJECTIVE:    Outcome Measure: Tool Used: Modified Oswestry Low Back Pain Questionnaire  Score:  Initial: 12/50  Most Recent: 9/50 (Date:12/5/17 )   Interpretation of Score: Each section is scored on a 0-5 scale, 5 representing the greatest disability. The scores of each section are added together for a total score of 50. Score 0 1-10 11-20 21-30 31-40 41-49 50   Modifier CH CI CJ CK CL CM CN     ? Changing and Maintaining Body Position:    [id number] - CURRENT STATUS: CI - 1%-19% impaired, limited or restricted    - GOAL STATUS:  CH - 0% impaired, limited or restricted    - D/C STATUS:  CI - 1%-19% impaired, limited or restricted  Tool Used: Lower Extremity Functional Scale (LEFS)  Score:  Initial: 49/80 Most Recent: 50/80 (Date: 5/30/18 )   Interpretation of Score: 20 questions each scored on a 5 point scale with 0 representing \"extreme difficulty or unable to perform\" and 4 representing \"no difficulty\". The lower the score, the greater the functional disability. 80/80 represents no disability. Minimal detectable change is 9 points. Score 80 79-63 62-48 47-32 31-16 15-1 0   Modifier CH CI CJ CK CL CM CN   ? Mobility - Walking and Moving Around:     - CURRENT STATUS: CJ - 20%-39% impaired, limited or restricted    - GOAL STATUS: CI - 1%-19% impaired, limited or restricted    - D/C STATUS:  ---------------To be determined---------------        Observation/Orthostatic Postural Assessment:    Patient is obese individual with increased abdominal mass and lordosis in standing. Patient shows slight right rotation from thoracic spine in standing. He reports that the popping has subsided during some of his activity. Palpation:    Patient continues to shows some tightness at vastus lateralis on R LE and hip. Spine shows improvement with core control and function.   ROM: Slight limitation in hip range of motion, but mostly limited in right rotation and side bending left   Cervical ROM shows 50% to the right and 70% left cervical rotation. Strength: Good and functional LE strength but with pain through knee extension                 Special Tests: Vertical compression test (VCT)- grade 3/5 with dysfunction at TL junction and right rotation  Elbow flexion test (EFT)- Grade 3/5 with sluggish initiation, and endurance present  Lumbar protective mechanism (LPM)- Left A-P is present grade 3, P-A grade 3 and present; Right A-P is grade 2 present, endurance present, P-A is 3 and present. (-)  Functional Mobility:       Patient has difficulty with functional mobility, standing for greater than 30 min and with exercise activity  Balance:    fair- Patient unable to stand greater than 1 sec in single leg stance Bilaterally  TREATMENT:    (In addition to Assessment/Re-Assessment sessions the following treatments were rendered)  Therapeutic Exercise: (30 Minutes): ) Exercises per grid below to improve mobility, strength, balance and coordination. Required minimal visual, verbal and manual cues to promote proper body alignment, promote proper body posture and promote proper body mechanics. Progressed resistance, range and repetitions as indicated. Date:  6/11/2018     Activity/Exercise Parameters   SCI FIT Not today   Stretches  IT BAND review stretches x 1 min   Education 15 min on continuing his exercises and starting to work some exercises in at home and not just the gym  -Mini-plunger use and mobilization     Core training (reviewed Wall stance with leg up x 3 min  Changed to stairs x 2 min with heel lift  Posterior depression x 5 min     Indirect discussion on health and recent talk with MD about right knee pain. He brought x-rays to look at and explain. Discussed position of the knees and hips and what to do for gym exercises. .30 min.       HEP-education on posture  Manual Therapy ( ):()  Right and left hip stretches. STM to left glut med and min to border and bony contours  -STM to right fibula, tibialis anterior and fibular mobilizations. -STM to IT band with mini plunger    (Used abbreviations: MET - muscle energy technique; PNF - proprioceptive neuromuscular facilitation; NMR - neuromuscular re-education; a/p - anterior to posterior; p/a - posterior to anterior)   Therapeutic Modalities:                                                                                               HEP: As above; handouts given to patient for all exercises. ______________________________________________________________________________________________________    Treatment Assessment:  Patient shows better understanding on his knees today. Continue with home program and gym program. Pain at 0/10 with celebrex  Progression/Medical Necessity:   · Patient is expected to demonstrate progress in strength, range of motion, balance, coordination and functional technique to improve functional mobility. · Patient demonstrates good rehab potential due to higher previous functional level. · Skilled intervention continues to be required due to core weakness and instability. Compliance with Program/Exercises: compliant all of the time. Reason for Continuation of Services/Other Comments:  · Patient continues to require skilled intervention due to decreased function. Recommendations/Intent for next treatment session: \"Treatment next visit will focus on core training, endurance\".  And balance   Total Treatment Duration: Treatment: 30 min  PT Patient Time In/Time Out  Time In: 1502  Time Out: [id number] Evan, PT, DPT, OCS

## 2018-06-25 ENCOUNTER — HOSPITAL ENCOUNTER (OUTPATIENT)
Dept: PHYSICAL THERAPY | Age: 73
Discharge: HOME | End: 2018-06-25
Payer: MEDICARE

## 2018-06-25 PROCEDURE — 97110 THERAPEUTIC EXERCISES: CPT

## 2018-06-25 NOTE — PROGRESS NOTES
Tonya Cedillo  : 1945  [place]  Secondary:  [facility] at [address] [place] [address]  [phone]   [phone]        Outpatient PHYSICAL THERAPY: Daily Note  Fall Risk Score: 2 (? 5 = High Risk)       ICD-10: Treatment Diagnosis: Low back pain, M54.5  Difficulty walking, not elsewhere classified, R26.2  Cervicalgia, M54.2  Pain in joint, right and left knee, M25.561, M25.562  REFERRING PHYSICIAN: MD Dr. Neal Kay MD Orders: Evaluate and Treat  Return Physician Appointment: not known  MEDICAL/REFERRING DIAGNOSIS: Lumbosacral stenosis with neurogenic claudication, DDD, Lumbosacral spondylosis without myelopathy, other symptoms referrable to back  DATE OF ONSET: Chronic   PRIOR LEVEL OF FUNCTION: independent  PRECAUTIONS/ALLERGIES:   Allergies   Allergen Reactions    Other Medication Other (comments)     \"Some BP meds\" cause \"  SWELLING OF LEGS with Procardia and with current medicine    Statins-Hmg-Coa Reductase Inhibitors Other (comments)     Extreme nerve pain       ASSESSMENT:  ?????? ? ? This section established at most recent assessment??????????  Tonya Cedillo has been seen for 60 visits from 12/11/15 to 2018 for lumbar spine pain and spinal stenosis. Patient has performed therapeutic exercises, activities, and had manual therapy to increased strength, ROM and function. Patient has also used modalities for pain control in order to increase function. He is becoming more consistent with his workout program at the gym and is seeing benefits. He still has lack of core strength at this time. Patient has shown some decrease this time with the LEFS to 51/80. Patient has reported that he wants to get back to his old routine with some help for his LE strength. We will start to focus on LE strength and functional mobility more at this time.  Patient has progressed well toward their goals and will benefit from continuing skilled PT in order to address their impairments. Alan Urena, PT, DPT, OCS  6/25/2018      PROBLEM LIST (Impairments causing functional limitations):  1. Decreased Strength affecting function  2. Decreased ADL/Functional Activities  3. Decreased Flexibility/joint mobility  4. Decreased transfer abilities  5. Increased Pain affecting function  6. Decreased Activity tolerance   7. Increased Fatigue affecting function  GOALS: (Goals have been discussed and agreed upon with patient.)  1. DISCHARGE GOALS: Time Frame: 12 weeks   2. Patient will be independent in advanced core training exercises to return to functional mobility (ongoing)  3. Patient will report standing for greater than 30 min with work activity (MET-11/7/16)  4. NEW GOAL:  5. Patient will walk greater than 12 laps in the gym during gym session. (MET))  6. Patient will perform core exercises as part of his routine greater than 4-5 times a week (ongoing)  7. Patient will perform knee extension machine without pain at least 30 repetitions. (ongoing)  8. Patient will drive for longer than 2 hours without pain increase (MET)  9. Patient will show a greater than 5 point increase on the LEFS in order to show an increase in walking function (ongoing)  REHABILITATION POTENTIAL FOR STATED GOALS: GoodPLAN OF CARE:  INTERVENTIONS PLANNED: (Benefits and precautions of physical therapy have been discussed with the patient.)  1. balance exercise  2. bed mobility  3. cold  4. electrical stimulation  5. gait training  6. heat  7. manual therapy (including instrument assisted soft tissue mobilization)  8. neuromuscular re-education/strengthening  9. range of motion: active/assisted/passive  10. therapeutic activities  11. therapeutic exercise/strengthening  12. transfer training  13. Other: Aquatics  TREATMENT PLAN EFFECTIVE DATES: 5/30/18 TO 8/30/18  FREQUENCY/DURATION: Follow patient 2 time a month for 3 months to address above goals. SUBJECTIVE:    History of Present Injury/Illness (Reason for Referral): Patient report that he has had a long history of back issues. He first started to have health and difficulty functioning with a presence of gout for about a year, then had his hip replaced in April of this year. Now, his is trying to regain his back strength and function. He has difficulty walking for long periods and standing in one place. His goal is to become more active and functional as well as control his health. Patient had an injection a few weeks ago and had some relief from that. MRI results: Copy in paper chart showing:  Abdominal hernia surgery performed in   -Patient has been coming to therapy for a while now and started to have increased knee pain through the first few months of the year related to arthritic changes and increased load with working out with a . He has now changed his routine and looks for guidance on LE strength to avoid surgery. Present Symptoms: Patient reports that he did reach his short goal of 12 laps in the gym. Pain at 2/10  Dominant Side: right  Past Medical History:    Active Ambulatory Problems     Diagnosis Date Noted    S/P total hip arthroplasty 04/06/2015    HTN (hypertension) 04/06/2015    HLD (hyperlipidemia) 04/06/2015    BPH (benign prostatic hyperplasia) 04/06/2015    Gout 04/06/2015    Anxiety 04/06/2015    Diabetes mellitus ([address] 04/06/2015     Resolved Ambulatory Problems     Diagnosis Date Noted    No Resolved Ambulatory Problems     Past Medical History:   Diagnosis Date    Anxiety     Edema of both legs     Enlarged prostate     Gout     Hip pain     History of elevated glucose     History of seasonal allergies     Hypercholesterolemia     Hypertension     Keratoacanthoma     S/P total hip arthroplasty 4/6/2015    Skin neoplasm     Spinal stenosis      Past Surgical History:   Procedure Laterality Date    HX TONSILLECTOMY  as child    HX WISDOM TEETH EXTRACTION  as teenager     Current Medications:   Current Outpatient Prescriptions:     HYDROmorphone (DILAUDID) 2 mg tablet, Take 1 Tab by mouth every four (4) hours as needed. Max Daily Amount: 12 mg., Disp: 40 Tab, Rfl: 0    prazosin (MINIPRESS) 1 mg capsule, Take 1 mg by mouth three (3) times daily. TAKE AM OF SURGERY WITH SMALL SIP OF WATER   Indications: BENIGN PROSTATIC HYPERTROPHY, Disp: , Rfl:     amLODIPine (NORVASC) 10 mg tablet, Take 10 mg by mouth daily. TAKE AM OF SURGERY WITH SMALL SIP OF WATER, Disp: , Rfl:     labetalol (NORMODYNE) 200 mg tablet, Take 200 mg by mouth two (2) times a day. TAKE AM OF SURGERY WITH SMALL SIP OF WATER, Disp: , Rfl:     cloNIDine HCl (CATAPRES) 0.2 mg tablet, Take 0.2 mg by mouth nightly. Indications: HYPERTENSION, Disp: , Rfl:     finasteride (PROSCAR) 5 mg tablet, Take 5 mg by mouth daily. TAKE AM OF SURGERY WITH SMALL SIP OF WATER   Indications: BENIGN PROSTATIC HYPERTROPHY, Disp: , Rfl:     furosemide (LASIX) 40 mg tablet, Take 40 mg by mouth daily. , Disp: , Rfl:     Febuxostat (ULORIC) 80 mg tab tablet, Take 80 mg by mouth daily. TAKE AM OF SURGERY WITH SMALL SIP OF WATER   Indications: GOUT, Disp: , Rfl:     LORazepam (ATIVAN) 0.5 mg tablet, Take 0.5 mg by mouth daily as needed for Anxiety. TAKES 1/2 OF 1MG TABLET DAILY AS NEEDED, \"USUALLY EVERY DAY\", Disp: , Rfl:   Celebrex-generic   Date Last Reviewed: 6/25/2018    Social History/Home Situation:   Social History     Social History    Marital status:      Spouse name: N/A    Number of children: N/A    Years of education: N/A     Occupational History    Not on file.      Social History Main Topics    Smoking status: Never Smoker    Smokeless tobacco: Not on file    Alcohol use No    Drug use: Not on file    Sexual activity: Not on file     Other Topics Concern    Not on file     Social History Narrative           Work/Activity History: consultation  OBJECTIVE:    Outcome Measure: Tool Used: Modified Oswestry Low Back Pain Questionnaire  Score:  Initial: 12/50  Most Recent: 9/50 (Date:12/5/17 )   Interpretation of Score: Each section is scored on a 0-5 scale, 5 representing the greatest disability. The scores of each section are added together for a total score of 50. Score 0 1-10 11-20 21-30 31-40 41-49 50   Modifier CH CI CJ CK CL CM CN     ? Changing and Maintaining Body Position:    [id number] - CURRENT STATUS: CI - 1%-19% impaired, limited or restricted    - GOAL STATUS:  CH - 0% impaired, limited or restricted    - D/C STATUS:  CI - 1%-19% impaired, limited or restricted  Tool Used: Lower Extremity Functional Scale (LEFS)  Score:  Initial: 49/80 Most Recent: 50/80 (Date: 5/30/18 )   Interpretation of Score: 20 questions each scored on a 5 point scale with 0 representing \"extreme difficulty or unable to perform\" and 4 representing \"no difficulty\". The lower the score, the greater the functional disability. 80/80 represents no disability. Minimal detectable change is 9 points. Score 80 79-63 62-48 47-32 31-16 15-1 0   Modifier CH CI CJ CK CL CM CN   ? Mobility - Walking and Moving Around:     - CURRENT STATUS: CJ - 20%-39% impaired, limited or restricted    - GOAL STATUS: CI - 1%-19% impaired, limited or restricted    - D/C STATUS:  ---------------To be determined---------------        Observation/Orthostatic Postural Assessment:    Patient is obese individual with increased abdominal mass and lordosis in standing. Patient shows slight right rotation from thoracic spine in standing. He reports that the popping has subsided during some of his activity. Palpation:    Patient continues to shows some tightness at vastus lateralis on R LE and hip. Spine shows improvement with core control and function.   ROM: Slight limitation in hip range of motion, but mostly limited in right rotation and side bending left   Cervical ROM shows 50% to the right and 70% left cervical rotation. Strength: Good and functional LE strength but with pain through knee extension                 Special Tests: Vertical compression test (VCT)- grade 3/5 with dysfunction at TL junction and right rotation  Elbow flexion test (EFT)- Grade 3/5 with sluggish initiation, and endurance present  Lumbar protective mechanism (LPM)- Left A-P is present grade 3, P-A grade 3 and present; Right A-P is grade 2 present, endurance present, P-A is 3 and present. (-)  Functional Mobility:       Patient has difficulty with functional mobility, standing for greater than 30 min and with exercise activity  Balance:    fair- Patient unable to stand greater than 1 sec in single leg stance Bilaterally  TREATMENT:    (In addition to Assessment/Re-Assessment sessions the following treatments were rendered)  Therapeutic Exercise: (20 Minutes): ) Exercises per grid below to improve mobility, strength, balance and coordination. Required minimal visual, verbal and manual cues to promote proper body alignment, promote proper body posture and promote proper body mechanics. Progressed resistance, range and repetitions as indicated. Date:  6/25/2018     Activity/Exercise Parameters   SCI FIT Not today   Stretches  IT BAND review stretches x 1 min   Education 15 min on continuing his exercises and starting to work some exercises in at home and not just the gym  -Mini-plunger use and mobilization     Core training (reviewed Wall stance with leg up x 3 min  Changed to stairs x 2 min with heel lift  Posterior depression x 5 min     Indirect discussion on health and recent talk with MD about right knee pain. Discussed position of the knees and hips and what to do for gym exercises. .30 min. HEP-education on posture  Manual Therapy (     ):()  Right and left hip stretches. STM to left glut med and min to border and bony contours  -STM to right fibula, tibialis anterior and fibular mobilizations.   -STM to IT band with mini plunger    (Used abbreviations: MET - muscle energy technique; PNF - proprioceptive neuromuscular facilitation; NMR - neuromuscular re-education; a/p - anterior to posterior; p/a - posterior to anterior)   Therapeutic Modalities:                                                                                               HEP: As above; handouts given to patient for all exercises. ______________________________________________________________________________________________________    Treatment Assessment:  Patient shows better understanding on his knees today. Continue with home program and gym program. Pain at 0/10 with Celebrex. Follow up in one month  Progression/Medical Necessity:   · Patient is expected to demonstrate progress in strength, range of motion, balance, coordination and functional technique to improve functional mobility. · Patient demonstrates good rehab potential due to higher previous functional level. · Skilled intervention continues to be required due to core weakness and instability. Compliance with Program/Exercises: compliant all of the time. Reason for Continuation of Services/Other Comments:  · Patient continues to require skilled intervention due to decreased function. Recommendations/Intent for next treatment session: \"Treatment next visit will focus on core training, endurance\".  And balance   Total Treatment Duration: Treatment: 20 min  PT Patient Time In/Time Out  Time In: 1400  Time Out: 1500    Abdoulayeist Maki, PT, DPT, OCS

## 2018-07-23 ENCOUNTER — HOSPITAL ENCOUNTER (OUTPATIENT)
Dept: PHYSICAL THERAPY | Age: 73
Discharge: HOME | End: 2018-07-23
Payer: MEDICARE

## 2018-07-23 NOTE — PROGRESS NOTES
Edwin Michel : 1945 [place] Secondary:  [facility] 
[address] [place] [address] [phone]   [phone] OUTPATIENT DAILY NOTE 
 
NAME/AGE/GENDER: Edwin Michel is a 74 y.o. male. DATE: 2018 SUBJECTIVE:  Patient did not show up for appointment today due to unknown reasons. Will plan to follow up on next scheduled visit.  
 
Sydney Leal, PT,

## 2018-07-24 ENCOUNTER — HOSPITAL ENCOUNTER (OUTPATIENT)
Dept: PHYSICAL THERAPY | Age: 73
Discharge: HOME | End: 2018-07-24
Payer: MEDICARE

## 2018-07-24 PROCEDURE — 97140 MANUAL THERAPY 1/> REGIONS: CPT

## 2018-07-24 NOTE — PROGRESS NOTES
Eduardo Arce : 1945 [place] Secondary:  [facility] 
[address] [place] [address] [phone]   [phone] Outpatient PHYSICAL THERAPY: Daily Note Fall Risk Score: 2 (? 5 = High Risk) ICD-10: Treatment Diagnosis: Low back pain, M54.5 Difficulty walking, not elsewhere classified, R26.2 Cervicalgia, M54.2 Pain in joint, right and left knee, M25.561, M25.562 REFERRING PHYSICIAN: MD Dr. Jeffy Pena MD Orders: Evaluate and Treat Return Physician Appointment: not known MEDICAL/REFERRING DIAGNOSIS: Lumbosacral stenosis with neurogenic claudication, DDD, Lumbosacral spondylosis without myelopathy, other symptoms referrable to back DATE OF ONSET: Chronic PRIOR LEVEL OF FUNCTION: independent PRECAUTIONS/ALLERGIES:  
Allergies Allergen Reactions  Other Medication Other (comments) \"Some BP meds\" cause \" SWELLING OF LEGS with Procardia and with current medicine  Statins-Hmg-Coa Reductase Inhibitors Other (comments) Extreme nerve pain ASSESSMENT: 
?????? ? ? This section established at most recent assessment?????????? 
Eduardo Arce has been seen for 60 visits from 12/11/15 to 2018 for lumbar spine pain and spinal stenosis. Patient has performed therapeutic exercises, activities, and had manual therapy to increased strength, ROM and function. Patient has also used modalities for pain control in order to increase function. He is becoming more consistent with his workout program at the gym and is seeing benefits. He still has lack of core strength at this time. Patient has shown some decrease this time with the LEFS to 51/80. Patient has reported that he wants to get back to his old routine with some help for his LE strength. We will start to focus on LE strength and functional mobility more at this time.  Patient has progressed well toward their goals and will benefit from continuing skilled PT in order to address their impairments. Keli Woo, PT, DPT, OCS 
7/24/2018 PROBLEM LIST (Impairments causing functional limitations): 1. Decreased Strength affecting function 2. Decreased ADL/Functional Activities 3. Decreased Flexibility/joint mobility 4. Decreased transfer abilities 5. Increased Pain affecting function 6. Decreased Activity tolerance 7. Increased Fatigue affecting function GOALS: (Goals have been discussed and agreed upon with patient.) 1. DISCHARGE GOALS: Time Frame: 12 weeks 2. Patient will be independent in advanced core training exercises to return to functional mobility (ongoing) 3. Patient will report standing for greater than 30 min with work activity (MET-11/7/16) 4. NEW GOAL: 
5. Patient will walk greater than 12 laps in the gym during gym session. (MET)) 6. Patient will perform core exercises as part of his routine greater than 4-5 times a week (ongoing) 7. Patient will perform knee extension machine without pain at least 30 repetitions. (ongoing) 8. Patient will drive for longer than 2 hours without pain increase (MET) 9. Patient will show a greater than 5 point increase on the LEFS in order to show an increase in walking function (ongoing) REHABILITATION POTENTIAL FOR STATED GOALS: Merlin Smiling OF CARE: 
INTERVENTIONS PLANNED: (Benefits and precautions of physical therapy have been discussed with the patient.) 1. balance exercise 2. bed mobility 3. cold 4. electrical stimulation 5. gait training 6. heat 7. manual therapy (including instrument assisted soft tissue mobilization) 8. neuromuscular re-education/strengthening 9. range of motion: active/assisted/passive 10. therapeutic activities 11. therapeutic exercise/strengthening 12. transfer training 13. Other: [employer] TREATMENT PLAN EFFECTIVE DATES: 5/30/18 TO 8/30/18 FREQUENCY/DURATION: Follow patient 2 time a month for 3 months to address above goals. SUBJECTIVE: 
 
History of Present Injury/Illness (Reason for Referral): Patient report that he has had a long history of back issues. He first started to have health and difficulty functioning with a presence of gout for about a year, then had his hip replaced in April of this year. Now, his is trying to regain his back strength and function. He has difficulty walking for long periods and standing in one place. His goal is to become more active and functional as well as control his health. Patient had an injection a few weeks ago and had some relief from that. MRI results: Copy in paper chart showing: 
Abdominal hernia surgery performed in  
-Patient has been coming to therapy for a while now and started to have increased knee pain through the first few months of the year related to arthritic changes and increased load with working out with a . He has now changed his routine and looks for guidance on LE strength to avoid surgery. Present Symptoms: Patient reports that he had a twist and pain in the right knee. He has gotten back to it some, but took his time getting back to his exercises. Pain at 2/10 Dominant Side: right Past Medical History: Active Ambulatory Problems Diagnosis Date Noted  S/P total hip arthroplasty 04/06/2015  
 HTN (hypertension) 04/06/2015  HLD (hyperlipidemia) 04/06/2015  BPH (benign prostatic hyperplasia) 04/06/2015  Gout 04/06/2015  Anxiety 04/06/2015  Diabetes mellitus ([facility] [address] 04/06/2015 Resolved Ambulatory Problems Diagnosis Date Noted  No Resolved Ambulatory Problems Past Medical History:  
Diagnosis Date  Anxiety  Edema of both legs  Enlarged prostate  Gout  Hip pain  History of elevated glucose  History of seasonal allergies  Hypercholesterolemia  Hypertension  Keratoacanthoma  S/P total hip arthroplasty 4/6/2015  
 Skin neoplasm  Spinal stenosis Past Surgical History:  
Procedure Laterality Date  HX TONSILLECTOMY  as child  HX WISDOM TEETH EXTRACTION  as teenager Current Medications:  
Current Outpatient Prescriptions:  
  HYDROmorphone (DILAUDID) 2 mg tablet, Take 1 Tab by mouth every four (4) hours as needed. Max Daily Amount: 12 mg., Disp: 40 Tab, Rfl: 0 
  prazosin (MINIPRESS) 1 mg capsule, Take 1 mg by mouth three (3) times daily. TAKE AM OF SURGERY WITH SMALL SIP OF WATER   Indications: BENIGN PROSTATIC HYPERTROPHY, Disp: , Rfl:  
  amLODIPine (NORVASC) 10 mg tablet, Take 10 mg by mouth daily. TAKE AM OF SURGERY WITH SMALL SIP OF WATER, Disp: , Rfl:  
  labetalol (NORMODYNE) 200 mg tablet, Take 200 mg by mouth two (2) times a day. TAKE AM OF SURGERY WITH SMALL SIP OF WATER, Disp: , Rfl:  
  cloNIDine HCl (CATAPRES) 0.2 mg tablet, Take 0.2 mg by mouth nightly. Indications: HYPERTENSION, Disp: , Rfl:  
  finasteride (PROSCAR) 5 mg tablet, Take 5 mg by mouth daily. TAKE AM OF SURGERY WITH SMALL SIP OF WATER   Indications: BENIGN PROSTATIC HYPERTROPHY, Disp: , Rfl:  
  furosemide (LASIX) 40 mg tablet, Take 40 mg by mouth daily. , Disp: , Rfl:  
  Febuxostat (ULORIC) 80 mg tab tablet, Take 80 mg by mouth daily. TAKE AM OF SURGERY WITH SMALL SIP OF WATER   Indications: GOUT, Disp: , Rfl:  
  LORazepam (ATIVAN) 0.5 mg tablet, Take 0.5 mg by mouth daily as needed for Anxiety. TAKES 1/2 OF 1MG TABLET DAILY AS NEEDED, \"USUALLY EVERY DAY\", Disp: , Rfl:  
Celebrex-generic Date Last Reviewed: 7/24/2018 Social History/Home Situation:  
Social History Social History  Marital status:  Spouse name: N/A  
 Number of children: N/A  
 Years of education: N/A Occupational History  Not on file. Social History Main Topics  Smoking status: Never Smoker  Smokeless tobacco: Not on file  Alcohol use No  
 Drug use: Not on file  Sexual activity: Not on file Other Topics Concern  Not on file Social History Narrative Work/Activity History: consultation OBJECTIVE: 
 
Outcome Measure: Tool Used: Modified Oswestry Low Back Pain Questionnaire Score:  Initial: 12/50  Most Recent: 9/50 (Date:12/5/17 ) Interpretation of Score: Each section is scored on a 0-5 scale, 5 representing the greatest disability. The scores of each section are added together for a total score of 50. Score 0 1-10 11-20 21-30 31-40 41-49 50 Modifier CH CI CJ CK CL CM CN  
 
? Changing and Maintaining Body Position:  
 [id number] - CURRENT STATUS: CI - 1%-19% impaired, limited or restricted  - GOAL STATUS:  CH - 0% impaired, limited or restricted  - D/C STATUS:  CI - 1%-19% impaired, limited or restricted Tool Used: Lower Extremity Functional Scale (LEFS) Score:  Initial: 49/80 Most Recent: 50/80 (Date: 5/30/18 ) Interpretation of Score: 20 questions each scored on a 5 point scale with 0 representing \"extreme difficulty or unable to perform\" and 4 representing \"no difficulty\". The lower the score, the greater the functional disability. 80/80 represents no disability. Minimal detectable change is 9 points. Score 80 79-63 62-48 47-32 31-16 15-1 0 Modifier CH CI CJ CK CL CM CN  
? Mobility - Walking and Moving Around:  
  - CURRENT STATUS: CJ - 20%-39% impaired, limited or restricted  - GOAL STATUS: CI - 1%-19% impaired, limited or restricted  - D/C STATUS:  ---------------To be determined--------------- Observation/Orthostatic Postural Assessment:    Patient is obese individual with increased abdominal mass and lordosis in standing. Patient shows slight right rotation from thoracic spine in standing. He reports that the popping has subsided during some of his activity. Palpation:    Patient continues to shows some tightness at vastus lateralis on R LE and hip. Spine shows improvement with core control and function.  
ROM: Slight limitation in hip range of motion, but mostly limited in right rotation and side bending left Cervical ROM shows 50% to the right and 70% left cervical rotation. Strength: Good and functional LE strength but with pain through knee extension Special Tests: Vertical compression test (VCT)- grade 3/5 with dysfunction at TL junction and right rotation Elbow flexion test (EFT)- Grade 3/5 with sluggish initiation, and endurance present Lumbar protective mechanism (LPM)- Left A-P is present grade 3, P-A grade 3 and present; Right A-P is grade 2 present, endurance present, P-A is 3 and present. (-) Functional Mobility:    
  Patient has difficulty with functional mobility, standing for greater than 30 min and with exercise activity Balance:    fair- Patient unable to stand greater than 1 sec in single leg stance Bilaterally TREATMENT:  
 (In addition to Assessment/Re-Assessment sessions the following treatments were rendered) Therapeutic Exercise: ( ):5 min ) Exercises per grid below to improve mobility, strength, balance and coordination. Required minimal visual, verbal and manual cues to promote proper body alignment, promote proper body posture and promote proper body mechanics. Progressed resistance, range and repetitions as indicated. Date: 
7/24/2018 Activity/Exercise Parameters SCI FIT Not today Stretches  IT BAND review stretches x 1 min Education 15 min on continuing his exercises and starting to work some exercises in at home and not just the gym 
-Mini-plunger use and mobilization Core training (reviewed Wall stance with leg up x 3 min Changed to stairs x 2 min with heel lift Posterior depression x 5 min Indirect discussion on health and recent talk with MD about right knee pain. Discussed position of the knees and hips and what to do for gym exercises. .30 min. HEP-education on posture Manual Therapy (    Soft Tissue Mobilization Duration Duration: 15 Minutes):()  Right and left hip stretches.   STM to left glut med and min to border and bony contours -STM to right fibula, tibialis anterior and fibular mobilizations. -STM to IT band with mini plunger 
 
(Used abbreviations: MET - muscle energy technique; PNF - proprioceptive neuromuscular facilitation; NMR - neuromuscular re-education; a/p - anterior to posterior; p/a - posterior to anterior) Therapeutic Modalities: HEP: As above; handouts given to patient for all exercises. ______________________________________________________________________________________________________ Treatment Assessment:  Patient shows some stiffness with the patella today. Pain at 0/10 with Celebrex. Follow up in one month Progression/Medical Necessity:  
· Patient is expected to demonstrate progress in strength, range of motion, balance, coordination and functional technique to improve functional mobility. · Patient demonstrates good rehab potential due to higher previous functional level. · Skilled intervention continues to be required due to core weakness and instability. Compliance with Program/Exercises: compliant all of the time. Reason for Continuation of Services/Other Comments: 
· Patient continues to require skilled intervention due to decreased function. Recommendations/Intent for next treatment session: \"Treatment next visit will focus on core training, endurance\". And balance Total Treatment Duration: Treatment: 20 min PT Patient Time In/Time Out Time In: 9020 Time Out: 1400 Patricia Mtz, PT, DPT, OCS

## 2018-08-28 ENCOUNTER — HOSPITAL ENCOUNTER (OUTPATIENT)
Dept: PHYSICAL THERAPY | Age: 73
Discharge: HOME | End: 2018-08-28
Payer: MEDICARE

## 2018-08-28 PROCEDURE — 97140 MANUAL THERAPY 1/> REGIONS: CPT

## 2018-08-28 PROCEDURE — G8978 MOBILITY CURRENT STATUS: HCPCS

## 2018-08-28 PROCEDURE — 97164 PT RE-EVAL EST PLAN CARE: CPT

## 2018-08-28 PROCEDURE — G8979 MOBILITY GOAL STATUS: HCPCS

## 2018-08-28 NOTE — THERAPY RECERTIFICATION
Nicol Segura : 1945 [place] Secondary:  [facility] 
[address] [place] [address] [phone]   [phone] Outpatient PHYSICAL THERAPY: Daily Note, Re-evaluation and Recertification\ Fall Risk Score: 2 (? 5 = High Risk) ICD-10: Treatment Diagnosis: Low back pain, M54.5 Difficulty walking, not elsewhere classified, R26.2 Cervicalgia, M54.2 Pain in joint, right and left knee, M25.561, M25.562 REFERRING PHYSICIAN: MD Dr. Kayleigh Vargas MD Orders: Evaluate and Treat Return Physician Appointment: not known MEDICAL/REFERRING DIAGNOSIS: Lumbosacral stenosis with neurogenic claudication, DDD, Lumbosacral spondylosis without myelopathy, other symptoms referrable to back DATE OF ONSET: Chronic PRIOR LEVEL OF FUNCTION: independent PRECAUTIONS/ALLERGIES:  
Allergies Allergen Reactions  Other Medication Other (comments) \"Some BP meds\" cause \" SWELLING OF LEGS with Procardia and with current medicine  Statins-Hmg-Coa Reductase Inhibitors Other (comments) Extreme nerve pain ASSESSMENT: 
?????? ? ? This section established at most recent assessment?????????? 
Nicol Segura has been seen for 72 visits from 12/11/15 to 2018 for lumbar spine pain and spinal stenosis. Patient has performed therapeutic exercises, activities, and had manual therapy to increased strength, ROM and function. Patient has also used modalities for pain control in order to increase function. He is becoming more consistent with his workout program at the gym and is seeing benefits. He still has lack of core strength at this time. Patient has shown some decrease this time with the LEFS to 62/80. He has had a recent weight gains and is looking to start back better in the gym and to start working on the [employer] strength.   Patient has progressed well toward their goals and will benefit from continuing skilled PT in order to address their impairments. Lewis Barrera, PT, DPT, OCS 
8/28/2018 PROBLEM LIST (Impairments causing functional limitations): 1. Decreased Strength affecting function 2. Decreased ADL/Functional Activities 3. Decreased Flexibility/joint mobility 4. Decreased transfer abilities 5. Increased Pain affecting function 6. Decreased Activity tolerance 7. Increased Fatigue affecting function GOALS: (Goals have been discussed and agreed upon with patient.) 1. DISCHARGE GOALS: Time Frame: 12 weeks 2. Patient will be independent in advanced core training exercises to return to functional mobility (MET) 3. Patient will report standing for greater than 30 min with work activity (MET-11/7/16) 4. NEW GOAL: 
5. Patient will walk greater than 12 laps in the gym during gym session. (MET)) 6. Patient will perform core exercises as part of his routine greater than 4-5 times a week (ongoing) 7. Patient will perform knee extension machine without pain at least 30 repetitions. (ongoing) 8. Patient will drive for longer than 2 hours without pain increase (MET) 9. Patient will show a greater than 5 point increase on the LEFS in order to show an increase in walking function (ongoing) REHABILITATION POTENTIAL FOR STATED GOALS: Genice Los OF CARE: 
INTERVENTIONS PLANNED: (Benefits and precautions of physical therapy have been discussed with the patient.) 1. balance exercise 2. bed mobility 3. cold 4. electrical stimulation 5. gait training 6. heat 7. manual therapy (including instrument assisted soft tissue mobilization) 8. neuromuscular re-education/strengthening 9. range of motion: active/assisted/passive 10. therapeutic activities 11. therapeutic exercise/strengthening 12. transfer training 13. Other: [employer] TREATMENT PLAN EFFECTIVE DATES: 8/28/18 TO 11/28/18 FREQUENCY/DURATION: Follow patient 2 time a month for 3 months to address above goals. Regarding James Potocki's therapy, I certify that the treatment plan above will be carried out by a therapist or under their direction. Thank you for this referral, Maritza Calabrese, PT Referring Physician Signature: New Barahona MD          Date Dr. Maria Guadalupe Prajapati SUBJECTIVE: 
 
History of Present Injury/Illness (Reason for Referral): Patient report that he has had a long history of back issues. He first started to have health and difficulty functioning with a presence of gout for about a year, then had his hip replaced in April of this year. Now, his is trying to regain his back strength and function. He has difficulty walking for long periods and standing in one place. His goal is to become more active and functional as well as control his health. Patient had an injection a few weeks ago and had some relief from that. MRI results: Copy in paper chart showing: 
Abdominal hernia surgery performed in  
-Patient has been coming to therapy for a while now and started to have increased knee pain through the first few months of the year related to arthritic changes and increased load with working out with a . He has now changed his routine and looks for guidance on LE strength to avoid surgery. Present Symptoms: Patient reports that he had a twist and pain in the right knee. He is trying to get back to his exercise routine, but he just had a check that through him off with an increase in weight gain Pain at 2/10 Dominant Side: right Past Medical History: Active Ambulatory Problems Diagnosis Date Noted  S/P total hip arthroplasty 04/06/2015  
 HTN (hypertension) 04/06/2015  HLD (hyperlipidemia) 04/06/2015  BPH (benign prostatic hyperplasia) 04/06/2015  Gout 04/06/2015  Anxiety 04/06/2015  Diabetes mellitus ([facility] [address] 04/06/2015 Resolved Ambulatory Problems Diagnosis Date Noted  No Resolved Ambulatory Problems Past Medical History: Diagnosis Date  Anxiety  Edema of both legs  Enlarged prostate  Gout  Hip pain  History of elevated glucose  History of seasonal allergies  Hypercholesterolemia  Hypertension  Keratoacanthoma  S/P total hip arthroplasty 4/6/2015  
 Skin neoplasm  Spinal stenosis Past Surgical History:  
Procedure Laterality Date  HX TONSILLECTOMY  as child  HX WISDOM TEETH EXTRACTION  as teenager Current Medications:  
Current Outpatient Prescriptions:  
  HYDROmorphone (DILAUDID) 2 mg tablet, Take 1 Tab by mouth every four (4) hours as needed. Max Daily Amount: 12 mg., Disp: 40 Tab, Rfl: 0 
  prazosin (MINIPRESS) 1 mg capsule, Take 1 mg by mouth three (3) times daily. TAKE AM OF SURGERY WITH SMALL SIP OF WATER   Indications: BENIGN PROSTATIC HYPERTROPHY, Disp: , Rfl:  
  amLODIPine (NORVASC) 10 mg tablet, Take 10 mg by mouth daily. TAKE AM OF SURGERY WITH SMALL SIP OF WATER, Disp: , Rfl:  
  labetalol (NORMODYNE) 200 mg tablet, Take 200 mg by mouth two (2) times a day. TAKE AM OF SURGERY WITH SMALL SIP OF WATER, Disp: , Rfl:  
  cloNIDine HCl (CATAPRES) 0.2 mg tablet, Take 0.2 mg by mouth nightly. Indications: HYPERTENSION, Disp: , Rfl:  
  finasteride (PROSCAR) 5 mg tablet, Take 5 mg by mouth daily. TAKE AM OF SURGERY WITH SMALL SIP OF WATER   Indications: BENIGN PROSTATIC HYPERTROPHY, Disp: , Rfl:  
  furosemide (LASIX) 40 mg tablet, Take 40 mg by mouth daily. , Disp: , Rfl:  
  Febuxostat (ULORIC) 80 mg tab tablet, Take 80 mg by mouth daily. TAKE AM OF SURGERY WITH SMALL SIP OF WATER   Indications: GOUT, Disp: , Rfl:  
  LORazepam (ATIVAN) 0.5 mg tablet, Take 0.5 mg by mouth daily as needed for Anxiety. TAKES 1/2 OF 1MG TABLET DAILY AS NEEDED, \"USUALLY EVERY DAY\", Disp: , Rfl:  
Celebrex-generic Date Last Reviewed: 8/28/2018 Social History/Home Situation:  
Social History Social History  Marital status:    Spouse name: N/A  
  Number of children: N/A  
 Years of education: N/A Occupational History  Not on file. Social History Main Topics  Smoking status: Never Smoker  Smokeless tobacco: Not on file  Alcohol use No  
 Drug use: Not on file  Sexual activity: Not on file Other Topics Concern  Not on file Social History Narrative Work/Activity History: consultation OBJECTIVE: 
 
Outcome Measure: Tool Used: Modified Oswestry Low Back Pain Questionnaire Score:  Initial: 12/50  Most Recent: 9/50 (Date:12/5/17 ) Interpretation of Score: Each section is scored on a 0-5 scale, 5 representing the greatest disability. The scores of each section are added together for a total score of 50. Score 0 1-10 11-20 21-30 31-40 41-49 50 Modifier CH CI CJ CK CL CM CN  
 
? Changing and Maintaining Body Position:  
 [id number] - CURRENT STATUS: CI - 1%-19% impaired, limited or restricted  - GOAL STATUS:  CH - 0% impaired, limited or restricted  - D/C STATUS:  CI - 1%-19% impaired, limited or restricted Tool Used: Lower Extremity Functional Scale (LEFS) Score:  Initial: 49/80 Most Recent: 62/80 (Date: 6/28/18 ) Interpretation of Score: 20 questions each scored on a 5 point scale with 0 representing \"extreme difficulty or unable to perform\" and 4 representing \"no difficulty\". The lower the score, the greater the functional disability. 80/80 represents no disability. Minimal detectable change is 9 points. Score 80 79-63 62-48 47-32 31-16 15-1 0 Modifier CH CI CJ CK CL CM CN  
? Mobility - Walking and Moving Around:  
  - CURRENT STATUS: CJ - 20%-39% impaired, limited or restricted  - GOAL STATUS: CI - 1%-19% impaired, limited or restricted  - D/C STATUS:  ---------------To be determined--------------- Observation/Orthostatic Postural Assessment:    Shows increased genu varus on right and valgus on left. Decreased knee extension in standing Palpation:    Patient continues to shows some tightness at vastus lateralis on R LE and hip. Spine shows improvement with core control and function. 
-Decreased joint glide of tibia on femur into IR more than ER 
-Increased restriction at patella on right knee ROM: Slight limitation in hip range of motion, but mostly limited in right rotation and side bending left Cervical ROM shows 50% to the right and 70% left cervical rotation. Patient shows limitation in right knee extension with -5 deg from full - Strength: Good and functional LE strength but with pain through knee extension Special Tests: Vertical compression test (VCT)- grade 3/5 with dysfunction at TL junction and right rotation Elbow flexion test (EFT)- Grade 3/5 with sluggish initiation, and endurance present Lumbar protective mechanism (LPM)- Left A-P is present grade 3, P-A grade 3 and present; Right A-P is grade 2 present, endurance present, P-A is 3 and present. (-) Functional Mobility:    
  Patient has difficulty with functional mobility, standing for greater than 30 min and with exercise activity Difficulty with stair climbing Balance:    fair- Patient unable to stand greater than 1 sec in single leg stance Bilaterally TREATMENT:  
 (In addition to Assessment/Re-Assessment sessions the following treatments were rendered) Therapeutic Exercise: ( ):not today min ) Exercises per grid below to improve mobility, strength, balance and coordination. Required minimal visual, verbal and manual cues to promote proper body alignment, promote proper body posture and promote proper body mechanics. Progressed resistance, range and repetitions as indicated. Date: 
8/28/2018 Activity/Exercise Parameters SCI FIT Not today Stretches  IT BAND review stretches x 1 min Education 15 min on continuing his exercises and starting to work some exercises in at home and not just the gym 
-Mini-plunger use and mobilization Core training (reviewed Wall stance with leg up x 3 min Changed to stairs x 2 min with heel lift Posterior depression x 5 min Indirect discussion on health and recent talk with MD about right knee pain. Discussed position of the knees and hips and what to do for gym exercises. Arthur Guardado HEP-education on posture Manual Therapy (    Soft Tissue Mobilization Duration Duration: 15 Minutes):(15 min) -STM to right fibula, tibialis anterior and fibular mobilizations. -STM to IT band with mini plunger 
 
(Used abbreviations: MET - muscle energy technique; PNF - proprioceptive neuromuscular facilitation; NMR - neuromuscular re-education; a/p - anterior to posterior; p/a - posterior to anterior) Therapeutic Modalities: HEP: As above; handouts given to patient for all exercises. ______________________________________________________________________________________________________ Treatment Assessment:  Patient shows continued stiffness in the right tibia femoral joint. Follow up 1-2 times a month Progression/Medical Necessity:  
· Patient is expected to demonstrate progress in strength, range of motion, balance, coordination and functional technique to improve functional mobility. · Patient demonstrates good rehab potential due to higher previous functional level. · Skilled intervention continues to be required due to core weakness and instability. Compliance with Program/Exercises: compliant all of the time. Reason for Continuation of Services/Other Comments: 
· Patient continues to require skilled intervention due to decreased function. Recommendations/Intent for next treatment session: \"Treatment next visit will focus on core training, endurance\". Mobility in right knee Total Treatment Duration: Treatment: 15 min, Re-evaluation 40 min PT Patient Time In/Time Out Time In: 1303 Time Out: 1400 Vin Dickerson, PT, DPT, OCS

## 2018-09-25 ENCOUNTER — HOSPITAL ENCOUNTER (OUTPATIENT)
Dept: PHYSICAL THERAPY | Age: 73
Discharge: HOME | End: 2018-09-25
Payer: MEDICARE

## 2018-09-25 PROCEDURE — 97110 THERAPEUTIC EXERCISES: CPT

## 2018-09-25 PROCEDURE — 97140 MANUAL THERAPY 1/> REGIONS: CPT

## 2018-09-25 NOTE — PROGRESS NOTES
Teresa Ordonez : 1945 [place] Secondary:  [facility] 
[address] [place] [address] [phone]   [phone] Outpatient PHYSICAL THERAPY: Daily Note\ Fall Risk Score: 2 (? 5 = High Risk) ICD-10: Treatment Diagnosis: Low back pain, M54.5 Difficulty walking, not elsewhere classified, R26.2 Cervicalgia, M54.2 Pain in joint, right and left knee, M25.561, M25.562 REFERRING PHYSICIAN: MD Dr. Eric De La Cruz MD Orders: Evaluate and Treat Return Physician Appointment: not known MEDICAL/REFERRING DIAGNOSIS: Lumbosacral stenosis with neurogenic claudication, DDD, Lumbosacral spondylosis without myelopathy, other symptoms referrable to back DATE OF ONSET: Chronic PRIOR LEVEL OF FUNCTION: independent PRECAUTIONS/ALLERGIES:  
Allergies Allergen Reactions  Other Medication Other (comments) \"Some BP meds\" cause \" SWELLING OF LEGS with Procardia and with current medicine  Statins-Hmg-Coa Reductase Inhibitors Other (comments) Extreme nerve pain ASSESSMENT: 
?????? ? ? This section established at most recent assessment?????????? 
Teresa Ordonez has been seen for 65 visits from 12/11/15 to 2018 for lumbar spine pain and spinal stenosis. Patient has performed therapeutic exercises, activities, and had manual therapy to increased strength, ROM and function. Patient has also used modalities for pain control in order to increase function. He is becoming more consistent with his workout program at the gym and is seeing benefits. He still has lack of core strength at this time. Patient has shown some decrease this time with the LEFS to 62/80. He has had a recent weight gains and is looking to start back better in the gym and to start working on the [employer] strength.   Patient has progressed well toward their goals and will benefit from continuing skilled PT in order to address their impairments. Amy Thompson, PT, DPT, OCS 
9/25/2018 PROBLEM LIST (Impairments causing functional limitations): 1. Decreased Strength affecting function 2. Decreased ADL/Functional Activities 3. Decreased Flexibility/joint mobility 4. Decreased transfer abilities 5. Increased Pain affecting function 6. Decreased Activity tolerance 7. Increased Fatigue affecting function GOALS: (Goals have been discussed and agreed upon with patient.) 1. DISCHARGE GOALS: Time Frame: 12 weeks 2. Patient will be independent in advanced core training exercises to return to functional mobility (MET) 3. Patient will report standing for greater than 30 min with work activity (MET-11/7/16) 4. NEW GOAL: 
5. Patient will walk greater than 12 laps in the gym during gym session. (MET)) 6. Patient will perform core exercises as part of his routine greater than 4-5 times a week (ongoing) 7. Patient will perform knee extension machine without pain at least 30 repetitions. (ongoing) 8. Patient will drive for longer than 2 hours without pain increase (MET) 9. Patient will show a greater than 5 point increase on the LEFS in order to show an increase in walking function (ongoing) REHABILITATION POTENTIAL FOR STATED GOALS: Lexie Rowe OF CARE: 
INTERVENTIONS PLANNED: (Benefits and precautions of physical therapy have been discussed with the patient.) 1. balance exercise 2. bed mobility 3. cold 4. electrical stimulation 5. gait training 6. heat 7. manual therapy (including instrument assisted soft tissue mobilization) 8. neuromuscular re-education/strengthening 9. range of motion: active/assisted/passive 10. therapeutic activities 11. therapeutic exercise/strengthening 12. transfer training 13. Other: [employer] TREATMENT PLAN EFFECTIVE DATES: 8/28/18 TO 11/28/18 FREQUENCY/DURATION: Follow patient 2 time a month for 3 months to address above goals. Regarding James Potocki's therapy, I certify that the treatment plan above will be carried out by a therapist or under their direction. Thank you for this referral, Manjit Zaidi PT    
                 
SUBJECTIVE: 
 
History of Present Injury/Illness (Reason for Referral): Patient report that he has had a long history of back issues. He first started to have health and difficulty functioning with a presence of gout for about a year, then had his hip replaced in April of this year. Now, his is trying to regain his back strength and function. He has difficulty walking for long periods and standing in one place. His goal is to become more active and functional as well as control his health. Patient had an injection a few weeks ago and had some relief from that. MRI results: Copy in paper chart showing: 
Abdominal hernia surgery performed in  
-Patient has been coming to therapy for a while now and started to have increased knee pain through the first few months of the year related to arthritic changes and increased load with working out with a . He has now changed his routine and looks for guidance on LE strength to avoid surgery. Present Symptoms: Patient reports that he has managed to get back to walking more and getting back to his activity. He reports that the last session really helped his knee Pain at 2/10 Dominant Side: right Past Medical History: Active Ambulatory Problems Diagnosis Date Noted  S/P total hip arthroplasty 04/06/2015  
 HTN (hypertension) 04/06/2015  HLD (hyperlipidemia) 04/06/2015  BPH (benign prostatic hyperplasia) 04/06/2015  Gout 04/06/2015  Anxiety 04/06/2015  Diabetes mellitus ([facility] [address] 04/06/2015 Resolved Ambulatory Problems Diagnosis Date Noted  No Resolved Ambulatory Problems Past Medical History:  
Diagnosis Date  Anxiety  Edema of both legs  Enlarged prostate  Gout  Hip pain  History of elevated glucose  History of seasonal allergies  Hypercholesterolemia  Hypertension  Keratoacanthoma  S/P total hip arthroplasty 4/6/2015  
 Skin neoplasm  Spinal stenosis Past Surgical History:  
Procedure Laterality Date  HX TONSILLECTOMY  as child  HX WISDOM TEETH EXTRACTION  as teenager Current Medications:  
Current Outpatient Prescriptions:  
  HYDROmorphone (DILAUDID) 2 mg tablet, Take 1 Tab by mouth every four (4) hours as needed. Max Daily Amount: 12 mg., Disp: 40 Tab, Rfl: 0 
  prazosin (MINIPRESS) 1 mg capsule, Take 1 mg by mouth three (3) times daily. TAKE AM OF SURGERY WITH SMALL SIP OF WATER   Indications: BENIGN PROSTATIC HYPERTROPHY, Disp: , Rfl:  
  amLODIPine (NORVASC) 10 mg tablet, Take 10 mg by mouth daily. TAKE AM OF SURGERY WITH SMALL SIP OF WATER, Disp: , Rfl:  
  labetalol (NORMODYNE) 200 mg tablet, Take 200 mg by mouth two (2) times a day. TAKE AM OF SURGERY WITH SMALL SIP OF WATER, Disp: , Rfl:  
  cloNIDine HCl (CATAPRES) 0.2 mg tablet, Take 0.2 mg by mouth nightly. Indications: HYPERTENSION, Disp: , Rfl:  
  finasteride (PROSCAR) 5 mg tablet, Take 5 mg by mouth daily. TAKE AM OF SURGERY WITH SMALL SIP OF WATER   Indications: BENIGN PROSTATIC HYPERTROPHY, Disp: , Rfl:  
  furosemide (LASIX) 40 mg tablet, Take 40 mg by mouth daily. , Disp: , Rfl:  
  Febuxostat (ULORIC) 80 mg tab tablet, Take 80 mg by mouth daily. TAKE AM OF SURGERY WITH SMALL SIP OF WATER   Indications: GOUT, Disp: , Rfl:  
  LORazepam (ATIVAN) 0.5 mg tablet, Take 0.5 mg by mouth daily as needed for Anxiety. TAKES 1/2 OF 1MG TABLET DAILY AS NEEDED, \"USUALLY EVERY DAY\", Disp: , Rfl:  
Celebrex-generic Date Last Reviewed: 9/25/2018 Social History/Home Situation:  
Social History Social History  Marital status:  Spouse name: N/A  
 Number of children: N/A  
 Years of education: N/A Occupational History  Not on file. Social History Main Topics  Smoking status: Never Smoker  Smokeless tobacco: Not on file  Alcohol use No  
 Drug use: Not on file  Sexual activity: Not on file Other Topics Concern  Not on file Social History Narrative Work/Activity History: consultation OBJECTIVE: 
 
Outcome Measure: Tool Used: Modified Oswestry Low Back Pain Questionnaire Score:  Initial: 12/50  Most Recent: 9/50 (Date:12/5/17 ) Interpretation of Score: Each section is scored on a 0-5 scale, 5 representing the greatest disability. The scores of each section are added together for a total score of 50. Score 0 1-10 11-20 21-30 31-40 41-49 50 Modifier CH CI CJ CK CL CM CN  
 
? Changing and Maintaining Body Position:  
 [id number] - CURRENT STATUS: CI - 1%-19% impaired, limited or restricted  - GOAL STATUS:  CH - 0% impaired, limited or restricted  - D/C STATUS:  CI - 1%-19% impaired, limited or restricted Tool Used: Lower Extremity Functional Scale (LEFS) Score:  Initial: 49/80 Most Recent: 62/80 (Date: 6/28/18 ) Interpretation of Score: 20 questions each scored on a 5 point scale with 0 representing \"extreme difficulty or unable to perform\" and 4 representing \"no difficulty\". The lower the score, the greater the functional disability. 80/80 represents no disability. Minimal detectable change is 9 points. Score 80 79-63 62-48 47-32 31-16 15-1 0 Modifier CH CI CJ CK CL CM CN  
? Mobility - Walking and Moving Around:  
  - CURRENT STATUS: CJ - 20%-39% impaired, limited or restricted  - GOAL STATUS: CI - 1%-19% impaired, limited or restricted  - D/C STATUS:  ---------------To be determined--------------- Observation/Orthostatic Postural Assessment:    Shows increased genu varus on right and valgus on left. Decreased knee extension in standing Palpation:    Patient continues to shows some tightness at vastus lateralis on R LE and hip. Spine shows improvement with core control and function. 
-Decreased joint glide of tibia on femur into IR more than ER 
-Increased restriction at patella on right knee ROM: Slight limitation in hip range of motion, but mostly limited in right rotation and side bending left Cervical ROM shows 50% to the right and 70% left cervical rotation. Patient shows limitation in right knee extension with -5 deg from full - Strength: Good and functional LE strength but with pain through knee extension Special Tests: Vertical compression test (VCT)- grade 3/5 with dysfunction at TL junction and right rotation Elbow flexion test (EFT)- Grade 3/5 with sluggish initiation, and endurance present Lumbar protective mechanism (LPM)- Left A-P is present grade 3, P-A grade 3 and present; Right A-P is grade 2 present, endurance present, P-A is 3 and present. (-) Functional Mobility:    
  Patient has difficulty with functional mobility, standing for greater than 30 min and with exercise activity Difficulty with stair climbing Balance:    fair- Patient unable to stand greater than 1 sec in single leg stance Bilaterally TREATMENT:  
 (In addition to Assessment/Re-Assessment sessions the following treatments were rendered) Therapeutic Exercise: (15 Minutes):not today min ) Exercises per grid below to improve mobility, strength, balance and coordination. Required minimal visual, verbal and manual cues to promote proper body alignment, promote proper body posture and promote proper body mechanics. Progressed resistance, range and repetitions as indicated. Date: 
9/25/2018 Activity/Exercise Parameters SCI FIT Not today Stretches  IT BAND review stretches x 1 min Education 15 min on continuing his exercises and starting to work some exercises in at home and not just the gym 
-Mini-plunger use and mobilization Core training (reviewed Wall stance with leg up x 3 min Changed to stairs x 2 min with heel lift Posterior depression x 5 min Indirect discussion on health and recent talk with MD about right knee pain. Discussed position of the knees and hips and what to do for gym exercises. Mabeline Sink HEP-education on posture Manual Therapy (    Soft Tissue Mobilization Duration Duration: 25 Minutes):( min) -STM to right fibula, tibialis anterior and fibular mobilizations. -STM to IT band with mini plunger 
-Education and training on self management 
 
(Used abbreviations: MET - muscle energy technique; PNF - proprioceptive neuromuscular facilitation; NMR - neuromuscular re-education; a/p - anterior to posterior; p/a - posterior to anterior) Therapeutic Modalities: HEP: As above; handouts given to patient for all exercises. ______________________________________________________________________________________________________ Treatment Assessment:  Patient shows continued stiffness in the right tibia femoral joint. He shows improvement in self management today Pain at 2/10. Progression/Medical Necessity:  
· Patient is expected to demonstrate progress in strength, range of motion, balance, coordination and functional technique to improve functional mobility. · Patient demonstrates good rehab potential due to higher previous functional level. · Skilled intervention continues to be required due to core weakness and instability. Compliance with Program/Exercises: compliant all of the time. Reason for Continuation of Services/Other Comments: 
· Patient continues to require skilled intervention due to decreased function. Recommendations/Intent for next treatment session: \"Treatment next visit will focus on core training, endurance\". Mobility in right knee Total Treatment Duration: Treatment: 40 min PT Patient Time In/Time Out Time In: 1301 Time Out: 1400 Yesi Hendrickson, PT, DPT, OCS

## 2018-10-22 ENCOUNTER — HOSPITAL ENCOUNTER (OUTPATIENT)
Dept: PHYSICAL THERAPY | Age: 73
Discharge: HOME | End: 2018-10-22
Payer: MEDICARE

## 2018-10-22 PROCEDURE — 97140 MANUAL THERAPY 1/> REGIONS: CPT

## 2018-10-22 PROCEDURE — 97110 THERAPEUTIC EXERCISES: CPT

## 2018-10-22 NOTE — PROGRESS NOTES
Na Mishra : 1945 [place] Secondary:  Therapy Center at [facility] 
[address] [place] [address] [phone]   [phone] Outpatient PHYSICAL THERAPY: Daily Note\ Fall Risk Score: 2 (? 5 = High Risk) ICD-10: Treatment Diagnosis: Low back pain, M54.5 Difficulty walking, not elsewhere classified, R26.2 Cervicalgia, M54.2 Pain in joint, right and left knee, M25.561, M25.562 REFERRING PHYSICIAN: MD Dr. Camelia Easton MD Orders: Evaluate and Treat Return Physician Appointment: not known MEDICAL/REFERRING DIAGNOSIS: Lumbosacral stenosis with neurogenic claudication, DDD, Lumbosacral spondylosis without myelopathy, other symptoms referrable to back DATE OF ONSET: Chronic PRIOR LEVEL OF FUNCTION: independent PRECAUTIONS/ALLERGIES:  
Allergies Allergen Reactions  Other Medication Other (comments) \"Some BP meds\" cause \" SWELLING OF LEGS with Procardia and with current medicine  Statins-Hmg-Coa Reductase Inhibitors Other (comments) Extreme nerve pain ASSESSMENT: 
?????? ? ? This section established at most recent assessment?????????? 
Na Mishra has been seen for 65 visits from 12/11/15 to 10/22/2018 for lumbar spine pain and spinal stenosis. Patient has performed therapeutic exercises, activities, and had manual therapy to increased strength, ROM and function. Patient has also used modalities for pain control in order to increase function. He is becoming more consistent with his workout program at the gym and is seeing benefits. He still has lack of core strength at this time. Patient has shown some decrease this time with the LEFS to 62/80. He has had a recent weight gains and is looking to start back better in the gym and to start working on the [employer] strength.   Patient has progressed well toward their goals and will benefit from continuing skilled PT in order to address their impairments. Leonor Hardin, PT, DPT, OCS 
10/22/2018 PROBLEM LIST (Impairments causing functional limitations): 1. Decreased Strength affecting function 2. Decreased ADL/Functional Activities 3. Decreased Flexibility/joint mobility 4. Decreased transfer abilities 5. Increased Pain affecting function 6. Decreased Activity tolerance 7. Increased Fatigue affecting function GOALS: (Goals have been discussed and agreed upon with patient.) 1. DISCHARGE GOALS: Time Frame: 12 weeks 2. Patient will be independent in advanced core training exercises to return to functional mobility (MET) 3. Patient will report standing for greater than 30 min with work activity (MET-11/7/16) 4. NEW GOAL: 
5. Patient will walk greater than 12 laps in the gym during gym session. (MET)) 6. Patient will perform core exercises as part of his routine greater than 4-5 times a week (ongoing) 7. Patient will perform knee extension machine without pain at least 30 repetitions. (ongoing) 8. Patient will drive for longer than 2 hours without pain increase (MET) 9. Patient will show a greater than 5 point increase on the LEFS in order to show an increase in walking function (ongoing) REHABILITATION POTENTIAL FOR STATED GOALS: Leila Page OF CARE: 
INTERVENTIONS PLANNED: (Benefits and precautions of physical therapy have been discussed with the patient.) 1. balance exercise 2. bed mobility 3. cold 4. electrical stimulation 5. gait training 6. heat 7. manual therapy (including instrument assisted soft tissue mobilization) 8. neuromuscular re-education/strengthening 9. range of motion: active/assisted/passive 10. therapeutic activities 11. therapeutic exercise/strengthening 12. transfer training 13. Other: [employer] TREATMENT PLAN EFFECTIVE DATES: 8/28/18 TO 11/28/18 FREQUENCY/DURATION: Follow patient 2 time a month for 3 months to address above goals. Regarding James Potocki's therapy, I certify that the treatment plan above will be carried out by a therapist or under their direction. Thank you for this referral, Mamie Brody, PT    
                
SUBJECTIVE: 
 
History of Present Injury/Illness (Reason for Referral): Patient report that he has had a long history of back issues. He first started to have health and difficulty functioning with a presence of gout for about a year, then had his hip replaced in April of this year. Now, his is trying to regain his back strength and function. He has difficulty walking for long periods and standing in one place. His goal is to become more active and functional as well as control his health. Patient had an injection a few weeks ago and had some relief from that. MRI results: Copy in paper chart showing: 
Abdominal hernia surgery performed in  
-Patient has been coming to therapy for a while now and started to have increased knee pain through the first few months of the year related to arthritic changes and increased load with working out with a . He has now changed his routine and looks for guidance on LE strength to avoid surgery. Dominant Side: right Past Medical History: Active Ambulatory Problems Diagnosis Date Noted  S/P total hip arthroplasty 04/06/2015  
 HTN (hypertension) 04/06/2015  HLD (hyperlipidemia) 04/06/2015  BPH (benign prostatic hyperplasia) 04/06/2015  Gout 04/06/2015  Anxiety 04/06/2015  Diabetes mellitus ([facility] [address] 04/06/2015 Resolved Ambulatory Problems Diagnosis Date Noted  No Resolved Ambulatory Problems Past Medical History:  
Diagnosis Date  Anxiety  Edema of both legs  Enlarged prostate  Gout  Hip pain  History of elevated glucose  History of seasonal allergies  Hypercholesterolemia  Hypertension  Keratoacanthoma  S/P total hip arthroplasty 4/6/2015  
 Skin neoplasm  Spinal stenosis Past Surgical History:  
Procedure Laterality Date  HX TONSILLECTOMY  as child  HX WISDOM TEETH EXTRACTION  as teenager Current Medications:  
Current Outpatient Medications:  
  HYDROmorphone (DILAUDID) 2 mg tablet, Take 1 Tab by mouth every four (4) hours as needed. Max Daily Amount: 12 mg., Disp: 40 Tab, Rfl: 0 
  prazosin (MINIPRESS) 1 mg capsule, Take 1 mg by mouth three (3) times daily. TAKE AM OF SURGERY WITH SMALL SIP OF WATER   Indications: BENIGN PROSTATIC HYPERTROPHY, Disp: , Rfl:  
  amLODIPine (NORVASC) 10 mg tablet, Take 10 mg by mouth daily. TAKE AM OF SURGERY WITH SMALL SIP OF WATER, Disp: , Rfl:  
  labetalol (NORMODYNE) 200 mg tablet, Take 200 mg by mouth two (2) times a day. TAKE AM OF SURGERY WITH SMALL SIP OF WATER, Disp: , Rfl:  
  cloNIDine HCl (CATAPRES) 0.2 mg tablet, Take 0.2 mg by mouth nightly. Indications: HYPERTENSION, Disp: , Rfl:  
  finasteride (PROSCAR) 5 mg tablet, Take 5 mg by mouth daily. TAKE AM OF SURGERY WITH SMALL SIP OF WATER   Indications: BENIGN PROSTATIC HYPERTROPHY, Disp: , Rfl:  
  furosemide (LASIX) 40 mg tablet, Take 40 mg by mouth daily. , Disp: , Rfl:  
  Febuxostat (ULORIC) 80 mg tab tablet, Take 80 mg by mouth daily. TAKE AM OF SURGERY WITH SMALL SIP OF WATER   Indications: GOUT, Disp: , Rfl:  
  LORazepam (ATIVAN) 0.5 mg tablet, Take 0.5 mg by mouth daily as needed for Anxiety. TAKES 1/2 OF 1MG TABLET DAILY AS NEEDED, \"USUALLY EVERY DAY\", Disp: , Rfl:  
Celebrex-generic Date Last Reviewed: 10/22/2018 Social History/Home Situation:  
Social History Socioeconomic History  Marital status:  Spouse name: Not on file  Number of children: Not on file  Years of education: Not on file  Highest education level: Not on file Social Needs  Financial resource strain: Not on file  Food insecurity - worry: Not on file  Food insecurity - inability: Not on file  Transportation needs - medical: Not on file  Transportation needs - non-medical: Not on file Occupational History  Not on file Tobacco Use  Smoking status: Never Smoker Substance and Sexual Activity  Alcohol use: No  
 Drug use: Not on file  Sexual activity: Not on file Other Topics Concern  Not on file Social History Narrative  Not on file Work/Activity History: consultation OBJECTIVE: 
 
Outcome Measure: Tool Used: Modified Oswestry Low Back Pain Questionnaire Score:  Initial: 12/50  Most Recent: 9/50 (Date:12/5/17 ) Interpretation of Score: Each section is scored on a 0-5 scale, 5 representing the greatest disability. The scores of each section are added together for a total score of 50. Score 0 1-10 11-20 21-30 31-40 41-49 50 Modifier CH CI CJ CK CL CM CN  
 
? Changing and Maintaining Body Position:  
 [id number] - CURRENT STATUS: CI - 1%-19% impaired, limited or restricted  - GOAL STATUS:  CH - 0% impaired, limited or restricted  - D/C STATUS:  CI - 1%-19% impaired, limited or restricted Tool Used: Lower Extremity Functional Scale (LEFS) Score:  Initial: 49/80 Most Recent: 62/80 (Date: 6/28/18 ) Interpretation of Score: 20 questions each scored on a 5 point scale with 0 representing \"extreme difficulty or unable to perform\" and 4 representing \"no difficulty\". The lower the score, the greater the functional disability. 80/80 represents no disability. Minimal detectable change is 9 points. Score 80 79-63 62-48 47-32 31-16 15-1 0 Modifier CH CI CJ CK CL CM CN  
? Mobility - Walking and Moving Around:  
  - CURRENT STATUS: CJ - 20%-39% impaired, limited or restricted  - GOAL STATUS: CI - 1%-19% impaired, limited or restricted  - D/C STATUS:  ---------------To be determined--------------- Observation/Orthostatic Postural Assessment:    Shows increased genu varus on right and valgus on left. Decreased knee extension in standing Palpation:    Patient continues to shows some tightness at vastus lateralis on R LE and hip. Spine shows improvement with core control and function. 
-Decreased joint glide of tibia on femur into IR more than ER 
-Increased restriction at patella on right knee ROM: Slight limitation in hip range of motion, but mostly limited in right rotation and side bending left Cervical ROM shows 50% to the right and 70% left cervical rotation. Patient shows limitation in right knee extension with -5 deg from full - Strength: Good and functional LE strength but with pain through knee extension Special Tests: Vertical compression test (VCT)- grade 3/5 with dysfunction at TL junction and right rotation Elbow flexion test (EFT)- Grade 3/5 with sluggish initiation, and endurance present Lumbar protective mechanism (LPM)- Left A-P is present grade 3, P-A grade 3 and present; Right A-P is grade 2 present, endurance present, P-A is 3 and present. (-) Functional Mobility:    
  Patient has difficulty with functional mobility, standing for greater than 30 min and with exercise activity Difficulty with stair climbing Balance:    fair- Patient unable to stand greater than 1 sec in single leg stance Bilaterally TREATMENT:  
 (In addition to Assessment/Re-Assessment sessions the following treatments were rendered) Present Symptoms: Patient reports that he still has some increase in pain in the back of the right knee that comes and goes. He reports he need to continue to work in the gym to get the activity level back up. Pain at 2/10 Therapeutic Exercise: (10 Minutes):10 min ) Exercises per grid below to improve mobility, strength, balance and coordination. Required minimal visual, verbal and manual cues to promote proper body alignment, promote proper body posture and promote proper body mechanics. Progressed resistance, range and repetitions as indicated. Date: 
10/22/2018 Activity/Exercise Parameters SCI FIT Not today Stretches  IT BAND review stretches x 1 min Education 15 min on continuing his exercises and starting to work some exercises in at home and not just the gym 
-Mini-plunger use and mobilization Core training (reviewed Wall stance with leg up x 3 min Changed to stairs x 2 min with heel lift Posterior depression x 5 min Indirect discussion on health and recent talk with MD about right knee pain. Discussed position of the knees and hips and what to do for gym exercises. Tereso Counter HEP-education on posture Manual Therapy (    Soft Tissue Mobilization Duration Duration: 30 Minutes):( min) -STM to right fibula, tibialis anterior and fibular mobilizations. -STM to IT band with mini plunger 
-Hamstring mobilization in supine 
-Tenderness at superior gastroc on right side 
-Education and training on self management 
 
(Used abbreviations: MET - muscle energy technique; PNF - proprioceptive neuromuscular facilitation; NMR - neuromuscular re-education; a/p - anterior to posterior; p/a - posterior to anterior) Therapeutic Modalities: HEP: As above; handouts given to patient for all exercises. ______________________________________________________________________________________________________ Treatment Assessment:  Patient shows continued pain in posterior knee at gastroc -soleus and hamstring junction. He shows improvement in self management today Pain at 1/10. Progression/Medical Necessity:  
· Patient is expected to demonstrate progress in strength, range of motion, balance, coordination and functional technique to improve functional mobility. · Patient demonstrates good rehab potential due to higher previous functional level. · Skilled intervention continues to be required due to core weakness and instability. Compliance with Program/Exercises: compliant all of the time. Reason for Continuation of Services/Other Comments: 
· Patient continues to require skilled intervention due to decreased function. Recommendations/Intent for next treatment session: \"Treatment next visit will focus on core training, endurance\". Mobility in right knee Total Treatment Duration: Treatment: 40 min PT Patient Time In/Time Out Time In: 8268 Time Out: 1501 Marjorie Evans, PT, DPT, OCS

## 2018-11-19 ENCOUNTER — HOSPITAL ENCOUNTER (OUTPATIENT)
Dept: PHYSICAL THERAPY | Age: 73
Discharge: HOME | End: 2018-11-19
Payer: MEDICARE

## 2018-11-19 PROCEDURE — 97164 PT RE-EVAL EST PLAN CARE: CPT

## 2018-11-19 PROCEDURE — 97140 MANUAL THERAPY 1/> REGIONS: CPT

## 2018-11-19 PROCEDURE — G8979 MOBILITY GOAL STATUS: HCPCS

## 2018-11-19 PROCEDURE — G8978 MOBILITY CURRENT STATUS: HCPCS

## 2018-11-19 NOTE — THERAPY RECERTIFICATION
Cristobal Brito : 1945 [place] Secondary:  Therapy Center at [facility] 
[address] [place] [address] [phone]   [phone] Outpatient PHYSICAL THERAPY: Daily Note, Re-evaluation and Recertification\ Fall Risk Score: 2 (? 5 = High Risk) ICD-10: Treatment Diagnosis: Low back pain, M54.5 Difficulty walking, not elsewhere classified, R26.2 Cervicalgia, M54.2 Pain in joint, right and left knee, M25.561, M25.562 REFERRING PHYSICIAN: MD Dr. Sinai Garcia MD Orders: Evaluate and Treat Return Physician Appointment: not known MEDICAL/REFERRING DIAGNOSIS: Lumbosacral stenosis with neurogenic claudication, DDD, Lumbosacral spondylosis without myelopathy, other symptoms referrable to back DATE OF ONSET: Chronic PRIOR LEVEL OF FUNCTION: independent PRECAUTIONS/ALLERGIES:  
Allergies Allergen Reactions  Other Medication Other (comments) \"Some BP meds\" cause \" SWELLING OF LEGS with Procardia and with current medicine  Statins-Hmg-Coa Reductase Inhibitors Other (comments) Extreme nerve pain ASSESSMENT: 
?????? ? ? This section established at most recent assessment?????????? 
Cristobal Brito has been seen for 76 visits from 12/11/15 to 2018 for lumbar spine pain and spinal stenosis. Patient has performed therapeutic exercises, activities, and had manual therapy to increased strength, ROM and function. Patient has also used modalities for pain control in order to increase function. He is becoming more consistent with his workout program at the gym and is seeing benefits. He still has lack of core strength at this time. Patient has shown some decrease this time with the LEFS to 62/80. He has had a recent weight gains and is looking to start back better in the gym and to start working on the Symwave strength.   Patient has progressed well toward their goals and will benefit from continuing skilled PT in order to address their impairments. Horacio Mason, PT, DPT, OCS 
11/19/2018 PROBLEM LIST (Impairments causing functional limitations): 1. Decreased Strength affecting function 2. Decreased ADL/Functional Activities 3. Decreased Flexibility/joint mobility 4. Decreased transfer abilities 5. Increased Pain affecting function 6. Decreased Activity tolerance 7. Increased Fatigue affecting function GOALS: (Goals have been discussed and agreed upon with patient.) 1. DISCHARGE GOALS: Time Frame: 12 weeks 2. Patient will be independent in advanced core training exercises to return to functional mobility (MET) 3. Patient will report standing for greater than 30 min with work activity (MET-11/7/16) 4. NEW GOAL: 
5. Patient will walk greater than 12 laps in the gym during gym session. (MET)) 6. Patient will perform core exercises as part of his routine greater than 4-5 times a week (ongoing) 7. Patient will perform knee extension machine without pain at least 30 repetitions. (ongoing) 8. Patient will drive for longer than 2 hours without pain increase (MET) 9. Patient will show a greater than 5 point increase on the LEFS in order to show an increase in walking function (ongoing) REHABILITATION POTENTIAL FOR STATED GOALS: Virginie Organ OF CARE: 
INTERVENTIONS PLANNED: (Benefits and precautions of physical therapy have been discussed with the patient.) 1. balance exercise 2. bed mobility 3. cold 4. electrical stimulation 5. gait training 6. heat 7. manual therapy (including instrument assisted soft tissue mobilization) 8. neuromuscular re-education/strengthening 9. range of motion: active/assisted/passive 10. therapeutic activities 11. therapeutic exercise/strengthening 12. transfer training 13. Other: [employer] TREATMENT PLAN EFFECTIVE DATES: 11/19/18 TO 2/19/19 FREQUENCY/DURATION: Follow patient 1 time a month for 3 months to address above goals.  
 
                
SUBJECTIVE: 
 History of Present Injury/Illness (Reason for Referral): Patient report that he has had a long history of back issues. He first started to have health and difficulty functioning with a presence of gout for about a year, then had his hip replaced in April of this year. Now, his is trying to regain his back strength and function. He has difficulty walking for long periods and standing in one place. His goal is to become more active and functional as well as control his health. Patient had an injection a few weeks ago and had some relief from that. MRI results: Copy in paper chart showing: 
Abdominal hernia surgery performed in  
-Patient has been coming to therapy for a while now and started to have increased knee pain through the first few months of the year related to arthritic changes and increased load with working out with a . He has now changed his routine and looks for guidance on LE strength to avoid surgery. Dominant Side: right Past Medical History: Active Ambulatory Problems Diagnosis Date Noted  S/P total hip arthroplasty 04/06/2015  
 HTN (hypertension) 04/06/2015  HLD (hyperlipidemia) 04/06/2015  BPH (benign prostatic hyperplasia) 04/06/2015  Gout 04/06/2015  Anxiety 04/06/2015  Diabetes mellitus ([facility] [address] 04/06/2015 Resolved Ambulatory Problems Diagnosis Date Noted  No Resolved Ambulatory Problems Past Medical History:  
Diagnosis Date  Anxiety  Edema of both legs  Enlarged prostate  Gout  Hip pain  History of elevated glucose  History of seasonal allergies  Hypercholesterolemia  Hypertension  Keratoacanthoma  S/P total hip arthroplasty 4/6/2015  
 Skin neoplasm  Spinal stenosis Past Surgical History:  
Procedure Laterality Date  HX TONSILLECTOMY  as child  HX WISDOM TEETH EXTRACTION  as teenager Current Medications:  
Current Outpatient Medications:   HYDROmorphone (DILAUDID) 2 mg tablet, Take 1 Tab by mouth every four (4) hours as needed. Max Daily Amount: 12 mg., Disp: 40 Tab, Rfl: 0 
  prazosin (MINIPRESS) 1 mg capsule, Take 1 mg by mouth three (3) times daily. TAKE AM OF SURGERY WITH SMALL SIP OF WATER   Indications: BENIGN PROSTATIC HYPERTROPHY, Disp: , Rfl:  
  amLODIPine (NORVASC) 10 mg tablet, Take 10 mg by mouth daily. TAKE AM OF SURGERY WITH SMALL SIP OF WATER, Disp: , Rfl:  
  labetalol (NORMODYNE) 200 mg tablet, Take 200 mg by mouth two (2) times a day. TAKE AM OF SURGERY WITH SMALL SIP OF WATER, Disp: , Rfl:  
  cloNIDine HCl (CATAPRES) 0.2 mg tablet, Take 0.2 mg by mouth nightly. Indications: HYPERTENSION, Disp: , Rfl:  
  finasteride (PROSCAR) 5 mg tablet, Take 5 mg by mouth daily. TAKE AM OF SURGERY WITH SMALL SIP OF WATER   Indications: BENIGN PROSTATIC HYPERTROPHY, Disp: , Rfl:  
  furosemide (LASIX) 40 mg tablet, Take 40 mg by mouth daily. , Disp: , Rfl:  
  Febuxostat (ULORIC) 80 mg tab tablet, Take 80 mg by mouth daily. TAKE AM OF SURGERY WITH SMALL SIP OF WATER   Indications: GOUT, Disp: , Rfl:  
  LORazepam (ATIVAN) 0.5 mg tablet, Take 0.5 mg by mouth daily as needed for Anxiety. TAKES 1/2 OF 1MG TABLET DAILY AS NEEDED, \"USUALLY EVERY DAY\", Disp: , Rfl:  
Celebrex-generic Date Last Reviewed: 11/19/2018 Social History/Home Situation:  
Social History Socioeconomic History  Marital status:  Spouse name: Not on file  Number of children: Not on file  Years of education: Not on file  Highest education level: Not on file Social Needs  Financial resource strain: Not on file  Food insecurity - worry: Not on file  Food insecurity - inability: Not on file  Transportation needs - medical: Not on file  Transportation needs - non-medical: Not on file Occupational History  Not on file Tobacco Use  Smoking status: Never Smoker Substance and Sexual Activity  Alcohol use:  No  
  Drug use: Not on file  Sexual activity: Not on file Other Topics Concern  Not on file Social History Narrative  Not on file Work/Activity History: consultation OBJECTIVE: 
 
Outcome Measure: Tool Used: Modified Oswestry Low Back Pain Questionnaire Score:  Initial: 12/50  Most Recent: 9/50 (Date:12/5/17 ) Interpretation of Score: Each section is scored on a 0-5 scale, 5 representing the greatest disability. The scores of each section are added together for a total score of 50. Score 0 1-10 11-20 21-30 31-40 41-49 50 Modifier CH CI CJ CK CL CM CN  
 
? Changing and Maintaining Body Position:  
 [id number] - CURRENT STATUS: CI - 1%-19% impaired, limited or restricted  - GOAL STATUS:  CH - 0% impaired, limited or restricted  - D/C STATUS:  CI - 1%-19% impaired, limited or restricted Tool Used: Lower Extremity Functional Scale (LEFS) Score:  Initial: 49/80 Most Recent: 62/80 (Date: 11/19/18 ) Interpretation of Score: 20 questions each scored on a 5 point scale with 0 representing \"extreme difficulty or unable to perform\" and 4 representing \"no difficulty\". The lower the score, the greater the functional disability. 80/80 represents no disability. Minimal detectable change is 9 points. Score 80 79-63 62-48 47-32 31-16 15-1 0 Modifier CH CI CJ CK CL CM CN  
? Mobility - Walking and Moving Around:  
  - CURRENT STATUS: CJ - 20%-39% impaired, limited or restricted  - GOAL STATUS: CI - 1%-19% impaired, limited or restricted  - D/C STATUS:  ---------------To be determined--------------- Observation/Orthostatic Postural Assessment:    Shows increased genu varus on right and valgus on left. Decreased knee extension in standing Palpation:    Patient continues to shows some tightness at vastus lateralis on R LE and hip.   Spine shows improvement with core control and function. 
-Decreased joint glide of tibia on femur into IR more than ER 
 -Increased restriction at patella on right knee more medial tendon. ROM: Slight limitation in hip range of motion, but mostly limited in right rotation and side bending left Cervical ROM shows 50% to the right and 70% left cervical rotation. Patient shows limitation full extension today - Strength: Good and functional LE strength but with pain through knee extension Special Tests: Vertical compression test (VCT)- grade 3/5 with dysfunction at TL junction and right rotation Elbow flexion test (EFT)- Grade 3/5 with sluggish initiation, and endurance present Lumbar protective mechanism (LPM)- Left A-P is present grade 3, P-A grade 3 and present; Right A-P is grade 2 present, endurance present, P-A is 3 and present. (-) Functional Mobility:    
  Patient has difficulty with functional mobility, standing for greater than 30 min and with exercise activity Difficulty with stair climbing Balance:    fair- Patient unable to stand greater than 1 sec in single leg stance Bilaterally TREATMENT:  
 (In addition to Assessment/Re-Assessment sessions the following treatments were rendered) Present Symptoms: Patient reports that he had an injection a week and a half ago and has felt great since then. Pain at 0/10 Therapeutic Exercise: ( ):reviewed ) Exercises per grid below to improve mobility, strength, balance and coordination. Required minimal visual, verbal and manual cues to promote proper body alignment, promote proper body posture and promote proper body mechanics. Progressed resistance, range and repetitions as indicated. Date: 
11/19/2018 Activity/Exercise Parameters SCI FIT Not today Stretches  IT BAND review stretches x 1 min Education 15 min on continuing his exercises and starting to work some exercises in at home and not just the gym 
-Mini-plunger use and mobilization Core training (reviewed Wall stance with leg up x 3 min Changed to stairs x 2 min with heel lift Posterior depression x 5 min Indirect discussion on health and recent talk with MD about right knee pain. Discussed position of the knees and hips and what to do for gym exercises. Khang Wilhelm HEP-education on posture Manual Therapy (    Soft Tissue Mobilization Duration Duration: 30 Minutes):( min) -Mobilization of patella and patella tendon 
-STM to right fibula, tibialis anterior and fibular mobilizations. -STM to IT band with mini plunger 
-Hamstring mobilization in supine 
-Tenderness at superior gastroc on right side 
-Education and training on self management 
 
(Used abbreviations: MET - muscle energy technique; PNF - proprioceptive neuromuscular facilitation; NMR - neuromuscular re-education; a/p - anterior to posterior; p/a - posterior to anterior) Therapeutic Modalities: HEP: As above; handouts given to patient for all exercises. ______________________________________________________________________________________________________ Treatment Assessment:  Patient shows improved mobility today due to injection last week. Continue to follow once a month  Pain at 1/10. Progression/Medical Necessity:  
· Patient is expected to demonstrate progress in strength, range of motion, balance, coordination and functional technique to improve functional mobility. · Patient demonstrates good rehab potential due to higher previous functional level. · Skilled intervention continues to be required due to core weakness and instability. Compliance with Program/Exercises: compliant all of the time. Reason for Continuation of Services/Other Comments: 
· Patient continues to require skilled intervention due to decreased function. Recommendations/Intent for next treatment session: \"Treatment next visit will focus on core training, endurance\". Mobility in right knee Total Treatment Duration: Treatment: 30 min, Re-evaluation 20 min PT Patient Time In/Time Out Time In: 1300 Time Out: 1400 Cece Headings, PT, DPT, OCS

## 2018-12-17 ENCOUNTER — HOSPITAL ENCOUNTER (OUTPATIENT)
Dept: PHYSICAL THERAPY | Age: 73
Discharge: HOME | End: 2018-12-17
Payer: MEDICARE

## 2018-12-17 PROCEDURE — 97110 THERAPEUTIC EXERCISES: CPT

## 2018-12-17 NOTE — PROGRESS NOTES
Meeta Hallman  : 1945  [place]  Secondary:  [address]  at [facility] [address]  [phone]   [phone]        Outpatient PHYSICAL THERAPY: Daily Note\  Fall Risk Score: 2 (? 5 = High Risk)       ICD-10: Treatment Diagnosis: Low back pain, M54.5  Difficulty walking, not elsewhere classified, R26.2  Cervicalgia, M54.2  Pain in joint, right and left knee, M25.561, M25.562  REFERRING PHYSICIAN: MD Dr. Aranza Driver MD Orders: Evaluate and Treat  Return Physician Appointment: not known  MEDICAL/REFERRING DIAGNOSIS: Lumbosacral stenosis with neurogenic claudication, DDD, Lumbosacral spondylosis without myelopathy, other symptoms referrable to back  DATE OF ONSET: Chronic   PRIOR LEVEL OF FUNCTION: independent  PRECAUTIONS/ALLERGIES:   Allergies   Allergen Reactions    Other Medication Other (comments)     \"Some BP meds\" cause \"  SWELLING OF LEGS with Procardia and with current medicine    Statins-Hmg-Coa Reductase Inhibitors Other (comments)     Extreme nerve pain       ASSESSMENT:  ?????? ? ? This section established at most recent assessment??????????  Meeta Hallman has been seen for 76 visits from 12/11/15 to 2018 for lumbar spine pain and spinal stenosis. Patient has performed therapeutic exercises, activities, and had manual therapy to increased strength, ROM and function. Patient has also used modalities for pain control in order to increase function. He is becoming more consistent with his workout program at the gym and is seeing benefits. He still has lack of core strength at this time. Patient has shown some decrease this time with the LEFS to 62/80. He has had a recent weight gains and is looking to start back better in the gym and to start working on the [employer] strength.   Patient has progressed well toward their goals and will benefit from continuing skilled PT in order to address their impairments. Hafsa Sanchez, PT, DPT, OCS  12/17/2018      PROBLEM LIST (Impairments causing functional limitations):  1. Decreased Strength affecting function  2. Decreased ADL/Functional Activities  3. Decreased Flexibility/joint mobility  4. Decreased transfer abilities  5. Increased Pain affecting function  6. Decreased Activity tolerance   7. Increased Fatigue affecting function  GOALS: (Goals have been discussed and agreed upon with patient.)  1. DISCHARGE GOALS: Time Frame: 12 weeks   2. Patient will be independent in advanced core training exercises to return to functional mobility (MET)  3. Patient will report standing for greater than 30 min with work activity (MET-11/7/16)  4. NEW GOAL:  5. Patient will walk greater than 12 laps in the gym during gym session. (MET))  6. Patient will perform core exercises as part of his routine greater than 4-5 times a week (ongoing)  7. Patient will perform knee extension machine without pain at least 30 repetitions. (ongoing)  8. Patient will drive for longer than 2 hours without pain increase (MET)  9. Patient will show a greater than 5 point increase on the LEFS in order to show an increase in walking function (ongoing)  REHABILITATION POTENTIAL FOR STATED GOALS: GoodPLAN OF CARE:  INTERVENTIONS PLANNED: (Benefits and precautions of physical therapy have been discussed with the patient.)  1. balance exercise  2. bed mobility  3. cold  4. electrical stimulation  5. gait training  6. heat  7. manual therapy (including instrument assisted soft tissue mobilization)  8. neuromuscular re-education/strengthening  9. range of motion: active/assisted/passive  10. therapeutic activities  11. therapeutic exercise/strengthening  12. transfer training  13. Other: Aquatics  TREATMENT PLAN EFFECTIVE DATES: 11/19/18 TO 2/19/18  FREQUENCY/DURATION: Follow patient 1 time a month for 3 months to address above goals.                       SUBJECTIVE:    History of Present Injury/Illness (Reason for Referral): Patient report that he has had a long history of back issues. He first started to have health and difficulty functioning with a presence of gout for about a year, then had his hip replaced in April of this year. Now, his is trying to regain his back strength and function. He has difficulty walking for long periods and standing in one place. His goal is to become more active and functional as well as control his health. Patient had an injection a few weeks ago and had some relief from that. MRI results: Copy in paper chart showing:  Abdominal hernia surgery performed in   -Patient has been coming to therapy for a while now and started to have increased knee pain through the first few months of the year related to arthritic changes and increased load with working out with a . He has now changed his routine and looks for guidance on LE strength to avoid surgery. Dominant Side: right  Past Medical History:    Active Ambulatory Problems     Diagnosis Date Noted    S/P total hip arthroplasty 04/06/2015    HTN (hypertension) 04/06/2015    HLD (hyperlipidemia) 04/06/2015    BPH (benign prostatic hyperplasia) 04/06/2015    Gout 04/06/2015    Anxiety 04/06/2015    Diabetes mellitus ([facility] [address] 04/06/2015     Resolved Ambulatory Problems     Diagnosis Date Noted    No Resolved Ambulatory Problems     Past Medical History:   Diagnosis Date    Anxiety     Edema of both legs     Enlarged prostate     Gout     Hip pain     History of elevated glucose     History of seasonal allergies     Hypercholesterolemia     Hypertension     Keratoacanthoma     S/P total hip arthroplasty 4/6/2015    Skin neoplasm     Spinal stenosis      Past Surgical History:   Procedure Laterality Date    HX TONSILLECTOMY  as child    HX WISDOM TEETH EXTRACTION  as teenager     Current Medications:   Current Outpatient Medications:     HYDROmorphone (DILAUDID) 2 mg tablet, Take 1 Tab by mouth every four (4) hours as needed. Max Daily Amount: 12 mg., Disp: 40 Tab, Rfl: 0    prazosin (MINIPRESS) 1 mg capsule, Take 1 mg by mouth three (3) times daily. TAKE AM OF SURGERY WITH SMALL SIP OF WATER   Indications: BENIGN PROSTATIC HYPERTROPHY, Disp: , Rfl:     amLODIPine (NORVASC) 10 mg tablet, Take 10 mg by mouth daily. TAKE AM OF SURGERY WITH SMALL SIP OF WATER, Disp: , Rfl:     labetalol (NORMODYNE) 200 mg tablet, Take 200 mg by mouth two (2) times a day. TAKE AM OF SURGERY WITH SMALL SIP OF WATER, Disp: , Rfl:     cloNIDine HCl (CATAPRES) 0.2 mg tablet, Take 0.2 mg by mouth nightly. Indications: HYPERTENSION, Disp: , Rfl:     finasteride (PROSCAR) 5 mg tablet, Take 5 mg by mouth daily. TAKE AM OF SURGERY WITH SMALL SIP OF WATER   Indications: BENIGN PROSTATIC HYPERTROPHY, Disp: , Rfl:     furosemide (LASIX) 40 mg tablet, Take 40 mg by mouth daily. , Disp: , Rfl:     Febuxostat (ULORIC) 80 mg tab tablet, Take 80 mg by mouth daily. TAKE AM OF SURGERY WITH SMALL SIP OF WATER   Indications: GOUT, Disp: , Rfl:     LORazepam (ATIVAN) 0.5 mg tablet, Take 0.5 mg by mouth daily as needed for Anxiety.  TAKES 1/2 OF 1MG TABLET DAILY AS NEEDED, \"USUALLY EVERY DAY\", Disp: , Rfl:   Celebrex-generic   Date Last Reviewed: 12/17/2018    Social History/Home Situation:   Social History     Socioeconomic History    Marital status:      Spouse name: Not on file    Number of children: Not on file    Years of education: Not on file    Highest education level: Not on file   Social Needs    Financial resource strain: Not on file    Food insecurity - worry: Not on file    Food insecurity - inability: Not on file   [employer] needs - medical: Not on file   [employer] needs - non-medical: Not on file   Occupational History    Not on file   Tobacco Use    Smoking status: Never Smoker   Substance and Sexual Activity    Alcohol use: No    Drug use: Not on file    Sexual activity: Not on file   Other Topics Concern    Not on file   Social History Narrative    Not on file           Work/Activity History: consultation  OBJECTIVE:    Outcome Measure: Tool Used: Modified Oswestry Low Back Pain Questionnaire  Score:  Initial: 12/50  Most Recent: 9/50 (Date:12/5/17 )   Interpretation of Score: Each section is scored on a 0-5 scale, 5 representing the greatest disability. The scores of each section are added together for a total score of 50. Score 0 1-10 11-20 21-30 31-40 41-49 50   Modifier CH CI CJ CK CL CM CN     ? Changing and Maintaining Body Position:    [id number] - CURRENT STATUS: CI - 1%-19% impaired, limited or restricted    - GOAL STATUS:  CH - 0% impaired, limited or restricted    - D/C STATUS:  CI - 1%-19% impaired, limited or restricted  Tool Used: Lower Extremity Functional Scale (LEFS)  Score:  Initial: 49/80 Most Recent: 62/80 (Date: 11/19/18 )   Interpretation of Score: 20 questions each scored on a 5 point scale with 0 representing \"extreme difficulty or unable to perform\" and 4 representing \"no difficulty\". The lower the score, the greater the functional disability. 80/80 represents no disability. Minimal detectable change is 9 points. Score 80 79-63 62-48 47-32 31-16 15-1 0   Modifier CH CI CJ CK CL CM CN   ? Mobility - Walking and Moving Around:     - CURRENT STATUS: CJ - 20%-39% impaired, limited or restricted    - GOAL STATUS: CI - 1%-19% impaired, limited or restricted    - D/C STATUS:  ---------------To be determined---------------        Observation/Orthostatic Postural Assessment:    Shows increased genu varus on right and valgus on left. Decreased knee extension in standing   Palpation:    Patient continues to shows some tightness at vastus lateralis on R LE and hip. Spine shows improvement with core control and function.  -Decreased joint glide of tibia on femur into IR more than ER  -Increased restriction at patella on right knee more medial tendon.   ROM: Slight limitation in hip range of motion, but mostly limited in right rotation and side bending left   Cervical ROM shows 50% to the right and 70% left cervical rotation. Patient shows limitation full extension today  -     Strength: Good and functional LE strength but with pain through knee extension                 Special Tests: Vertical compression test (VCT)- grade 3/5 with dysfunction at TL junction and right rotation  Elbow flexion test (EFT)- Grade 3/5 with sluggish initiation, and endurance present  Lumbar protective mechanism (LPM)- Left A-P is present grade 3, P-A grade 3 and present; Right A-P is grade 2 present, endurance present, P-A is 3 and present. (-)  Functional Mobility:       Patient has difficulty with functional mobility, standing for greater than 30 min and with exercise activity  Difficulty with stair climbing  Balance:    fair- Patient unable to stand greater than 1 sec in single leg stance Bilaterally  TREATMENT:    (In addition to Assessment/Re-Assessment sessions the following treatments were rendered)  Present Symptoms: Patient reports that he has felt great over the last two months    Pain at 0/10    Therapeutic Exercise: (30 Minutes):reviewed ) Exercises per grid below to improve mobility, strength, balance and coordination. Required minimal visual, verbal and manual cues to promote proper body alignment, promote proper body posture and promote proper body mechanics. Progressed resistance, range and repetitions as indicated. Date:  12/17/2018     Activity/Exercise Parameters   SCI FIT Not today   Stretches  IT BAND review stretches x 1 min   Education 25 min on stress management     Core training (reviewed Wall stance with leg up x 3 min  Changed to stairs x 2 min with heel lift  Posterior depression x 5 min     Indirect discussion on health and recent talk with MD about right knee pain. Discussed position of the knees and hips and what to do for gym exercises. Lisa Wright     HEP-education on posture  Manual Therapy (     ):( min)   -Mobilization of patella and patella tendon  -STM to right fibula, tibialis anterior and fibular mobilizations. -STM to IT band with mini plunger  -Hamstring mobilization in supine  -Tenderness at superior gastroc on right side  -Education and training on self management    (Used abbreviations: MET - muscle energy technique; PNF - proprioceptive neuromuscular facilitation; NMR - neuromuscular re-education; a/p - anterior to posterior; p/a - posterior to anterior)   Therapeutic Modalities:                                                                                               HEP: As above; handouts given to patient for all exercises. ______________________________________________________________________________________________________    Treatment Assessment:  Patient shows good understanding of stress management Continue to follow once a month  Pain at 0/10. Progression/Medical Necessity:   · Patient is expected to demonstrate progress in strength, range of motion, balance, coordination and functional technique to improve functional mobility. · Patient demonstrates good rehab potential due to higher previous functional level. · Skilled intervention continues to be required due to core weakness and instability. Compliance with Program/Exercises: compliant all of the time. Reason for Continuation of Services/Other Comments:  · Patient continues to require skilled intervention due to decreased function. Recommendations/Intent for next treatment session: \"Treatment next visit will focus on core training, endurance\".  Mobility in right knee  Total Treatment Duration: Treatment: 30 min,   PT Patient Time In/Time Out  Time In: 1251  Time Out: 96 Bishnu Agarwal, PT, DPT, OCS

## 2019-01-14 ENCOUNTER — HOSPITAL ENCOUNTER (OUTPATIENT)
Dept: PHYSICAL THERAPY | Age: 74
Discharge: HOME | End: 2019-01-14
Payer: MEDICARE

## 2019-01-14 PROCEDURE — 97110 THERAPEUTIC EXERCISES: CPT

## 2019-01-14 NOTE — THERAPY DISCHARGE
Ino Gonzáles : 1945 [place] Secondary:  [facility] 
[address] [place] [address] [phone]   [phone] Outpatient PHYSICAL THERAPY: Daily Note and Discharge\ Fall Risk Score: 2 (? 5 = High Risk) ICD-10: Treatment Diagnosis: Low back pain, M54.5 Difficulty walking, not elsewhere classified, R26.2 Cervicalgia, M54.2 Pain in joint, right and left knee, M25.561, M25.562 REFERRING PHYSICIAN: MD Dr. Ketty Portillo MD Orders: Evaluate and Treat Return Physician Appointment: not known MEDICAL/REFERRING DIAGNOSIS: Lumbosacral stenosis with neurogenic claudication, DDD, Lumbosacral spondylosis without myelopathy, other symptoms referrable to back DATE OF ONSET: Chronic PRIOR LEVEL OF FUNCTION: independent PRECAUTIONS/ALLERGIES:  
Allergies Allergen Reactions  Other Medication Other (comments) \"Some BP meds\" cause \" SWELLING OF LEGS with Procardia and with current medicine  Statins-Hmg-Coa Reductase Inhibitors Other (comments) Extreme nerve pain ASSESSMENT: 
?????? ? ? This section established at most recent assessment?????????? 
Ino Gonzáles has been seen for 79 visits from 12/11/15 to 2019 for lumbar spine pain and spinal stenosis. Patient has performed therapeutic exercises, activities, and had manual therapy to increased strength, ROM and function. Patient has also used modalities for pain control in order to increase function. He is becoming more consistent with his workout program at the gym and is seeing benefits. He still has lack of core strength at this time. Patient has shown some decrease this time with the LEFS to 61/80. He has a good understanding of his Home exercises and plan. He will plan to get another order if further treatment and guidance is needed. Patient has met his goals for therapy and will be discharged. Please re-order therapy if further is needed. Thank you for the referral. 
Jerson Heller, PT, DPT, OCS 
1/14/2019 PROBLEM LIST (Impairments causing functional limitations): 1. Decreased Strength affecting function 2. Decreased ADL/Functional Activities 3. Decreased Flexibility/joint mobility 4. Decreased transfer abilities 5. Increased Pain affecting function 6. Decreased Activity tolerance 7. Increased Fatigue affecting function GOALS: (Goals have been discussed and agreed upon with patient.) 1. DISCHARGE GOALS: Time Frame: 12 weeks 2. Patient will be independent in advanced core training exercises to return to functional mobility (MET) 3. Patient will report standing for greater than 30 min with work activity (MET-11/7/16) 4. NEW GOAL: 
5. Patient will walk greater than 12 laps in the gym during gym session. (MET)) 6. Patient will perform core exercises as part of his routine greater than 4-5 times a week (MET) 7. Patient will perform knee extension machine without pain at least 30 repetitions. (MET) 8. Patient will drive for longer than 2 hours without pain increase (MET) 9. Patient will show a greater than 5 point increase on the LEFS in order to show an increase in walking function (MET) REHABILITATION POTENTIAL FOR STATED GOALS: Dari Nielsen OF CARE: 
INTERVENTIONS PLANNED: (Benefits and precautions of physical therapy have been discussed with the patient.) 1. balance exercise 2. bed mobility 3. cold 4. electrical stimulation 5. gait training 6. heat 7. manual therapy (including instrument assisted soft tissue mobilization) 8. neuromuscular re-education/strengthening 9. range of motion: active/assisted/passive 10. therapeutic activities 11. therapeutic exercise/strengthening 12. transfer training 13. Other: [employer] TREATMENT PLAN EFFECTIVE DATES: 11/19/18 TO 2/19/18 FREQUENCY/DURATION: Follow patient 1 time a month for 3 months to address above goals. SUBJECTIVE: 
 
History of Present Injury/Illness (Reason for Referral): Patient report that he has had a long history of back issues. He first started to have health and difficulty functioning with a presence of gout for about a year, then had his hip replaced in April of this year. Now, his is trying to regain his back strength and function. He has difficulty walking for long periods and standing in one place. His goal is to become more active and functional as well as control his health. Patient had an injection a few weeks ago and had some relief from that. MRI results: Copy in paper chart showing: 
Abdominal hernia surgery performed in  
-Patient has been coming to therapy for a while now and started to have increased knee pain through the first few months of the year related to arthritic changes and increased load with working out with a . He has now changed his routine and looks for guidance on LE strength to avoid surgery. Dominant Side: right Past Medical History: Active Ambulatory Problems Diagnosis Date Noted  S/P total hip arthroplasty 04/06/2015  
 HTN (hypertension) 04/06/2015  HLD (hyperlipidemia) 04/06/2015  BPH (benign prostatic hyperplasia) 04/06/2015  Gout 04/06/2015  Anxiety 04/06/2015  Diabetes mellitus ([facility] [address] 04/06/2015 Resolved Ambulatory Problems Diagnosis Date Noted  No Resolved Ambulatory Problems Past Medical History:  
Diagnosis Date  Anxiety  Edema of both legs  Enlarged prostate  Gout  Hip pain  History of elevated glucose  History of seasonal allergies  Hypercholesterolemia  Hypertension  Keratoacanthoma  S/P total hip arthroplasty 4/6/2015  
 Skin neoplasm  Spinal stenosis Past Surgical History:  
Procedure Laterality Date  HX TONSILLECTOMY  as child  HX WISDOM TEETH EXTRACTION  as teenager Current Medications:  
Current Outpatient Medications:   HYDROmorphone (DILAUDID) 2 mg tablet, Take 1 Tab by mouth every four (4) hours as needed. Max Daily Amount: 12 mg., Disp: 40 Tab, Rfl: 0 
  prazosin (MINIPRESS) 1 mg capsule, Take 1 mg by mouth three (3) times daily. TAKE AM OF SURGERY WITH SMALL SIP OF WATER   Indications: BENIGN PROSTATIC HYPERTROPHY, Disp: , Rfl:  
  amLODIPine (NORVASC) 10 mg tablet, Take 10 mg by mouth daily. TAKE AM OF SURGERY WITH SMALL SIP OF WATER, Disp: , Rfl:  
  labetalol (NORMODYNE) 200 mg tablet, Take 200 mg by mouth two (2) times a day. TAKE AM OF SURGERY WITH SMALL SIP OF WATER, Disp: , Rfl:  
  cloNIDine HCl (CATAPRES) 0.2 mg tablet, Take 0.2 mg by mouth nightly. Indications: HYPERTENSION, Disp: , Rfl:  
  finasteride (PROSCAR) 5 mg tablet, Take 5 mg by mouth daily. TAKE AM OF SURGERY WITH SMALL SIP OF WATER   Indications: BENIGN PROSTATIC HYPERTROPHY, Disp: , Rfl:  
  furosemide (LASIX) 40 mg tablet, Take 40 mg by mouth daily. , Disp: , Rfl:  
  Febuxostat (ULORIC) 80 mg tab tablet, Take 80 mg by mouth daily. TAKE AM OF SURGERY WITH SMALL SIP OF WATER   Indications: GOUT, Disp: , Rfl:  
  LORazepam (ATIVAN) 0.5 mg tablet, Take 0.5 mg by mouth daily as needed for Anxiety. TAKES 1/2 OF 1MG TABLET DAILY AS NEEDED, \"USUALLY EVERY DAY\", Disp: , Rfl:  
Celebrex-generic Date Last Reviewed: 1/14/2019 Social History/Home Situation:  
Social History Socioeconomic History  Marital status:  Spouse name: Not on file  Number of children: Not on file  Years of education: Not on file  Highest education level: Not on file Social Needs  Financial resource strain: Not on file  Food insecurity - worry: Not on file  Food insecurity - inability: Not on file  Transportation needs - medical: Not on file  Transportation needs - non-medical: Not on file Occupational History  Not on file Tobacco Use  Smoking status: Never Smoker Substance and Sexual Activity  Alcohol use:  No  
  Drug use: Not on file  Sexual activity: Not on file Other Topics Concern  Not on file Social History Narrative  Not on file Work/Activity History: consultation OBJECTIVE: 
 
Outcome Measure: Tool Used: Modified Oswestry Low Back Pain Questionnaire Score:  Initial: 12/50  Most Recent: 9/50 (Date:12/5/17 ) Interpretation of Score: Each section is scored on a 0-5 scale, 5 representing the greatest disability. The scores of each section are added together for a total score of 50. Score 0 1-10 11-20 21-30 31-40 41-49 50 Modifier CH CI CJ CK CL CM CN Tool Used: Lower Extremity Functional Scale (LEFS) Score:  Initial: 49/80 Most Recent: 61/80 (Date: 1/14/19) Interpretation of Score: 20 questions each scored on a 5 point scale with 0 representing \"extreme difficulty or unable to perform\" and 4 representing \"no difficulty\". The lower the score, the greater the functional disability. 80/80 represents no disability. Minimal detectable change is 9 points. Score 80 79-63 62-48 47-32 31-16 15-1 0 Modifier CH CI CJ CK CL CM CN  
 
 
 
 
Observation/Orthostatic Postural Assessment:    Shows increased genu varus on right and valgus on left. Decreased knee extension in standing Palpation:    Patient continues to shows some tightness at vastus lateralis on R LE and hip. Spine shows improvement with core control and function. 
-Decreased joint glide of tibia on femur into IR more than ER 
-Increased restriction at patella on right knee more medial tendon. ROM: Slight limitation in hip range of motion, but mostly limited in right rotation and side bending left Cervical ROM shows 50% to the right and 70% left cervical rotation. Patient shows limitation full extension today - Strength: Good and functional LE strength but with pain through knee extension Special Tests: Vertical compression test (VCT)- grade 4/5 with dysfunction at TL junction and right rotation Elbow flexion test (EFT)- Grade 3+/5 with sluggish initiation, and endurance present Lumbar protective mechanism (LPM)- Left A-P is present grade 3+, P-A grade 4 and present; Right A-P is grade 3 present, endurance present, P-A is 3+ and present. (-) Functional Mobility:    
  Patient has difficulty with functional mobility, standing for greater than 30 min and with exercise activity Difficulty with stair climbing Balance:    fair- Patient unable to stand greater than 1 sec in single leg stance Bilaterally TREATMENT:  
 (In addition to Assessment/Re-Assessment sessions the following treatments were rendered) Present Symptoms: Patient reports that he has felt pretty good with the joints lately. He is getting different blood pressure meds that seem to be doing better now Pain at 0/10 Therapeutic Exercise: (15 Minutes):reviewed ) Exercises per grid below to improve mobility, strength, balance and coordination. Required minimal visual, verbal and manual cues to promote proper body alignment, promote proper body posture and promote proper body mechanics. Progressed resistance, range and repetitions as indicated. Date: 
1/14/2019 Activity/Exercise Parameters SCI FIT Not today Stretches  IT BAND review stretches x 1 min Education 25 min on stress management Core training (reviewed Wall stance with leg up x 3 min Changed to stairs x 2 min with heel lift Posterior depression x 5 min Indirect discussion on health and recent talk with MD about right knee pain. Discussed position of the knees and hips and what to do for gym exercises. Yovanny Mason HEP-education on posture 
 
 
(Used abbreviations: MET - muscle energy technique; PNF - proprioceptive neuromuscular facilitation; NMR - neuromuscular re-education; a/p - anterior to posterior; p/a - posterior to anterior) Therapeutic Modalities: HEP: As above; handouts given to patient for all exercises. ______________________________________________________________________________________________________ Treatment Assessment:  Patient shows good understanding of stress management. Patient to be discharged at this time. Pain at 0/10. Total Treatment Duration: Treatment: 15 min, Discussion and discharge 40 min PT Patient Time In/Time Out Time In: 1400 Time Out: 1500 Reba Zafar, PT, DPT, OCS

## 2019-02-11 ENCOUNTER — HOSPITAL ENCOUNTER (OUTPATIENT)
Dept: PHYSICAL THERAPY | Age: 74
End: 2019-02-11

## 2019-03-04 ENCOUNTER — HOSPITAL ENCOUNTER (OUTPATIENT)
Dept: PHYSICAL THERAPY | Age: 74
Discharge: HOME | End: 2019-03-04
Payer: MEDICARE

## 2019-03-04 PROCEDURE — 97161 PT EVAL LOW COMPLEX 20 MIN: CPT

## 2019-03-04 NOTE — THERAPY EVALUATION
Milton Dan : 1945 [place] Secondary: [facility] 
[address] [place] [address] [phone]   [phone] OUTPATIENT PHYSICAL THERAPY:Initial Assessment 3/4/2019 ICD-10: Treatment Diagnosis: Pain in joint, right knee, M25.561 Difficulty walking, not elsewhere classified, R26.2 Precautions/Allergies: Other medication and Statins-hmg-coa reductase inhibitors MD Orders: Evaluate and Treat MEDICAL/REFERRING DIAGNOSIS: 
Unilateral primary osteoarthritis, right knee [M17.11] DATE OF ONSET: Chronic REFERRING PHYSICIAN: Wilfredo Gunderson MD 
RETURN PHYSICIAN APPOINTMENT: not sheduled INITIAL ASSESSMENT:  Mr. Ana Ledbetter presents with chronic right knee pain and difficulty walking due to primary OA. He shows limitations from his hip and low back to the right knee. He has some decreased mobility overall that has prevented him from returning to regular gym activities. He is a good candidate for skilled PT in order to address listed impairments affecting his function. Ruchi Gambino, PT, DPT, OCS PROBLEM LIST (Impacting functional limitations): 1. Decreased Strength 2. Decreased ADL/Functional Activities 3. Decreased Transfer Abilities 4. Decreased Ambulation Ability/Technique 5. Decreased Balance 6. Increased Pain 7. Decreased Activity Tolerance 8. Increased Fatigue 9. Decreased Flexibility/Joint Mobility 10. Edema/Girth INTERVENTIONS PLANNED: (Treatment may consist of any combination of the following) 1. Balance Exercise 2. Bed Mobility 3. Gait Training 4. Heat 5. Manual Therapy 6. Neuromuscular Re-education/Strengthening 7. Range of Motion (ROM) 8. Therapeutic Activites 9. Therapeutic Exercise/Strengthening TREATMENT PLAN: 
Effective Dates: 3/4/2019 TO 2019 (90 days). Frequency/Duration: 1 time a month for 3 months GOALS: (Goals have been discussed and agreed upon with patient.) Discharge Goals: Time Frame: 12 weeks 1. Patient will show a return to performing at least 30 minutes of endurance exercises at least 3-4 times a week in order to return to his normal function 2. Patient will show full understanding of right knee exercises without difficulty 3. Patient will show a greater than 5 point increase on the LEFS in order to show an increase in functional activities 4. Patient will perform 10 sit to stands without difficulty Rehabilitation Potential For Stated Goals: Excellent The information in this section was collected on 3/4/2019 
 (except where otherwise noted). HISTORY:  
History of Present Injury/Illness (Reason for Referral): 
Patient reports that he has just been feeling a little off this whole year so far even starting in December. He has had the joint aches and some pain in his right knee and lower back. The right knee has been much better since he had an injection in November and he feels like that is holding on and helping him keep moving. He has had difficulty with getting moving with exercises again and has felt more problems with overall inflammation and with his bowels. His primary care Dr. Reji Brandt had a scan done for abdominal aorta problems and or gastro intestinal blockages that were negative. He reports that he has not returned to all of the exercise he was doing before, but is a little fearful to cause pain. His goals are to return to normal physical activity and get back to a healthy lifestyle without knee pain. Past Medical History/Comorbidities:  
Mr. Kodak Cornell  has a past medical history of Anxiety, Edema of both legs, Enlarged prostate, Gout, Hip pain, History of elevated glucose, History of seasonal allergies, Hypercholesterolemia, Hypertension, Keratoacanthoma, S/P total hip arthroplasty (4/6/2015), Skin neoplasm, and Spinal stenosis. He also has no past medical history of Aneurysm ([facility] [address] Arrhythmia, Arthritis, Asthma, Autoimmune disease ([facility] [address] CAD (coronary artery disease), Cancer ([facility] [address] Chronic kidney disease, Chronic obstructive pulmonary disease ([facility] [address] Chronic pain, Coagulation disorder ([facility] [address] Difficult intubation, GERD (gastroesophageal reflux disease), Heart failure ([facility] [address] Ill-defined condition, Liver disease, Malignant hyperthermia due to anesthesia, Morbid obesity ([facility] [address] Nausea & vomiting, Pseudocholinesterase deficiency, Psychiatric disorder, PUD (peptic ulcer disease), Seizures ([facility] [address] [facility] Thromboembolus ([facility] Thyroid disease, Unspecified adverse effect of anesthesia, or Unspecified sleep apnea. Mr. Lizette Proctor  has a past surgical history that includes hx tonsillectomy (as child) and hx wisdom teeth extraction (as teenager). Social History/Living Environment:  
   
Social History Socioeconomic History  Marital status:  Spouse name: Not on file  Number of children: Not on file  Years of education: Not on file  Highest education level: Not on file Social Needs  Financial resource strain: Not on file  Food insecurity - worry: Not on file  Food insecurity - inability: Not on file  Transportation needs - medical: Not on file  Transportation needs - non-medical: Not on file Occupational History  Not on file Tobacco Use  Smoking status: Never Smoker Substance and Sexual Activity  Alcohol use: No  
 Drug use: Not on file  Sexual activity: Not on file Other Topics Concern  Not on file Social History Narrative  Not on file Prior Level of Function/Work/Activity: 
Independent, sales Dominant Side:  
      RIGHT Ambulatory/Rehab Services H2 Model Falls Risk Assessment Risk Factors: 
     (1)  Gender [Male] Ability to Rise from Chair: 
     (1)  Pushes up, successful in one attempt Falls Prevention Plan: No modifications necessary Total: (5 or greater = High Risk): 2  
 ©2010 [facility] [employer] States Patent #[id number]. Federal Law prohibits the replication, distribution or use without written permission from [facility] Current Medications:  Patient recently had a steroid pack for sinus issues and antibiotic for that as well that were recently completed Current Outpatient Medications:  
  HYDROmorphone (DILAUDID) 2 mg tablet, Take 1 Tab by mouth every four (4) hours as needed. Max Daily Amount: 12 mg., Disp: 40 Tab, Rfl: 0 
  prazosin (MINIPRESS) 1 mg capsule, Take 1 mg by mouth three (3) times daily. TAKE AM OF SURGERY WITH SMALL SIP OF WATER   Indications: BENIGN PROSTATIC HYPERTROPHY, Disp: , Rfl:  
  amLODIPine (NORVASC) 10 mg tablet, Take 10 mg by mouth daily. TAKE AM OF SURGERY WITH SMALL SIP OF WATER, Disp: , Rfl:  
  labetalol (NORMODYNE) 200 mg tablet, Take 200 mg by mouth two (2) times a day. TAKE AM OF SURGERY WITH SMALL SIP OF WATER, Disp: , Rfl:  
  cloNIDine HCl (CATAPRES) 0.2 mg tablet, Take 0.2 mg by mouth nightly. Indications: HYPERTENSION, Disp: , Rfl:  
  finasteride (PROSCAR) 5 mg tablet, Take 5 mg by mouth daily. TAKE AM OF SURGERY WITH SMALL SIP OF WATER   Indications: BENIGN PROSTATIC HYPERTROPHY, Disp: , Rfl:  
  furosemide (LASIX) 40 mg tablet, Take 40 mg by mouth daily. , Disp: , Rfl:  
  Febuxostat (ULORIC) 80 mg tab tablet, Take 80 mg by mouth daily. TAKE AM OF SURGERY WITH SMALL SIP OF WATER   Indications: GOUT, Disp: , Rfl:  
  LORazepam (ATIVAN) 0.5 mg tablet, Take 0.5 mg by mouth daily as needed for Anxiety. TAKES 1/2 OF 1MG TABLET DAILY AS NEEDED, \"USUALLY EVERY DAY\", Disp: , Rfl:   
Date Last Reviewed:  3/4/2019 Number of Personal Factors/Comorbidities that affect the Plan of Care: 1-2: MODERATE COMPLEXITY EXAMINATION:  
Observation/Orthostatic Postural Assessment:   
      Patient shows increased right pelvic height in standing with increased right side bent position. Increased knee hyperextension in standing as well as increased right ER in the femur (previous ANUJ) Right LE shows genu varus and left LE shows genu valgus Palpation:   
      Tightness along right QL in standing 
-Patella on right and left lack mobility in superior and inferior glide 
-Joint mobility shows some decreased in joint play on the right tibia on femur for ER 
-Right hip shows good overall mobility at this time 
-Posterior right knee has restrictions in gastroc-soleus tendon ROM:   
      Knee flexion to 130 deg R LE and full extension Functional Mobility:  
      Gait/Ambulation:  Patient shows increased side to side sway with gait and some lack of extension push off from gluts Transfers:  Pushes with hands from chair to stand Stairs:  Some slight difficulty with pushing through Quads Balance:   
      Decreased balance with tandem stance and single leg B Body Structures Involved: 1. Bones 2. Joints 3. Muscles 4. Ligaments Body Functions Affected: 1. Neuromusculoskeletal 
2. Movement Related Activities and Participation Affected: 1. General Tasks and Demands 2. Mobility 3. Domestic Life 4. Community, Social and [place] Number of elements (examined above) that affect the Plan of Care: 4+: HIGH COMPLEXITY CLINICAL PRESENTATION:  
Presentation: Stable and uncomplicated: LOW COMPLEXITY CLINICAL DECISION MAKING:  
Outcome Measure: Tool Used: Lower Extremity Functional Scale (LEFS) Score:  Initial: 60/80 Most Recent: X/80 (Date: -- ) Interpretation of Score: 20 questions each scored on a 5 point scale with 0 representing \"extreme difficulty or unable to perform\" and 4 representing \"no difficulty\". The lower the score, the greater the functional disability. 80/80 represents no disability. Minimal detectable change is 9 points.  
 
 
Medical Necessity:  
· Patient is expected to demonstrate progress in strength, range of motion, balance, coordination and functional technique to increased mobility. · Patient demonstrates excellent rehab potential due to higher previous functional level. · Skilled intervention continues to be required due to decreased activity and mobility. Reason for Services/Other Comments: 
· Patient continues to require skilled intervention due to decreased functional mobility. Use of outcome tool(s) and clinical judgement create a POC that gives a: Clear prediction of patient's progress: LOW COMPLEXITY · Pain at 2/10  Today. HEP to create log of food/diet and exercise Total Treatment Duration:Evaluation only PT Patient Time In/Time Out Time In: 4449 Time Out: 1500 Sierra Grant, PT Future Appointments Date Time Provider Nancy Eason 4/1/2019  2:00 PM Cintia Morales, PT [facility]

## 2019-03-21 ENCOUNTER — HOSPITAL ENCOUNTER (OUTPATIENT)
Dept: PHYSICAL THERAPY | Age: 74
Discharge: HOME | End: 2019-03-21
Payer: MEDICARE

## 2019-03-21 PROCEDURE — 97140 MANUAL THERAPY 1/> REGIONS: CPT

## 2019-03-21 PROCEDURE — 97110 THERAPEUTIC EXERCISES: CPT

## 2019-03-21 NOTE — PROGRESS NOTES
Tonya Cedillo : 1945 [place] Secondary: [facility] 
[address] [place] [address] [phone]   [phone] OUTPATIENT PHYSICAL THERAPY: Daily Treatment Note 3/21/2019 Pre-treatment Symptoms/Complaints:  Patient reports that he has had an injection in the back and he started to have more pain in the hip and down the leg after that. He also had some dental work done and took [employer] as an antibiotic. He has had all the side effects from the list and had trouble getting up and walking for a few days. He is done with the medication, but still has difficulty walking Pain: Initial: Pain Intensity 1: 4 achy radiating Post Session:  2/10 Medications Last Reviewed:  3/21/2019 Updated Objective Findings:  Patient shows some decreased weight bearing today with ambulation on the right side TREATMENT:  
 
THERAPEUTIC EXERCISE: (15 minutes):  Exercises per grid below to improve mobility, strength, balance and coordination. Required minimal visual, verbal, manual and tactile cues to promote proper body alignment, promote proper body posture, promote proper body mechanics and promote proper body breathing techniques. Progressed resistance, range, repetitions and complexity of movement as indicated. Date: 
3/21/2019 Activity/Exercise Parameters Hip stretches 5 min with education and work on hip flexor off side of bed and piriformis Clam shell Patient shows weakness in the abductors today started back to basics x 20 in side lie Weight shift in standing X 2 min Long leg depression X 3 min in side lie MANUAL THERAPY: (30 minutes): Joint mobilization and Soft tissue mobilization was utilized and necessary because of the patient's restricted joint motion and restricted motion of soft tissue.  
-Supine hamstring mobilization -supine lateral IT band and bony contour of right hip 
-Left side lie mobilization to IT band, glut medius muscle and tendon 
-Left side lie pelvic mobility with posterior depression [facility] Portal 
Treatment/Session Summary: · Response to Treatment:  Patient shows some gait improvement after todays treatment. · Communication/Consultation:  None today · Equipment provided today:  None today · Recommendations/Intent for next treatment session: Next visit will focus on Hip mobility and core training. Treatment Plan of Care Effective Dates:  3/4/19 to 6/2/19 Total Treatment Billable Duration:  45 minutes PT Patient Time In/Time Out Time In: 5331 Time Out: 1200 Viviana Vides PT Future Appointments Date Time Provider Nancy Eason 4/1/2019  2:00 PM Nohelia Quarles, SURINDER [facility] [place]

## 2019-04-01 ENCOUNTER — HOSPITAL ENCOUNTER (OUTPATIENT)
Dept: PHYSICAL THERAPY | Age: 74
Discharge: HOME | End: 2019-04-01
Payer: MEDICARE

## 2019-04-01 PROCEDURE — 97140 MANUAL THERAPY 1/> REGIONS: CPT

## 2019-04-01 PROCEDURE — 97110 THERAPEUTIC EXERCISES: CPT

## 2019-04-01 NOTE — PROGRESS NOTES
Dolores Room : 1945 [place] Secondary: [address] [facility] 
[address] [place] [address] [phone]   [phone] OUTPATIENT PHYSICAL THERAPY: Daily Treatment Note 2019 Pre-treatment Symptoms/Complaints:  Patient reports that he had a couple of good days after last session, but then had a difficult time last week till the weekend, when he finally feels like things are turning around Pain: Initial: Pain Intensity 1: 3 achy radiating Post Session:  2/10 Medications Last Reviewed:  2019 Updated Objective Findings:  Patient shows some decreased weight bearing today with ambulation on the right side TREATMENT:  
 
THERAPEUTIC EXERCISE: (15 minutes):  Exercises per grid below to improve mobility, strength, balance and coordination. Required minimal visual, verbal, manual and tactile cues to promote proper body alignment, promote proper body posture, promote proper body mechanics and promote proper body breathing techniques. Progressed resistance, range, repetitions and complexity of movement as indicated. Date: 
2019 Activity/Exercise Parameters Hip stretches 5 min with education and work on hip flexor off side of bed and piriformis Clam shell Patient shows weakness in the abductors today started back to basics x 20 in side lie Weight shift in standing X 2 min Long leg depression X 3 min in side lie Decompression bridge 2 x 10 MANUAL THERAPY: (30 minutes): Joint mobilization and Soft tissue mobilization was utilized and necessary because of the patient's restricted joint motion and restricted motion of soft tissue.  
-Supine hamstring mobilization 
-supine lateral IT band and bony contour of right hip 
-Left side lie mobilization to IT band, glut medius muscle and tendon 
-Left side lie pelvic mobility with posterior depression [facility] Portal 
 Treatment/Session Summary: · Response to Treatment:  Patient shows good posterior depression today. Working on gait again and stability training. Follow up in two weeks · Communication/Consultation:  None today · Equipment provided today:  None today · Recommendations/Intent for next treatment session: Next visit will focus on Hip mobility and core training. Treatment Plan of Care Effective Dates:  3/4/19 to 6/2/19 Total Treatment Billable Duration:  45 minutes PT Patient Time In/Time Out Time In: 4781 Time Out: 1500 Stacy Escobar PT Future Appointments Date Time Provider Nancy Eason 4/15/2019  2:00 PM Richard Jason, SURINDER [facility]

## 2019-04-15 ENCOUNTER — HOSPITAL ENCOUNTER (OUTPATIENT)
Dept: PHYSICAL THERAPY | Age: 74
Discharge: HOME | End: 2019-04-15
Payer: MEDICARE

## 2019-04-15 PROCEDURE — 97110 THERAPEUTIC EXERCISES: CPT

## 2019-04-15 NOTE — PROGRESS NOTES
Winnie Joseph : 1945 [place] Secondary: [facility] [facility] 
[address] [place] [address] [phone]   [phone] OUTPATIENT PHYSICAL THERAPY: Daily Treatment Note 4/15/2019 Pre-treatment Symptoms/Complaints:  Patient reports that he has had an up and down few weeks with the doctors looking into blood work for possible Pain: Initial: Pain Intensity 1: 4 achy radiating Post Session:  2/10 Medications Last Reviewed:  4/15/2019 Updated Objective Findings:  Patient shows some decreased weight bearing today with ambulation on the right side TREATMENT:  
 
THERAPEUTIC EXERCISE: (30 minutes):  Exercises per grid below to improve mobility, strength, balance and coordination. Required minimal visual, verbal, manual and tactile cues to promote proper body alignment, promote proper body posture, promote proper body mechanics and promote proper body breathing techniques. Progressed resistance, range, repetitions and complexity of movement as indicated. Date: 
4/15/2019 Activity/Exercise Parameters Hip stretches 5 min with education and work on hip flexor off side of bed and piriformis Clam shell Patient shows weakness in the abductors today started back to basics x 20 in side lie Weight shift in standing X 2 min Long leg depression X 3 min in side lie Decompression bridge 2 x 10 Core training Single leg push for initiation of brace 3 x 30 sec B Indirect-Education about his low back, hip and knee for connection to his neuromuscular system and why it is going up and down lately x 20 min MANUAL THERAPY: ( minutes): Joint mobilization and Soft tissue mobilization was utilized and necessary because of the patient's restricted joint motion and restricted motion of soft tissue.  
-Supine hamstring mobilization 
-supine lateral IT band and bony contour of right hip -Left side lie mobilization to IT band, glut medius muscle and tendon 
-Left side lie pelvic mobility with posterior depression [facility] Portal 
Treatment/Session Summary: · Response to Treatment:  Patient shows increased fatigue with bracing. Will continue to follow up in two weeks to progress this function · Communication/Consultation:  None today · Equipment provided today:  None today · Recommendations/Intent for next treatment session: Next visit will focus on Hip mobility and core training. Treatment Plan of Care Effective Dates:  3/4/19 to 6/2/19 Total Treatment Billable Duration:  30 minutes PT Patient Time In/Time Out Time In: 6568 Time Out: 1500 Ramin Burns PT Future Appointments Date Time Provider Nancy Eason 4/30/2019  2:00 PM Buster Salinas, PT [facility] [place]

## 2019-04-30 ENCOUNTER — APPOINTMENT (OUTPATIENT)
Dept: PHYSICAL THERAPY | Age: 74
End: 2019-04-30
Payer: MEDICARE

## 2019-05-13 ENCOUNTER — HOSPITAL ENCOUNTER (OUTPATIENT)
Dept: PHYSICAL THERAPY | Age: 74
Discharge: HOME | End: 2019-05-13
Payer: MEDICARE

## 2019-05-13 PROCEDURE — 97110 THERAPEUTIC EXERCISES: CPT

## 2019-05-13 NOTE — PROGRESS NOTES
Yovanny Luis E : 1945 [place] Secondary: [address] [facility] 
[address] [place] [address] [phone]   [phone] OUTPATIENT PHYSICAL THERAPY: Daily Treatment Note 2019 Pre-treatment Symptoms/Complaints:  Patient reports that he is still not sure on what the answer the doctors have given him, but he goes back to think about the anti-biotic and his stenosis as possible factors Pain: Initial: Pain Intensity 1: 4 achy radiating Post Session:  2/10 Medications Last Reviewed:  2019 Updated Objective Findings:  Patient shows some decreased weight bearing today with ambulation on the right side TREATMENT:  
 
THERAPEUTIC EXERCISE: (30 minutes):  Exercises per grid below to improve mobility, strength, balance and coordination. Required minimal visual, verbal, manual and tactile cues to promote proper body alignment, promote proper body posture, promote proper body mechanics and promote proper body breathing techniques. Progressed resistance, range, repetitions and complexity of movement as indicated. Date: 
2019 Activity/Exercise Parameters Hip stretches 5 min with education and work on hip flexor off side of bed and piriformis Clam shell Patient shows weakness in the abductors today started back to basics x 20 in side lie Long leg depression X 3 min in side lie Decompression bridge 2 x 10 Core training Single leg push for initiation of brace 3 x 30 sec B Indirect-Education about his low back, hip and knee for connection to his neuromuscular system and why it is going up and down lately x 20 min Focused on stenosis education today as well and weight bearing versus non-weight bearing exercises MANUAL THERAPY: ( minutes): Joint mobilization and Soft tissue mobilization was utilized and necessary because of the patient's restricted joint motion and restricted motion of soft tissue.  
-Supine hamstring mobilization 
-supine lateral IT band and bony contour of right hip 
-Left side lie mobilization to IT band, glut medius muscle and tendon 
-Left side lie pelvic mobility with posterior depression [facility] Portal 
Treatment/Session Summary: · Response to Treatment:  Patient shows increased fatigue with bracing. Will continue to follow up in two weeks to progress this function and re-assess · Communication/Consultation:  None today · Equipment provided today:  None today · Recommendations/Intent for next treatment session: Next visit will focus on Hip mobility and core training. Treatment Plan of Care Effective Dates:  3/4/19 to 6/2/19 Total Treatment Billable Duration:  30 minutes PT Patient Time In/Time Out Time In: 5916 Time Out: 1400 Rakesh Cowan, PT Future Appointments Date Time Provider Nancy Eason 5/31/2019  1:00 PM Antoni Riding, PT [facility]

## 2019-05-31 ENCOUNTER — HOSPITAL ENCOUNTER (OUTPATIENT)
Dept: PHYSICAL THERAPY | Age: 74
Discharge: HOME | End: 2019-05-31
Payer: MEDICARE

## 2019-05-31 PROCEDURE — 97164 PT RE-EVAL EST PLAN CARE: CPT

## 2019-05-31 NOTE — PROGRESS NOTES
Winnie Joseph  : 1945  [place]  Secondary: [employer] [address] [facility]  at [address] [place] [address]  [phone]   [phone]        OUTPATIENT PHYSICAL THERAPY: Daily Treatment Note 2019  Pre-treatment Symptoms/Complaints:  Patient reports trying to get back to activity, but having some difficulty. Pain: Initial: Pain Intensity 1: 3 achy radiating Post Session:  2/10   Medications Last Reviewed:  2019    Updated Objective Findings:  Patient shows some decreased weight bearing today with ambulation on the right side   TREATMENT:     THERAPEUTIC EXERCISE: (reviewed minutes):  Exercises per grid below to improve mobility, strength, balance and coordination. Required minimal visual, verbal, manual and tactile cues to promote proper body alignment, promote proper body posture, promote proper body mechanics and promote proper body breathing techniques. Progressed resistance, range, repetitions and complexity of movement as indicated.    Date:  2019     Activity/Exercise Parameters   Hip stretches 5 min with education and work on hip flexor off side of bed and piriformis   Clam shell Patient shows weakness in the abductors today started back to basics x 20 in side lie   Long leg depression X 3 min in side lie   Decompression bridge 2 x 10   Core training Single leg push for initiation of brace 3 x 30 sec B       Indirect 45 min-Education about his low back, hip and knee for connection to his neuromuscular system and why it is going up and down lately x 20 min Focused on stenosis education today as well and weight bearing versus non-weight bearing exercises    MANUAL THERAPY: ( minutes): Joint mobilization and Soft tissue mobilization was utilized and necessary because of the patient's restricted joint motion and restricted motion of soft tissue.   -Supine hamstring mobilization  -supine lateral IT band and bony contour of right hip  -Left side lie mobilization to IT band, glut medius muscle and tendon  -Left side lie pelvic mobility with posterior depression  MedBridge Portal  Treatment/Session Summary:    · Response to Treatment:  Patient shows increased fatigue with bracing and decreased activity level. Will continue to follow up once a month  · Communication/Consultation:  None today  · Equipment provided today:  None today  · Recommendations/Intent for next treatment session: Next visit will focus on Hip mobility and core training. Treatment Plan of Care Effective Dates: 5/31/19 to 8/31/19  Total Treatment Billable Duration:  0 minutes today-more re-evaluation  PT Patient Time In/Time Out  Time In: 1300  Time Out: [address], PT    No future appointments.

## 2019-05-31 NOTE — THERAPY RECERTIFICATION
Angie Real  : 1945  [place]  Secondary: Bshsi Generic [address] at [address] [place] [address]  [phone]   [phone]          OUTPATIENT PHYSICAL THERAPY:Re-evaluation and Recertification 3/42/8376   ICD-10: Treatment Diagnosis: Pain in joint, right knee, M25.561  Difficulty walking, not elsewhere classified, R26.2  Precautions/Allergies: Other medication and Statins-hmg-coa reductase inhibitors   MD Orders: Evaluate and Treat MEDICAL/REFERRING DIAGNOSIS:  Unilateral primary osteoarthritis, right knee [M17.11]   DATE OF ONSET: Chronic  REFERRING PHYSICIAN: Jefferson Smith MD  RETURN PHYSICIAN APPOINTMENT: not sheduled   Angie Real has been seen for 6 visits from 3/4/19 to 2019 for knee pain and difficulty walking. Patient has performed therapeutic exercises, activities, and had manual therapy to increased strength, ROM and function. Patient has also used modalities for pain control in order to increase function. Patient has shown a decrease in function per the LEFS with scores of 49/80 due to some exacerbation. . Patient has improved with the knee, but sill has some difficulty with walking and aerobic capacity. Patient has progressed well toward their goals and will benefit from continuing skilled PT in order to address their impairments. Huey Monterroso, PT, DPT, OCS        INITIAL ASSESSMENT:  Mr. Nakul Warren presents with chronic right knee pain and difficulty walking due to primary OA. He shows limitations from his hip and low back to the right knee. He has some decreased mobility overall that has prevented him from returning to regular gym activities. He is a good candidate for skilled PT in order to address listed impairments affecting his function. Huey Monterroso, PT, DPT, OCS      PROBLEM LIST (Impacting functional limitations):  1. Decreased Strength  2.  Decreased ADL/Functional Activities  3. Decreased Transfer Abilities  4. Decreased Ambulation Ability/Technique  5. Decreased Balance  6. Increased Pain  7. Decreased Activity Tolerance  8. Increased Fatigue  9. Decreased Flexibility/Joint Mobility  10. Edema/Girth INTERVENTIONS PLANNED: (Treatment may consist of any combination of the following)  1. Balance Exercise  2. Bed Mobility  3. Gait Training  4. Heat  5. Manual Therapy  6. Neuromuscular Re-education/Strengthening  7. Range of Motion (ROM)  8. Therapeutic Activites  9. Therapeutic Exercise/Strengthening   TREATMENT PLAN:  Effective Dates: 5/31/2019 TO 8/31/2019 (90 days). Frequency/Duration: 1 time a month for 3 months  GOALS: (Goals have been discussed and agreed upon with patient.)    Discharge Goals: Time Frame: 12 weeks  1. Patient will show a return to performing at least 30 minutes of endurance exercises at least 3-4 times a week in order to return to his normal function (ongoing)  2. Patient will show full understanding of right knee exercises without difficulty (ongoing)  3. Patient will show a greater than 5 point increase on the LEFS in order to show an increase in functional activities (ongoing)  4. Patient will perform 10 sit to stands without difficulty (ongoing)  5. NEW GOAL:  6. Patient will perform a pool exercise at least 3 times a week in order to return to previous exercise level  Rehabilitation Potential For Stated Goals: Excellent              The information in this section was collected on 5/31/2019   (except where otherwise noted). HISTORY:   History of Present Injury/Illness (Reason for Referral):  Patient reports that he has just been feeling a little off this whole year so far even starting in December. He has had the joint aches and some pain in his right knee and lower back. The right knee has been much better since he had an injection in November and he feels like that is holding on and helping him keep moving.   He has had difficulty with getting moving with exercises again and has felt more problems with overall inflammation and with his bowels. His primary care Dr. Carlos Mullins had a scan done for abdominal aorta problems and or gastro intestinal blockages that were negative. He reports that he has not returned to all of the exercise he was doing before, but is a little fearful to cause pain. His goals are to return to normal physical activity and get back to a healthy lifestyle without knee pain. Past Medical History/Comorbidities:   Mr. Caitlin Landeros  has a past medical history of Anxiety, Edema of both legs, Enlarged prostate, Gout, Hip pain, History of elevated glucose, History of seasonal allergies, Hypercholesterolemia, Hypertension, Keratoacanthoma, S/P total hip arthroplasty (4/6/2015), Skin neoplasm, and Spinal stenosis. He also has no past medical history of Aneurysm ([address] Arrhythmia, Arthritis, Asthma, Autoimmune disease ([address] CAD (coronary artery disease), Cancer ([address] Chronic kidney disease, Chronic obstructive pulmonary disease ([address] Chronic pain, Coagulation disorder ([address] Difficult intubation, GERD (gastroesophageal reflux disease), Heart failure ([address] Ill-defined condition, Liver disease, Malignant hyperthermia due to anesthesia, Morbid obesity ([address] Nausea & vomiting, Pseudocholinesterase deficiency, Psychiatric disorder, PUD (peptic ulcer disease), Seizures ([address] Stroke ([address] Thromboembolus ([address] Thyroid disease, Unspecified adverse effect of anesthesia, or Unspecified sleep apnea. Mr. Caitlin Landeros  has a past surgical history that includes hx tonsillectomy (as child) and hx wisdom teeth extraction (as teenager).   Social History/Living Environment:       Social History     Socioeconomic History    Marital status:      Spouse name: Not on file    Number of children: Not on file    Years of education: Not on file    Highest education level: Not on file   Occupational History    Not on file   Social Needs    Financial resource strain: Not on file    Food insecurity:     Worry: Not on file     Inability: Not on file    Transportation needs:     Medical: Not on file     Non-medical: Not on file   Tobacco Use    Smoking status: Never Smoker   Substance and Sexual Activity    Alcohol use: No    Drug use: Not on file    Sexual activity: Not on file   Lifestyle    Physical activity:     Days per week: Not on file     Minutes per session: Not on file    Stress: Not on file   Relationships    Social connections:     Talks on phone: Not on file     Gets together: Not on file     Attends Denominational service: Not on file     Active member of club or organization: Not on file     Attends meetings of clubs or organizations: Not on file     Relationship status: Not on file    Intimate partner violence:     Fear of current or ex partner: Not on file     Emotionally abused: Not on file     Physically abused: Not on file     Forced sexual activity: Not on file   Other Topics Concern    Not on file   Social History Narrative    Not on file       Prior Level of Function/Work/Activity:  Independent, sales  Dominant Side:         RIGHT     Ambulatory/Rehab Services H2 Model Falls Risk Assessment    Risk Factors:       (1)  Gender [Male] Ability to Rise from Chair:       (1)  Pushes up, successful in one attempt    Falls Prevention Plan:       No modifications necessary   Total: (5 or greater = High Risk): 2    ©2010 [facility] [address] Patent #[id number]. Federal Law prohibits the replication, distribution or use without written permission from [facility] [employer]     Current Medications:  Patient recently had a steroid pack for sinus issues and antibiotic for that as well that were recently completed     Current Outpatient Medications:     HYDROmorphone (DILAUDID) 2 mg tablet, Take 1 Tab by mouth every four (4) hours as needed.  Max Daily Amount: 12 mg., Disp: 40 Tab, Rfl: 0    prazosin (MINIPRESS) 1 mg capsule, Take 1 mg by mouth three (3) times daily. TAKE AM OF SURGERY WITH SMALL SIP OF WATER   Indications: BENIGN PROSTATIC HYPERTROPHY, Disp: , Rfl:     amLODIPine (NORVASC) 10 mg tablet, Take 10 mg by mouth daily. TAKE AM OF SURGERY WITH SMALL SIP OF WATER, Disp: , Rfl:     labetalol (NORMODYNE) 200 mg tablet, Take 200 mg by mouth two (2) times a day. TAKE AM OF SURGERY WITH SMALL SIP OF WATER, Disp: , Rfl:     cloNIDine HCl (CATAPRES) 0.2 mg tablet, Take 0.2 mg by mouth nightly. Indications: HYPERTENSION, Disp: , Rfl:     finasteride (PROSCAR) 5 mg tablet, Take 5 mg by mouth daily. TAKE AM OF SURGERY WITH SMALL SIP OF WATER   Indications: BENIGN PROSTATIC HYPERTROPHY, Disp: , Rfl:     furosemide (LASIX) 40 mg tablet, Take 40 mg by mouth daily. , Disp: , Rfl:     Febuxostat (ULORIC) 80 mg tab tablet, Take 80 mg by mouth daily. TAKE AM OF SURGERY WITH SMALL SIP OF WATER   Indications: GOUT, Disp: , Rfl:     LORazepam (ATIVAN) 0.5 mg tablet, Take 0.5 mg by mouth daily as needed for Anxiety. TAKES 1/2 OF 1MG TABLET DAILY AS NEEDED, \"USUALLY EVERY DAY\", Disp: , Rfl:    Date Last Reviewed:  5/31/2019     Number of Personal Factors/Comorbidities that affect the Plan of Care: 1-2: MODERATE COMPLEXITY   EXAMINATION:   Observation/Orthostatic Postural Assessment:          Patient shows increased right pelvic height in standing with increased right side bent position.   Increased knee hyperextension in standing as well as increased right ER in the femur (previous ANUJ)  Right LE shows genu varus and left LE shows genu valgus  Palpation:          Tightness along right QL in standing  -Patella on right and left lack mobility in superior and inferior glide  -Joint mobility shows some decreased in joint play on the right tibia on femur for ER  -Right hip shows good overall mobility at this time  -Posterior right knee has restrictions in gastroc-soleus tendon  Patient has increased paraspinal tightness through lower back  ROM:          Knee flexion to 130 deg R LE and full extension  Functional Mobility:         Gait/Ambulation:  Patient shows increased side to side sway with gait and some lack of extension push off from gluts        Transfers:  Pushes with hands from chair to stand        Stairs:  Some slight difficulty with pushing through Quads  Balance:          Decreased balance with tandem stance and single leg B   Body Structures Involved:  1. Bones  2. Joints  3. Muscles  4. Ligaments Body Functions Affected:  1. Neuromusculoskeletal  2. Movement Related Activities and Participation Affected:  1. General Tasks and Demands  2. Mobility  3. Domestic Life  4. Community, Social and [place]   Number of elements (examined above) that affect the Plan of Care: 4+: HIGH COMPLEXITY   CLINICAL PRESENTATION:   Presentation: Stable and uncomplicated: LOW COMPLEXITY   CLINICAL DECISION MAKING:   Outcome Measure: Tool Used: Lower Extremity Functional Scale (LEFS)  Score:  Initial: 60/80 Most Recent: 49/80 (Date: 5/31/19 )   Interpretation of Score: 20 questions each scored on a 5 point scale with 0 representing \"extreme difficulty or unable to perform\" and 4 representing \"no difficulty\". The lower the score, the greater the functional disability. 80/80 represents no disability. Minimal detectable change is 9 points. Medical Necessity:   · Patient is expected to demonstrate progress in strength, range of motion, balance, coordination and functional technique to increased mobility. · Patient demonstrates excellent rehab potential due to higher previous functional level. · Skilled intervention continues to be required due to decreased activity and mobility. Reason for Services/Other Comments:  · Patient continues to require skilled intervention due to decreased functional mobility.    Use of outcome tool(s) and clinical judgement create a POC that gives a: Clear prediction of patient's progress: LOW COMPLEXITY · Pain at 2/10  Today. HEP to create log of food/diet and exercise  Total Treatment Duration:re-evaluation only today  PT Patient Time In/Time Out  Time In: 1300  Time Out: Abiel 62, PT    No future appointments.

## 2019-07-11 ENCOUNTER — HOSPITAL ENCOUNTER (OUTPATIENT)
Dept: PHYSICAL THERAPY | Age: 74
Discharge: HOME | End: 2019-07-11
Payer: MEDICARE

## 2019-07-11 PROCEDURE — 97110 THERAPEUTIC EXERCISES: CPT

## 2019-07-11 PROCEDURE — 97140 MANUAL THERAPY 1/> REGIONS: CPT

## 2019-07-11 NOTE — PROGRESS NOTES
Moni Pickard  : 1945  [place]  Secondary: Bshsi Generic [address] [facility]  at [facility]  [address] [place] [address], [address] [place] [address]  [phone]   [phone]        OUTPATIENT PHYSICAL THERAPY: Daily Treatment Note 2019  Pre-treatment Symptoms/Complaints:  Patient reports trying to get back to activity. He reports walking is still difficult, but he is coming to the gym more  Pain: Initial: Pain Intensity 1: 2 achy radiating Post Session:  2/10   Medications Last Reviewed:  2019    Updated Objective Findings:  Patient shows some decreased weight bearing today with ambulation on the right side   TREATMENT:     THERAPEUTIC EXERCISE: (10 minutes):  Exercises per grid below to improve mobility, strength, balance and coordination. Required minimal visual, verbal, manual and tactile cues to promote proper body alignment, promote proper body posture, promote proper body mechanics and promote proper body breathing techniques. Progressed resistance, range, repetitions and complexity of movement as indicated.    Date:  2019     Activity/Exercise Parameters   Hip stretches 5 min with education and work on hip flexor off side of bed and piriformis   Clam shell Patient shows weakness in the abductors today started back to basics x 20 in side lie   Long leg depression X 3 min in side lie   Decompression bridge 2 x 10   Core training Single leg push for initiation of brace 3 x 30 sec B       Indirect 15 min-Education about his low back, hip and knee for connection to his neuromuscular system and why it is going up and down lately x 20 min Focused on stenosis education today as well and weight bearing versus non-weight bearing exercises    MANUAL THERAPY: ( 30 minutes): Joint mobilization and Soft tissue mobilization was utilized and necessary because of the patient's restricted joint motion and restricted motion of soft tissue.   -Supine hamstring mobilization  -supine lateral IT band and bony contour of right hip  -Right side lie mobilization to IT band, glut medius muscle and tendon  -right side lie pelvic mobility with posterior depression and end range hold for neuromuscular re-education  MedBridge Portal  Treatment/Session Summary:    · Response to Treatment:  Patient shows increased fatigue with bracing and decreased activity level. Patient to be increased in frequency due to difficulty with walking lately. Go to one time a week for the rest of this month  · Communication/Consultation:  None today  · Equipment provided today:  None today  · Recommendations/Intent for next treatment session: Next visit will focus on Hip mobility and core training.   Treatment Plan of Care Effective Dates: 5/31/19 to 8/31/19  Total Treatment Billable Duration:  45 minutes today-more re-evaluation  PT Patient Time In/Time Out  Time In: 1400  Time Out: Romeo 65, PT    Future Appointments   Date Time Provider Nancy Eason   7/15/2019  3:00 PM Jennifer Mora, SURINDER [facility]   7/22/2019  2:00 PM Jennifer Mora PT [facility]   7/29/2019  1:00 PM Jennifer Mora PT [facility]

## 2019-07-15 ENCOUNTER — HOSPITAL ENCOUNTER (OUTPATIENT)
Dept: PHYSICAL THERAPY | Age: 74
Discharge: HOME | End: 2019-07-15
Payer: MEDICARE

## 2019-07-15 PROCEDURE — 97140 MANUAL THERAPY 1/> REGIONS: CPT

## 2019-07-15 PROCEDURE — 97110 THERAPEUTIC EXERCISES: CPT

## 2019-07-15 NOTE — PROGRESS NOTES
Bhaskar Mejia  : 1945  [place]  Secondary: Bshsi Generic [facility]  at [address] [place] [address]  [phone]   [phone]        OUTPATIENT PHYSICAL THERAPY: Daily Treatment Note 7/15/2019  Pre-treatment Symptoms/Complaints:  Patient reports some definite improvement with walking since last week  Pain: Initial: Pain Intensity 1: 2 achy radiating Post Session:  2/10   Medications Last Reviewed:  7/15/2019    Updated Objective Findings:  Patient shows some decreased weight bearing today with ambulation on the right side   TREATMENT:     THERAPEUTIC EXERCISE: (10 minutes):  Exercises per grid below to improve mobility, strength, balance and coordination. Required minimal visual, verbal, manual and tactile cues to promote proper body alignment, promote proper body posture, promote proper body mechanics and promote proper body breathing techniques. Progressed resistance, range, repetitions and complexity of movement as indicated.    Date:  7/15/2019     Activity/Exercise Parameters   Hip stretches 5 min with education and work on hip flexor off side of bed and piriformis   Clam shell Patient shows weakness in the abductors today started back to basics x 20 in side lie   Long leg depression X 3 min in side lie   Decompression bridge reviewed   Core training Single leg push for initiation of brace 3 x 30 sec B       Indirect 15 min-Education about his low back, hip and knee for connection to his neuromuscular system and why it is going up and down lately x 20 min Focused on stenosis education today as well and weight bearing versus non-weight bearing exercises    MANUAL THERAPY: ( 35 minutes): Joint mobilization and Soft tissue mobilization was utilized and necessary because of the patient's restricted joint motion and restricted motion of soft tissue.   -Supine hamstring mobilization  -supine lateral IT band and bony contour of right hip  -Right side lie mobilization to IT band, glut medius muscle and tendon  -right side lie pelvic mobility with posterior depression and end range hold for neuromuscular re-education  -Worked anterior elevation as well for core training  MedBridge Portal  Treatment/Session Summary:    · Response to Treatment:  Patient shows improvement in posterior depression. Continue training core initiation. · Communication/Consultation:  None today  · Equipment provided today:  None today  · Recommendations/Intent for next treatment session: Next visit will focus on Hip mobility and core training.   Treatment Plan of Care Effective Dates: 5/31/19 to 8/31/19  Total Treatment Billable Duration:  45 minutes today  PT Patient Time In/Time Out  Time In: 1502  Time Out: 1001 Kristi Angeles, PT    Future Appointments   Date Time Provider Nancy Eason   7/22/2019  2:00 PM Kenrick Butler, PT [facility]   7/29/2019  1:00 PM Kenrick Butler PT [facility]

## 2019-07-22 ENCOUNTER — HOSPITAL ENCOUNTER (OUTPATIENT)
Dept: PHYSICAL THERAPY | Age: 74
Discharge: HOME | End: 2019-07-22
Payer: MEDICARE

## 2019-07-22 PROCEDURE — 97140 MANUAL THERAPY 1/> REGIONS: CPT

## 2019-07-22 PROCEDURE — 97110 THERAPEUTIC EXERCISES: CPT

## 2019-07-22 NOTE — PROGRESS NOTES
Leeanne Lanes  : 1945  [place]  Secondary: Bshsi Generic [address] [facility]  at [address] [place] [address]  [phone]   [phone]        OUTPATIENT PHYSICAL THERAPY: Daily Treatment Note 2019  Pre-treatment Symptoms/Complaints:  Patient reports some definite improvement with walking since last week, but still getting those hitches some days  Pain: Initial: Pain Intensity 1: 3 achy radiating Post Session:  2/10   Medications Last Reviewed:  2019    Updated Objective Findings:  Patient shows some decreased weight bearing today with ambulation on the right side   TREATMENT:     THERAPEUTIC EXERCISE: (10 minutes):  Exercises per grid below to improve mobility, strength, balance and coordination. Required minimal visual, verbal, manual and tactile cues to promote proper body alignment, promote proper body posture, promote proper body mechanics and promote proper body breathing techniques. Progressed resistance, range, repetitions and complexity of movement as indicated.    Date:  2019     Activity/Exercise Parameters   Hip stretches 5 min with education and work on hip flexor off side of bed and piriformis   Clam shell Patient shows weakness in the abductors today started back to basics x 20 in side lie   Long leg depression X 3 min in side lie   Decompression bridge reviewed   Core training Single leg push for initiation of brace 3 x 30 sec B       Indirect 15 min-Education about his low back, hip and knee for connection to his neuromuscular system and why it is going up and down lately x 20 min Focused on stenosis education today as well and weight bearing versus non-weight bearing exercises    MANUAL THERAPY: ( 30 minutes): Joint mobilization and Soft tissue mobilization was utilized and necessary because of the patient's restricted joint motion and restricted motion of soft tissue.   -Supine hamstring mobilization  -supine lateral IT band and bony contour of right hip  -Right side lie mobilization to IT band, glut medius muscle and tendon  -right side lie pelvic mobility with posterior depression and end range hold for neuromuscular re-education  -Worked anterior elevation as well for core training  MedBridge Portal  Treatment/Session Summary:    · Response to Treatment:  Patient shows improvement in posterior depression. Continue training core initiation. Facilitate extension as well  · Communication/Consultation:  None today  · Equipment provided today:  None today  · Recommendations/Intent for next treatment session: Next visit will focus on Hip mobility and core training.   Treatment Plan of Care Effective Dates: 5/31/19 to 8/31/19  Total Treatment Billable Duration:  40 minutes today  PT Patient Time In/Time Out  Time In: 1405  Time Out: Romeo Aleman, PT    Future Appointments   Date Time Provider Nancy Eason   7/29/2019  1:00 PM Veronica Van, PT [facility]

## 2019-07-29 ENCOUNTER — HOSPITAL ENCOUNTER (OUTPATIENT)
Dept: PHYSICAL THERAPY | Age: 74
Discharge: HOME | End: 2019-07-29
Payer: MEDICARE

## 2019-07-29 PROCEDURE — 97110 THERAPEUTIC EXERCISES: CPT

## 2019-07-29 PROCEDURE — 97140 MANUAL THERAPY 1/> REGIONS: CPT

## 2019-07-29 NOTE — PROGRESS NOTES
Rosibel Lynne  : 1945  [place]  Secondary: Bshsi Generic [address] [facility]  at [facility]  [address] [place] [address]  [phone]   [phone]        OUTPATIENT PHYSICAL THERAPY: Daily Treatment Note 2019  Pre-treatment Symptoms/Complaints:  Patient reports some definite improvement with walking since last week. It felt good till Friday last week  Pain: Initial: Pain Intensity 1: 2 achy radiating Post Session:  2/10   Medications Last Reviewed:  2019    Updated Objective Findings:  Patient shows some decreased weight bearing today with ambulation on the right side   TREATMENT:     THERAPEUTIC EXERCISE: (10 minutes):  Exercises per grid below to improve mobility, strength, balance and coordination. Required minimal visual, verbal, manual and tactile cues to promote proper body alignment, promote proper body posture, promote proper body mechanics and promote proper body breathing techniques. Progressed resistance, range, repetitions and complexity of movement as indicated.    Date:  2019     Activity/Exercise Parameters   Hip stretches 5 min with education and work on hip flexor off side of bed and piriformis   Clam shell Patient shows weakness in the abductors today started back to basics x 20 in side lie   Long leg depression X 3 min in side lie   Decompression bridge reviewed   Core training Single leg push for initiation of brace 3 x 30 sec B       Indirect 5 min-Education about his low back, hip and knee for connection to his neuromuscular system and why it is going up and down lately x 20 min Focused on stenosis education today as well and weight bearing versus non-weight bearing exercises    MANUAL THERAPY: ( 30 minutes): Joint mobilization and Soft tissue mobilization was utilized and necessary because of the patient's restricted joint motion and restricted motion of soft tissue.   -Supine hamstring mobilization  -supine lateral IT band and bony contour of right hip  -Right side lie mobilization to IT band, glut medius muscle and tendon  -right side lie pelvic mobility with posterior depression and end range hold for neuromuscular re-education  -Worked anterior elevation as well for core training  [employer] Portal  Treatment/Session Summary:    · Response to Treatment:  Patient shows improvement in posterior depression. Worked on sit to stand some  · Communication/Consultation:  None today  · Equipment provided today:  None today  · Recommendations/Intent for next treatment session: Next visit will focus on Hip mobility and core training.   Treatment Plan of Care Effective Dates: 5/31/19 to 8/31/19  Total Treatment Billable Duration:  40 minutes today  PT Patient Time In/Time Out  Time In: 0421  Time Out: Elvia Lao8, PT    Future Appointments   Date Time Provider Nancy Eason   8/21/2019  3:00 PM Kassidy Medina, PT [facility]

## 2019-08-21 ENCOUNTER — HOSPITAL ENCOUNTER (OUTPATIENT)
Dept: PHYSICAL THERAPY | Age: 74
Discharge: HOME | End: 2019-08-21
Payer: MEDICARE

## 2019-08-21 PROCEDURE — 97110 THERAPEUTIC EXERCISES: CPT

## 2019-08-21 PROCEDURE — 97140 MANUAL THERAPY 1/> REGIONS: CPT

## 2019-08-21 PROCEDURE — 97164 PT RE-EVAL EST PLAN CARE: CPT

## 2019-08-21 NOTE — THERAPY RECERTIFICATION
Keyshawn Velazco  : 1945  [place]  Secondary: Bshsi Generic [address] [facility]  at [address] [place] [address]  [phone]   [phone]          OUTPATIENT PHYSICAL THERAPY:Re-evaluation and Recertification    ICD-10: Treatment Diagnosis: Pain in joint, right knee, M25.561  Difficulty walking, not elsewhere classified, R26.2  Precautions/Allergies: Other medication and Statins-hmg-coa reductase inhibitors   MD Orders: Evaluate and Treat MEDICAL/REFERRING DIAGNOSIS:  Unilateral primary osteoarthritis, right knee [M17.11]   DATE OF ONSET: Chronic  REFERRING PHYSICIAN: Adi De La O MD  RETURN PHYSICIAN APPOINTMENT: not sheduled   Keyshawn Velazco has been seen for 11 visits from 3/4/19 to 2019 for knee pain and difficulty walking. Patient has performed therapeutic exercises, activities, and had manual therapy to increased strength, ROM and function. Patient has also used modalities for pain control in order to increase function. Patient has shown a decrease in function per the LEFS with scores of 39/80 per him due to getting a new perspective of his deficits. Patient has improved with the knee, but sill has some difficulty with walking and aerobic capacity. Patient has progressed well toward their goals and will benefit from continuing skilled PT in order to address their impairments. Shara Maldonado, PT, DPT, OCS        INITIAL ASSESSMENT:  Mr. Maggie Quesada presents with chronic right knee pain and difficulty walking due to primary OA. He shows limitations from his hip and low back to the right knee. He has some decreased mobility overall that has prevented him from returning to regular gym activities. He is a good candidate for skilled PT in order to address listed impairments affecting his function. Shara Maldonado, PT, DPT, OCS      PROBLEM LIST (Impacting functional limitations):  1.  Decreased Strength  2. Decreased ADL/Functional Activities  3. Decreased Transfer Abilities  4. Decreased Ambulation Ability/Technique  5. Decreased Balance  6. Increased Pain  7. Decreased Activity Tolerance  8. Increased Fatigue  9. Decreased Flexibility/Joint Mobility  10. Edema/Girth INTERVENTIONS PLANNED: (Treatment may consist of any combination of the following)  1. Balance Exercise  2. Bed Mobility  3. Gait Training  4. Heat  5. Manual Therapy  6. Neuromuscular Re-education/Strengthening  7. Range of Motion (ROM)  8. Therapeutic Activites  9. Therapeutic Exercise/Strengthening   TREATMENT PLAN:  Effective Dates: 8/212019 TO 11/21/2019 (90 days). Frequency/Duration: 1 time every week for 4 weeks, then every other week for 8 weeks  GOALS: (Goals have been discussed and agreed upon with patient.)    Discharge Goals: Time Frame: 12 weeks  1. Patient will show a return to performing at least 30 minutes of endurance exercises at least 3-4 times a week in order to return to his normal function (MET)  2. Patient will show full understanding of right knee exercises without difficulty (ongoing)  3. Patient will show a greater than 5 point increase on the LEFS in order to show an increase in functional activities (ongoing)  4. Patient will perform 10 sit to stands without difficulty (ongoing)  5. NEW GOAL:  6. Patient will perform a pool exercise at least 3 times a week in order to return to previous exercise level  Rehabilitation Potential For Stated Goals: Excellent              The information in this section was collected on 8/21/2019   (except where otherwise noted). HISTORY:   History of Present Injury/Illness (Reason for Referral):  Patient reports that he has just been feeling a little off this whole year so far even starting in December. He has had the joint aches and some pain in his right knee and lower back.   The right knee has been much better since he had an injection in November and he feels like that is holding on and helping him keep moving. He has had difficulty with getting moving with exercises again and has felt more problems with overall inflammation and with his bowels. His primary care Dr. Ynes Pedraza had a scan done for abdominal aorta problems and or gastro intestinal blockages that were negative. He reports that he has not returned to all of the exercise he was doing before, but is a little fearful to cause pain. His goals are to return to normal physical activity and get back to a healthy lifestyle without knee pain. Past Medical History/Comorbidities:   Mr. Kady Moseley  has a past medical history of Anxiety, Edema of both legs, Enlarged prostate, Gout, Hip pain, History of elevated glucose, History of seasonal allergies, Hypercholesterolemia, Hypertension, Keratoacanthoma, S/P total hip arthroplasty (4/6/2015), Skin neoplasm, and Spinal stenosis. He also has no past medical history of Aneurysm ([address] Arrhythmia, Arthritis, Asthma, Autoimmune disease ([address] CAD (coronary artery disease), Cancer ([address] Chronic kidney disease, Chronic obstructive pulmonary disease ([address] Chronic pain, Coagulation disorder ([address] Difficult intubation, GERD (gastroesophageal reflux disease), Heart failure ([address] Ill-defined condition, Liver disease, Malignant hyperthermia due to anesthesia, Morbid obesity ([address] Nausea & vomiting, Pseudocholinesterase deficiency, Psychiatric disorder, PUD (peptic ulcer disease), Seizures ([address] Stroke ([address] Thromboembolus ([address] Thyroid disease, Unspecified adverse effect of anesthesia, or Unspecified sleep apnea. Mr. Kady Moseley  has a past surgical history that includes hx tonsillectomy (as child) and hx wisdom teeth extraction (as teenager).   Social History/Living Environment:       Social History     Socioeconomic History    Marital status:      Spouse name: Not on file    Number of children: Not on file    Years of education: Not on file    Highest education level: Not on file Occupational History    Not on file   Social Needs    Financial resource strain: Not on file    Food insecurity:     Worry: Not on file     Inability: Not on file    Transportation needs:     Medical: Not on file     Non-medical: Not on file   Tobacco Use    Smoking status: Never Smoker   Substance and Sexual Activity    Alcohol use: No    Drug use: Not on file    Sexual activity: Not on file   Lifestyle    Physical activity:     Days per week: Not on file     Minutes per session: Not on file    Stress: Not on file   Relationships    Social connections:     Talks on phone: Not on file     Gets together: Not on file     Attends Voodoo service: Not on file     Active member of club or organization: Not on file     Attends meetings of clubs or organizations: Not on file     Relationship status: Not on file    Intimate partner violence:     Fear of current or ex partner: Not on file     Emotionally abused: Not on file     Physically abused: Not on file     Forced sexual activity: Not on file   Other Topics Concern    Not on file   Social History Narrative    Not on file       Prior Level of Function/Work/Activity:  Independent, sales  Dominant Side:         RIGHT     Ambulatory/Rehab Services H2 Model Falls Risk Assessment    Risk Factors:       (1)  Gender [Male] Ability to Rise from Chair:       (1)  Pushes up, successful in one attempt    Falls Prevention Plan:       No modifications necessary   Total: (5 or greater = High Risk): 2    ©2010 [facility] [place] Patent #[id number]. Federal Law prohibits the replication, distribution or use without written permission from [facility]     Current Medications:  Patient recently had a steroid pack for sinus issues and antibiotic for that as well that were recently completed     Current Outpatient Medications:     HYDROmorphone (DILAUDID) 2 mg tablet, Take 1 Tab by mouth every four (4) hours as needed. Max Daily Amount: 12 mg., Disp: 40 Tab, Rfl: 0    prazosin (MINIPRESS) 1 mg capsule, Take 1 mg by mouth three (3) times daily. TAKE AM OF SURGERY WITH SMALL SIP OF WATER   Indications: BENIGN PROSTATIC HYPERTROPHY, Disp: , Rfl:     amLODIPine (NORVASC) 10 mg tablet, Take 10 mg by mouth daily. TAKE AM OF SURGERY WITH SMALL SIP OF WATER, Disp: , Rfl:     labetalol (NORMODYNE) 200 mg tablet, Take 200 mg by mouth two (2) times a day. TAKE AM OF SURGERY WITH SMALL SIP OF WATER, Disp: , Rfl:     cloNIDine HCl (CATAPRES) 0.2 mg tablet, Take 0.2 mg by mouth nightly. Indications: HYPERTENSION, Disp: , Rfl:     finasteride (PROSCAR) 5 mg tablet, Take 5 mg by mouth daily. TAKE AM OF SURGERY WITH SMALL SIP OF WATER   Indications: BENIGN PROSTATIC HYPERTROPHY, Disp: , Rfl:     furosemide (LASIX) 40 mg tablet, Take 40 mg by mouth daily. , Disp: , Rfl:     Febuxostat (ULORIC) 80 mg tab tablet, Take 80 mg by mouth daily. TAKE AM OF SURGERY WITH SMALL SIP OF WATER   Indications: GOUT, Disp: , Rfl:     LORazepam (ATIVAN) 0.5 mg tablet, Take 0.5 mg by mouth daily as needed for Anxiety. TAKES 1/2 OF 1MG TABLET DAILY AS NEEDED, \"USUALLY EVERY DAY\", Disp: , Rfl:    Date Last Reviewed:  8/21/2019     Number of Personal Factors/Comorbidities that affect the Plan of Care: 1-2: MODERATE COMPLEXITY   EXAMINATION:   Observation/Orthostatic Postural Assessment:          Patient shows increased right pelvic height in standing with increased right side bent position.   Increased knee hyperextension in standing as well as increased right ER in the femur (previous ANUJ)  Right LE shows genu varus and left LE shows genu valgus  Palpation:          Tightness along right QL in standing  -Patella on right and left lack mobility in superior and inferior glide  -Joint mobility shows some decreased in joint play on the right tibia on femur for ER  -Right hip shows good overall mobility at this time  -Posterior right knee has restrictions in gastroc-soleus tendon  Patient has increased paraspinal tightness through lower back  ROM:          Knee flexion to 130 deg R LE and full extension  Hip extension lacking 5 degrees from neutral on right   Functional Mobility:         Gait/Ambulation:  Patient shows increased side to side sway with gait and some lack of extension push off from gluts        Transfers:  Pushes with hands from chair to stand        Stairs:  Some slight difficulty with pushing through Quads  Balance:          Decreased balance with tandem stance and single leg B   Body Structures Involved:  1. Bones  2. Joints  3. Muscles  4. Ligaments Body Functions Affected:  1. Neuromusculoskeletal  2. Movement Related Activities and Participation Affected:  1. General Tasks and Demands  2. Mobility  3. Domestic Life  4. Community, Social and [place]   Number of elements (examined above) that affect the Plan of Care: 4+: HIGH COMPLEXITY   CLINICAL PRESENTATION:   Presentation: Stable and uncomplicated: LOW COMPLEXITY   CLINICAL DECISION MAKING:   Outcome Measure: Tool Used: Lower Extremity Functional Scale (LEFS)  Score:  Initial: 60/80 Most Recent: 49/80 (Date: 8/21/19 )   Interpretation of Score: 20 questions each scored on a 5 point scale with 0 representing \"extreme difficulty or unable to perform\" and 4 representing \"no difficulty\". The lower the score, the greater the functional disability. 80/80 represents no disability. Minimal detectable change is 9 points. Medical Necessity:   · Patient is expected to demonstrate progress in strength, range of motion, balance, coordination and functional technique to increased mobility. · Patient demonstrates excellent rehab potential due to higher previous functional level. · Skilled intervention continues to be required due to decreased activity and mobility. Reason for Services/Other Comments:  · Patient continues to require skilled intervention due to decreased functional mobility.    Use of outcome tool(s) and clinical judgement create a POC that gives a: Clear prediction of patient's progress: LOW COMPLEXITY               · Pain at 2/10  Today. HEP to create log of food/diet and exercise  Total Treatment Duration:  PT Patient Time In/Time Out  Time In: 1500  Time Out: [facility], PT    No future appointments.

## 2019-08-21 NOTE — PROGRESS NOTES
Domenic Cramer  : 1945  [place]  Secondary: Bshsi Generic [address] [facility]  at [facility]  [address] [place] [address]  [phone]   [phone]        OUTPATIENT PHYSICAL THERAPY: Daily Treatment Note 2019  Pre-treatment Symptoms/Complaints:  Patient reports that every time after our sessions he feels much better. He is getting to the gym more now and continuing to improve slowly  Pain: Initial: Pain Intensity 1: 1 achy radiating Post Session:  2/10   Medications Last Reviewed:  2019    Updated Objective Findings:  Patient shows some decreased weight bearing today with ambulation on the right side   TREATMENT:     THERAPEUTIC EXERCISE: (10 minutes):  Exercises per grid below to improve mobility, strength, balance and coordination. Required minimal visual, verbal, manual and tactile cues to promote proper body alignment, promote proper body posture, promote proper body mechanics and promote proper body breathing techniques. Progressed resistance, range, repetitions and complexity of movement as indicated.    Date:  2019     Activity/Exercise Parameters   Hip stretches 5 min with education and work on hip flexor off side of bed and piriformis   Clam shell Patient shows weakness in the abductors today started back to basics x 20 in side lie   Long leg depression X 3 min in side lie   Decompression bridge reviewed   Core training Single leg push for initiation of brace 3 x 30 sec B   High knee on wall with support X 1 min       MANUAL THERAPY: ( 30 minutes): Joint mobilization and Soft tissue mobilization was utilized and necessary because of the patient's restricted joint motion and restricted motion of soft tissue.   -Supine hamstring mobilization  -supine lateral IT band and bony contour of right hip  -Right side lie mobilization to IT band, glut medius muscle and tendon  -right side lie pelvic mobility with posterior depression and end range hold for neuromuscular re-education  -Worked anterior elevation as well for core training  [employer] Portal  Treatment/Session Summary:    · Response to Treatment:  Patient shows improvement in posterior depression. Started to work more standing training  · Communication/Consultation:  None today  · Equipment provided today:  None today  · Recommendations/Intent for next treatment session: Next visit will focus on Hip mobility and core training. Treatment Plan of Care Effective Dates: 8/21/19 to 11/2119  Total Treatment Billable Duration:  40 minutes today  PT Patient Time In/Time Out  Time In: 1500  Time Out: 1001 Kristi Angeles PT    No future appointments.

## 2019-08-30 ENCOUNTER — HOSPITAL ENCOUNTER (OUTPATIENT)
Dept: PHYSICAL THERAPY | Age: 74
Discharge: HOME | End: 2019-08-30
Payer: MEDICARE

## 2019-08-30 PROCEDURE — 97140 MANUAL THERAPY 1/> REGIONS: CPT

## 2019-08-30 PROCEDURE — 97110 THERAPEUTIC EXERCISES: CPT

## 2019-08-30 NOTE — PROGRESS NOTES
Maricel Alvarenga  : 1945  [place]  Secondary: Bshsi Generic [address] [facility]  at [facility]  [address] [place] [address]  [phone]   [phone]        OUTPATIENT PHYSICAL THERAPY: Daily Treatment Note 2019  Pre-treatment Symptoms/Complaints:  Patient reports that every time after our sessions he feels much better. He had a visit with Dr. Latanya Page and confirmed that the hip and knee joints are doing just fine that it is probably the stenosis in the back that is causing his trouble  Pain: Initial: Pain Intensity 1: 1 achy radiating Post Session:  0/10   Medications Last Reviewed:  2019    Updated Objective Findings:  Patient shows some decreased weight bearing today with ambulation on the right side   TREATMENT:     THERAPEUTIC EXERCISE: (10 minutes):  Exercises per grid below to improve mobility, strength, balance and coordination. Required minimal visual, verbal, manual and tactile cues to promote proper body alignment, promote proper body posture, promote proper body mechanics and promote proper body breathing techniques. Progressed resistance, range, repetitions and complexity of movement as indicated.    Date:  2019     Activity/Exercise Parameters   Hip stretches 5 min with education and work on hip flexor off side of bed and piriformis   Clam shell Patient shows weakness in the abductors today started back to basics x 20 in side lie   Long leg depression X 3 min in side lie   Decompression bridge reviewed   Core training Single leg push for initiation of brace 3 x 30 sec B   High knee on wall with support Used on flat floor due to challenge with lifting oppositeLE       MANUAL THERAPY: ( 30 minutes): Joint mobilization and Soft tissue mobilization was utilized and necessary because of the patient's restricted joint motion and restricted motion of soft tissue.   -Supine hamstring mobilization  -supine lateral IT band and bony contour of right hip  -Right side lie mobilization to IT band, glut medius muscle and tendon  -right side lie pelvic mobility with posterior depression and end range hold for neuromuscular re-education  -Worked anterior elevation as well for core training  Med[employer] Portal  Treatment/Session Summary:    · Response to Treatment:  Patient shows improvement in posterior depression. His gait improves with core training and activiation  · Communication/Consultation:  None today  · Equipment provided today:  None today  · Recommendations/Intent for next treatment session: Next visit will focus on Hip mobility and core training.   Treatment Plan of Care Effective Dates: 8/21/19 to 11/2119  Total Treatment Billable Duration:  45 minutes today  PT Patient Time In/Time Out  Time In: 1300  Time Out: [address], PT    Future Appointments   Date Time Provider Nancy Eason   9/6/2019 10:00 AM Farhan Moscoso, PT [facility]   9/13/2019 10:00 AM Mary Kay Watt, PT [facility]   9/20/2019  1:00 PM Farhan Moscoso, PT [facility]   9/27/2019 10:00 AM Mary Kay Watt, PT [facility]

## 2019-09-05 ENCOUNTER — HOSPITAL ENCOUNTER (OUTPATIENT)
Dept: PHYSICAL THERAPY | Age: 74
Discharge: HOME | End: 2019-09-05
Payer: MEDICARE

## 2019-09-05 PROCEDURE — 97110 THERAPEUTIC EXERCISES: CPT

## 2019-09-05 PROCEDURE — 97140 MANUAL THERAPY 1/> REGIONS: CPT

## 2019-09-05 NOTE — PROGRESS NOTES
Lars Patino  : 1945  [place]  Secondary: Bshsi Generic [address]  at [facility]  [address] [place] [address]  [phone]   [phone]        OUTPATIENT PHYSICAL THERAPY: Daily Treatment Note 2019  Pre-treatment Symptoms/Complaints:  Patient reports that he is starting to improve with his gym workouts  Pain: Initial: Pain Intensity 1: 2 achy radiating Post Session:  0/10   Medications Last Reviewed:  2019    Updated Objective Findings:  Patient shows some decreased weight bearing today with ambulation on the right side   TREATMENT:     THERAPEUTIC EXERCISE: (10 minutes):  Exercises per grid below to improve mobility, strength, balance and coordination. Required minimal visual, verbal, manual and tactile cues to promote proper body alignment, promote proper body posture, promote proper body mechanics and promote proper body breathing techniques. Progressed resistance, range, repetitions and complexity of movement as indicated.    Date:  2019     Activity/Exercise Parameters   Hip stretches 5 min with education and work on hip flexor off side of bed and piriformis   Clam shell Patient shows weakness in the abductors today started back to basics x 20 in side lie   Long leg depression X 3 min in side lie   Decompression bridge reviewed   Core training Single leg push for initiation of brace 3 x 30 sec B   High knee on wall with support Used on flat floor due to challenge with lifting oppositeLE       MANUAL THERAPY: ( 35 minutes): Joint mobilization and Soft tissue mobilization was utilized and necessary because of the patient's restricted joint motion and restricted motion of soft tissue.   -Supine hamstring mobilization  -supine lateral IT band and bony contour of right hip  -Right side lie mobilization to IT band, glut medius muscle and tendon  -right side lie pelvic mobility with posterior depression and end range hold for neuromuscular re-education  -Worked anterior elevation as well for core training  [employer] Portal  Treatment/Session Summary:    · Response to Treatment:  Patient shows improvement in posterior depression. Continue to work weight acceptance in standing  · Communication/Consultation:  None today  · Equipment provided today:  None today  · Recommendations/Intent for next treatment session: Next visit will focus on Hip mobility and core training.   Treatment Plan of Care Effective Dates: 8/21/19 to 11/2119  Total Treatment Billable Duration:  45 minutes today  PT Patient Time In/Time Out  Time In: 1300  Time Out: [address] [place] [place], PT    Future Appointments   Date Time Provider Nancy Eason   9/13/2019 10:00 AM Maricel Tsai, PT [facility]   9/20/2019  1:00 PM Maricel Tsai, PT [facility]   9/27/2019 10:00 AM Torie Watt, PT [facility]

## 2019-09-13 ENCOUNTER — HOSPITAL ENCOUNTER (OUTPATIENT)
Dept: PHYSICAL THERAPY | Age: 74
Discharge: HOME | End: 2019-09-13
Payer: MEDICARE

## 2019-09-13 PROCEDURE — 97110 THERAPEUTIC EXERCISES: CPT

## 2019-09-13 PROCEDURE — 97140 MANUAL THERAPY 1/> REGIONS: CPT

## 2019-09-13 NOTE — PROGRESS NOTES
Viola Fleming  : 1945  [place]  Secondary: Bshsi Generic [id number] [facility]  at [facility]  [address] [place] [address]  [phone]   [phone]        OUTPATIENT PHYSICAL THERAPY: Daily Treatment Note 2019  Pre-treatment Symptoms/Complaints:  Patient reports that he is starting to improve with his gym workouts. He is being more consistent now  Pain: Initial: Pain Intensity 1: 2 achy radiating Post Session:  0/10   Medications Last Reviewed:  2019    Updated Objective Findings:  Patient shows some decreased weight bearing today with ambulation on the right side   TREATMENT:     THERAPEUTIC EXERCISE: (15 minutes):  Exercises per grid below to improve mobility, strength, balance and coordination. Required minimal visual, verbal, manual and tactile cues to promote proper body alignment, promote proper body posture, promote proper body mechanics and promote proper body breathing techniques. Progressed resistance, range, repetitions and complexity of movement as indicated.    Date:  2019     Activity/Exercise Parameters   Hip stretches 5 min with education and work on hip flexor off side of bed and piriformis   Clam shell Patient shows weakness in the abductors today started back to basics x 20 in side lie   Long leg depression X 3 min in side lie   Decompression bridge reviewed   Core training Single leg push for initiation of brace 3 x 30 sec B   High knee on wall with support Used on flat floor due to challenge with lifting oppositeLE       MANUAL THERAPY: ( 30 minutes): Joint mobilization and Soft tissue mobilization was utilized and necessary because of the patient's restricted joint motion and restricted motion of soft tissue.   -Supine hamstring mobilization  -supine lateral IT band and bony contour of right hip  -Right side lie mobilization to IT band, glut medius muscle and tendon  -right side lie pelvic mobility with posterior depression and end range hold for neuromuscular re-education  -Worked anterior elevation as well for core training  MedBridge Portal  Treatment/Session Summary:    · Response to Treatment:  Patient shows improvement in posterior depression. Continue to address walking dysfunction  · Communication/Consultation:  None today  · Equipment provided today:  None today  · Recommendations/Intent for next treatment session: Next visit will focus on Hip mobility and core training.   Treatment Plan of Care Effective Dates: 8/21/19 to 11/2119  Total Treatment Billable Duration:  45 minutes today  PT Patient Time In/Time Out  Time In: 1004  Time Out: 3101 Reg Pope, PT    Future Appointments   Date Time Provider Nancy Eason   9/20/2019  1:00 PM Med Krishann, PT [facility]   9/27/2019 10:00 AM Catrachito Watt, PT [facility]

## 2019-09-20 ENCOUNTER — HOSPITAL ENCOUNTER (OUTPATIENT)
Dept: PHYSICAL THERAPY | Age: 74
Discharge: HOME | End: 2019-09-20
Payer: MEDICARE

## 2019-09-20 PROCEDURE — 97110 THERAPEUTIC EXERCISES: CPT

## 2019-09-20 PROCEDURE — 97140 MANUAL THERAPY 1/> REGIONS: CPT

## 2019-09-20 NOTE — PROGRESS NOTES
Viola Fleming  : 1945  [place]  Secondary: Bshsi Generic [id number] [facility]  at [facility]  [address] [place] [address]  [phone]   [phone]        OUTPATIENT PHYSICAL THERAPY: Daily Treatment Note 2019  Pre-treatment Symptoms/Complaints:  Patient reports that he is starting to improve with his gym workouts. He feels the walking improving overall  Pain: Initial: Pain Intensity 1: 2 achy radiating Post Session:  0/10   Medications Last Reviewed:  2019    Updated Objective Findings:  Patient shows some decreased weight bearing today with ambulation on the right side   TREATMENT:     THERAPEUTIC EXERCISE: (15 minutes):  Exercises per grid below to improve mobility, strength, balance and coordination. Required minimal visual, verbal, manual and tactile cues to promote proper body alignment, promote proper body posture, promote proper body mechanics and promote proper body breathing techniques. Progressed resistance, range, repetitions and complexity of movement as indicated.    Date:  2019     Activity/Exercise Parameters   Hip stretches 5 min with education and work on hip flexor off side of bed and piriformis   Clam shell Patient shows weakness in the abductors today started back to basics x 20 in side lie   Long leg depression X 3 min in side lie   Decompression bridge    Core training Single leg push for initiation of brace 3 x 30 sec B   High knee on wall with support Used on flat floor due to challenge with lifting oppositeLE       MANUAL THERAPY: ( 30 minutes): Joint mobilization and Soft tissue mobilization was utilized and necessary because of the patient's restricted joint motion and restricted motion of soft tissue.   -Supine hamstring mobilization  -supine lateral IT band and bony contour of right hip  -Right side lie mobilization to IT band, glut medius muscle and tendon  -right side lie pelvic mobility with posterior depression and end range hold for neuromuscular re-education  -Worked anterior elevation as well for core training  MedBridge Portal  Treatment/Session Summary:    · Response to Treatment:  Patient shows improvement in posterior depression. More tightness in lumbar spine today, but loosened with manual treatment  · Communication/Consultation:  None today  · Equipment provided today:  None today  · Recommendations/Intent for next treatment session: Next visit will focus on Hip mobility and core training.   Treatment Plan of Care Effective Dates: 8/21/19 to 11/2119  Total Treatment Billable Duration:  45 minutes today  PT Patient Time In/Time Out  Time In: 3090  Time Out: [address] PT    Future Appointments   Date Time Provider Nancy Eason   9/27/2019 10:00 AM Bassem Rodriguez, PT [facility]

## 2019-09-27 ENCOUNTER — HOSPITAL ENCOUNTER (OUTPATIENT)
Dept: PHYSICAL THERAPY | Age: 74
Discharge: HOME | End: 2019-09-27
Payer: MEDICARE

## 2019-09-27 PROCEDURE — 97110 THERAPEUTIC EXERCISES: CPT

## 2019-09-27 PROCEDURE — 97140 MANUAL THERAPY 1/> REGIONS: CPT

## 2019-09-27 NOTE — PROGRESS NOTES
Ceasar Lion  : 1945  [place]  Secondary: Bshsi Generic [address] [facility]  at [facility]  [address] [place] [address]  [phone]   [phone]        OUTPATIENT PHYSICAL THERAPY: Daily Treatment Note 2019  Pre-treatment Symptoms/Complaints:  Patient reports that he is starting to improve with his gym workouts. He feels the walking improving overall  Pain: Initial: Pain Intensity 1: 1 achy radiating Post Session:  0/10   Medications Last Reviewed:  2019    Updated Objective Findings:    TREATMENT:     THERAPEUTIC EXERCISE: (25 minutes):  Exercises per grid below to improve mobility, strength, balance and coordination. Required minimal visual, verbal, manual and tactile cues to promote proper body alignment, promote proper body posture, promote proper body mechanics and promote proper body breathing techniques. Progressed resistance, range, repetitions and complexity of movement as indicated.    Date:  2019     Activity/Exercise Parameters   Hip stretches 5 min with education and work on hip flexor off side of bed and piriformis   Clam shell Patient shows weakness in the abductors today started back to basics x 20 in side lie   Long leg depression X 3 min in side lie   Decompression bridge    Core training Single leg push for initiation of brace 3 x 30 sec B   High knee on wall with support Not today   Toe taps Up onto top step x 2 min   Weight bearing through R LE Step forward and back x 5 min working on pushing off           MANUAL THERAPY: ( 30 minutes): Joint mobilization and Soft tissue mobilization was utilized and necessary because of the patient's restricted joint motion and restricted motion of soft tissue.   -Supine hamstring mobilization  -supine lateral IT band and bony contour of right hip  -Right side lie mobilization to IT band, glut medius muscle and tendon  -right side lie pelvic mobility with posterior depression and end range hold for neuromuscular re-education  -Worked anterior elevation as well for core training  Intacct Portal  Treatment/Session Summary:    · Response to Treatment:  Patient shows improvement in posterior depression. More tightness in lumbar spine today, but loosened with manual treatment. Continue to work gait training  · Communication/Consultation:  None today  · Equipment provided today:  None today  · Recommendations/Intent for next treatment session: Next visit will focus on Hip mobility and core training.   Treatment Plan of Care Effective Dates: 8/21/19 to 11/2119  Total Treatment Billable Duration:  45 minutes today  PT Patient Time In/Time Out  Time In: 1001  Time Out: [address] [facility], PT    Future Appointments   Date Time Provider Nancy Eason   10/7/2019 10:00 AM Garcia Reilly, PT [facility]   10/21/2019 10:00 AM Joyce Watt, PT [facility]

## 2019-10-07 ENCOUNTER — HOSPITAL ENCOUNTER (OUTPATIENT)
Dept: PHYSICAL THERAPY | Age: 74
Discharge: HOME | End: 2019-10-07
Payer: MEDICARE

## 2019-10-07 NOTE — PROGRESS NOTES
Yuri Nguyen  : 1945  [place]  Secondary: Bshsi Generic [address] [facility]  at [address] [place] [address]  [phone]   [phone]          OUTPATIENT DAILY NOTE    NAME/AGE/GENDER: Yuri Nguyen is a 66 y.o. male. DATE: 10/7/2019    SUBJECTIVE:  Patient needed to reschedule his appointment today due to unable to make it. Will plan to follow up on next scheduled visit.     Art Hankins PT,

## 2019-10-21 ENCOUNTER — HOSPITAL ENCOUNTER (OUTPATIENT)
Dept: PHYSICAL THERAPY | Age: 74
Discharge: HOME | End: 2019-10-21
Payer: MEDICARE

## 2019-10-21 PROCEDURE — 97140 MANUAL THERAPY 1/> REGIONS: CPT

## 2019-10-21 NOTE — PROGRESS NOTES
Tray Julio  : 1945  [place]  Secondary: Bshsi Generic [address] at [facility]  [address] [place] [address]  [phone]   [phone]        OUTPATIENT PHYSICAL THERAPY: Daily Treatment Note 10/21/2019  Pre-treatment Symptoms/Complaints:  Patient reports that he had a three day stay in the hospital last week due to low sodium and potassium. He also had increased blood pressure during this and the doctors are changing some of his medications. He feels better now that he is out and feels like the swelling went really far down after being in the hospital.  Pain: Initial: Pain Intensity 1: 1 achy radiating Post Session:  0/10   Medications Last Reviewed:  10/21/2019    Updated Objective Findings:    TREATMENT:     THERAPEUTIC EXERCISE: (reviewed minutes):  Exercises per grid below to improve mobility, strength, balance and coordination. Required minimal visual, verbal, manual and tactile cues to promote proper body alignment, promote proper body posture, promote proper body mechanics and promote proper body breathing techniques. Progressed resistance, range, repetitions and complexity of movement as indicated.    Date:  10/21/2019     Activity/Exercise Parameters   Hip stretches 5 min with education and work on hip flexor off side of bed and piriformis   Clam shell Patient shows weakness in the abductors today started back to basics x 20 in side lie   Long leg depression X 3 min in side lie   Decompression bridge    Core training Single leg push for initiation of brace 3 x 30 sec B   High knee on wall with support Not today   Toe taps Up onto top step x 2 min   Weight bearing through R LE Step forward and back x 5 min working on pushing off           MANUAL THERAPY: ( 25 minutes): Joint mobilization and Soft tissue mobilization was utilized and necessary because of the patient's restricted joint motion and restricted motion of soft tissue.   -Supine hamstring mobilization  -supine lateral IT band and bony contour of right hip  -Right side lie mobilization to IT band, glut medius muscle and tendon  -right side lie pelvic mobility with posterior depression and end range hold for neuromuscular re-education  -Worked anterior elevation as well for core training  MedBridge Portal  Treatment/Session Summary:    · Response to Treatment:  Patient shows improvement in posterior depression. Follow up in two weeks and patient is following up with his doctor every week right now  · Communication/Consultation:  None today  · Equipment provided today:  None today  · Recommendations/Intent for next treatment session: Next visit will focus on Hip mobility and core training.   Treatment Plan of Care Effective Dates: 8/21/19 to 11/2119  Total Treatment Billable Duration:  25 minutes today  PT Patient Time In/Time Out  Time In: 1000  Time Out: 9[address] [facility], PT    Future Appointments   Date Time Provider Nancy Eason   11/4/2019 10:00 AM Valarie Espinoza PT [facility]

## 2019-11-05 ENCOUNTER — HOSPITAL ENCOUNTER (OUTPATIENT)
Dept: PHYSICAL THERAPY | Age: 74
Discharge: HOME | End: 2019-11-05
Payer: MEDICARE

## 2019-11-05 PROCEDURE — 97140 MANUAL THERAPY 1/> REGIONS: CPT

## 2019-11-05 NOTE — PROGRESS NOTES
Erica Pulido  : 1945  [place]  Secondary: Bshsi Generic [address] [facility]  at [facility]  [address] [place] [address]  [phone]   [phone]        OUTPATIENT PHYSICAL THERAPY: Daily Treatment Note 2019  Pre-treatment Symptoms/Complaints:  Patient reports that he has his last follow up with the doctor this week and everything feels good, but they are still working on the BP control  Pain: Initial: Pain Intensity 1: 1 achy radiating Post Session:  0/10   Medications Last Reviewed:  2019    Updated Objective Findings:    TREATMENT:     THERAPEUTIC EXERCISE: (reviewed minutes):  Exercises per grid below to improve mobility, strength, balance and coordination. Required minimal visual, verbal, manual and tactile cues to promote proper body alignment, promote proper body posture, promote proper body mechanics and promote proper body breathing techniques. Progressed resistance, range, repetitions and complexity of movement as indicated.    Date:  2019     Activity/Exercise Parameters   Hip stretches 5 min with education and work on hip flexor off side of bed and piriformis   Clam shell Patient shows weakness in the abductors today started back to basics x 20 in side lie   Long leg depression X 3 min in side lie   Decompression bridge    Core training Single leg push for initiation of brace 3 x 30 sec B   High knee on wall with support Not today   Toe taps Up onto top step x 2 min   Weight bearing through R LE Step forward and back x 5 min working on pushing off           MANUAL THERAPY: ( 40 minutes): Joint mobilization and Soft tissue mobilization was utilized and necessary because of the patient's restricted joint motion and restricted motion of soft tissue.   -Supine hamstring mobilization  -supine lateral IT band and bony contour of right hip  -Right side lie mobilization to IT band, glut medius muscle and tendon  -right side lie pelvic mobility with posterior depression and end range hold for neuromuscular re-education  -Worked anterior elevation as well for core training  -Stability training through pelvis with manual resistance  MedBridge Portal  Treatment/Session Summary:    · Response to Treatment:  Patient shows improvement in posterior depression. Re-assess in two weeks. Patient to start back more exercises and walking between now and then  · Communication/Consultation:  None today  · Equipment provided today:  None today  · Recommendations/Intent for next treatment session: Next visit will focus on Hip mobility and core training.   Treatment Plan of Care Effective Dates: 8/21/19 to 11/2119  Total Treatment Billable Duration: 40  minutes today  PT Patient Time In/Time Out  Time In: 7490  Time Out: 455 Roslyn Agarwal PT    Future Appointments   Date Time Provider Nancy Eason   11/18/2019  1:00 PM Valarie Espinoza PT [facility]

## 2019-11-18 ENCOUNTER — HOSPITAL ENCOUNTER (OUTPATIENT)
Dept: PHYSICAL THERAPY | Age: 74
Discharge: HOME | End: 2019-11-18
Payer: MEDICARE

## 2019-11-18 PROCEDURE — 97164 PT RE-EVAL EST PLAN CARE: CPT

## 2019-11-18 PROCEDURE — 97140 MANUAL THERAPY 1/> REGIONS: CPT

## 2019-11-18 NOTE — THERAPY RECERTIFICATION
Marj Barrera : 1945 [place] Secondary: [facility] 
[address] [place] [address] [phone]   [phone] OUTPATIENT PHYSICAL THERAPY:Re-evaluation and Recertification  ICD-10: Treatment Diagnosis: Pain in joint, right knee, M25.561 Difficulty walking, not elsewhere classified, R26.2 Precautions/Allergies: Other medication and Statins-hmg-coa reductase inhibitors MD Orders: Evaluate and Treat MEDICAL/REFERRING DIAGNOSIS: 
Unilateral primary osteoarthritis, right knee [M17.11] DATE OF ONSET: Chronic REFERRING PHYSICIAN: Wander Maldonado MD 
RETURN PHYSICIAN APPOINTMENT: not sheduled Marj Barrera has been seen for 11 visits from 3/4/19 to 2019 for knee pain and difficulty walking. Patient has performed therapeutic exercises, activities, and had manual therapy to increased strength, ROM and function. Patient has also used modalities for pain control in order to increase function. Patient has shown an increase in function per the LEFS with scores of 54/80. Patient has improved with the knee, but silpaty has some difficulty with walking and aerobic capacity. Continue to follow and work on his core control for his spine. Patient has progressed well toward their goals and will benefit from continuing skilled PT in order to address their impairments. Javi Willson, PT, DPT, OCS INITIAL ASSESSMENT:  Mr. Varsha Gonzalez presents with chronic right knee pain and difficulty walking due to primary OA. He shows limitations from his hip and low back to the right knee. He has some decreased mobility overall that has prevented him from returning to regular gym activities. He is a good candidate for skilled PT in order to address listed impairments affecting his function. Javi Willson, PT, DPT, OCS PROBLEM LIST (Impacting functional limitations): 
 1. Decreased Strength 2. Decreased ADL/Functional Activities 3. Decreased Transfer Abilities 4. Decreased Ambulation Ability/Technique 5. Decreased Balance 6. Increased Pain 7. Decreased Activity Tolerance 8. Increased Fatigue 9. Decreased Flexibility/Joint Mobility 10. Edema/Girth INTERVENTIONS PLANNED: (Treatment may consist of any combination of the following) 1. Balance Exercise 2. Bed Mobility 3. Gait Training 4. Heat 5. Manual Therapy 6. Neuromuscular Re-education/Strengthening 7. Range of Motion (ROM) 8. Therapeutic Activites 9. Therapeutic Exercise/Strengthening TREATMENT PLAN: 
Effective Dates: 11/18/2019 TO 2/18/20 (90 days). Frequency/Duration: 1 time a week GOALS: (Goals have been discussed and agreed upon with patient.) Discharge Goals: Time Frame: 12 weeks 1. Patient will show a return to performing at least 30 minutes of endurance exercises at least 3-4 times a week in order to return to his normal function (MET) 2. Patient will show full understanding of right knee exercises without difficulty (MET) 3. Patient will show a greater than 5 point increase on the LEFS in order to show an increase in functional activities (ongoing) 4. Patient will perform 10 sit to stands without difficulty (ongoing) 5. NEW GOAL: 
6. Patient will perform a pool exercise at least 3 times a week in order to return to previous exercise level (ongoing) Rehabilitation Potential For Stated Goals: Excellent The information in this section was collected on 11/18/2019 
 (except where otherwise noted). HISTORY:  
History of Present Injury/Illness (Reason for Referral): 
Patient reports that he has just been feeling a little off this whole year so far even starting in December. He has had the joint aches and some pain in his right knee and lower back.   The right knee has been much better since he had an injection in November and he feels like that is holding on and helping him keep moving. He has had difficulty with getting moving with exercises again and has felt more problems with overall inflammation and with his bowels. His primary care Dr. Salty Kamara had a scan done for abdominal aorta problems and or gastro intestinal blockages that were negative. He reports that he has not returned to all of the exercise he was doing before, but is a little fearful to cause pain. His goals are to return to normal physical activity and get back to a healthy lifestyle without knee pain. Past Medical History/Comorbidities:  
Mr. Katharina Zendejas  has a past medical history of Anxiety, Edema of both legs, Enlarged prostate, Gout, Hip pain, History of elevated glucose, History of seasonal allergies, Hypercholesterolemia, Hypertension, Keratoacanthoma, S/P total hip arthroplasty (4/6/2015), Skin neoplasm, and Spinal stenosis. He also has no past medical history of Aneurysm ([address] Arrhythmia, Arthritis, Asthma, Autoimmune disease ([address] CAD (coronary artery disease), Cancer ([address] Chronic kidney disease, Chronic obstructive pulmonary disease ([address] Chronic pain, Coagulation disorder ([address] Difficult intubation, GERD (gastroesophageal reflux disease), Heart failure ([address] Ill-defined condition, Liver disease, Malignant hyperthermia due to anesthesia, Morbid obesity ([address] Nausea & vomiting, Pseudocholinesterase deficiency, Psychiatric disorder, PUD (peptic ulcer disease), Seizures ([address] Stroke ([address] Thromboembolus ([address] Thyroid disease, Unspecified adverse effect of anesthesia, or Unspecified sleep apnea. Mr. Katharina Zendejas  has a past surgical history that includes hx tonsillectomy (as child) and hx wisdom teeth extraction (as teenager). Social History/Living Environment:  
   
Social History Socioeconomic History  Marital status:  Spouse name: Not on file  Number of children: Not on file  Years of education: Not on file  Highest education level: Not on file Occupational History  Not on file Social Needs  Financial resource strain: Not on file  Food insecurity:  
  Worry: Not on file Inability: Not on file  Transportation needs:  
  Medical: Not on file Non-medical: Not on file Tobacco Use  Smoking status: Never Smoker Substance and Sexual Activity  Alcohol use: No  
 Drug use: Not on file  Sexual activity: Not on file Lifestyle  Physical activity:  
  Days per week: Not on file Minutes per session: Not on file  Stress: Not on file Relationships  Social connections:  
  Talks on phone: Not on file Gets together: Not on file Attends Christian service: Not on file Active member of club or organization: Not on file Attends meetings of clubs or organizations: Not on file Relationship status: Not on file  Intimate partner violence:  
  Fear of current or ex partner: Not on file Emotionally abused: Not on file Physically abused: Not on file Forced sexual activity: Not on file Other Topics Concern  Not on file Social History Narrative  Not on file Prior Level of Function/Work/Activity: 
Independent, sales Dominant Side:  
      [facility] H2 Model Falls Risk Assessment Risk Factors: 
     (1)  Gender [Male] Ability to Rise from Chair: 
     (1)  Pushes up, successful in one attempt Falls Prevention Plan: No modifications necessary Total: (5 or greater = High Risk): 2  
 ©2010 [facility] [address] [place] Patent #[id number]. Federal Law prohibits the replication, distribution or use without written permission from [facility] [place] Current Medications:  Patient recently had a steroid pack for sinus issues and antibiotic for that as well that were recently completed Current Outpatient Medications:  
  HYDROmorphone (DILAUDID) 2 mg tablet, Take 1 Tab by mouth every four (4) hours as needed. Max Daily Amount: 12 mg., Disp: 40 Tab, Rfl: 0 
  prazosin (MINIPRESS) 1 mg capsule, Take 1 mg by mouth three (3) times daily. TAKE AM OF SURGERY WITH SMALL SIP OF WATER   Indications: BENIGN PROSTATIC HYPERTROPHY, Disp: , Rfl:  
  amLODIPine (NORVASC) 10 mg tablet, Take 10 mg by mouth daily. TAKE AM OF SURGERY WITH SMALL SIP OF WATER, Disp: , Rfl:  
  labetalol (NORMODYNE) 200 mg tablet, Take 200 mg by mouth two (2) times a day. TAKE AM OF SURGERY WITH SMALL SIP OF WATER, Disp: , Rfl:  
  cloNIDine HCl (CATAPRES) 0.2 mg tablet, Take 0.2 mg by mouth nightly. Indications: HYPERTENSION, Disp: , Rfl:  
  finasteride (PROSCAR) 5 mg tablet, Take 5 mg by mouth daily. TAKE AM OF SURGERY WITH SMALL SIP OF WATER   Indications: BENIGN PROSTATIC HYPERTROPHY, Disp: , Rfl:  
  furosemide (LASIX) 40 mg tablet, Take 40 mg by mouth daily. , Disp: , Rfl:  
  Febuxostat (ULORIC) 80 mg tab tablet, Take 80 mg by mouth daily. TAKE AM OF SURGERY WITH SMALL SIP OF WATER   Indications: GOUT, Disp: , Rfl:  
  LORazepam (ATIVAN) 0.5 mg tablet, Take 0.5 mg by mouth daily as needed for Anxiety. TAKES 1/2 OF 1MG TABLET DAILY AS NEEDED, \"USUALLY EVERY DAY\", Disp: , Rfl:   
Date Last Reviewed:  11/18/2019 Number of Personal Factors/Comorbidities that affect the Plan of Care: 1-2: MODERATE COMPLEXITY EXAMINATION:  
Observation/Orthostatic Postural Assessment:   
      Patient shows increased right pelvic height in standing with increased right side bent position. Increased knee hyperextension in standing as well as increased right ER in the femur (previous ANUJ) Right LE shows genu varus and left LE shows genu valgus Palpation:   
      Tightness along right QL in standing 
-Patella on right and left lack mobility in superior and inferior glide 
-Joint mobility shows some decreased in joint play on the right tibia on femur for ER 
-Right hip shows good overall mobility at this time -Posterior right knee has restrictions in gastroc-soleus tendon Patient has increased paraspinal tightness through lower back ROM:   
      Knee flexion to 130 deg R LE and full extension Hip extension lacking 5 degrees from neutral on right Functional Mobility:  
      Gait/Ambulation:  Patient shows increased side to side sway with gait and some lack of extension push off from gluts Transfers:  Pushes with hands from chair to stand Stairs:  Some slight difficulty with pushing through Quads Balance:   
      Decreased balance with tandem stance and single leg B Body Structures Involved: 1. Bones 2. Joints 3. Muscles 4. Ligaments Body Functions Affected: 1. Neuromusculoskeletal 
2. Movement Related Activities and Participation Affected: 1. General Tasks and Demands 2. Mobility 3. Domestic Life 4. Community, Social and Hope [place] Number of elements (examined above) that affect the Plan of Care: 4+: HIGH COMPLEXITY CLINICAL PRESENTATION:  
Presentation: Stable and uncomplicated: LOW COMPLEXITY CLINICAL DECISION MAKING:  
Outcome Measure: Tool Used: Lower Extremity Functional Scale (LEFS) Score:  Initial: 60/80 Most Recent: 54/80 (Date: 11/18/19 ) Interpretation of Score: 20 questions each scored on a 5 point scale with 0 representing \"extreme difficulty or unable to perform\" and 4 representing \"no difficulty\". The lower the score, the greater the functional disability. 80/80 represents no disability. Minimal detectable change is 9 points. Medical Necessity:  
· Patient is expected to demonstrate progress in strength, range of motion, balance, coordination and functional technique to increased mobility. · Patient demonstrates excellent rehab potential due to higher previous functional level. · Skilled intervention continues to be required due to decreased activity and mobility. Reason for Services/Other Comments: · Patient continues to require skilled intervention due to decreased functional mobility. Use of outcome tool(s) and clinical judgement create a POC that gives a: Clear prediction of patient's progress: LOW COMPLEXITY · Pain at 2/10 Total Treatment Duration: PT Patient Time In/Time Out Time In: 1300 Time Out: 1400 Mely Montalvo PT Future Appointments Date Time Provider Nancy Eason 12/9/2019  2:00 PM Nayla Petty, PT [facility]

## 2019-11-18 NOTE — PROGRESS NOTES
Edgar Guzmán  : 1945  [place]  Secondary: Bshsi Generic [facility] [facility]  at [facility]  [address] [place] [address]  [phone]   [phone]        OUTPATIENT PHYSICAL THERAPY: Daily Treatment Note 2019  Pre-treatment Symptoms/Complaints:  Patient reports that he is still feeling good in the legs and his last blood work was good. He is getting back to some of his previous activities now  Pain: Initial: Pain Intensity 1: 1 achy radiating Post Session:  0/10   Medications Last Reviewed:  2019    Updated Objective Findings:    TREATMENT:     THERAPEUTIC EXERCISE: (reviewed minutes):  Exercises per grid below to improve mobility, strength, balance and coordination. Required minimal visual, verbal, manual and tactile cues to promote proper body alignment, promote proper body posture, promote proper body mechanics and promote proper body breathing techniques. Progressed resistance, range, repetitions and complexity of movement as indicated.    Date:  2019     Activity/Exercise Parameters   Hip stretches 5 min with education and work on hip flexor off side of bed and piriformis   Clam shell Patient shows weakness in the abductors today started back to basics x 20 in side lie   Long leg depression X 3 min in side lie   Decompression bridge    Core training Single leg push for initiation of brace 3 x 30 sec B   High knee on wall with support Not today   Toe taps Up onto top step x 2 min   Weight bearing through R LE Step forward and back x 5 min working on pushing off           MANUAL THERAPY: ( 30 minutes): Joint mobilization and Soft tissue mobilization was utilized and necessary because of the patient's restricted joint motion and restricted motion of soft tissue.   -Supine hamstring mobilization  -supine lateral IT band and bony contour of right hip  -Right side lie mobilization to IT band, glut medius muscle and tendon  -right side lie pelvic mobility with posterior depression and end range hold for neuromuscular re-education  -Worked anterior elevation as well for core training  -Stability training through pelvis with manual resistance  MedBridge Portal  Treatment/Session Summary:    · Response to Treatment:  Patient shows improvement in posterior depression. Patient is getting back to his normal activity now and we will follow up in 3 weeks  · Communication/Consultation:  None today  · Equipment provided today:  None today  · Recommendations/Intent for next treatment session: Next visit will focus on Hip mobility and core training.   Treatment Plan of Care Effective Dates: 11/18/19 to 2/18/20  Total Treatment Billable Duration: 30  minutes today  PT Patient Time In/Time Out  Time In: 1300  Time Out: [address], PT    Future Appointments   Date Time Provider Nancy Eason   12/9/2019  2:00 PM Leonie Jeans, PT [facility]

## 2019-12-09 NOTE — PROGRESS NOTES
Sonia Hernández  : 1945  [place]  Secondary: Bshsi Generic [address] [facility]  at [address] [place] [address]  [phone]   [phone]          OUTPATIENT DAILY NOTE    NAME/AGE/GENDER: Sonia Hernández is a 66 y.o. male. DATE: 2019    SUBJECTIVE:  Patient did not show up for appointment today due to he forgot. Will plan to follow up on next scheduled visit.     Darlena Rubinstein, PT,

## 2019-12-12 ENCOUNTER — HOSPITAL ENCOUNTER (OUTPATIENT)
Dept: PHYSICAL THERAPY | Age: 74
Discharge: HOME | End: 2019-12-12
Payer: MEDICARE

## 2019-12-12 PROCEDURE — 97140 MANUAL THERAPY 1/> REGIONS: CPT

## 2019-12-12 PROCEDURE — 97110 THERAPEUTIC EXERCISES: CPT

## 2019-12-12 NOTE — PROGRESS NOTES
Miya Pack  : 1945  [place]  Secondary: Bshsi Generic [address] at [facility]  [address] [place] [address]  [phone]   [phone]        OUTPATIENT PHYSICAL THERAPY: Daily Treatment Note 2019  Pre-treatment Symptoms/Complaints:  Patient reports that he is still feeling good in the legs, but the new blood pressure medication seems to be making him foggy and a little more off balance  Pain: Initial: Pain Intensity 1: 1 achy radiating Post Session:  0/10   Medications Last Reviewed:  2019    Updated Objective Findings:    TREATMENT:     THERAPEUTIC EXERCISE: (10 minutes):  Exercises per grid below to improve mobility, strength, balance and coordination. Required minimal visual, verbal, manual and tactile cues to promote proper body alignment, promote proper body posture, promote proper body mechanics and promote proper body breathing techniques. Progressed resistance, range, repetitions and complexity of movement as indicated.    Date:  2019     Activity/Exercise Parameters   Hip stretches 5 min with education and work on hip flexor off side of bed and piriformis   Clam shell Patient shows weakness in the abductors today started back to basics x 20 in side lie   Long leg depression X 3 min in side lie   Decompression bridge    Core training Single leg push for initiation of brace 3 x 30 sec B   High knee on wall with support Not today   Toe taps Up onto top step x 2 min   Weight bearing through R LE Step forward and back x 5 min working on pushing off           MANUAL THERAPY: ( 20 minutes): Joint mobilization and Soft tissue mobilization was utilized and necessary because of the patient's restricted joint motion and restricted motion of soft tissue.   -Supine hamstring mobilization  -supine lateral IT band and bony contour of right hip  -Right side lie mobilization to IT band, glut medius muscle and tendon  -right side lie pelvic mobility with posterior depression and end range hold for neuromuscular re-education  -Worked anterior elevation as well for core training  -Stability training through pelvis with manual resistance  MedBridge Portal  Treatment/Session Summary:    · Response to Treatment:  Patient shows improvement in posterior depression. Still needs stability training on the right hip with gait function. He is anxious about how he feels with the medication and will talk to his doctor next week. We discussed changing the time of day he takes the BP medication, but he will check with the doctor. · Communication/Consultation:  None today  · Equipment provided today:  None today  · Recommendations/Intent for next treatment session: Next visit will focus on Hip mobility and core training.   Treatment Plan of Care Effective Dates: 11/18/19 to 2/18/20  Total Treatment Billable Duration: 30  minutes today  PT Patient Time In/Time Out  Time In: 1400  Time Out: [address], PT    Future Appointments   Date Time Provider Nancy Eason   1/6/2020  2:00 PM Ashlyn Jackson, PT [facility]

## 2020-01-21 ENCOUNTER — APPOINTMENT (OUTPATIENT)
Dept: PHYSICAL THERAPY | Age: 75
End: 2020-01-21

## 2020-02-06 ENCOUNTER — HOSPITAL ENCOUNTER (OUTPATIENT)
Dept: PHYSICAL THERAPY | Age: 75
Discharge: HOME | End: 2020-02-06
Payer: MEDICARE

## 2020-02-06 PROCEDURE — 97164 PT RE-EVAL EST PLAN CARE: CPT

## 2020-02-06 NOTE — THERAPY RECERTIFICATION
Zee Banerjee : 1945 [place] Secondary: [facility] 
[address] [place] [address] [phone]   [phone] OUTPATIENT PHYSICAL THERAPY:Re-evaluation and Recertification [id number] ICD-10: Treatment Diagnosis: Pain in joint, right knee, M25.561 Difficulty walking, not elsewhere classified, R26.2 Precautions/Allergies: Other medication and Statins-hmg-coa reductase inhibitors MD Orders: Evaluate and Treat MEDICAL/REFERRING DIAGNOSIS: 
Unilateral primary osteoarthritis, right knee [M17.11] DATE OF ONSET: Chronic REFERRING PHYSICIAN: Simone Mahmood MD 
RETURN PHYSICIAN APPOINTMENT: not sheduled Zee Banerjee has been seen for 21 visits from 3/4/19 to 2020 for knee pain and difficulty walking. Patient has performed therapeutic exercises, activities, and had manual therapy to increased strength, ROM and function. Patient has also used modalities for pain control in order to increase function. Patient has shown an increase in function per the LEFS with scores of 54/80. Patient has reported improving in the last few weeks now that he is getting use to his new heart medication and BP medication. He has returned to walking and the gym more, but still feels back tightness and balance issues. Patient has progressed well toward their goals and will benefit from continuing skilled PT in order to address their impairments. Dao Tobin, PT, DPT, OCS, CFMT INITIAL ASSESSMENT:  Mr. Allie Stewart presents with chronic right knee pain and difficulty walking due to primary OA. He shows limitations from his hip and low back to the right knee. He has some decreased mobility overall that has prevented him from returning to regular gym activities. He is a good candidate for skilled PT in order to address listed impairments affecting his function.  
Dao Tobin, PT, DPT, OCS 
   
 PROBLEM LIST (Impacting functional limitations): 1. Decreased Strength 2. Decreased ADL/Functional Activities 3. Decreased Transfer Abilities 4. Decreased Ambulation Ability/Technique 5. Decreased Balance 6. Increased Pain 7. Decreased Activity Tolerance 8. Increased Fatigue 9. Decreased Flexibility/Joint Mobility 10. Edema/Girth INTERVENTIONS PLANNED: (Treatment may consist of any combination of the following) 1. Balance Exercise 2. Bed Mobility 3. Gait Training 4. Heat 5. Manual Therapy 6. Neuromuscular Re-education/Strengthening 7. Range of Motion (ROM) 8. Therapeutic Activites 9. Therapeutic Exercise/Strengthening TREATMENT PLAN: 
Effective Dates: 2/6/20 TO 5/6/20 (90 days). Frequency/Duration: 1 time a week every month for three months GOALS: (Goals have been discussed and agreed upon with patient.) Discharge Goals: Time Frame: 12 weeks 1. Patient will show a return to performing at least 30 minutes of endurance exercises at least 3-4 times a week in order to return to his normal function (MET) 2. Patient will show full understanding of right knee exercises without difficulty (MET) 3. Patient will show a greater than 5 point increase on the LEFS in order to show an increase in functional activities (ongoing) 4. Patient will perform 10 sit to stands without difficulty (ongoing) 5. NEW GOAL: 
6. Patient will perform a pool exercise at least 3 times a week in order to return to previous exercise level (discontinued) 7. Patient will perform single leg stance for greater than 10 seconds in order to progress balance function Rehabilitation Potential For Stated Goals: Excellent Regarding James Potocki's therapy, I certify that the treatment plan above will be carried out by a therapist or under their direction. Thank you for this referral, Luis Fernando Fernandez, SURINDER Referring Physician Signature: Neptali Medina MD _______________________________ Date _____________ The information in this section was collected on 2/6/2020 
 (except where otherwise noted). HISTORY:  
History of Present Injury/Illness (Reason for Referral): 
Patient reports that he has just been feeling a little off this whole year so far even starting in December. He has had the joint aches and some pain in his right knee and lower back. The right knee has been much better since he had an injection in November and he feels like that is holding on and helping him keep moving. He has had difficulty with getting moving with exercises again and has felt more problems with overall inflammation and with his bowels. His primary care Dr. Kofi Pierre had a scan done for abdominal aorta problems and or gastro intestinal blockages that were negative. He reports that he has not returned to all of the exercise he was doing before, but is a little fearful to cause pain. His goals are to return to normal physical activity and get back to a healthy lifestyle without knee pain. Past Medical History/Comorbidities:  
Mr. Venkat Estrella  has a past medical history of Anxiety, Edema of both legs, Enlarged prostate, Gout, Hip pain, History of elevated glucose, History of seasonal allergies, Hypercholesterolemia, Hypertension, Keratoacanthoma, S/P total hip arthroplasty (4/6/2015), Skin neoplasm, and Spinal stenosis.  He also has no past medical history of Aneurysm ([address] Arrhythmia, Arthritis, Asthma, Autoimmune disease ([address] CAD (coronary artery disease), Cancer ([address] Chronic kidney disease, Chronic obstructive pulmonary disease ([address] Chronic pain, Coagulation disorder ([address] Difficult intubation, GERD (gastroesophageal reflux disease), Heart failure ([address] Ill-defined condition, Liver disease, Malignant hyperthermia due to anesthesia, Morbid obesity ([address] Nausea & vomiting, Pseudocholinesterase deficiency, Psychiatric disorder, PUD (peptic ulcer disease), Seizures ([address] Stroke ([address] Thromboembolus ([address] Thyroid disease, Unspecified adverse effect of anesthesia, or Unspecified sleep apnea. Mr. Trey Castillo  has a past surgical history that includes hx tonsillectomy (as child) and hx wisdom teeth extraction (as teenager). Social History/Living Environment:  
   
Social History Socioeconomic History  Marital status:  Spouse name: Not on file  Number of children: Not on file  Years of education: Not on file  Highest education level: Not on file Occupational History  Not on file Social Needs  Financial resource strain: Not on file  Food insecurity:  
  Worry: Not on file Inability: Not on file  Transportation needs:  
  Medical: Not on file Non-medical: Not on file Tobacco Use  Smoking status: Never Smoker Substance and Sexual Activity  Alcohol use: No  
 Drug use: Not on file  Sexual activity: Not on file Lifestyle  Physical activity:  
  Days per week: Not on file Minutes per session: Not on file  Stress: Not on file Relationships  Social connections:  
  Talks on phone: Not on file Gets together: Not on file Attends Jainism service: Not on file Active member of club or organization: Not on file Attends meetings of clubs or organizations: Not on file Relationship status: Not on file  Intimate partner violence:  
  Fear of current or ex partner: Not on file Emotionally abused: Not on file Physically abused: Not on file Forced sexual activity: Not on file Other Topics Concern  Not on file Social History Narrative  Not on file Prior Level of Function/Work/Activity: 
Independent, sales Dominant Side:  
      [facility] H2 Model Falls Risk Assessment Risk Factors: 
     (1)  Gender [Male] Ability to Rise from Chair: 
     (1)  Pushes up, successful in one attempt Falls Prevention Plan: No modifications necessary Total: (5 or greater = High Risk): 2 ©2010 [facility] [address] [place] [address] States Patent #[id number]. Federal Law prohibits the replication, distribution or use without written permission from [facility] Current Medications:  Patient recently had a steroid pack for sinus issues and antibiotic for that as well that were recently completed Current Outpatient Medications:  
  HYDROmorphone (DILAUDID) 2 mg tablet, Take 1 Tab by mouth every four (4) hours as needed. Max Daily Amount: 12 mg., Disp: 40 Tab, Rfl: 0 
  prazosin (MINIPRESS) 1 mg capsule, Take 1 mg by mouth three (3) times daily. TAKE AM OF SURGERY WITH SMALL SIP OF WATER   Indications: BENIGN PROSTATIC HYPERTROPHY, Disp: , Rfl:  
  amLODIPine (NORVASC) 10 mg tablet, Take 10 mg by mouth daily. TAKE AM OF SURGERY WITH SMALL SIP OF WATER, Disp: , Rfl:  
  labetalol (NORMODYNE) 200 mg tablet, Take 200 mg by mouth two (2) times a day. TAKE AM OF SURGERY WITH SMALL SIP OF WATER, Disp: , Rfl:  
  cloNIDine HCl (CATAPRES) 0.2 mg tablet, Take 0.2 mg by mouth nightly. Indications: HYPERTENSION, Disp: , Rfl:  
  finasteride (PROSCAR) 5 mg tablet, Take 5 mg by mouth daily. TAKE AM OF SURGERY WITH SMALL SIP OF WATER   Indications: BENIGN PROSTATIC HYPERTROPHY, Disp: , Rfl:  
  furosemide (LASIX) 40 mg tablet, Take 40 mg by mouth daily. , Disp: , Rfl:  
  Febuxostat (ULORIC) 80 mg tab tablet, Take 80 mg by mouth daily. TAKE AM OF SURGERY WITH SMALL SIP OF WATER   Indications: GOUT, Disp: , Rfl:  
  LORazepam (ATIVAN) 0.5 mg tablet, Take 0.5 mg by mouth daily as needed for Anxiety. TAKES 1/2 OF 1MG TABLET DAILY AS NEEDED, \"USUALLY EVERY DAY\", Disp: , Rfl:   
Date Last Reviewed:  2/6/2020 Number of Personal Factors/Comorbidities that affect the Plan of Care: 1-2: MODERATE COMPLEXITY EXAMINATION:  
Observation/Orthostatic Postural Assessment:   
      Patient shows increased right pelvic height in standing with increased right side bent position. Increased knee hyperextension in standing as well as increased right ER in the femur (previous ANUJ) Right LE shows genu varus and left LE shows genu valgus Palpation:   
      Tightness along right QL in standing* 
-Patella on right and left lack mobility in superior and inferior glide 
-Joint mobility shows some decreased in joint play on the right tibia on femur for ER 
-Right hip shows good overall mobility at this time 
-Posterior right knee has restrictions in gastroc-soleus tendon Patient has increased paraspinal tightness through lower back ROM:   
      Knee flexion to 130 deg R LE and full extension Hip extension lacking 5 degrees from neutral on right Functional Mobility:  
      Gait/Ambulation:  Patient shows increased side to side sway with gait and some lack of extension push off from gluts Transfers:  Pushes with hands from chair to stand Stairs:  Some slight difficulty with pushing through Quads Balance:   
      Decreased balance with tandem stance and single leg B to less than 5 sec Body Structures Involved: 1. Bones 2. Joints 3. Muscles 4. Ligaments Body Functions Affected: 1. Neuromusculoskeletal 
2. Movement Related Activities and Participation Affected: 1. General Tasks and Demands 2. Mobility 3. Domestic Life 4. Community, Social and [place] Number of elements (examined above) that affect the Plan of Care: 4+: HIGH COMPLEXITY CLINICAL PRESENTATION:  
Presentation: Stable and uncomplicated: LOW COMPLEXITY CLINICAL DECISION MAKING:  
Outcome Measure: Tool Used: Lower Extremity Functional Scale (LEFS) Score:  Initial: 56/76 Most Recert: 61/31 (Date: 8/9/81 ) Interpretation of Score: 20 questions each scored on a 5 point scale with 0 representing \"extreme difficulty or unable to perform\" and 4 representing \"no difficulty\".   The lower the score, the greater the functional disability. 80/80 represents no disability. Minimal detectable change is 9 points. Tool Used: Modified Oswestry Low Back Pain Questionnaire Score:  Initial: 15/50  Most Recent: X/50 (Date: -- ) Interpretation of Score: Each section is scored on a 0-5 scale, 5 representing the greatest disability. The scores of each section are added together for a total score of 50. Medical Necessity:  
· Patient is expected to demonstrate progress in strength, range of motion, balance, coordination and functional technique to increased mobility. · Patient demonstrates excellent rehab potential due to higher previous functional level. · Skilled intervention continues to be required due to decreased activity and mobility. Reason for Services/Other Comments: 
· Patient continues to require skilled intervention due to decreased functional mobility. Use of outcome tool(s) and clinical judgement create a POC that gives a: Clear prediction of patient's progress: LOW COMPLEXITY · Pain at 2/10 Total Treatment Duration:Re-evaluation x 45 min PT Patient Time In/Time Out Time In: 9795 Time Out: 1506 Nicolas Franco PT No future appointments.

## 2020-02-06 NOTE — PROGRESS NOTES
Mireya Cage  : 1945  [place]  Secondary: Bshsi Generic [facility]  at [facility]  [address] [place] [address]  [phone]   [phone]        OUTPATIENT PHYSICAL THERAPY: Daily Treatment Note 2020  Pre-treatment Symptoms/Complaints:  Patient has been doing well for the last two months, and finally feels a little better with the new medications. He still wants to work on his goals of balance and strength and decrease the low back tightness. Pain: Initial: Pain Intensity 1: 2 achy radiating Post Session:  0/10   Medications Last Reviewed:  2020    Updated Objective Findings:    TREATMENT:     THERAPEUTIC EXERCISE: (reviewed minutes):  Exercises per grid below to improve mobility, strength, balance and coordination. Required minimal visual, verbal, manual and tactile cues to promote proper body alignment, promote proper body posture, promote proper body mechanics and promote proper body breathing techniques. Progressed resistance, range, repetitions and complexity of movement as indicated.    Date:  2020     Activity/Exercise Parameters   Hip stretches 5 min with education and work on hip flexor off side of bed and piriformis   Clam shell Patient shows weakness in the abductors today started back to basics x 20 in side lie   Long leg depression X 3 min in side lie   Decompression bridge    Core training Single leg push for initiation of brace 3 x 30 sec B   High knee on wall with support Not today   Toe taps Up onto top step x 2 min   Weight bearing through R LE Step forward and back x 5 min working on pushing off           MANUAL THERAPY: ( 0 minutes): Joint mobilization and Soft tissue mobilization was utilized and necessary because of the patient's restricted joint motion and restricted motion of soft tissue.   -Supine hamstring mobilization  -supine lateral IT band and bony contour of right hip  -Right side lie mobilization to IT band, glut medius muscle and tendon  -right side lie pelvic mobility with posterior depression and end range hold for neuromuscular re-education  -Worked anterior elevation as well for core training  -Stability training through pelvis with manual resistance  [employer] Portal  Treatment/Session Summary:    · Response to Treatment:  Patient discussed plan of care and how to take the next steps toward his goals  · Communication/Consultation:  None today  · Equipment provided today:  None today  · Recommendations/Intent for next treatment session: Next visit will focus on Hip mobility and core training. Treatment Plan of Care Effective Dates: 2/6/20 to 5/6/20  Total Treatment Billable Duration: 0 minutes today  PT Patient Time In/Time Out  Time In: 7197  Time Out: Nancy Westfall PT    No future appointments.

## 2020-03-02 ENCOUNTER — HOSPITAL ENCOUNTER (OUTPATIENT)
Dept: PHYSICAL THERAPY | Age: 75
Discharge: HOME | End: 2020-03-02
Payer: MEDICARE

## 2020-03-02 PROCEDURE — 97140 MANUAL THERAPY 1/> REGIONS: CPT

## 2020-03-02 NOTE — PROGRESS NOTES
Lexi Quinones  : 1945  [place]  Secondary: Bshsi Generic [facility]  at [facility]  [address] [place] [address]  [phone]   [phone]        OUTPATIENT PHYSICAL THERAPY: Daily Treatment Note 3/2/2020  Pre-treatment Symptoms/Complaints:  Patient reports he had some good days last week and came into the gym frequently, but just feels back to sluggish today. He feels like he goes up and down with energy levels due to the BP medication  Pain: Initial: Pain Intensity 1: 1 achy low back and mid back pain Post Session:  0/10   Medications Last Reviewed:  3/2/2020    Updated Objective Findings:    TREATMENT:     THERAPEUTIC EXERCISE: (reviewed minutes):  Exercises per grid below to improve mobility, strength, balance and coordination. Required minimal visual, verbal, manual and tactile cues to promote proper body alignment, promote proper body posture, promote proper body mechanics and promote proper body breathing techniques. Progressed resistance, range, repetitions and complexity of movement as indicated.    Date:  3/2/2020     Activity/Exercise Parameters   Hip stretches 5 min with education and work on hip flexor off side of bed and piriformis   Clam shell Patient shows weakness in the abductors today started back to basics x 20 in side lie   Long leg depression X 3 min in side lie   Decompression bridge    Core training Single leg push for initiation of brace 3 x 30 sec B   High knee on wall with support Not today   Toe taps Up onto top step x 2 min   Weight bearing through R LE Step forward and back x 5 min working on pushing off           MANUAL THERAPY: ( 30 minutes): Joint mobilization and Soft tissue mobilization was utilized and necessary because of the patient's restricted joint motion and restricted motion of soft tissue.   -Supine hamstring mobilization  -supine lateral IT band and bony contour of right hip  -Right side lie mobilization to IT band, glut medius muscle and tendon  -right side lie pelvic mobility with posterior depression and end range hold for neuromuscular re-education  -Worked anterior elevation as well for core training  -Stability training through pelvis with manual resistance  [employer] Portal  Treatment/Session Summary:    · Response to Treatment:  Patient discussed his heart and BP medication as well. He felt better and shows better core control after session. Follow up in two weeks  · Communication/Consultation:  None today  · Equipment provided today:  None today  · Recommendations/Intent for next treatment session: Next visit will focus on Hip mobility and core training.   Treatment Plan of Care Effective Dates: 2/6/20 to 5/6/20  Total Treatment Billable Duration: 30 minutes  PT Patient Time In/Time Out  Time In: 1300  Time Out: [address], PT    Future Appointments   Date Time Provider Nancy Eason   3/16/2020  1:00 PM Richard Jason, PT [facility]

## 2020-03-16 ENCOUNTER — HOSPITAL ENCOUNTER (OUTPATIENT)
Dept: PHYSICAL THERAPY | Age: 75
Discharge: HOME | End: 2020-03-16
Payer: MEDICARE

## 2020-03-16 PROCEDURE — 97140 MANUAL THERAPY 1/> REGIONS: CPT

## 2020-03-16 PROCEDURE — 97110 THERAPEUTIC EXERCISES: CPT

## 2020-03-16 NOTE — PROGRESS NOTES
Lexi Quinones  : 1945  [place]  Secondary: Bshsi Generic [facility]  at [address] [place] [address]  [phone]   [phone]        OUTPATIENT PHYSICAL THERAPY: Daily Treatment Note 3/16/2020   ICD-10: Treatment Diagnosis: Pain in joint, right knee, M25.561  Difficulty walking, not elsewhere classified, R26.2  Precautions/Allergies: Other medication and Statins-hmg-coa reductase inhibitors   MD Orders: Evaluate and Treat MEDICAL/REFERRING DIAGNOSIS:  Unilateral primary osteoarthritis, right knee [M17.11]   DATE OF ONSET: Chronic  REFERRING PHYSICIAN: Kelly Quintero MD       Pre-treatment Symptoms/Complaints:  Patient reports he is having a bit more difficulty with the R LE with achy pain and some traveling symptoms that made his leg give out some this morning  Pain: Initial: Pain Intensity 1: 3 achy low back and mid back pain Post Session:  2/10   Medications Last Reviewed:  3/16/2020    Updated Objective Findings:    TREATMENT:     THERAPEUTIC EXERCISE: (15 minutes):  Exercises per grid below to improve mobility, strength, balance and coordination. Required minimal visual, verbal, manual and tactile cues to promote proper body alignment, promote proper body posture, promote proper body mechanics and promote proper body breathing techniques. Progressed resistance, range, repetitions and complexity of movement as indicated.    Date:  3/16/2020     Activity/Exercise Parameters   Hip stretches 5 min with education and work on hip flexor off side of bed and piriformis   Clam shell Patient shows weakness in the abductors today started back to basics x 20 in side lie   Long leg depression X 3 min in side lie   Decompression bridge    Core training Single leg push for initiation of brace 3 x 30 sec B   High knee on wall with support Not today   Toe taps Up onto top step x 2 min   Weight bearing through R LE Step forward and back x 5 min working on pushing off           MANUAL THERAPY: ( 30 minutes): Joint mobilization and Soft tissue mobilization was utilized and necessary because of the patient's restricted joint motion and restricted motion of soft tissue.   -Supine hamstring mobilization  -supine lateral IT band and bony contour of right hip  -Right side lie mobilization to IT band, glut medius muscle and tendon  -right side lie pelvic mobility with posterior depression and end range hold for neuromuscular re-education  -Worked anterior elevation as well for core training  -Stability training through pelvis with manual resistance    [facility]  Treatment/Session Summary:    · Response to Treatment:  Patient shows some reluctance to weight bear on the R LE today and has increased fear of falling. We worked on functional push off and strength, but he is still limited. Follow up in two weeks  · Communication/Consultation:  None today  · Equipment provided today:  None today  · Recommendations/Intent for next treatment session: Next visit will focus on Hip mobility and core training.   Treatment Plan of Care Effective Dates: 2/6/20 to 5/6/20  Total Treatment Billable Duration: 45 minutes  PT Patient Time In/Time Out  Time In: 8337  Time Out: [address], PT    Future Appointments   Date Time Provider Nancy Eason   4/3/2020  1:00 PM Tati Dennison, PT [facility]

## 2020-05-15 ENCOUNTER — HOSPITAL ENCOUNTER (OUTPATIENT)
Dept: PHYSICAL THERAPY | Age: 75
Discharge: HOME | End: 2020-05-15
Payer: MEDICARE

## 2020-05-15 PROCEDURE — 97140 MANUAL THERAPY 1/> REGIONS: CPT

## 2020-05-15 PROCEDURE — 97164 PT RE-EVAL EST PLAN CARE: CPT

## 2020-05-15 NOTE — THERAPY RECERTIFICATION
Dorinda Herrera : 1945 [place] Secondary: [facility] 
[address] [place] [address] [phone]   [phone] OUTPATIENT PHYSICAL THERAPY:Re-evaluation and Recertification  ICD-10: Treatment Diagnosis: Pain in joint, right knee, M25.561 Difficulty walking, not elsewhere classified, R26.2 Sciatica, right side, M54.31 Precautions/Allergies: Other medication and Statins-hmg-coa reductase inhibitors MD Orders: Evaluate and Treat MEDICAL/REFERRING DIAGNOSIS: 
Unilateral primary osteoarthritis, right knee [M17.11] DATE OF ONSET: Chronic REFERRING PHYSICIAN: Radha Terrell MD 
RETURN PHYSICIAN APPOINTMENT: not sheduled Dorinda Herrera has been seen for 24 visits from 3/4/19 to 5/15/2020 for knee pain and difficulty walking. Patient has performed therapeutic exercises, activities, and had manual therapy to increased strength, ROM and function. Patient has also used modalities for pain control in order to increase function. Patient has shown a significant decrease in function per the LEFS with scores of 26/80. Patient shows some increase in LE weakness with walking as has started to use a single point cane for support or balance. but still feels back tightness and balance issues. We discussed his feeling of sciatic pain as well as some feeling of not having good control of his LE at times. PT will inquire to his spine doctor (Dr. Mina Edmonds) to see if she wants to see him at this time for an injection or check up. Patient will still benefit from therapy at this time. Lyndsay Lopez, PT, DPT, OCS, CFMT INITIAL ASSESSMENT:  Mr. Sagrario Jj presents with chronic right knee pain and difficulty walking due to primary OA. He shows limitations from his hip and low back to the right knee.   He has some decreased mobility overall that has prevented him from returning to regular gym activities. He is a good candidate for skilled PT in order to address listed impairments affecting his function. Patricio Nur, PT, DPT, OCS PROBLEM LIST (Impacting functional limitations): 1. Decreased Strength 2. Decreased ADL/Functional Activities 3. Decreased Transfer Abilities 4. Decreased Ambulation Ability/Technique 5. Decreased Balance 6. Increased Pain 7. Decreased Activity Tolerance 8. Increased Fatigue 9. Decreased Flexibility/Joint Mobility 10. Edema/Girth INTERVENTIONS PLANNED: (Treatment may consist of any combination of the following) 1. Balance Exercise 2. Bed Mobility 3. Gait Training 4. Heat 5. Manual Therapy 6. Neuromuscular Re-education/Strengthening 7. Range of Motion (ROM) 8. Therapeutic Activites 9. Therapeutic Exercise/Strengthening TREATMENT PLAN: 
Effective Dates: 5/15/20 TO 8/15/20 (90 days). Frequency/Duration: 1 time a week for 4 weeks then every other week for 8 weeks GOALS: (Goals have been discussed and agreed upon with patient.) Discharge Goals: Time Frame: 12 weeks 1. Patient will show a return to performing at least 30 minutes of endurance exercises at least 3-4 times a week in order to return to his normal function (MET) 2. Patient will show full understanding of right knee exercises without difficulty (MET) 3. Patient will show a greater than 5 point increase on the LEFS in order to show an increase in functional activities (ongoing) 4. Patient will perform 10 sit to stands without difficulty (ongoing) 5. NEW GOAL: 
6. Patient will perform a pool exercise at least 3 times a week in order to return to previous exercise level (discontinued) 7. Patient will perform single leg stance for greater than 10 seconds in order to progress balance function Rehabilitation Potential For Stated Goals: Excellent Regarding Darie Blades Potocki's therapy, I certify that the treatment plan above will be carried out by a therapist or under their direction. Thank you for this referral, Mike Amaya PT Referring Physician Signature: Yehuda Lozada MD _______________________________ Date _____________ The information in this section was collected on 5/15/2020 
 (except where otherwise noted). HISTORY:  
History of Present Injury/Illness (Reason for Referral): 
Patient reports that he has just been feeling a little off this whole year so far even starting in December. He has had the joint aches and some pain in his right knee and lower back. The right knee has been much better since he had an injection in November and he feels like that is holding on and helping him keep moving. He has had difficulty with getting moving with exercises again and has felt more problems with overall inflammation and with his bowels. His primary care Dr. Mccormick Locus had a scan done for abdominal aorta problems and or gastro intestinal blockages that were negative. He reports that he has not returned to all of the exercise he was doing before, but is a little fearful to cause pain. His goals are to return to normal physical activity and get back to a healthy lifestyle without knee pain. Past Medical History/Comorbidities:  
Mr. Moncho Castillo  has a past medical history of Anxiety, Edema of both legs, Enlarged prostate, Gout, Hip pain, History of elevated glucose, History of seasonal allergies, Hypercholesterolemia, Hypertension, Keratoacanthoma, S/P total hip arthroplasty (4/6/2015), Skin neoplasm, and Spinal stenosis.  He also has no past medical history of Aneurysm ([address] Arrhythmia, Arthritis, Asthma, Autoimmune disease ([address] CAD (coronary artery disease), Cancer ([address] Chronic kidney disease, Chronic obstructive pulmonary disease ([address] Chronic pain, Coagulation disorder ([address] Difficult intubation, GERD (gastroesophageal reflux disease), Heart failure ([address] Ill-defined condition, Liver disease, Malignant hyperthermia due to anesthesia, Morbid obesity ([facility] Nausea & vomiting, Pseudocholinesterase deficiency, Psychiatric disorder, PUD (peptic ulcer disease), Seizures ([facility] Stroke ([facility] Thromboembolus ([facility] Thyroid disease, Unspecified adverse effect of anesthesia, or Unspecified sleep apnea. Mr. Mckinnon Dad  has a past surgical history that includes hx tonsillectomy (as child) and hx wisdom teeth extraction (as teenager). Social History/Living Environment:  
   
Social History Socioeconomic History  Marital status:  Spouse name: Not on file  Number of children: Not on file  Years of education: Not on file  Highest education level: Not on file Occupational History  Not on file Social Needs  Financial resource strain: Not on file  Food insecurity Worry: Not on file Inability: Not on file  Transportation needs Medical: Not on file Non-medical: Not on file Tobacco Use  Smoking status: Never Smoker Substance and Sexual Activity  Alcohol use: No  
 Drug use: Not on file  Sexual activity: Not on file Lifestyle  Physical activity Days per week: Not on file Minutes per session: Not on file  Stress: Not on file Relationships  Social connections Talks on phone: Not on file Gets together: Not on file Attends Yarsanism service: Not on file Active member of club or organization: Not on file Attends meetings of clubs or organizations: Not on file Relationship status: Not on file  Intimate partner violence Fear of current or ex partner: Not on file Emotionally abused: Not on file Physically abused: Not on file Forced sexual activity: Not on file Other Topics Concern  Not on file Social History Narrative  Not on file Prior Level of Function/Work/Activity: 
Independent, sales Dominant Side:  
      [facility] H2 Model Falls Risk Assessment Risk Factors: (1)  Gender [Male] 
     (5)  History of Recent Falls [w/in 3 months] 
     (1)  Orthostatic drop in BP Ability to Rise from Chair: 
     (3)  Multiple attempts, but successful Falls Prevention Plan: Mobility Assistance Device (specify): Has SPC and may need more depending on progress Total: (5 or greater = High Risk): 10  
 ©2010 [facility] . [facility] Patent #[id number]. Federal Law prohibits the replication, distribution or use without written permission from [facility] Current Medications:  Patient recently had a steroid pack for sinus issues and antibiotic for that as well that were recently completed Current Outpatient Medications:  
  HYDROmorphone (DILAUDID) 2 mg tablet, Take 1 Tab by mouth every four (4) hours as needed. Max Daily Amount: 12 mg., Disp: 40 Tab, Rfl: 0 
  prazosin (MINIPRESS) 1 mg capsule, Take 1 mg by mouth three (3) times daily. TAKE AM OF SURGERY WITH SMALL SIP OF WATER   Indications: BENIGN PROSTATIC HYPERTROPHY, Disp: , Rfl:  
  amLODIPine (NORVASC) 10 mg tablet, Take 10 mg by mouth daily. TAKE AM OF SURGERY WITH SMALL SIP OF WATER, Disp: , Rfl:  
  labetalol (NORMODYNE) 200 mg tablet, Take 200 mg by mouth two (2) times a day. TAKE AM OF SURGERY WITH SMALL SIP OF WATER, Disp: , Rfl:  
  cloNIDine HCl (CATAPRES) 0.2 mg tablet, Take 0.2 mg by mouth nightly. Indications: HYPERTENSION, Disp: , Rfl:  
  finasteride (PROSCAR) 5 mg tablet, Take 5 mg by mouth daily. TAKE AM OF SURGERY WITH SMALL SIP OF WATER   Indications: BENIGN PROSTATIC HYPERTROPHY, Disp: , Rfl:  
  furosemide (LASIX) 40 mg tablet, Take 40 mg by mouth daily. , Disp: , Rfl:  
  Febuxostat (ULORIC) 80 mg tab tablet, Take 80 mg by mouth daily.  TAKE AM OF SURGERY WITH SMALL SIP OF WATER   Indications: GOUT, Disp: , Rfl:  
  LORazepam (ATIVAN) 0.5 mg tablet, Take 0.5 mg by mouth daily as needed for Anxiety. TAKES 1/2 OF 1MG TABLET DAILY AS NEEDED, \"USUALLY EVERY DAY\", Disp: , Rfl:   
Date Last Reviewed:  5/15/2020 Number of Personal Factors/Comorbidities that affect the Plan of Care: 1-2: MODERATE COMPLEXITY EXAMINATION:  
Observation/Orthostatic Postural Assessment:   
      Patient shows increased right pelvic height in standing with increased right side bent position. Increased knee hyperextension in standing as well as increased right ER in the femur (previous ANUJ) Right LE shows genu varus and left LE shows genu valgus Palpation:   
      Tightness along right QL in standing* 
-Patella on right and left lack mobility in superior and inferior glide 
-Joint mobility shows some decreased in joint play on the right tibia on femur for ER 
-Right hip shows good overall mobility at this time 
-Posterior right knee has restrictions in gastroc-soleus tendon Patient has increased paraspinal tightness through lower back ROM:   
      Knee flexion to 130 deg R LE and full extension Hip extension lacking 5 degrees from neutral on right Functional Mobility:  
      Gait/Ambulation:  Patient shows increased side to side sway with gait and some lack of extension push off from gluts Transfers:  Pushes with hands from chair to stand Stairs:  Some slight difficulty with pushing through Quads Balance:   
      Decreased balance with tandem stance and single leg B to less than 5 sec Patient is ambulating with SPC at this time and shows some slower more deliberate pattern and function. He still has some trouble with his orthostatic hypotension as well Body Structures Involved: 1. Bones 2. Joints 3. Muscles 4. Ligaments Body Functions Affected: 1. Neuromusculoskeletal 
2. Movement Related Activities and Participation Affected: 1. General Tasks and Demands 2. Mobility 3. Domestic Life 4. Community, Social and [place] Number of elements (examined above) that affect the Plan of Care: 4+: HIGH COMPLEXITY CLINICAL PRESENTATION:  
Presentation: Stable and uncomplicated: LOW COMPLEXITY CLINICAL DECISION MAKING:  
Outcome Measure: Tool Used: Lower Extremity Functional Scale (LEFS) Score:  Initial: 61/00 Most Recert: 78/47 (Date: 7/12/55 ) Interpretation of Score: 20 questions each scored on a 5 point scale with 0 representing \"extreme difficulty or unable to perform\" and 4 representing \"no difficulty\". The lower the score, the greater the functional disability. 80/80 represents no disability. Minimal detectable change is 9 points. Tool Used: Modified Oswestry Low Back Pain Questionnaire Score:  Initial: 15/50  Most Recent: 23/50 (Date: 5/15/20) Interpretation of Score: Each section is scored on a 0-5 scale, 5 representing the greatest disability. The scores of each section are added together for a total score of 50. Medical Necessity:  
· Patient is expected to demonstrate progress in strength, range of motion, balance, coordination and functional technique to increased mobility. · Patient demonstrates excellent rehab potential due to higher previous functional level. · Skilled intervention continues to be required due to decreased activity and mobility. Reason for Services/Other Comments: 
· Patient continues to require skilled intervention due to decreased functional mobility. Use of outcome tool(s) and clinical judgement create a POC that gives a: Clear prediction of patient's progress: LOW COMPLEXITY · Pain at 2/10 Total Treatment Duration:Re-evaluation PT Patient Time In/Time Out Time In: 1400 Time Out: 1500 Dipesh Gomes PT Future Appointments Date Time Provider Nancy Eason 6/24/2020  8:30 AM Genaro Michaels MD [facility]

## 2020-05-15 NOTE — PROGRESS NOTES
Yosvany Davidson  : 1945  [place]  Secondary: [facility]  at [address] [place] [address]  [phone]   [phone]        OUTPATIENT PHYSICAL THERAPY: Daily Treatment Note 5/15/2020   ICD-10: Treatment Diagnosis: Pain in joint, right knee, M25.561  Difficulty walking, not elsewhere classified, R26.2  Sciatica, right side, M54.31  Precautions/Allergies: Other medication and Statins-hmg-coa reductase inhibitors   MD Orders: Evaluate and Treat  Treatment Plan of Care Effective Dates: 5/15/20 to 8/15/20 MEDICAL/REFERRING DIAGNOSIS:  Unilateral primary osteoarthritis, right knee [M17.11]   DATE OF ONSET: Chronic  REFERRING PHYSICIAN: Martha Willett MD       Pre-treatment Symptoms/Complaints:  Patient reports he has continued to feel weakness in the LE's after not being able to do much over the last two months. He just feels like he can't control his R LE as much and feels more right back of the leg pain and tightness  Pain: Initial: Pain Intensity 1: 4 achy low back and mid back pain Post Session:  2/10   Medications Last Reviewed:  5/15/2020    Updated Objective Findings:    TREATMENT:     THERAPEUTIC EXERCISE: (reviewed minutes):  Exercises per grid below to improve mobility, strength, balance and coordination. Required minimal visual, verbal, manual and tactile cues to promote proper body alignment, promote proper body posture, promote proper body mechanics and promote proper body breathing techniques. Progressed resistance, range, repetitions and complexity of movement as indicated.    Date:  5/15/2020     Activity/Exercise Parameters   Hip stretches 5 min with education and work on hip flexor off side of bed and piriformis   Clam shell Patient shows weakness in the abductors today started back to basics x 20 in side lie   Long leg depression X 3 min in side lie   Decompression bridge    Core training Single leg push for initiation of brace 3 x 30 sec B   High knee on wall with support Not today   Toe taps Up onto top step x 2 min   Weight bearing through R LE Step forward and back x 5 min working on pushing off           MANUAL THERAPY: ( 20 minutes): Joint mobilization and Soft tissue mobilization was utilized and necessary because of the patient's restricted joint motion and restricted motion of soft tissue.   -Supine hamstring mobilization  -supine lateral IT band and bony contour of right hip  -Right side lie mobilization to IT band, glut medius muscle and tendon  -right side lie pelvic mobility with posterior depression and end range hold for neuromuscular re-education  -Worked anterior elevation as well for core training  -Stability training through pelvis with manual resistance    MedBridge Portal  Treatment/Session Summary:    · Response to Treatment:  Patient shows continued issues with gait function and functional mobility due to weakness in R LE. He shows some decrease in nerve function of L2-3 nerve root. Will follow up with ortho about injection and what to do from here. · Communication/Consultation:  None today  · Equipment provided today:  None today  · Recommendations/Intent for next treatment session: Next visit will focus on Hip mobility and core training.     Total Treatment Billable Duration: 20 minutes  PT Patient Time In/Time Out  Time In: 1400  Time Out: Romeo 65, PT    Future Appointments   Date Time Provider Nancy Eason   6/24/2020  8:30 AM Urszula Thacker MD [facility]

## 2020-05-22 ENCOUNTER — HOSPITAL ENCOUNTER (OUTPATIENT)
Dept: PHYSICAL THERAPY | Age: 75
Discharge: HOME | End: 2020-05-22
Payer: MEDICARE

## 2020-05-22 PROCEDURE — 97140 MANUAL THERAPY 1/> REGIONS: CPT

## 2020-05-22 PROCEDURE — 97110 THERAPEUTIC EXERCISES: CPT

## 2020-05-22 NOTE — PROGRESS NOTES
Aime Velasco  : 1945  [place]  Secondary: [employer] Generic [address]  at [address] [place] [address]  [phone]   [phone]        OUTPATIENT PHYSICAL THERAPY: Daily Treatment Note 2020   ICD-10: Treatment Diagnosis: Pain in joint, right knee, M25.561  Difficulty walking, not elsewhere classified, R26.2  Sciatica, right side, M54.31  Precautions/Allergies: Other medication and Statins-hmg-coa reductase inhibitors   MD Orders: Evaluate and Treat  Treatment Plan of Care Effective Dates: 5/15/20 to 8/15/20 MEDICAL/REFERRING DIAGNOSIS:  Unilateral primary osteoarthritis, right knee [M17.11]   DATE OF ONSET: Chronic  REFERRING PHYSICIAN: Ann Marie Walter MD       Pre-treatment Symptoms/Complaints:  Patient reports he has been feeling about the same, but walked better after last session  Pain: Initial: Pain Intensity 1: 4 achy low back and mid back pain Post Session:  2/10   Medications Last Reviewed:  2020    Updated Objective Findings:    TREATMENT:     THERAPEUTIC EXERCISE: (10 minutes):  Exercises per grid below to improve mobility, strength, balance and coordination. Required minimal visual, verbal, manual and tactile cues to promote proper body alignment, promote proper body posture, promote proper body mechanics and promote proper body breathing techniques. Progressed resistance, range, repetitions and complexity of movement as indicated.    Date:  2020     Activity/Exercise Parameters   Hip stretches 5 min with education and work on hip flexor off side of bed and piriformis   Clam shell Patient shows weakness in the abductors today started back to basics x 20 in side lie   Long leg depression X 3 min in side lie   Decompression bridge    Core training Single leg push for initiation of brace 3 x 30 sec B in side lie   High knee on wall with support Not today   Toe taps (not today) Up onto top step x 2 min   Weight bearing through R LE (not today) Step forward and back x 5 min working on pushing off           MANUAL THERAPY: ( 30 minutes): Joint mobilization and Soft tissue mobilization was utilized and necessary because of the patient's restricted joint motion and restricted motion of soft tissue.   -Supine hamstring mobilization  -supine lateral IT band and bony contour of right hip  -Right side lie mobilization to IT band, glut medius muscle and tendon  -right side lie pelvic mobility with posterior depression and end range hold for neuromuscular re-education B  -Worked anterior elevation as well for core training  -Stability training through pelvis with manual resistance    MedBridge Portal  Treatment/Session Summary:    · Response to Treatment:  Patient shows continued slow movements, but better core facilitation. · Communication/Consultation:  None today  · Equipment provided today:  None today  · Recommendations/Intent for next treatment session: Next visit will focus on Hip mobility and core training.     Total Treatment Billable Duration: 40 minutes  PT Patient Time In/Time Out  Time In: 1400  Time Out: Romeo 65, PT    Future Appointments   Date Time Provider Nancy Eason   5/29/2020  2:00 PM Anika Villegas PT [facility]   6/5/2020  2:00 PM Anika Villegas PT [facility]   6/24/2020  8:30 AM Elroy Julio MD [facility]

## 2020-05-29 ENCOUNTER — HOSPITAL ENCOUNTER (OUTPATIENT)
Dept: PHYSICAL THERAPY | Age: 75
Discharge: HOME | End: 2020-05-29
Payer: MEDICARE

## 2020-05-29 PROCEDURE — 97140 MANUAL THERAPY 1/> REGIONS: CPT

## 2020-05-29 NOTE — PROGRESS NOTES
Virgil Perry  : 1945  [place]  Secondary: [employer] [address] at [facility]  [address] [place] [address]  [phone]   [phone]        OUTPATIENT PHYSICAL THERAPY: Daily Treatment Note 2020   ICD-10: Treatment Diagnosis: Pain in joint, right knee, M25.561  Difficulty walking, not elsewhere classified, R26.2  Sciatica, right side, M54.31  Precautions/Allergies: Other medication and Statins-hmg-coa reductase inhibitors   MD Orders: Evaluate and Treat  Treatment Plan of Care Effective Dates: 5/15/20 to 8/15/20 MEDICAL/REFERRING DIAGNOSIS:  Unilateral primary osteoarthritis, right knee [M17.11]   DATE OF ONSET: Chronic  REFERRING PHYSICIAN: Maris Nava MD       Pre-treatment Symptoms/Complaints:  Patient reports he has still had the difficulty with getting around and is looking to figure out what his goals are and how to get them started  Pain: Initial: Pain Intensity 1: 2 achy low back and mid back pain Post Session:  2/10   Medications Last Reviewed:  2020    Updated Objective Findings:    TREATMENT:     THERAPEUTIC EXERCISE: (reviewed minutes):  Exercises per grid below to improve mobility, strength, balance and coordination. Required minimal visual, verbal, manual and tactile cues to promote proper body alignment, promote proper body posture, promote proper body mechanics and promote proper body breathing techniques. Progressed resistance, range, repetitions and complexity of movement as indicated.    Date:  2020     Activity/Exercise Parameters   Hip stretches 5 min with education and work on hip flexor off side of bed and piriformis   Clam shell Patient shows weakness in the abductors today started back to basics x 20 in side lie   Long leg depression X 3 min in side lie   Decompression bridge    Core training Single leg push for initiation of brace 3 x 30 sec B in side lie   High knee on wall with support Not today   Toe taps  Up onto top step x 2 min   Weight bearing through R LE (not today) Step forward and back x 5 min working on pushing off           MANUAL THERAPY: ( 30 minutes): Joint mobilization and Soft tissue mobilization was utilized and necessary because of the patient's restricted joint motion and restricted motion of soft tissue.   -Supine hamstring mobilization  -supine lateral IT band and bony contour of right hip  -Right side lie mobilization to IT band, glut medius muscle and tendon  -right side lie pelvic mobility with posterior depression and end range hold for neuromuscular re-education B  -Worked anterior elevation as well for core training  -Stability training through pelvis with manual resistance    MedBridge Portal  Treatment/Session Summary:    · Response to Treatment:  Patient shows improved core training with posterior depression on the left side, but still lacks on the right side as well. · Communication/Consultation:  None today  · Equipment provided today:  None today  · Recommendations/Intent for next treatment session: Next visit will focus on Hip mobility and core training.     Total Treatment Billable Duration: 30 minutes  PT Patient Time In/Time Out  Time In: 1489  Time Out: Romeo Aleman, PT    Future Appointments   Date Time Provider Nancy Eason   6/5/2020  2:00 PM Emigdio Burdick, [facility]   6/24/2020  8:30 AM Rohit Worrell MD [facility]

## 2020-06-05 ENCOUNTER — HOSPITAL ENCOUNTER (OUTPATIENT)
Dept: PHYSICAL THERAPY | Age: 75
Discharge: HOME | End: 2020-06-05
Payer: MEDICARE

## 2020-06-05 PROCEDURE — 97140 MANUAL THERAPY 1/> REGIONS: CPT

## 2020-06-05 NOTE — PROGRESS NOTES
Mahsa Davison  : 1945  [place]  Secondary: Bshsi Generic [address] [facility]  at [facility]2 [address]  [phone]   [phone]        OUTPATIENT PHYSICAL THERAPY: Daily Treatment Note 2020   ICD-10: Treatment Diagnosis: Pain in joint, right knee, M25.561  Difficulty walking, not elsewhere classified, R26.2  Sciatica, right side, M54.31  Precautions/Allergies: Other medication and Statins-hmg-coa reductase inhibitors   MD Orders: Evaluate and Treat  Treatment Plan of Care Effective Dates: 5/15/20 to 8/15/20 MEDICAL/REFERRING DIAGNOSIS:  Unilateral primary osteoarthritis, right knee [M17.11]   DATE OF ONSET: Chronic  REFERRING PHYSICIAN: Yehuda Lozada MD       Pre-treatment Symptoms/Complaints:  Patient reports he has still had the difficulty with getting around. He had a visit with Dr. Farooq Lay and they are looking more at his BP medication. He is also scheduled for a lumbar MRI on  due to increased weakness  Pain: Initial: Pain Intensity 1: 3 achy low back and mid back pain Post Session:  2/10   Medications Last Reviewed:  2020    Updated Objective Findings:    TREATMENT:     THERAPEUTIC EXERCISE: (reviewed minutes):  Exercises per grid below to improve mobility, strength, balance and coordination. Required minimal visual, verbal, manual and tactile cues to promote proper body alignment, promote proper body posture, promote proper body mechanics and promote proper body breathing techniques. Progressed resistance, range, repetitions and complexity of movement as indicated.    Date:  2020     Activity/Exercise Parameters   Hip stretches 5 min with education and work on hip flexor off side of bed and piriformis   Clam shell Patient shows weakness in the abductors today started back to basics x 20 in side lie   Long leg depression X 3 min in side lie   Decompression bridge    Core training Single leg push for initiation of brace 3 x 30 sec B in side lie   High knee on wall with support Not today   Toe taps  Up onto top step x 2 min   Weight bearing through R LE (not today) Step forward and back x 5 min working on pushing off           MANUAL THERAPY: ( 30 minutes): Joint mobilization and Soft tissue mobilization was utilized and necessary because of the patient's restricted joint motion and restricted motion of soft tissue.   -Supine hamstring mobilization  -supine lateral IT band and bony contour of right hip  -Right side lie mobilization to IT band, glut medius muscle and tendon  -right side lie pelvic mobility with posterior depression and end range hold for neuromuscular re-education B  -Worked anterior elevation as well for core training  -Stability training through pelvis with manual resistance for anterior elevation and posterior depression    [facility] Portal  Treatment/Session Summary:    · Response to Treatment:  Patient shows improved gait function. Continue to work left hip and glut strength. · Communication/Consultation:  None today  · Equipment provided today:  None today  · Recommendations/Intent for next treatment session: Next visit will focus on Hip mobility and core training.     Total Treatment Billable Duration: 30 minutes  PT Patient Time In/Time Out  Time In: 1400  Time Out: [address], PT    Future Appointments   Date Time Provider Nancy Eason   6/12/2020  2:00 PM Billye Channel, PT [facility]   6/19/2020  2:00 PM Billye Channel, PT [facility]   6/22/2020  2:00 PM Billye Channel, PT [facility]   6/24/2020  8:30 AM Zee Lovell MD [facility] BS[facility]

## 2020-06-11 ENCOUNTER — HOSPITAL ENCOUNTER (OUTPATIENT)
Dept: PHYSICAL THERAPY | Age: 75
Discharge: HOME | End: 2020-06-11
Payer: MEDICARE

## 2020-06-11 NOTE — PROGRESS NOTES
Kayode Carrasco  : 1945  [place]  Secondary: Bshsi Generic 87[address] [facility]  at [facility] & [address]  [phone]   [phone]          OUTPATIENT DAILY NOTE    NAME/AGE/GENDER: Kayode Carrasco is a 66 y.o. male. DATE: 2020    SUBJECTIVE:  Patient cancelled his appointment today due to not feeling well. Will plan to follow up on next scheduled visit.     Nikki Silva PT,

## 2020-06-18 ENCOUNTER — HOSPITAL ENCOUNTER (OUTPATIENT)
Dept: PHYSICAL THERAPY | Age: 75
Discharge: HOME | End: 2020-06-18
Payer: MEDICARE

## 2020-06-18 NOTE — PROGRESS NOTES
Judy Nunn  : 1945  [place]  Secondary: Bshsi Generic [address] [facility]  at [facility] & [address]  [phone]   [phone]          OUTPATIENT DAILY NOTE    NAME/AGE/GENDER: Judy Nunn is a 66 y.o. male. DATE: 2020    SUBJECTIVE:  Patient cancelled hisr appointment today due to still not feeling well. He goes to his doctor tomorrow. Will plan to follow up on next scheduled visit.     Mandi Islas, PT,

## 2020-06-22 ENCOUNTER — HOSPITAL ENCOUNTER (OUTPATIENT)
Dept: PHYSICAL THERAPY | Age: 75
Discharge: HOME | End: 2020-06-22
Payer: MEDICARE

## 2020-06-22 PROCEDURE — 97140 MANUAL THERAPY 1/> REGIONS: CPT

## 2020-06-22 NOTE — PROGRESS NOTES
Torsten Nunn  : 1945  [place]  Secondary: Bshsi Generic 69[address] [facility]  at [address] [place] [address]  [phone]   [phone]        OUTPATIENT PHYSICAL THERAPY: Daily Treatment Note 2020   ICD-10: Treatment Diagnosis: Pain in joint, right knee, M25.561  Difficulty walking, not elsewhere classified, R26.2  Sciatica, right side, M54.31  Precautions/Allergies: Other medication and Statins-hmg-coa reductase inhibitors   MD Orders: Evaluate and Treat  Treatment Plan of Care Effective Dates: 5/15/20 to 8/15/20 MEDICAL/REFERRING DIAGNOSIS:  Unilateral primary osteoarthritis, right knee [M17.11]   DATE OF ONSET: Chronic  REFERRING PHYSICIAN: Hillary Lau MD       Pre-treatment Symptoms/Complaints:  Patient reports he has been weening off his BP medication per his doctor this past 2 weeks. He has felt off getting off the medication, but his BP has still been good  Pain: Initial: Pain Intensity 1: 3 achy low back and mid back pain Post Session:  2/10   Medications Last Reviewed:  2020    Updated Objective Findings:    TREATMENT:     THERAPEUTIC EXERCISE: (reviewed minutes):  Exercises per grid below to improve mobility, strength, balance and coordination. Required minimal visual, verbal, manual and tactile cues to promote proper body alignment, promote proper body posture, promote proper body mechanics and promote proper body breathing techniques. Progressed resistance, range, repetitions and complexity of movement as indicated.    Date:  2020     Activity/Exercise Parameters   Hip stretches 5 min with education and work on hip flexor off side of bed and piriformis   Clam shell Patient shows weakness in the abductors today started back to basics x 20 in side lie   Long leg depression X 3 min in side lie   Decompression bridge    Core training Single leg push for initiation of brace 3 x 30 sec B in side lie   High knee on wall with support Not today   Toe taps  Up onto top step x 2 min   Weight bearing through R LE (not today) Step forward and back x 5 min working on pushing off           MANUAL THERAPY: ( 25 minutes): Joint mobilization and Soft tissue mobilization was utilized and necessary because of the patient's restricted joint motion and restricted motion of soft tissue.   -Supine hamstring mobilization  -supine lateral IT band and bony contour of right hip  -Right side lie mobilization to IT band, glut medius muscle and tendon  -right side lie pelvic mobility with posterior depression and end range hold for neuromuscular re-education B  -Worked anterior elevation as well for core training  -Stability training through pelvis with manual resistance for anterior elevation and posterior depression    [facility]  Treatment/Session Summary:    · Response to Treatment:  Patient shows improved gait function. Follow up one time a week. Patient still weaning off of BP meds. · Communication/Consultation:  None today  · Equipment provided today:  None today  · Recommendations/Intent for next treatment session: Next visit will focus on Hip mobility and core training.     Total Treatment Billable Duration: 30 minutes  PT Patient Time In/Time Out  Time In: 1400  Time Out: [address] Darius Agarwal PT    Future Appointments   Date Time Provider Nancy Eason   6/24/2020  8:30 AM Jovita Benitez MD [facility]   6/30/2020 11:00 AM Farrukh Langford PT [facility]   7/6/2020  2:00 PM Farrukh Langford PT [facility]   7/13/2020  2:00 PM Farrukh Langford PT [facility]

## 2020-06-24 PROBLEM — E53.9 BURNING FEET SYNDROME: Status: ACTIVE | Noted: 2020-06-24

## 2020-06-24 PROBLEM — R29.898 WEAKNESS OF BOTH LEGS: Status: ACTIVE | Noted: 2020-06-24

## 2020-06-24 PROBLEM — R20.2 PARESTHESIA OF LOWER EXTREMITY: Status: ACTIVE | Noted: 2020-06-24

## 2020-06-24 PROBLEM — R29.3 POSTURAL IMBALANCE: Status: ACTIVE | Noted: 2020-06-24
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
## 2020-06-30 ENCOUNTER — APPOINTMENT (OUTPATIENT)
Dept: PHYSICAL THERAPY | Age: 75
End: 2020-06-30
Payer: MEDICARE

## 2020-07-06 ENCOUNTER — HOSPITAL ENCOUNTER (OUTPATIENT)
Dept: PHYSICAL THERAPY | Age: 75
Discharge: HOME | End: 2020-07-06
Payer: MEDICARE

## 2020-07-06 PROCEDURE — 97110 THERAPEUTIC EXERCISES: CPT

## 2020-07-06 PROCEDURE — 97140 MANUAL THERAPY 1/> REGIONS: CPT

## 2020-07-06 NOTE — PROGRESS NOTES
Jack Elgin  : 1945  [place]  Secondary: Bshsi Generic [address] [facility]  at [address] [place] [address]  [phone]   [phone]        OUTPATIENT PHYSICAL THERAPY: Daily Treatment Note 2020   ICD-10: Treatment Diagnosis: Pain in joint, right knee, M25.561  Difficulty walking, not elsewhere classified, R26.2  Sciatica, right side, M54.31  Precautions/Allergies: Other medication and Statins-hmg-coa reductase inhibitors   MD Orders: Evaluate and Treat  Treatment Plan of Care Effective Dates: 5/15/20 to 8/15/20 MEDICAL/REFERRING DIAGNOSIS:  Unilateral primary osteoarthritis, right knee [M17.11]   DATE OF ONSET: Chronic  REFERRING PHYSICIAN: Chiquita Pizano MD       Pre-treatment Symptoms/Complaints:  Patient reports he has been still weaning off BP meds and feeling much better and getting around better. He did have an injection in the back and that seems to have helped too. He had a disc herniation on the left side that was causing the issue  Pain: Initial: Pain Intensity 1: 2 achy low back and mid back pain Post Session:  2/10   Medications Last Reviewed:  2020    Updated Objective Findings:    TREATMENT:     THERAPEUTIC EXERCISE: (15 minutes):  Exercises per grid below to improve mobility, strength, balance and coordination. Required minimal visual, verbal, manual and tactile cues to promote proper body alignment, promote proper body posture, promote proper body mechanics and promote proper body breathing techniques. Progressed resistance, range, repetitions and complexity of movement as indicated.    Date:  2020     Activity/Exercise Parameters   Hip stretches 5 min with education and work on hip flexor off side of bed and piriformis   Clam shell Patient shows weakness in the abductors today started back to basics x 20 in side lie   Long leg depression X 3 min in side lie   Decompression bridge    Core training Single leg push for initiation of brace 3 x 30 sec B in side lie   High knee on wall with support Not today   Toe taps  Up onto top step x 2 min   Weight bearing through R LE (not today) Step forward and back x 5 min working on pushing off           MANUAL THERAPY: ( 15 minutes): Joint mobilization and Soft tissue mobilization was utilized and necessary because of the patient's restricted joint motion and restricted motion of soft tissue.   -Supine hamstring mobilization  -supine lateral IT band and bony contour of right hip  -Right side lie mobilization to IT band, glut medius muscle and tendon  -right side lie pelvic mobility with posterior depression and end range hold for neuromuscular re-education B  -Worked anterior elevation as well for core training  -Stability training through pelvis with manual resistance for anterior elevation and posterior depression    [facility] Portal  Treatment/Session Summary:    · Response to Treatment:  Patient shows improved gait function. He shows marked improvement in mental ability and a little quicker with gait pattern. · Communication/Consultation:  None today  · Equipment provided today:  None today  · Recommendations/Intent for next treatment session: Next visit will focus on Hip mobility and core training.     Total Treatment Billable Duration: 30 minutes  PT Patient Time In/Time Out  Time In: 1405  Time Out: [address], PT    Future Appointments   Date Time Provider Nancy Eason   7/13/2020  2:00 PM Shanelle Larose, SURINDER [facility]   8/25/2020  1:00 PM Nano Glass MD [facility] BSNE

## 2020-07-13 ENCOUNTER — HOSPITAL ENCOUNTER (OUTPATIENT)
Dept: PHYSICAL THERAPY | Age: 75
Discharge: HOME | End: 2020-07-13
Payer: MEDICARE

## 2020-07-13 NOTE — PROGRESS NOTES
Krystina Jackson  : 1945  [place]  Secondary: Bshsi Generic 67[address] [facility]  at [facility] & [address]  [phone]   [phone]          OUTPATIENT DAILY NOTE    NAME/AGE/GENDER: Krystina Jackson is a 66 y.o. male. DATE: 2020    SUBJECTIVE:  Patient cancelled his appointment today due to not feeling well. Will plan to follow up on next scheduled visit.     Celine Patel PT,

## 2020-08-27 NOTE — THERAPY DISCHARGE
Carlin Del Valle : 1945 [place] Secondary: [address] [facility] 
[address] [place] [address] [phone]   [phone] OUTPATIENT PHYSICAL THERAPY:Discharge Summary 2020 ICD-10: Treatment Diagnosis: Pain in joint, right knee, M25.561 Difficulty walking, not elsewhere classified, R26.2 Sciatica, right side, M54.31 Precautions/Allergies: Other medication and Statins-hmg-coa reductase inhibitors MD Orders: Evaluate and Treat MEDICAL/REFERRING DIAGNOSIS: 
Unilateral primary osteoarthritis, right knee [M17.11] DATE OF ONSET: Chronic REFERRING PHYSICIAN: Gretchen Lopez MD 
RETURN PHYSICIAN APPOINTMENT: not sheduled Carlin Del Valle has been seen for 29 visits from 3/4/19 to 20 for knee pain and difficulty walking. Patient has performed therapeutic exercises, activities, and had manual therapy to increased strength, ROM and function. Patient has also used modalities for pain control in order to increase function. Patient has shown a significant decrease in function per the LEFS with scores of 26/80. Patient shows some increase in LE weakness with walking as has started to use a single point cane for support or balance. but still feels back tightness and balance issues. He has not been able to come back at this time due to other issues and his plan of care has . Please re-order therapy if further is needed. Thank you for the referral. 
Alexys Perez, PT, DPT, OCS, CFMT INITIAL ASSESSMENT:  Mr. Alessandra Nath presents with chronic right knee pain and difficulty walking due to primary OA. He shows limitations from his hip and low back to the right knee. He has some decreased mobility overall that has prevented him from returning to regular gym activities. He is a good candidate for skilled PT in order to address listed impairments affecting his function. Eulalio Saenz, PT, DPT, OCS PROBLEM LIST (Impacting functional limitations): 1. Decreased Strength 2. Decreased ADL/Functional Activities 3. Decreased Transfer Abilities 4. Decreased Ambulation Ability/Technique 5. Decreased Balance 6. Increased Pain 7. Decreased Activity Tolerance 8. Increased Fatigue 9. Decreased Flexibility/Joint Mobility 10. Edema/Girth INTERVENTIONS PLANNED: (Treatment may consist of any combination of the following) 1. Balance Exercise 2. Bed Mobility 3. Gait Training 4. Heat 5. Manual Therapy 6. Neuromuscular Re-education/Strengthening 7. Range of Motion (ROM) 8. Therapeutic Activites 9. Therapeutic Exercise/Strengthening TREATMENT PLAN: 
Effective Dates: 5/15/20 TO 8/15/20 (90 days). Frequency/Duration: 1 time a week for 4 weeks then every other week for 8 weeks GOALS: (Goals have been discussed and agreed upon with patient.) Discharge Goals: Time Frame: 12 weeks 1. Patient will show a return to performing at least 30 minutes of endurance exercises at least 3-4 times a week in order to return to his normal function (MET) 2. Patient will show full understanding of right knee exercises without difficulty (MET) 3. Patient will show a greater than 5 point increase on the LEFS in order to show an increase in functional activities (not met) 4. Patient will perform 10 sit to stands without difficulty (not met) 5. NEW GOAL: 
6. Patient will perform a pool exercise at least 3 times a week in order to return to previous exercise level (discontinued) 7. Patient will perform single leg stance for greater than 10 seconds in order to progress balance function (not met)

## 2020-09-09 ENCOUNTER — HOSPITAL ENCOUNTER (OUTPATIENT)
Dept: PHYSICAL THERAPY | Age: 75
Discharge: HOME | End: 2020-09-09
Payer: MEDICARE

## 2020-09-09 PROCEDURE — 97161 PT EVAL LOW COMPLEX 20 MIN: CPT

## 2020-09-09 NOTE — PROGRESS NOTES
Mary Hall  : 1945  [place]  Secondary: Bshsi Generic [address] [facility]  at [facility]  [address] [place] [address]  [phone]   [phone]        OUTPATIENT PHYSICAL THERAPY: Daily Treatment Note 2020  Visit Count:  1    ICD-10: Treatment Diagnosis: Low back pain, M54.5  Difficulty walking, not elsewhere classified, R26.2  Pain in joint, right knee, M25.561  Precautions/Allergies: Other medication and Statins-hmg-coa reductase inhibitors   TREATMENT PLAN:  Effective Dates: 2020 TO 2020 (90 days). Frequency/Duration: 1 time a week for 4 weeks then every other week for 8 weeks MEDICAL/REFERRING DIAGNOSIS:  Low back pain [M54.5]   DATE OF ONSET: Chronic  REFERRING PHYSICIAN: Shirly Curling, MD MD Orders: Evaluate and Treat  Return MD Appointment: not scheduled       Pre-treatment Symptoms/Complaints:  Patient reports that he is just having a hard time getting back into exercises and is just feeling weak still  Pain: Initial: Pain Intensity 1: 3 /10 Post Session:  /10   Medications Last Reviewed:  2020  Updated Objective Findings:  See evaluation note from today  TREATMENT:     Evaluation and Education today  -Core exercises reviewed, sit to stand reviewed  [facility] Portal  Treatment/Session Summary:    · Response to Treatment:  Patient understands HEP and POC at this time. · Communication/Consultation:  None today  · Equipment provided today:  None today  · Recommendations/Intent for next treatment session: Next visit will focus on Core training/balance.     Total Treatment Billable Duration:  0 minutes  PT Patient Time In/Time Out  Time In: 1300  Time Out: [address], PT    Future Appointments   Date Time Provider Nancy Eason   2020  2:00 PM Faviola Cuevas, PT [facility]   2020  2:00 PM Faviola Cuevas, PT [facility]   10/6/2020  2:00 PM Faviola Cuevas, PT [facility]   10/13/2020  2:00 PM Britni Wren, PT [facility]

## 2020-09-09 NOTE — THERAPY EVALUATION
Marj Bruce  : 1945  [place]  Secondary: Bshsi Generic [address]  at [address] [place] [address]  [phone]   [phone]          OUTPATIENT PHYSICAL THERAPY:Initial Assessment 2020   ICD-10: Treatment Diagnosis: Low back pain, M54.5  Difficulty walking, not elsewhere classified, R26.2  Pain in joint, right knee, M25.561  Precautions/Allergies: Other medication and Statins-hmg-coa reductase inhibitors   TREATMENT PLAN:  Effective Dates: 2020 TO 2020 (90 days). Frequency/Duration: 1 time a week for 4 weeks then every other week for 8 weeks MEDICAL/REFERRING DIAGNOSIS:  Low back pain [M54.5]   DATE OF ONSET: Chronic  REFERRING PHYSICIAN: Wander Maldonado MD MD Orders: Evaluate and Treat  Return MD Appointment: not scheduled     INITIAL ASSESSMENT:  Mr. Varsha Gonzalez presents with chronic R LE weakness and pain along with peripheral neuropathy affecting his safety with gait and function. He shows limitation in walking and unsafe gait function. He shows continued deconditioning in the LE's. He is a good candidate for skilled PT in order to address listed impairments affecting his function. Javi Willson, PT, DPT, OCS, CFMT      PROBLEM LIST (Impacting functional limitations):  1. Decreased Strength  2. Decreased ADL/Functional Activities  3. Decreased Transfer Abilities  4. Decreased Ambulation Ability/Technique  5. Decreased Balance  6. Increased Pain  7. Decreased Activity Tolerance  8. Decreased Flexibility/Joint Mobility INTERVENTIONS PLANNED: (Treatment may consist of any combination of the following)  1. Balance Exercise  2. Bed Mobility  3. Electrical Stimulation  4. Gait Training  5. Heat  6. Manual Therapy  7. Neuromuscular Re-education/Strengthening  8. Range of Motion (ROM)  9. Therapeutic Activites  10.  Therapeutic Exercise/Strengthening     GOALS: (Goals have been discussed and agreed upon with patient.)  Short-Term Functional Goals: Time Frame: 4 weeks  1. Patient will report walking for greater than 10 minutes in order to return to functional mobility  2. Patient will show equal hamstring length in order to progress functional mobility  3. Patient will be independent in HEP for basic core strength training  Discharge Goals: Time Frame: 12 weeks  1. Patient will show a greater than 10 point increase on the LEFS in order to show an increase in function  2. Patient will report joining a pool and walking in the water greater than 2 times a week in order to progress function  3. Patient will be independent in balance exercises in order to decrease fall risk    OUTCOME MEASURE:   Tool Used: Lower Extremity Functional Scale (LEFS)  Score:  Initial: 37/80 Most Recent: X/80 (Date: -- )   Interpretation of Score: 20 questions each scored on a 5 point scale with 0 representing \"extreme difficulty or unable to perform\" and 4 representing \"no difficulty\". The lower the score, the greater the functional disability. 80/80 represents no disability. Minimal detectable change is 9 points. MEDICAL NECESSITY:   · Patient is expected to demonstrate progress in strength, range of motion, balance, coordination and functional technique to improve functionalmobility and safety with walking. · Patient demonstrates good rehab potential due to higher previous functional level. · Skilled intervention continues to be required due to decreased mobility and increased risk of falls. REASON FOR SERVICES/OTHER COMMENTS:  · Patient continues to require skilled intervention due to decreased mobility. Total Duration:  PT Patient Time In/Time Out  Time In: 1300  Time Out: 1400    Rehabilitation Potential For Stated Goals: Excellent  Regarding James Potocki's therapy, I certify that the treatment plan above will be carried out by a therapist or under their direction.   Thank you for this referral,  Norma Ya, PT Referring Physician Signature: Jakob Barbosa MD _______________________________ Date _____________     PAIN/SUBJECTIVE:   Initial: Pain Intensity 1: 3 /10 Post Session:  2/10   HISTORY:   History of Injury/Illness (Reason for Referral):  Patient has had a chronic history of R LE weakness and pain since he first had his hip replacement about 5 years ago. He has since had issues with spinal stenosis, disc herniation, right knee OA and has found out recently that he has peripheral neuropathy in both feet that affect his balance. He had a fall back in February/March that was concerning for him due to the leg giving out. He also had a hospitalization due to abnormal heartbeat. He has finally stabilized with the heart. He did have an injection in his lower back (Dr. Khris Escalante) in the summer due to increasing left LE weakness and pain that has helped a lot. He is not comfortable going to the gym like he use to due to Minh and has become deconditioned. He reports doing some things at home, but still just feels like he is starting from square one. His goals are to get back to exercises and daily life without difficulty and with less fear of falling. Past Medical History/Comorbidities:   Mr. Washington Glez  has a past medical history of Anxiety, Edema of both legs, Enlarged prostate, Gout, Hip pain, History of elevated glucose, History of seasonal allergies, Hypercholesterolemia, Hypertension, Keratoacanthoma, S/P total hip arthroplasty (4/6/2015), Skin neoplasm, and Spinal stenosis.  He also has no past medical history of Aneurysm ([address] Arrhythmia, Arthritis, Asthma, Autoimmune disease ([address] CAD (coronary artery disease), Cancer ([address] Chronic kidney disease, Chronic obstructive pulmonary disease ([address] Chronic pain, Coagulation disorder ([address] Difficult intubation, GERD (gastroesophageal reflux disease), Heart failure ([address] Ill-defined condition, Liver disease, Malignant hyperthermia due to anesthesia, Morbid obesity ([facility] Nausea & vomiting, Pseudocholinesterase deficiency, Psychiatric disorder, PUD (peptic ulcer disease), Seizures ([facility] Stroke ([facility] Thromboembolus ([facility] Thyroid disease, Unspecified adverse effect of anesthesia, or Unspecified sleep apnea. Mr. Haydee Honeycutt  has a past surgical history that includes hx tonsillectomy (as child) and hx wisdom teeth extraction (as teenager).   Social History/Living Environment:       Social History     Socioeconomic History    Marital status:      Spouse name: Not on file    Number of children: Not on file    Years of education: Not on file    Highest education level: Not on file   Occupational History    Not on file   Social Needs    Financial resource strain: Not on file    Food insecurity     Worry: Not on file     Inability: Not on file    Transportation needs     Medical: Not on file     Non-medical: Not on file   Tobacco Use    Smoking status: Never Smoker   Substance and Sexual Activity    Alcohol use: No    Drug use: Not on file    Sexual activity: Not on file   Lifestyle    Physical activity     Days per week: Not on file     Minutes per session: Not on file    Stress: Not on file   Relationships    Social connections     Talks on phone: Not on file     Gets together: Not on file     Attends Advent service: Not on file     Active member of club or organization: Not on file     Attends meetings of clubs or organizations: Not on file     Relationship status: Not on file    Intimate partner violence     Fear of current or ex partner: Not on file     Emotionally abused: Not on file     Physically abused: Not on file     Forced sexual activity: Not on file   Other Topics Concern    Not on file   Social History Narrative    Not on file       Prior Level of Function/Work/Activity:  Independent, retired  Dominant Side:         RIGHT   Ambulatory/Rehab Services H2 Model Falls Risk Assessment   Risk Factors:       (1)  Gender [Male]       (5)  History of Recent Falls [w/in 3 months]       (1)  Orthostatic drop in BP Ability to Rise from Chair:       (1)  Pushes up, successful in one attempt   Falls Prevention Plan:       No modifications necessary   Total: (5 or greater = High Risk): 8   ©2010 [facility] Patent #[id number]. Federal Law prohibits the replication, distribution or use without written permission from [facility] [employer]   Current Medications:       Current Outpatient Medications:     ergocalciferol (ERGOCALCIFEROL) 1,250 mcg (50,000 unit) capsule, Take 50,000 Units by mouth every seven (7) days. , Disp: , Rfl:     furosemide (LASIX) 20 mg tablet, Take 20 mg by mouth daily. , Disp: , Rfl:     lisinopriL (PRINIVIL, ZESTRIL) 20 mg tablet, Take 20 mg by mouth daily. , Disp: , Rfl:     celecoxib (CELEBREX) 200 mg capsule, Take 200 mg by mouth daily. , Disp: , Rfl:     allopurinoL (ZYLOPRIM) 300 mg tablet, Take 300 mg by mouth daily. , Disp: , Rfl:     spironolactone (ALDACTONE) 25 mg tablet, Take 25 mg by mouth daily. , Disp: , Rfl:     busPIRone (BUSPAR) 7.5 mg tablet, Take 7.5 mg by mouth two (2) times a day., Disp: , Rfl:     hydrOXYzine HCL (ATARAX) 25 mg tablet, Take 25 mg by mouth two (2) times a day., Disp: , Rfl:     POTASSIUM CHLORIDE PO, Take 10 mEq by mouth three (3) days a week., Disp: , Rfl:     cloNIDine (CATAPRES) 0.3 mg/24 hr, 1 Patch by TransDERmal route every seven (7) days. , Disp: , Rfl:     prazosin (MINIPRESS) 1 mg capsule, Take 1 mg by mouth two (2) times a day. TAKE AM OF SURGERY WITH SMALL SIP OF WATER   Indications: enlarged prostate, Disp: , Rfl:     finasteride (PROSCAR) 5 mg tablet, Take 5 mg by mouth daily.  TAKE AM OF SURGERY WITH SMALL SIP OF WATER   Indications: BENIGN PROSTATIC HYPERTROPHY, Disp: , Rfl:    Date Last Reviewed:  9/9/2020     Number of Personal Factors/Comorbidities that affect the Plan of Care: 1-2: MODERATE COMPLEXITY   EXAMINATION: Observation/Orthostatic Postural Assessment:          Patient shows increased obesity with more abdominal weight and increased lordosis. He shows increased small step length with short distances. He tends to show increased right pelvic rotation in standing and gait  Palpation:          Tightness along right paraspinals and into low back and pelvis. He shows tightness in lateral right hip and leg. Some noted atrophy in left glut compared to right side.  -Restricted and limited in hamstring mobility B with increased right restriction more than left  -Overall right hip motion appears good for artificial ANUJ. -Right knee shows increased restrictions at patella and medial and lateral joint line.  -Increased swelling in B LE and ankles with compression socks worn  ROM:          Patient shows some decreased hamstrings at 70 deg right and 75 deg left  -Flexion in lumbar spine to 30% in standing  Strength:          Hip flexion at 4-/5 B  Right knee extension at 4/5  Left knee extension at 4+/5  Right knee flexion at 4-/5  Left at 4/5  Neurological Screen:        Sensation: Patient has peripheral neuropathy on the plantar surface of both feet  Nerve conduction did not reveal specific radiculopathy  Functional Mobility:         Gait/Ambulation:  Patient shows increased Trendelenburg pattern B with increased right pelvic rotation that maintains during gait with right LE lagging some with shortened stance phase B. Patient has increased knee hike due to neuropathy. Transfers:  Patient shows ability to sit to stand with some aid from hands        Bed Mobility:  Patient has some difficulty with supine to sit with needs for some aid  Balance:          Poor for single leg stance, tandem stance and turning   Body Structures Involved:  1. Nerves  2. Thoracic Cage  3. Bones  4. Joints  5. Muscles  6. Ligaments Body Functions Affected:  1. Sensory/Pain  2. Neuromusculoskeletal  3.  Movement Related Activities and Participation Affected:  1. General Tasks and Demands  2. Mobility  3. Self Care  4. Domestic Life  5.  Interpersonal Interactions and Relationships   Number of elements (examined above) that affect the Plan of Care: 4+: HIGH COMPLEXITY   CLINICAL PRESENTATION:   Presentation: Stable and uncomplicated: LOW COMPLEXITY   CLINICAL DECISION MAKING:   Use of outcome tool(s) and clinical judgement create a POC that gives a: Clear prediction of patient's progress: LOW COMPLEXITY

## 2020-09-22 ENCOUNTER — HOSPITAL ENCOUNTER (OUTPATIENT)
Dept: PHYSICAL THERAPY | Age: 75
Discharge: HOME | End: 2020-09-22
Payer: MEDICARE

## 2020-09-22 PROCEDURE — 97110 THERAPEUTIC EXERCISES: CPT

## 2020-09-22 PROCEDURE — 97140 MANUAL THERAPY 1/> REGIONS: CPT

## 2020-09-29 ENCOUNTER — HOSPITAL ENCOUNTER (OUTPATIENT)
Dept: PHYSICAL THERAPY | Age: 75
Discharge: HOME | End: 2020-09-29
Payer: MEDICARE

## 2020-09-29 PROCEDURE — 97110 THERAPEUTIC EXERCISES: CPT

## 2020-09-29 PROCEDURE — 97140 MANUAL THERAPY 1/> REGIONS: CPT

## 2020-09-29 NOTE — PROGRESS NOTES
Felicia Merrill  : 1945  [place]  Secondary: Bshsi Generic [address] at [facility]  [address] [place] [address]  [phone]   [phone]        OUTPATIENT PHYSICAL THERAPY: Daily Treatment Note 2020  Visit Count:  3    ICD-10: Treatment Diagnosis: Low back pain, M54.5  Difficulty walking, not elsewhere classified, R26.2  Pain in joint, right knee, M25.561  Precautions/Allergies: Other medication and Statins-hmg-coa reductase inhibitors   TREATMENT PLAN:  Effective Dates: 2020 TO 2020 (90 days). Frequency/Duration: 1 time a week for 4 weeks then every other week for 8 weeks MEDICAL/REFERRING DIAGNOSIS:  Low back pain [M54.5]   DATE OF ONSET: Chronic  REFERRING PHYSICIAN: Natalia Osullivan MD MD Orders: Evaluate and Treat  Return MD Appointment: not scheduled       Pre-treatment Symptoms/Complaints:  Patient reports that he is slowly getting back to things and able to do a little at a time. He has started more into his routine even when he doesn't feel up to it. Pain: Initial: Pain Intensity 1: 4 /10 Post Session:  /10   Medications Last Reviewed:  2020  Updated Objective Findings:  None Today  TREATMENT:     THERAPEUTIC EXERCISE: (15 minutes):  Exercises per grid below to improve mobility, strength and coordination. Required minimal visual, verbal, manual and tactile cues to promote proper body alignment, promote proper body posture, promote proper body mechanics and promote proper body breathing techniques. Progressed resistance and repetitions as indicated.    Date:  2020     Activity/Exercise Parameters   Core exercises Single knee push isometric in side lie x 5 min for facilitation  Pelvic tilt x 20   Education on sit to stand X 10 with good weight shift   Stairs Discussed function and how to progress stair training x 5 min   Hip and pelvic hike Side lie for innominate controlx 2 min MANUAL THERAPY: (40 minutes): Joint mobilization and Soft tissue mobilization was utilized and necessary because of the patient's restricted joint motion and restricted motion of soft tissue. (Used abbreviations: SF - Superficial Fascia; BC - Bony contours; MP - muscle play; IASTM - instrument-assisted soft tissue mobilization; MET - muscle energy technique; a/p - anterior to posterior; p/a - posterior to anterior; Proprioceptive neuromuscular Facilitation-PNF) Manual treatment to improve soft tissue/joint mobility deficits, tissue integrity issues, trigger points and/or pain. -Supine mobilization of right hamstring, lateral IT band, rectus femoris  -Patellar mobilization in all directions with mini-plunger  -Hip ER stretch insupine  -Left side lie pelvic mobilization for anterior elevation and posterior depression  -PNF pattern to facilitate core into anterior elevation and posterior depression with working through end range holds and isometric reversals    -Core exercises reviewed, sit to stand reviewed  [facility] Portal  Treatment/Session Summary:    · Response to Treatment: Patient shows some improvement in hip and pelvic flexion post treatment. Continue to work core engagement with pelvic motion to improve gait function. · Communication/Consultation:  None today  · Equipment provided today:  None today  · Recommendations/Intent for next treatment session: Next visit will focus on Core training/balance.     Total Treatment Billable Duration:  55 minutes of treatment  PT Patient Time In/Time Out  Time In: 1355  Time Out: Marquis 12, PT    Future Appointments   Date Time Provider Nancy Eason   10/6/2020  2:00 PM Evelio Dockery, [facility]   10/13/2020  2:00 PM Evelio Dockery PT [facility]

## 2020-10-06 ENCOUNTER — HOSPITAL ENCOUNTER (OUTPATIENT)
Dept: PHYSICAL THERAPY | Age: 75
Discharge: HOME | End: 2020-10-06
Payer: MEDICARE

## 2020-10-06 PROCEDURE — 97110 THERAPEUTIC EXERCISES: CPT

## 2020-10-06 PROCEDURE — 97140 MANUAL THERAPY 1/> REGIONS: CPT

## 2020-10-06 NOTE — PROGRESS NOTES
Alexia Gibson  : 1945  [place]  Secondary: Bshsi Generic [address] at [facility]  [address] [place] [address]  [phone]   [phone]        OUTPATIENT PHYSICAL THERAPY: Daily Treatment Note 10/6/2020  Visit Count:  4    ICD-10: Treatment Diagnosis: Low back pain, M54.5  Difficulty walking, not elsewhere classified, R26.2  Pain in joint, right knee, M25.561  Precautions/Allergies: Other medication and Statins-hmg-coa reductase inhibitors   TREATMENT PLAN:  Effective Dates: 2020 TO 2020 (90 days). Frequency/Duration: 1 time a week for 4 weeks then every other week for 8 weeks MEDICAL/REFERRING DIAGNOSIS:  Low back pain [M54.5]   DATE OF ONSET: Chronic  REFERRING PHYSICIAN: Pat Marmolejo MD MD Orders: Evaluate and Treat  Return MD Appointment: not scheduled       Pre-treatment Symptoms/Complaints:  Patient reports that he is slowly getting back to things and able to do a little at a time. He had a little more difficulty this week, but is still trying to stick to his schedule every day  Pain: Initial: Pain Intensity 1: 4 /10 Post Session:  /10   Medications Last Reviewed:  10/6/2020  Updated Objective Findings:  None Today  TREATMENT:     THERAPEUTIC EXERCISE: (15 minutes):  Exercises per grid below to improve mobility, strength and coordination. Required minimal visual, verbal, manual and tactile cues to promote proper body alignment, promote proper body posture, promote proper body mechanics and promote proper body breathing techniques. Progressed resistance and repetitions as indicated.    Date:  10/6/2020     Activity/Exercise Parameters   Core exercises Single knee push isometric in side lie x 5 min for facilitation  Pelvic tilt x 20   Education on sit to stand X 10 with good weight shift   Stairs Discussed function and how to progress stair training x 5 min   Hip and pelvic hike Side lie for innominate controlx 2 min   Knee extension In sitting with red band x 30 on R LE               MANUAL THERAPY: (35 minutes): Joint mobilization and Soft tissue mobilization was utilized and necessary because of the patient's restricted joint motion and restricted motion of soft tissue. (Used abbreviations: SF - Superficial Fascia; BC - Bony contours; MP - muscle play; IASTM - instrument-assisted soft tissue mobilization; MET - muscle energy technique; a/p - anterior to posterior; p/a - posterior to anterior; Proprioceptive neuromuscular Facilitation-PNF) Manual treatment to improve soft tissue/joint mobility deficits, tissue integrity issues, trigger points and/or pain. -Supine mobilization of right hamstring, lateral IT band, rectus femoris  -Patellar mobilization in all directions with mini-plunger  -Hip ER stretch insupine  -Left side lie pelvic mobilization for anterior elevation and posterior depression  -PNF pattern to facilitate core into anterior elevation and posterior depression with working through end range holds and isometric reversals with the pelvis and at long full leg function    -Core exercises reviewed, sit to stand reviewed  [facility] Portal  Treatment/Session Summary:    · Response to Treatment: Patient shows some improvement in hip and pelvic flexion post treatment. Improved gait function with less right side bend with right heel strike  · Communication/Consultation:  None today  · Equipment provided today:  None today  · Recommendations/Intent for next treatment session: Next visit will focus on Core training/balance.     Total Treatment Billable Duration:  50 minutes of treatment  PT Patient Time In/Time Out  Time In: [address] [place] [address]  Time Out: Marquis 12, PT    Future Appointments   Date Time Provider Nancy Eason   10/13/2020  2:00 PM Franco Bowen, PT [facility]

## 2020-10-13 ENCOUNTER — HOSPITAL ENCOUNTER (OUTPATIENT)
Dept: PHYSICAL THERAPY | Age: 75
Discharge: HOME | End: 2020-10-13
Payer: MEDICARE

## 2020-10-13 PROCEDURE — 97110 THERAPEUTIC EXERCISES: CPT

## 2020-10-13 PROCEDURE — 97140 MANUAL THERAPY 1/> REGIONS: CPT

## 2020-10-13 NOTE — PROGRESS NOTES
Tiarra Wang  : 1945  [place]  Secondary: Bshsi Generic [address] [facility]  at [facility]  [address] [place] [address]  [phone]   [phone]        OUTPATIENT PHYSICAL THERAPY: Daily Treatment Note 10/13/2020  Visit Count:  5    ICD-10: Treatment Diagnosis: Low back pain, M54.5  Difficulty walking, not elsewhere classified, R26.2  Pain in joint, right knee, M25.561  Precautions/Allergies: Other medication and Statins-hmg-coa reductase inhibitors   TREATMENT PLAN:  Effective Dates: 2020 TO 2020 (90 days). Frequency/Duration: 1 time a week for 4 weeks then every other week for 8 weeks MEDICAL/REFERRING DIAGNOSIS:  Low back pain [M54.5]   DATE OF ONSET: Chronic  REFERRING PHYSICIAN: Steve Vasquez MD MD Orders: Evaluate and Treat  Return MD Appointment: not scheduled       Pre-treatment Symptoms/Complaints:  Patient reports that he feels a little better with his strength stuff, but still feels a little difficulty with the connection to the right side   Pain: Initial: Pain Intensity 1: 4 /10 Post Session:  2/10   Medications Last Reviewed:  10/13/2020  Updated Objective Findings:  None Today  TREATMENT:     THERAPEUTIC EXERCISE: (15 minutes):  Exercises per grid below to improve mobility, strength and coordination. Required minimal visual, verbal, manual and tactile cues to promote proper body alignment, promote proper body posture, promote proper body mechanics and promote proper body breathing techniques. Progressed resistance and repetitions as indicated.    Date:  10/13/2020     Activity/Exercise Parameters   Core exercises Single knee push isometric in side lie x 5 min for facilitation  Pelvic tilt x 20   Education on sit to stand X 10 with good weight shift   Stairs Discussed function and how to progress stair training x 5 min   Hip and pelvic hike Side lie for innominate controlx 2 min   Knee extension In sitting with red band x 30 on R LE   Weight shift R LE planted with follow through and work on keeping right side long x 5 min           MANUAL THERAPY: (40 minutes): Joint mobilization and Soft tissue mobilization was utilized and necessary because of the patient's restricted joint motion and restricted motion of soft tissue. (Used abbreviations: SF - Superficial Fascia; BC - Bony contours; MP - muscle play; IASTM - instrument-assisted soft tissue mobilization; MET - muscle energy technique; a/p - anterior to posterior; p/a - posterior to anterior; Proprioceptive neuromuscular Facilitation-PNF) Manual treatment to improve soft tissue/joint mobility deficits, tissue integrity issues, trigger points and/or pain. -Supine mobilization of right hamstring, lateral IT band, rectus femoris  -Patellar mobilization in all directions with mini-plunger  -Hip ER stretch insupine  -Left side lie pelvic mobilization for anterior elevation and posterior depression  -PNF pattern to facilitate core into anterior elevation and posterior depression with working through end range holds and isometric reversals with the pelvis and at long full leg function    -Core exercises reviewed, sit to stand reviewed  [facility] Portal  Treatment/Session Summary:    · Response to Treatment: Patient shows some improvement in pelvic function and started to perform more weight bearing challenges. Continue 1 time a week. · Communication/Consultation:  None today  · Equipment provided today:  None today  · Recommendations/Intent for next treatment session: Next visit will focus on Core training/balance.     Total Treatment Billable Duration:  55 minutes of treatment  PT Patient Time In/Time Out  Time In: 1400  Time Out: 217 Darius Agarwal PT    Future Appointments   Date Time Provider Nancy Eason   10/20/2020  3:00 PM Dez Mejia PT [facility]   10/27/2020  1:00 PM Dez Mejia PT [facility]

## 2020-10-20 ENCOUNTER — HOSPITAL ENCOUNTER (OUTPATIENT)
Dept: PHYSICAL THERAPY | Age: 75
Discharge: HOME | End: 2020-10-20
Payer: MEDICARE

## 2020-10-20 PROCEDURE — 97140 MANUAL THERAPY 1/> REGIONS: CPT

## 2020-10-20 PROCEDURE — 97110 THERAPEUTIC EXERCISES: CPT

## 2020-10-20 NOTE — PROGRESS NOTES
Vega Bourne  : 1945  [place]  Secondary: Bshsi Generic [id number] at [facility]  [address] [place] [address]  [phone]   [phone]        OUTPATIENT PHYSICAL THERAPY: Daily Treatment Note 10/20/2020  Visit Count:  6    ICD-10: Treatment Diagnosis: Low back pain, M54.5  Difficulty walking, not elsewhere classified, R26.2  Pain in joint, right knee, M25.561  Precautions/Allergies: Other medication and Statins-hmg-coa reductase inhibitors   TREATMENT PLAN:  Effective Dates: 2020 TO 2020 (90 days). Frequency/Duration: 1 time a week for 4 weeks then every other week for 8 weeks MEDICAL/REFERRING DIAGNOSIS:  Low back pain [M54.5]   DATE OF ONSET: Chronic  REFERRING PHYSICIAN: Alex Eisenberg MD MD Orders: Evaluate and Treat  Return MD Appointment: not scheduled       Pre-treatment Symptoms/Complaints:  Patient reports that he feels a little achy in the joints, but still plugging along with his exercises  Pain: Initial: Pain Intensity 1: 4 /10 Post Session:  2/10   Medications Last Reviewed:  10/20/2020  Updated Objective Findings:  None Today  TREATMENT:     THERAPEUTIC EXERCISE: (15 minutes):  Exercises per grid below to improve mobility, strength and coordination. Required minimal visual, verbal, manual and tactile cues to promote proper body alignment, promote proper body posture, promote proper body mechanics and promote proper body breathing techniques. Progressed resistance and repetitions as indicated.    Date:  10/20/2020     Activity/Exercise Parameters   Core exercises Single knee push isometric in side lie x 5 min for facilitation  Pelvic tilt x 20   Education on sit to stand X 10 with good weight shift   Stairs Discussed function and how to progress stair training x 5 min   Hip and pelvic hike Side lie for innominate controlx 2 min   Knee extension In sitting with red band x 30 on R LE   Weight shift R LE planted with follow through and work on keeping right side long x 5 min           MANUAL THERAPY: (40 minutes): Joint mobilization and Soft tissue mobilization was utilized and necessary because of the patient's restricted joint motion and restricted motion of soft tissue. (Used abbreviations: SF - Superficial Fascia; BC - Bony contours; MP - muscle play; IASTM - instrument-assisted soft tissue mobilization; MET - muscle energy technique; a/p - anterior to posterior; p/a - posterior to anterior; Proprioceptive neuromuscular Facilitation-PNF) Manual treatment to improve soft tissue/joint mobility deficits, tissue integrity issues, trigger points and/or pain. -Supine mobilization of right hamstring, lateral IT band, rectus femoris  -Patellar mobilization in all directions with mini-plunger  -Hip ER stretch insupine  -Left side lie pelvic mobilization for anterior elevation and posterior depression  -PNF pattern to facilitate core into anterior elevation and posterior depression with working through end range holds and isometric reversals with the pelvis and at long full leg function    -Core exercises reviewed, sit to stand reviewed  [facility] Portal  Treatment/Session Summary:    · Response to Treatment: Patient shows some improvement in pelvic control. Still working on cues for push off of toe in gait function. · Communication/Consultation:  None today  · Equipment provided today:  None today  · Recommendations/Intent for next treatment session: Next visit will focus on Core training/balance.     Total Treatment Billable Duration:  55 minutes of treatment  PT Patient Time In/Time Out  Time In: 1500  Time Out: [address], PT    Future Appointments   Date Time Provider Nancy Eason   10/27/2020  1:00 PM Maliha Pace, PT [facility]

## 2020-10-27 ENCOUNTER — HOSPITAL ENCOUNTER (OUTPATIENT)
Dept: PHYSICAL THERAPY | Age: 75
Discharge: HOME | End: 2020-10-27
Payer: MEDICARE

## 2020-10-27 PROCEDURE — 97140 MANUAL THERAPY 1/> REGIONS: CPT

## 2020-10-27 PROCEDURE — 97110 THERAPEUTIC EXERCISES: CPT

## 2020-10-27 NOTE — PROGRESS NOTES
Crystal Door  : 1945  [place]  Secondary: Bshsi Generic [id number] [facility]  at [facility]  [address] [place] [address]  [phone]   [phone]        OUTPATIENT PHYSICAL THERAPY: Daily Treatment Note 10/27/2020  Visit Count:  7    ICD-10: Treatment Diagnosis: Low back pain, M54.5  Difficulty walking, not elsewhere classified, R26.2  Pain in joint, right knee, M25.561  Precautions/Allergies: Other medication and Statins-hmg-coa reductase inhibitors   TREATMENT PLAN:  Effective Dates: 2020 TO 2020 (90 days). Frequency/Duration: 1 time a week for 4 weeks then every other week for 8 weeks MEDICAL/REFERRING DIAGNOSIS:  Low back pain [M54.5]   DATE OF ONSET: Chronic  REFERRING PHYSICIAN: Chris Deleon MD MD Orders: Evaluate and Treat  Return MD Appointment: not scheduled       Pre-treatment Symptoms/Complaints:  Patient reports that he feels a little achy in the joints, but still plugging along with his exercises and has his good days and bad. Still some issues with the right knee   Pain: Initial: Pain Intensity 1: 4 /10 Post Session:  2/10   Medications Last Reviewed:  10/27/2020  Updated Objective Findings:  None Today  TREATMENT:     THERAPEUTIC EXERCISE: (15 minutes):  Exercises per grid below to improve mobility, strength and coordination. Required minimal visual, verbal, manual and tactile cues to promote proper body alignment, promote proper body posture, promote proper body mechanics and promote proper body breathing techniques. Progressed resistance and repetitions as indicated.    Date:  10/27/2020     Activity/Exercise Parameters   Core exercises Single knee push isometric in side lie x 5 min for facilitation  Pelvic tilt x 20   Education on sit to stand X 10 with good weight shift   Stairs Working on pelvic anterior elevation on right side with knee drive and toe lift   Hip and pelvic hike Side lie for innominate controlx 2 min   Knee extension In sitting with red band x 30 on R LE   Weight shift R LE planted with follow through and work on keeping right side long x 5 min           MANUAL THERAPY: (30 minutes): Joint mobilization and Soft tissue mobilization was utilized and necessary because of the patient's restricted joint motion and restricted motion of soft tissue. (Used abbreviations: SF - Superficial Fascia; BC - Bony contours; MP - muscle play; IASTM - instrument-assisted soft tissue mobilization; MET - muscle energy technique; a/p - anterior to posterior; p/a - posterior to anterior; Proprioceptive neuromuscular Facilitation-PNF) Manual treatment to improve soft tissue/joint mobility deficits, tissue integrity issues, trigger points and/or pain. -Supine mobilization of right hamstring, lateral IT band, rectus femoris  -Patellar mobilization in all directions with mini-plunger  -Hip ER stretch insupine  -Left side lie pelvic mobilization for anterior elevation and posterior depression  -PNF pattern to facilitate core into anterior elevation and posterior depression with working through end range holds and isometric reversals with the pelvis and at long full leg function    -Core exercises reviewed, sit to stand reviewed  [facility] Portal  Treatment/Session Summary:    · Response to Treatment: Patient shows some improvement in pelvic control. Still working on cues for push off of toe in gait function. Asses how the right knee is moving more next time  · Communication/Consultation:  None today  · Equipment provided today:  None today  · Recommendations/Intent for next treatment session: Next visit will focus on Core training/balance.     Total Treatment Billable Duration:  45 minutes of treatment  PT Patient Time In/Time Out  Time In: 1305  Time Out: [address], PT    Future Appointments   Date Time Provider Nancy Eason   11/2/2020  3:00 PM Keya Bull, PT [facility] 11/10/2020  1:00 PM Bharati Watt, PT [facility]   11/17/2020  2:00 PM Dyan Mcdonald, PT [facility]   11/24/2020  2:00 PM Dyan Mcdonald, PT [facility]

## 2020-11-02 ENCOUNTER — HOSPITAL ENCOUNTER (OUTPATIENT)
Dept: PHYSICAL THERAPY | Age: 75
Discharge: HOME | End: 2020-11-02
Payer: MEDICARE

## 2020-11-02 PROCEDURE — 97140 MANUAL THERAPY 1/> REGIONS: CPT

## 2020-11-02 PROCEDURE — 97110 THERAPEUTIC EXERCISES: CPT

## 2020-11-02 NOTE — PROGRESS NOTES
Thelma Summers  : 1945  [place]  Secondary: Bshsi Generic [address] [facility]  at [facility]  [address] [place] [address]  [phone]   [phone]        OUTPATIENT PHYSICAL THERAPY: Daily Treatment Note 2020  Visit Count:  8    ICD-10: Treatment Diagnosis: Low back pain, M54.5  Difficulty walking, not elsewhere classified, R26.2  Pain in joint, right knee, M25.561  Precautions/Allergies: Other medication and Statins-hmg-coa reductase inhibitors   TREATMENT PLAN:  Effective Dates: 2020 TO 2020 (90 days). Frequency/Duration: 1 time a week for 4 weeks then every other week for 8 weeks MEDICAL/REFERRING DIAGNOSIS:  Low back pain [M54.5]   DATE OF ONSET: Chronic  REFERRING PHYSICIAN: Sneha Cox MD MD Orders: Evaluate and Treat  Return MD Appointment: not scheduled       Pre-treatment Symptoms/Complaints:  Patient reports that he feels a little achy in the joints. His knee is doing fine after last time. Pain: Initial: Pain Intensity 1: 3 /10 Post Session:  2/10   Medications Last Reviewed:  2020  Updated Objective Findings:  None Today  TREATMENT:     THERAPEUTIC EXERCISE: (15 minutes):  Exercises per grid below to improve mobility, strength and coordination. Required minimal visual, verbal, manual and tactile cues to promote proper body alignment, promote proper body posture, promote proper body mechanics and promote proper body breathing techniques. Progressed resistance and repetitions as indicated.    Date:  2020     Activity/Exercise Parameters   Core exercises Single knee push isometric in side lie x 5 min for facilitation  Pelvic tilt x 20   Education on sit to stand X 10 with good weight shift   Stairs Working on pelvic anterior elevation on right side with knee drive and toe lift   Hip and pelvic hike Side lie for innominate controlx 2 min   Knee extension (home) In sitting with red band x 30 on R LE   Weight shift R LE planted with follow through and work on keeping right side long x 5 min   Balance Tandem stance 2 x 30 sec B       MANUAL THERAPY: (40 minutes): Joint mobilization and Soft tissue mobilization was utilized and necessary because of the patient's restricted joint motion and restricted motion of soft tissue. (Used abbreviations: SF - Superficial Fascia; BC - Bony contours; MP - muscle play; IASTM - instrument-assisted soft tissue mobilization; MET - muscle energy technique; a/p - anterior to posterior; p/a - posterior to anterior; Proprioceptive neuromuscular Facilitation-PNF) Manual treatment to improve soft tissue/joint mobility deficits, tissue integrity issues, trigger points and/or pain. -Supine mobilization of right hamstring, lateral IT band, rectus femoris  -Patellar mobilization in all directions with mini-plunger  -Hip ER stretch insupine  -Left side lie pelvic mobilization for anterior elevation and posterior depression  -PNF pattern to facilitate core into anterior elevation and posterior depression with working through end range holds and isometric reversals with the pelvis and at long full leg function    -Core exercises reviewed, sit to stand reviewed  [facility] Portal  Treatment/Session Summary:    · Response to Treatment: Patient shows some improvement in pelvic control. Still working on cues for push off of toe in gait function. Patient still has some right knee pain after balance exercises. See how his knee is progressing next week  · Communication/Consultation:  None today  · Equipment provided today:  None today  · Recommendations/Intent for next treatment session: Next visit will focus on Core training/balance.     Total Treatment Billable Duration:  55 minutes of treatment  PT Patient Time In/Time Out  Time In: 1400  Time Out: Romeo Aleman PT    Future Appointments   Date Time Provider Nancy Eason   11/10/2020  1:00 PM Rebecca Christensen PT [facility] 11/17/2020  2:00 PM Scott Donis, PT [facility]   11/24/2020  2:00 PM Scott Donis, PT [facility]

## 2020-11-10 ENCOUNTER — HOSPITAL ENCOUNTER (OUTPATIENT)
Dept: PHYSICAL THERAPY | Age: 75
Discharge: HOME | End: 2020-11-10
Payer: MEDICARE

## 2020-11-10 PROCEDURE — 97110 THERAPEUTIC EXERCISES: CPT

## 2020-11-10 PROCEDURE — 97140 MANUAL THERAPY 1/> REGIONS: CPT

## 2020-11-10 NOTE — PROGRESS NOTES
Nico Donaldson  : 1945  [place]  Secondary: Bshsi Generic [address] [facility]  at [facility]  [address] [place] [address]  [phone]   [phone]        OUTPATIENT PHYSICAL THERAPY: Daily Treatment Note 11/10/2020  Visit Count:  9    ICD-10: Treatment Diagnosis: Low back pain, M54.5  Difficulty walking, not elsewhere classified, R26.2  Pain in joint, right knee, M25.561  Precautions/Allergies: Other medication and Statins-hmg-coa reductase inhibitors   TREATMENT PLAN:  Effective Dates: 2020 TO 2020 (90 days). Frequency/Duration: 1 time a week for 4 weeks then every other week for 8 weeks MEDICAL/REFERRING DIAGNOSIS:  Low back pain [M54.5]   DATE OF ONSET: Chronic  REFERRING PHYSICIAN: Waylon Reyez MD MD Orders: Evaluate and Treat  Return MD Appointment: not scheduled       Pre-treatment Symptoms/Complaints:  Patient reports that his knee will randomly bother him if given too much attention. He is wanting to know if there is anything else we can do to help his feet and ankles. Pain: Initial: Pain Intensity 1: 3 /10 Post Session:  2/10   Medications Last Reviewed:  11/10/2020  Updated Objective Findings:  None Today  TREATMENT:     THERAPEUTIC EXERCISE: (15 minutes):  Exercises per grid below to improve mobility, strength and coordination. Required minimal visual, verbal, manual and tactile cues to promote proper body alignment, promote proper body posture, promote proper body mechanics and promote proper body breathing techniques. Progressed resistance and repetitions as indicated.    Date:  11/10/2020     Activity/Exercise Parameters   Core exercises Single knee push isometric in side lie x 5 min for facilitation  Pelvic tilt x 20  PNF core facilitation in sidelying (into post depression and anterior elevation)   Education on sit to stand (not today) X 10 with good weight shift   Stairs (not today) Working on pelvic anterior elevation on right side with knee drive and toe lift   Hip and pelvic hike Side lie for innominate controlx 2 min   Knee extension (home) In sitting with Green band x 30 on R LE   Weight shift (not today) R LE planted with follow through and work on keeping right side long x 5 min   Balance (not today) Tandem stance 2 x 30 sec B       MANUAL THERAPY: (35 minutes): Joint mobilization and Soft tissue mobilization was utilized and necessary because of the patient's restricted joint motion and restricted motion of soft tissue. (Used abbreviations: SF - Superficial Fascia; BC - Bony contours; MP - muscle play; IASTM - instrument-assisted soft tissue mobilization; MET - muscle energy technique; a/p - anterior to posterior; p/a - posterior to anterior; Proprioceptive neuromuscular Facilitation-PNF) Manual treatment to improve soft tissue/joint mobility deficits, tissue integrity issues, trigger points and/or pain. -Supine mobilization of right hamstring, lateral IT band, rectus femoris, adductors  -Patellar mobilization in all directions with mini-plunger  -Hip ER stretch insupine  -Left side lie pelvic mobilization for anterior elevation and posterior depression  -PNF pattern to facilitate core into anterior elevation and posterior depression with working through end range holds and isometric reversals with the pelvis and at long full leg function    -Core exercises reviewed  [facility] Portal  Treatment/Session Summary:    · Response to Treatment: Patients knee is functioning okay today, but we halted standing exercises to see if we could continue to facilitate core and pelvic control in sidelying or supine and reduce pressures on the knee. Pt will continue to benefit with skilled PT as we address impairments and improve function.   · Communication/Consultation:  None today  · Equipment provided today:  None today  · Recommendations/Intent for next treatment session: Next visit will focus on Core training/balance.     Total Treatment Billable Duration:  50 minutes of treatment  PT Patient Time In/Time Out  Time In: 9108  Time Out: 2021 Airam Phoenix PT    Future Appointments   Date Time Provider Nancy Eason   11/17/2020  2:00 PM Selene Arreola, PT [facility]   11/24/2020  2:00 PM Selene Arreola, PT [facility]

## 2020-11-17 ENCOUNTER — HOSPITAL ENCOUNTER (OUTPATIENT)
Dept: PHYSICAL THERAPY | Age: 75
Discharge: HOME | End: 2020-11-17
Payer: MEDICARE

## 2020-11-17 PROCEDURE — 97110 THERAPEUTIC EXERCISES: CPT

## 2020-11-17 PROCEDURE — 97140 MANUAL THERAPY 1/> REGIONS: CPT

## 2020-11-17 NOTE — PROGRESS NOTES
Diana Greenberg  : 1945  [place]  Secondary: Bshsi Generic [address] [facility]  at [address] [place] [address]  [phone]   [phone]          OUTPATIENT PHYSICAL THERAPY:Progress Report 2020   ICD-10: Treatment Diagnosis: Low back pain, M54.5  Difficulty walking, not elsewhere classified, R26.2  Pain in joint, right knee, M25.561  Precautions/Allergies: Other medication and Statins-hmg-coa reductase inhibitors   TREATMENT PLAN:  Effective Dates: 2020 TO 2020 (90 days). Frequency/Duration: 1 time a week for 4 weeks then every other week for 8 weeks MEDICAL/REFERRING DIAGNOSIS:  Low back pain [M54.5]   DATE OF ONSET: Chronic  REFERRING PHYSICIAN: Tad Navarro MD MD Orders: Evaluate and Treat  Return MD Appointment: not scheduled   Diana Greenberg has been seen for 10 visits from 20 to 2020 for low back, right knee and R LE. Patient has performed therapeutic exercises, activities, and had manual therapy to increased strength, ROM and function. Patient has also used modalities for pain control in order to increase function. Patient has show an increase in function per the LEFS with scores of 42/80. He still shows limitations with weight bearing and ability to use the R LE. He is improving on his regular at home exercises, but will still benefit from using a pool or gym when he feels comfortable going back into one. Patient has progressed well toward their goals and will benefit from continuing skilled PT in order to address their impairments. Maribel Cross, PT, DPT, OCS, CFMT        INITIAL ASSESSMENT:  Mr. Jessica Vera presents with chronic R LE weakness and pain along with peripheral neuropathy affecting his safety with gait and function. He shows limitation in walking and unsafe gait function. He shows continued deconditioning in the LE's.  He is a good candidate for skilled PT in order to address listed impairments affecting his function. Camilla Conway, PT, DPT, OCS, CFMT      PROBLEM LIST (Impacting functional limitations):  1. Decreased Strength  2. Decreased ADL/Functional Activities  3. Decreased Transfer Abilities  4. Decreased Ambulation Ability/Technique  5. Decreased Balance  6. Increased Pain  7. Decreased Activity Tolerance  8. Decreased Flexibility/Joint Mobility INTERVENTIONS PLANNED: (Treatment may consist of any combination of the following)  1. Balance Exercise  2. Bed Mobility  3. Electrical Stimulation  4. Gait Training  5. Heat  6. Manual Therapy  7. Neuromuscular Re-education/Strengthening  8. Range of Motion (ROM)  9. Therapeutic Activites  10. Therapeutic Exercise/Strengthening     GOALS: (Goals have been discussed and agreed upon with patient.)  Short-Term Functional Goals: Time Frame: 4 weeks  1. Patient will report walking for greater than 10 minutes in order to return to functional mobility (ongoing)  2. Patient will show equal hamstring length in order to progress functional mobility (MET)  3. Patient will be independent in [facility] for basic core strength training (ongoing)  Discharge Goals: Time Frame: 12 weeks  1. Patient will show a greater than 10 point increase on the LEFS in order to show an increase in function (ongoing)  2. Patient will report joining a pool and walking in the water greater than 2 times a week in order to progress function (ongoing)  3. Patient will be independent in balance exercises in order to decrease fall risk (ongoing)    OUTCOME MEASURE:   Tool Used: Lower Extremity Functional Scale (LEFS)  Score:  Initial: 37/80 Most Recent: 42/80 (Date: 11/17/20 )   Interpretation of Score: 20 questions each scored on a 5 point scale with 0 representing \"extreme difficulty or unable to perform\" and 4 representing \"no difficulty\". The lower the score, the greater the functional disability. 80/80 represents no disability.   Minimal detectable change is 9 points. MEDICAL NECESSITY:   · Patient is expected to demonstrate progress in strength, range of motion, balance, coordination and functional technique to improve functionalmobility and safety with walking. · Patient demonstrates good rehab potential due to higher previous functional level. · Skilled intervention continues to be required due to decreased mobility and increased risk of falls. REASON FOR SERVICES/OTHER COMMENTS:  · Patient continues to require skilled intervention due to decreased mobility. Total Duration:  PT Patient Time In/Time Out  Time In: 1400  Time Out: 1500    Rehabilitation Potential For Stated Goals: Excellent  Regarding James Potocki's therapy, I certify that the treatment plan above will be carried out by a therapist or under their direction. Thank you for this referral,  Amadou Jay PT     Referring Physician Signature: Tuyet Posadas MD No Signature is Required for this note. PAIN/SUBJECTIVE:   Initial: Pain Intensity 1: 2 /10 Post Session:  2/10   HISTORY:   History of Injury/Illness (Reason for Referral):  Patient has had a chronic history of R LE weakness and pain since he first had his hip replacement about 5 years ago. He has since had issues with spinal stenosis, disc herniation, right knee OA and has found out recently that he has peripheral neuropathy in both feet that affect his balance. He had a fall back in February/March that was concerning for him due to the leg giving out. He also had a hospitalization due to abnormal heartbeat. He has finally stabilized with the heart. He did have an injection in his lower back (Dr. Steven Galindo) in the summer due to increasing left LE weakness and pain that has helped a lot. He is not comfortable going to the gym like he use to due to Minh and has become deconditioned. He reports doing some things at home, but still just feels like he is starting from square one.  His goals are to get back to exercises and daily life without difficulty and with less fear of falling. Past Medical History/Comorbidities:   Mr. Morgan Ortiz  has a past medical history of Anxiety, Edema of both legs, Enlarged prostate, Gout, Hip pain, History of elevated glucose, History of seasonal allergies, Hypercholesterolemia, Hypertension, Keratoacanthoma, S/P total hip arthroplasty (4/6/2015), Skin neoplasm, and Spinal stenosis. He also has no past medical history of Aneurysm ([address] Arrhythmia, Arthritis, Asthma, Autoimmune disease ([address] CAD (coronary artery disease), Cancer ([address] Chronic kidney disease, Chronic obstructive pulmonary disease ([address] Chronic pain, Coagulation disorder ([address] Difficult intubation, GERD (gastroesophageal reflux disease), Heart failure ([address] Ill-defined condition, Liver disease, Malignant hyperthermia due to anesthesia, Morbid obesity ([address] Nausea & vomiting, Pseudocholinesterase deficiency, Psychiatric disorder, PUD (peptic ulcer disease), Seizures ([address] Stroke ([address] Thromboembolus ([address] Thyroid disease, Unspecified adverse effect of anesthesia, or Unspecified sleep apnea. Mr. Morgan Ortiz  has a past surgical history that includes hx tonsillectomy (as child) and hx wisdom teeth extraction (as teenager).   Social History/Living Environment:       Social History     Socioeconomic History    Marital status:      Spouse name: Not on file    Number of children: Not on file    Years of education: Not on file    Highest education level: Not on file   Occupational History    Not on file   Social Needs    Financial resource strain: Not on file    Food insecurity     Worry: Not on file     Inability: Not on file   [employer] needs     Medical: Not on file     Non-medical: Not on file   Tobacco Use    Smoking status: Never Smoker   Substance and Sexual Activity    Alcohol use: No    Drug use: Not on file    Sexual activity: Not on file   Lifestyle    Physical activity     Days per week: Not on file     Minutes per session: Not on file    Stress: Not on file   Relationships    Social connections     Talks on phone: Not on file     Gets together: Not on file     Attends Advent service: Not on file     Active member of club or organization: Not on file     Attends meetings of clubs or organizations: Not on file     Relationship status: Not on file    Intimate partner violence     Fear of current or ex partner: Not on file     Emotionally abused: Not on file     Physically abused: Not on file     Forced sexual activity: Not on file   Other Topics Concern    Not on file   Social History Narrative    Not on file       Prior Level of Function/Work/Activity:  Independent, retired  Dominant Side:         RIGHT   Ambulatory/Rehab Services H2 Model Falls Risk Assessment   Risk Factors:       (1)  Gender [Male]       (5)  History of Recent Falls [w/in 3 months]       (1)  Orthostatic drop in BP Ability to Rise from Chair:       (1)  Pushes up, successful in one attempt   Falls Prevention Plan:       No modifications necessary   Total: (5 or greater = High Risk): 8   ©2010 [facility]. [facility] States Patent #[id number]. Federal Law prohibits the replication, distribution or use without written permission from [facility] [employer]   Current Medications:       Current Outpatient Medications:     ergocalciferol (ERGOCALCIFEROL) 1,250 mcg (50,000 unit) capsule, Take 50,000 Units by mouth every seven (7) days. , Disp: , Rfl:     furosemide (LASIX) 20 mg tablet, Take 20 mg by mouth daily. , Disp: , Rfl:     lisinopriL (PRINIVIL, ZESTRIL) 20 mg tablet, Take 20 mg by mouth daily. , Disp: , Rfl:     celecoxib (CELEBREX) 200 mg capsule, Take 200 mg by mouth daily. , Disp: , Rfl:     allopurinoL (ZYLOPRIM) 300 mg tablet, Take 300 mg by mouth daily. , Disp: , Rfl:     spironolactone (ALDACTONE) 25 mg tablet, Take 25 mg by mouth daily. , Disp: , Rfl:     busPIRone (BUSPAR) 7.5 mg tablet, Take 7.5 mg by mouth two (2) times a day., Disp: , Rfl:     hydrOXYzine HCL (ATARAX) 25 mg tablet, Take 25 mg by mouth two (2) times a day., Disp: , Rfl:     POTASSIUM CHLORIDE PO, Take 10 mEq by mouth three (3) days a week., Disp: , Rfl:     cloNIDine (CATAPRES) 0.3 mg/24 hr, 1 Patch by TransDERmal route every seven (7) days. , Disp: , Rfl:     prazosin (MINIPRESS) 1 mg capsule, Take 1 mg by mouth two (2) times a day. TAKE AM OF SURGERY WITH SMALL SIP OF WATER   Indications: enlarged prostate, Disp: , Rfl:     finasteride (PROSCAR) 5 mg tablet, Take 5 mg by mouth daily. TAKE AM OF SURGERY WITH SMALL SIP OF WATER   Indications: BENIGN PROSTATIC HYPERTROPHY, Disp: , Rfl:    Date Last Reviewed:  11/17/2020     Number of Personal Factors/Comorbidities that affect the Plan of Care: 1-2: MODERATE COMPLEXITY   EXAMINATION:   Observation/Orthostatic Postural Assessment:          Patient shows increased obesity with more abdominal weight and increased lordosis. He shows increased small step length with short distances. He tends to show increased right pelvic rotation in standing and gait  Palpation:          Tightness along right paraspinals and into low back and pelvis. He shows tightness in lateral right hip and leg. Some noted atrophy in left glut compared to right side.  -Restricted and limited in hamstring mobility B with increased right restriction more than left  -Overall right hip motion appears good for artificial ANUJ.   -Right knee shows increased restrictions at patella and medial and lateral joint line.  -Increased swelling in B LE and ankles with compression socks worn  ROM:          Patient shows some decreased hamstrings at 70 deg right and 75 deg left  -Flexion in lumbar spine to 30% in standing  Strength:          Hip flexion at 4-/5 B  Right knee extension at 4/5  Left knee extension at 4+/5  Right knee flexion at 4-/5  Left at 4/5  Neurological Screen:        Sensation: Patient has peripheral neuropathy on the plantar surface of both feet  Nerve conduction did not reveal specific radiculopathy  Functional Mobility:         Gait/Ambulation:  Patient shows increased Trendelenburg pattern B with increased right pelvic rotation that maintains during gait with right LE lagging some with shortened stance phase B. Patient has increased knee hike due to neuropathy. Transfers:  Patient shows ability to sit to stand with some aid from hands        Bed Mobility:  Patient has some difficulty with supine to sit with needs for some aid  Balance:          Poor for single leg stance, tandem stance and turning   Body Structures Involved:  1. Nerves  2. Thoracic Cage  3. Bones  4. Joints  5. Muscles  6. Ligaments Body Functions Affected:  1. Sensory/Pain  2. Neuromusculoskeletal  3. Movement Related Activities and Participation Affected:  1. General Tasks and Demands  2. Mobility  3. Self Care  4. Domestic Life  5.  Interpersonal Interactions and Relationships   Number of elements (examined above) that affect the Plan of Care: 4+: HIGH COMPLEXITY   CLINICAL PRESENTATION:   Presentation: Stable and uncomplicated: LOW COMPLEXITY   CLINICAL DECISION MAKING:   Use of outcome tool(s) and clinical judgement create a POC that gives a: Clear prediction of patient's progress: LOW COMPLEXITY

## 2020-11-24 ENCOUNTER — HOSPITAL ENCOUNTER (OUTPATIENT)
Dept: PHYSICAL THERAPY | Age: 75
Discharge: HOME | End: 2020-11-24
Payer: MEDICARE

## 2020-11-24 PROCEDURE — 97140 MANUAL THERAPY 1/> REGIONS: CPT

## 2020-11-24 NOTE — PROGRESS NOTES
Latricia Rowland  : 1945  [place]  Secondary: Bshsi Generic [address] [facility]  at [facility]  [address] [place] [address]  [phone]   [phone]        OUTPATIENT PHYSICAL THERAPY: Daily Treatment Note 2020  Visit Count:  11    ICD-10: Treatment Diagnosis: Low back pain, M54.5  Difficulty walking, not elsewhere classified, R26.2  Pain in joint, right knee, M25.561  Precautions/Allergies: Other medication and Statins-hmg-coa reductase inhibitors   TREATMENT PLAN:  Effective Dates: 2020 TO 2020 (90 days). Frequency/Duration: 1 time a week for 4 weeks then every other week for 8 weeks MEDICAL/REFERRING DIAGNOSIS:  Low back pain [M54.5]   DATE OF ONSET: Chronic  REFERRING PHYSICIAN: Andrade Delgado MD MD Orders: Evaluate and Treat  Return MD Appointment: not scheduled       Pre-treatment Symptoms/Complaints:  Patient reports that he is getting along ok and the day to day exercises seem to be helping some. He talked to Dr. Geri Dandy and discussed his stenosis as his main issues more than neuropathy  Pain: Initial: Pain Intensity 1: 2 /10 Post Session:  2/10   Medications Last Reviewed:  2020  Updated Objective Findings:  None Today  TREATMENT:     THERAPEUTIC EXERCISE: ((reviewed) minutes):  Exercises per grid below to improve mobility, strength and coordination. Required minimal visual, verbal, manual and tactile cues to promote proper body alignment, promote proper body posture, promote proper body mechanics and promote proper body breathing techniques. Progressed resistance and repetitions as indicated.    Date:  2020     Activity/Exercise Parameters   Core exercises Single knee push isometric in side lie x 5 min for facilitation  Pelvic tilt x 20  PNF core facilitation in sidelying (into post depression and anterior elevation)   Education on sit to stand (not today) X 10 with good weight shift   Stairs (not today) Working on pelvic anterior elevation on right side with knee drive and toe lift   Hip and pelvic hike Side lie for innominate controlx 2 min   Knee extension (home) In sitting with Green band x 30 on R LE   Weight shift (not today) R LE planted with follow through and work on keeping right side long x 5 min   Balance (not today) Tandem stance 2 x 30 sec B       MANUAL THERAPY: (40 minutes): Joint mobilization and Soft tissue mobilization was utilized and necessary because of the patient's restricted joint motion and restricted motion of soft tissue. (Used abbreviations: SF - Superficial Fascia; BC - Bony contours; MP - muscle play; IASTM - instrument-assisted soft tissue mobilization; MET - muscle energy technique; a/p - anterior to posterior; p/a - posterior to anterior; Proprioceptive neuromuscular Facilitation-PNF) Manual treatment to improve soft tissue/joint mobility deficits, tissue integrity issues, trigger points and/or pain. -Supine mobilization of right hamstring, lateral IT band, rectus femoris, adductors  -Patellar mobilization in all directions with mini-plunger  -Hip ER stretch insupine  -Left side lie pelvic mobilization for anterior elevation and posterior depression  -PNF pattern to facilitate core into anterior elevation and posterior depression with working through end range holds and isometric reversals with the pelvis and at long full leg function    -Core exercises reviewed  [facility]  Treatment/Session Summary:    · Response to Treatment: Patient shows improved pelvic control with patterns. Worked some side lie posterior depression and opposite anterior elevation for functional gait training. · Communication/Consultation:  None today  · Equipment provided today:  None today  · Recommendations/Intent for next treatment session: Next visit will focus on Core training/balance.     Total Treatment Billable Duration:  40 minutes of treatment  PT Patient Time In/Time Out  Time In: 1400  Time Out: [address], PT    Future Appointments   Date Time Provider Nancy Eason   12/1/2020  1:00 PM Selene Arreola, PT [facility]   12/8/2020  2:00 PM Selene Arreola, PT [facility]   12/15/2020  2:00 PM Selene Arreola, PT [facility]

## 2020-12-08 ENCOUNTER — HOSPITAL ENCOUNTER (OUTPATIENT)
Dept: PHYSICAL THERAPY | Age: 75
Discharge: HOME | End: 2020-12-08
Payer: MEDICARE

## 2020-12-08 PROCEDURE — 97140 MANUAL THERAPY 1/> REGIONS: CPT

## 2020-12-08 PROCEDURE — 97110 THERAPEUTIC EXERCISES: CPT

## 2020-12-08 NOTE — PROGRESS NOTES
Latricia Rowland  : 1945  [place]  Secondary: Bshsi Generic [address]  at [facility]  [address] [place] [address]  [phone]   [phone]        OUTPATIENT PHYSICAL THERAPY: Daily Treatment Note 2020  Visit Count:  12    ICD-10: Treatment Diagnosis: Low back pain, M54.5  Difficulty walking, not elsewhere classified, R26.2  Pain in joint, right knee, M25.561  Precautions/Allergies: Other medication and Statins-hmg-coa reductase inhibitors   TREATMENT PLAN:  Effective Dates: 2020 TO 3/8/2021 (90 days). Frequency/Duration: 1 time a week for 12 weeks MEDICAL/REFERRING DIAGNOSIS:  Low back pain [M54.5]   DATE OF ONSET: Chronic  REFERRING PHYSICIAN: Andrade Delgado MD MD Orders: Evaluate and Treat  Return MD Appointment: not scheduled       Pre-treatment Symptoms/Complaints:  Patient reports that he was under the weather last week and feels better today, but his back has been giving him fits and that is affecting his walking and his R LE. He is scheduled for an injection tomorrow. Pain: Initial: Pain Intensity 1: 10 Post Session:  2/10   Medications Last Reviewed:  2020  Updated Objective Findings:  None Today  TREATMENT:     THERAPEUTIC EXERCISE: (15 minutes):  Exercises per grid below to improve mobility, strength and coordination. Required minimal visual, verbal, manual and tactile cues to promote proper body alignment, promote proper body posture, promote proper body mechanics and promote proper body breathing techniques. Progressed resistance and repetitions as indicated.    Date:  2020     Activity/Exercise Parameters   Core exercises Single knee push isometric in side lie x 5 min for facilitation  Pelvic tilt x 20  PNF core facilitation in sidelying (into post depression and anterior elevation)   Education on sit to stand (not today) X 10 with good weight shift   Stairs (not today) Working on pelvic anterior elevation on right side with knee drive and toe lift   Hip and pelvic hike Side lie for innominate controlx 2 min   Knee extension (home) In sitting with Green band x 30 on R LE   Weight shift (not today) R LE planted with follow through and work on keeping right side long x 5 min   Balance (not today) Tandem stance 2 x 30 sec B       MANUAL THERAPY: (20 minutes): Joint mobilization and Soft tissue mobilization was utilized and necessary because of the patient's restricted joint motion and restricted motion of soft tissue. (Used abbreviations: SF - Superficial Fascia; BC - Bony contours; MP - muscle play; IASTM - instrument-assisted soft tissue mobilization; MET - muscle energy technique; a/p - anterior to posterior; p/a - posterior to anterior; Proprioceptive neuromuscular Facilitation-PNF) Manual treatment to improve soft tissue/joint mobility deficits, tissue integrity issues, trigger points and/or pain. -Supine mobilization of right hamstring, lateral IT band, rectus femoris, adductors  -Patellar mobilization in all directions with mini-plunger  -Hip ER stretch insupine  -Left side lie pelvic mobilization for anterior elevation and posterior depression  -PNF pattern to facilitate core into anterior elevation and posterior depression with working through end range holds and isometric reversals with the pelvis and at long full leg function    -Core exercises reviewed  [facility]  Treatment/Session Summary:    · Response to Treatment: Patient shows improved pelvic control with patterns. He continues to show issues with his core training today due to low back issues and is going to have an injection to help tomorrow  · Communication/Consultation:  None today  · Equipment provided today:  None today  · Recommendations/Intent for next treatment session: Next visit will focus on Core training/balance.     Total Treatment Billable Duration:  35 minutes of treatment, 25 min of re-certification  PT Patient Time In/Time Out  Time In: 1400  Time Out: [address], PT    Future Appointments   Date Time Provider Nancy Eason   12/15/2020  2:00 PM Blaine Aleman, PT [facility]

## 2020-12-08 NOTE — THERAPY RECERTIFICATION
Diana Greenberg  : 1945  [place]  Secondary: Bshsi Generic [id number] [facility]  at [address] [place] [address]  [phone]   [phone]          OUTPATIENT PHYSICAL THERAPY:Recertification    ICD-10: Treatment Diagnosis: Low back pain, M54.5  Difficulty walking, not elsewhere classified, R26.2  Pain in joint, right knee, M25.561  Precautions/Allergies: Other medication and Statins-hmg-coa reductase inhibitors   TREATMENT PLAN:  Effective Dates: 2020 TO 3/8/2021 (90 days). Frequency/Duration: 1 time a week for 12 weeks MEDICAL/REFERRING DIAGNOSIS:  Low back pain [M54.5]   DATE OF ONSET: Chronic  REFERRING PHYSICIAN: Tad Navarro MD MD Orders: Evaluate and Treat  Return MD Appointment: not scheduled   Diana Greenberg has been seen for 12 visits from 20 to 2020 for low back, right knee and R LE. Patient has performed therapeutic exercises, activities, and had manual therapy to increased strength, ROM and function. Patient has also used modalities for pain control in order to increase function. Patient has shown no change in function per the LEFS with scores of 41/80 due to recent illness and increased back pain. He is scheduled to see a spine doctor tomorrow for an injection to help with pain and function. He still shows limitations with weight bearing and ability to use the R LE. He is improving on his regular at home exercises, but will still benefit from using a pool or gym when he feels comfortable going back into one. Patient has progressed well toward their goals and will benefit from continuing skilled PT in order to address their impairments. Maribel Cross, PT, DPT, OCS, CFMT        INITIAL ASSESSMENT:  Mr. Jessica Vera presents with chronic R LE weakness and pain along with peripheral neuropathy affecting his safety with gait and function.  He shows limitation in walking and unsafe gait function. He shows continued deconditioning in the LE's. He is a good candidate for skilled PT in order to address listed impairments affecting his function. Joey Imus, PT, DPT, OCS, CFMT      PROBLEM LIST (Impacting functional limitations):  1. Decreased Strength  2. Decreased ADL/Functional Activities  3. Decreased Transfer Abilities  4. Decreased Ambulation Ability/Technique  5. Decreased Balance  6. Increased Pain  7. Decreased Activity Tolerance  8. Decreased Flexibility/Joint Mobility INTERVENTIONS PLANNED: (Treatment may consist of any combination of the following)  1. Balance Exercise  2. Bed Mobility  3. Electrical Stimulation  4. Gait Training  5. Heat  6. Manual Therapy  7. Neuromuscular Re-education/Strengthening  8. Range of Motion (ROM)  9. Therapeutic Activites  10. Therapeutic Exercise/Strengthening     GOALS: (Goals have been discussed and agreed upon with patient.)  Short-Term Functional Goals: Time Frame: 4 weeks  1. Patient will report walking for greater than 10 minutes in order to return to functional mobility (MET)  2. Patient will show equal hamstring length in order to progress functional mobility (MET)  3. Patient will be independent in [facility] for basic core strength training (ongoing)  Discharge Goals: Time Frame: 12 weeks  1. Patient will show a greater than 10 point increase on the LEFS in order to show an increase in function (ongoing)  2. Patient will report joining a pool and walking in the water greater than 2 times a week in order to progress function (ongoing)  3. Patient will be independent in balance exercises in order to decrease fall risk (ongoing)    OUTCOME MEASURE:   Tool Used: Lower Extremity Functional Scale (LEFS)  Score:  Initial: 37/80 Most Recent: 41/80 (Date: 12/8/20 )   Interpretation of Score: 20 questions each scored on a 5 point scale with 0 representing \"extreme difficulty or unable to perform\" and 4 representing \"no difficulty\".   The lower the score, the greater the functional disability. 80/80 represents no disability. Minimal detectable change is 9 points. MEDICAL NECESSITY:   · Patient is expected to demonstrate progress in strength, range of motion, balance, coordination and functional technique to improve functionalmobility and safety with walking. · Patient demonstrates good rehab potential due to higher previous functional level. · Skilled intervention continues to be required due to decreased mobility and increased risk of falls. REASON FOR SERVICES/OTHER COMMENTS:  · Patient continues to require skilled intervention due to decreased mobility. Total Duration:  PT Patient Time In/Time Out  Time In: 1400  Time Out: 1500    Rehabilitation Potential For Stated Goals: Excellent  Regarding James Potocki's therapy, I certify that the treatment plan above will be carried out by a therapist or under their direction. Thank you for this referral,  Dylan Paredes, SURINDER     Referring Physician Signature: Tari Dunlap MD _______________________________ Date _____________     PAIN/SUBJECTIVE:   Initial: Pain Intensity 1: 5 /10 Post Session:  2/10   HISTORY:   History of Injury/Illness (Reason for Referral):  Patient has had a chronic history of R LE weakness and pain since he first had his hip replacement about 5 years ago. He has since had issues with spinal stenosis, disc herniation, right knee OA and has found out recently that he has peripheral neuropathy in both feet that affect his balance. He had a fall back in February/March that was concerning for him due to the leg giving out. He also had a hospitalization due to abnormal heartbeat. He has finally stabilized with the heart. He did have an injection in his lower back (Dr. Sasha Frankel) in the summer due to increasing left LE weakness and pain that has helped a lot. He is not comfortable going to the gym like he use to due to Minh and has become deconditioned.   He reports doing some things at home, but still just feels like he is starting from square one. His goals are to get back to exercises and daily life without difficulty and with less fear of falling. Past Medical History/Comorbidities:   Mr. Sharif Márquez  has a past medical history of Anxiety, Edema of both legs, Enlarged prostate, Gout, Hip pain, History of elevated glucose, History of seasonal allergies, Hypercholesterolemia, Hypertension, Keratoacanthoma, S/P total hip arthroplasty (4/6/2015), Skin neoplasm, and Spinal stenosis. He also has no past medical history of Aneurysm ([address] Arrhythmia, Arthritis, Asthma, Autoimmune disease ([address] CAD (coronary artery disease), Cancer ([address] Chronic kidney disease, Chronic obstructive pulmonary disease ([address] Chronic pain, Coagulation disorder ([address] Difficult intubation, GERD (gastroesophageal reflux disease), Heart failure ([address] Ill-defined condition, Liver disease, Malignant hyperthermia due to anesthesia, Morbid obesity ([address] Nausea & vomiting, Pseudocholinesterase deficiency, Psychiatric disorder, PUD (peptic ulcer disease), Seizures ([address] Stroke ([address] Thromboembolus ([address] Thyroid disease, Unspecified adverse effect of anesthesia, or Unspecified sleep apnea. Mr. Sharif Márquez  has a past surgical history that includes hx tonsillectomy (as child) and hx wisdom teeth extraction (as teenager).   Social History/Living Environment:       Social History     Socioeconomic History    Marital status:      Spouse name: Not on file    Number of children: Not on file    Years of education: Not on file    Highest education level: Not on file   Occupational History    Not on file   Social Needs    Financial resource strain: Not on file    Food insecurity     Worry: Not on file     Inability: Not on file    Transportation needs     Medical: Not on file     Non-medical: Not on file   Tobacco Use    Smoking status: Never Smoker   Substance and Sexual Activity    Alcohol use: No    Drug use: Not on file    Sexual activity: Not on file   Lifestyle    Physical activity     Days per week: Not on file     Minutes per session: Not on file    Stress: Not on file   Relationships    Social connections     Talks on phone: Not on file     Gets together: Not on file     Attends Spiritism service: Not on file     Active member of club or organization: Not on file     Attends meetings of clubs or organizations: Not on file     Relationship status: Not on file    Intimate partner violence     Fear of current or ex partner: Not on file     Emotionally abused: Not on file     Physically abused: Not on file     Forced sexual activity: Not on file   Other Topics Concern    Not on file   Social History Narrative    Not on file       Prior Level of Function/Work/Activity:  Independent, retired  Dominant Side:         RIGHT   Ambulatory/Rehab Services H2 Model Falls Risk Assessment   Risk Factors:       (1)  Gender [Male]       (5)  History of Recent Falls [w/in 3 months]       (1)  Orthostatic drop in BP Ability to Rise from Chair:       (1)  Pushes up, successful in one attempt   Falls Prevention Plan:       No modifications necessary   Total: (5 or greater = High Risk): 8   ©2010 [employer] [address] [place] [address] States Patent #0,[id number]. Federal Law prohibits the replication, distribution or use without written permission from [employer] [facility] [employer]   Current Medications:       Current Outpatient Medications:     ergocalciferol (ERGOCALCIFEROL) 1,250 mcg (50,000 unit) capsule, Take 50,000 Units by mouth every seven (7) days. , Disp: , Rfl:     furosemide (LASIX) 20 mg tablet, Take 20 mg by mouth daily. , Disp: , Rfl:     lisinopriL (PRINIVIL, ZESTRIL) 20 mg tablet, Take 20 mg by mouth daily. , Disp: , Rfl:     celecoxib (CELEBREX) 200 mg capsule, Take 200 mg by mouth daily. , Disp: , Rfl:     allopurinoL (ZYLOPRIM) 300 mg tablet, Take 300 mg by mouth daily. , Disp: , Rfl:     spironolactone (ALDACTONE) 25 mg tablet, Take 25 mg by mouth daily. , Disp: , Rfl:     busPIRone (BUSPAR) 7.5 mg tablet, Take 7.5 mg by mouth two (2) times a day., Disp: , Rfl:     hydrOXYzine HCL (ATARAX) 25 mg tablet, Take 25 mg by mouth two (2) times a day., Disp: , Rfl:     POTASSIUM CHLORIDE PO, Take 10 mEq by mouth three (3) days a week., Disp: , Rfl:     cloNIDine (CATAPRES) 0.3 mg/24 hr, 1 Patch by TransDERmal route every seven (7) days. , Disp: , Rfl:     prazosin (MINIPRESS) 1 mg capsule, Take 1 mg by mouth two (2) times a day. TAKE AM OF SURGERY WITH SMALL SIP OF WATER   Indications: enlarged prostate, Disp: , Rfl:     finasteride (PROSCAR) 5 mg tablet, Take 5 mg by mouth daily. TAKE AM OF SURGERY WITH SMALL SIP OF WATER   Indications: BENIGN PROSTATIC HYPERTROPHY, Disp: , Rfl:    Date Last Reviewed:  12/8/2020     Number of Personal Factors/Comorbidities that affect the Plan of Care: 1-2: MODERATE COMPLEXITY   EXAMINATION:   Observation/Orthostatic Postural Assessment:          Patient shows increased obesity with more abdominal weight and increased lordosis. He shows increased small step length with short distances. He tends to show increased right pelvic rotation in standing and gait  Palpation:          Tightness along right paraspinals and into low back and pelvis. He shows tightness in lateral right hip and leg. Some noted atrophy in left glut compared to right side.  -Restricted and limited in hamstring mobility B with increased right restriction more than left  -Overall right hip motion appears good for artificial ANUJ.   -Right knee shows increased restrictions at patella and medial and lateral joint line.  -Increased swelling in B LE and ankles with compression socks worn  ROM:          Patient shows some decreased hamstrings at 70 deg right and 75 deg left  -Flexion in lumbar spine to 30% in standing  Strength:          Hip flexion at 4-/5 B  Right knee extension at 4/5  Left knee extension at 4+/5  Right knee flexion at 4-/5  Left at 4/5  Neurological Screen:        Sensation: Patient has peripheral neuropathy on the plantar surface of both feet  Nerve conduction did not reveal specific radiculopathy  Functional Mobility:         Gait/Ambulation:  Patient shows increased Trendelenburg pattern B with increased right pelvic rotation that maintains during gait with right LE lagging some with shortened stance phase B. Patient has increased knee hike due to neuropathy. Transfers:  Patient shows ability to sit to stand with some aid from hands        Bed Mobility:  Patient has some difficulty with supine to sit with needs for some aid  Balance:          Poor for single leg stance, tandem stance and turning   Body Structures Involved:  1. Nerves  2. Thoracic Cage  3. Bones  4. Joints  5. Muscles  6. Ligaments Body Functions Affected:  1. Sensory/Pain  2. Neuromusculoskeletal  3. Movement Related Activities and Participation Affected:  1. General Tasks and Demands  2. Mobility  3. Self Care  4. Domestic Life  5.  Interpersonal Interactions and Relationships   Number of elements (examined above) that affect the Plan of Care: 4+: HIGH COMPLEXITY   CLINICAL PRESENTATION:   Presentation: Stable and uncomplicated: LOW COMPLEXITY   CLINICAL DECISION MAKING:   Use of outcome tool(s) and clinical judgement create a POC that gives a: Clear prediction of patient's progress: LOW COMPLEXITY

## 2020-12-15 ENCOUNTER — HOSPITAL ENCOUNTER (OUTPATIENT)
Dept: PHYSICAL THERAPY | Age: 75
Discharge: HOME | End: 2020-12-15
Payer: MEDICARE

## 2020-12-15 PROCEDURE — 97140 MANUAL THERAPY 1/> REGIONS: CPT

## 2020-12-15 PROCEDURE — 97110 THERAPEUTIC EXERCISES: CPT

## 2020-12-15 NOTE — PROGRESS NOTES
Dana Pearisburg  : 1945  [place]  Secondary: Bshsi Generic [id number] at [facility]  [address] [place] [address]  [phone]   [phone]        OUTPATIENT PHYSICAL THERAPY: Daily Treatment Note 12/15/2020  Visit Count:  13    ICD-10: Treatment Diagnosis: Low back pain, M54.5  Difficulty walking, not elsewhere classified, R26.2  Pain in joint, right knee, M25.561  Precautions/Allergies: Other medication and Statins-hmg-coa reductase inhibitors   TREATMENT PLAN:  Effective Dates: 2020 TO 3/8/2021 (90 days). Frequency/Duration: 1 time a week for 12 weeks MEDICAL/REFERRING DIAGNOSIS:  Low back pain [M54.5]   DATE OF ONSET: Chronic  REFERRING PHYSICIAN: Sheila Kingsley MD MD Orders: Evaluate and Treat  Return MD Appointment: not scheduled       Pre-treatment Symptoms/Complaints:  Patient reports that he had an injection in L1-2 last week. That helped some, but not as much when they did L3-4  Pain: Initial: Pain Intensity 1: 3 /10 Post Session:  2/10   Medications Last Reviewed:  12/15/2020  Updated Objective Findings:  None Today  TREATMENT:     THERAPEUTIC EXERCISE: (15 minutes):  Exercises per grid below to improve mobility, strength and coordination. Required minimal visual, verbal, manual and tactile cues to promote proper body alignment, promote proper body posture, promote proper body mechanics and promote proper body breathing techniques. Progressed resistance and repetitions as indicated.    Date:  12/15/2020     Activity/Exercise Parameters   Core exercises Single knee push isometric in side lie x 5 min for facilitation  Pelvic tilt x 20  PNF core facilitation in sidelying (into post depression and anterior elevation)   Education on sit to stand (not today) X 10 with good weight shift   Stairs (not today) Working on pelvic anterior elevation on right side with knee drive and toe lift   Hip and pelvic hike Side lie for innominate controlx 2 min   Knee extension (home) In sitting with Green band x 30 on R LE   Weight shift (not today) R LE planted with follow through and work on keeping right side long x 5 min   Balance (not today) Tandem stance 2 x 30 sec B       MANUAL THERAPY: (30 minutes): Joint mobilization and Soft tissue mobilization was utilized and necessary because of the patient's restricted joint motion and restricted motion of soft tissue. (Used abbreviations: SF - Superficial Fascia; BC - Bony contours; MP - muscle play; IASTM - instrument-assisted soft tissue mobilization; MET - muscle energy technique; a/p - anterior to posterior; p/a - posterior to anterior; Proprioceptive neuromuscular Facilitation-PNF) Manual treatment to improve soft tissue/joint mobility deficits, tissue integrity issues, trigger points and/or pain. -Supine mobilization of right hamstring, lateral IT band, rectus femoris, adductors  -Patellar mobilization in all directions with mini-plunger  -Hip ER stretch insupine  -Left side lie pelvic mobilization for anterior elevation and posterior depression  -PNF pattern to facilitate core into anterior elevation and posterior depression with working through end range holds and isometric reversals with the pelvis and at long full leg function    -Core exercises reviewed  [facility]  Treatment/Session Summary:    · Response to Treatment: Patient shows improved pelvic control with patterns. He feels better with the exercises and is hope that the pain comes down and he cane start to increase his walking. · Communication/Consultation:  None today  · Equipment provided today:  None today  · Recommendations/Intent for next treatment session: Next visit will focus on Core training/balance.     Total Treatment Billable Duration:  45 minutes of treatment,   PT Patient Time In/Time Out  Time In: 1400  Time Out: Carlos Antoino PT    Future Appointments   Date Time Provider Department [place]   12/30/2020  2:00 PM Donivan Cooks, PT [facility]   1/5/2021  1:00 PM Donivan Cooks, PT [facility]   1/12/2021  2:00 PM Donivan Cooks, PT [facility]   1/19/2021  2:00 PM Donivan Cooks, PT [facility]   1/26/2021  2:00 PM Donivan Cooks, PT [facility]

## 2020-12-30 ENCOUNTER — HOSPITAL ENCOUNTER (OUTPATIENT)
Dept: PHYSICAL THERAPY | Age: 75
Discharge: HOME | End: 2020-12-30
Payer: MEDICARE

## 2020-12-30 PROCEDURE — 97140 MANUAL THERAPY 1/> REGIONS: CPT

## 2020-12-30 NOTE — PROGRESS NOTES
Tyrese Mercado  : 1945  [place]  Secondary: Bshsi Generic [address] at [facility]  [address] [place] [address]  [phone]   [phone]        OUTPATIENT PHYSICAL THERAPY: Daily Treatment Note 2020  Visit Count:  14    ICD-10: Treatment Diagnosis: Low back pain, M54.5  Difficulty walking, not elsewhere classified, R26.2  Pain in joint, right knee, M25.561  Precautions/Allergies: Other medication and Statins-hmg-coa reductase inhibitors   TREATMENT PLAN:  Effective Dates: 2020 TO 3/8/2021 (90 days). Frequency/Duration: 1 time a week for 12 weeks MEDICAL/REFERRING DIAGNOSIS:  Low back pain [M54.5]   DATE OF ONSET: Chronic  REFERRING PHYSICIAN: Amando Neff MD MD Orders: Evaluate and Treat  Return MD Appointment: not scheduled       Pre-treatment Symptoms/Complaints:  Patient reports that he had an increase in Migraines this past week and has not been feeling great. He is not sure why  Pain: Initial: Pain Intensity 1: 4 /10 Post Session:  2/10   Medications Last Reviewed:  2020  Updated Objective Findings:  None Today  TREATMENT:     THERAPEUTIC EXERCISE: (reviewed minutes):  Exercises per grid below to improve mobility, strength and coordination. Required minimal visual, verbal, manual and tactile cues to promote proper body alignment, promote proper body posture, promote proper body mechanics and promote proper body breathing techniques. Progressed resistance and repetitions as indicated.    Date:  2020     Activity/Exercise Parameters   Core exercises Single knee push isometric in side lie x 5 min for facilitation  Pelvic tilt x 20  PNF core facilitation in sidelying (into post depression and anterior elevation)   Education on sit to stand (not today) X 10 with good weight shift   Stairs (not today) Working on pelvic anterior elevation on right side with knee drive and toe lift   Hip and pelvic hike Side lie for innominate controlx 2 min   Knee extension (home) In sitting with Green band x 30 on R LE   Weight shift (not today) R LE planted with follow through and work on keeping right side long x 5 min   Balance (not today) Tandem stance 2 x 30 sec B       MANUAL THERAPY: (30 minutes): Joint mobilization and Soft tissue mobilization was utilized and necessary because of the patient's restricted joint motion and restricted motion of soft tissue. (Used abbreviations: SF - Superficial Fascia; BC - Bony contours; MP - muscle play; IASTM - instrument-assisted soft tissue mobilization; MET - muscle energy technique; a/p - anterior to posterior; p/a - posterior to anterior; Proprioceptive neuromuscular Facilitation-PNF) Manual treatment to improve soft tissue/joint mobility deficits, tissue integrity issues, trigger points and/or pain. -Supine mobilization of right hamstring, lateral IT band, rectus femoris, adductors  -Patellar mobilization in all directions with mini-plunger  -Hip ER stretch insupine  -Left side lie pelvic mobilization for anterior elevation and posterior depression  -PNF pattern to facilitate core into anterior elevation and posterior depression with working through end range holds and isometric reversals with the pelvis and at long full leg function    -Core exercises reviewed  [facility]  Treatment/Session Summary:    · Response to Treatment: Patient shows some increase in fatigue today. He is getting his core engagement better, but still has some difficulty with pelvic control  · Communication/Consultation:  None today  · Equipment provided today:  None today  · Recommendations/Intent for next treatment session: Next visit will focus on Core training/balance.     Total Treatment Billable Duration:  30 minutes of treatment, Education and discussion the remainder of the time  PT Patient Time In/Time Out  Time In: 1406  Time Out: Romeo 65, PT    Future Appointments   Date Time Provider [place]   1/5/2021  1:00 PM Chip Mckenna, PT [facility]   1/12/2021  2:00 PM Chip Mckenna, PT [facility]   1/19/2021  2:00 PM Chip Mckenna, PT [facility]   1/26/2021  2:00 PM Chip Mckenna, PT [facility]

## 2021-01-05 ENCOUNTER — HOSPITAL ENCOUNTER (OUTPATIENT)
Dept: PHYSICAL THERAPY | Age: 76
Discharge: HOME | End: 2021-01-05
Payer: MEDICARE

## 2021-01-05 PROCEDURE — 97110 THERAPEUTIC EXERCISES: CPT

## 2021-01-05 PROCEDURE — 97140 MANUAL THERAPY 1/> REGIONS: CPT

## 2021-01-05 NOTE — PROGRESS NOTES
Torsten Nunn  : 1945  [place]  Secondary: Bshsi Generic [address]  at [facility]  [address] [place] [address]  [phone]   [phone]        OUTPATIENT PHYSICAL THERAPY: Daily Treatment Note 2021  Visit Count:  15    ICD-10: Treatment Diagnosis: Low back pain, M54.5  Difficulty walking, not elsewhere classified, R26.2  Pain in joint, right knee, M25.561  Precautions/Allergies: Other medication and Statins-hmg-coa reductase inhibitors   TREATMENT PLAN:  Effective Dates: 2020 TO 3/8/2021 (90 days). Frequency/Duration: 1 time a week for 12 weeks MEDICAL/REFERRING DIAGNOSIS:  Low back pain [M54.5]   DATE OF ONSET: Chronic  REFERRING PHYSICIAN: Hillary Lau MD MD Orders: Evaluate and Treat  Return MD Appointment: not scheduled       Pre-treatment Symptoms/Complaints:  Patient reports that he has slowly gotten back to it and feels ok this week  Pain: Initial: Pain Intensity 1: 4 /10 Post Session:  2/10   Medications Last Reviewed:  2021  Updated Objective Findings:  None Today  TREATMENT:     THERAPEUTIC EXERCISE: (10 minutes):  Exercises per grid below to improve mobility, strength and coordination. Required minimal visual, verbal, manual and tactile cues to promote proper body alignment, promote proper body posture, promote proper body mechanics and promote proper body breathing techniques. Progressed resistance and repetitions as indicated.    Date:  2021     Activity/Exercise Parameters   Core exercises Single knee push isometric in side lie x 5 min for facilitation  Pelvic tilt x 20  PNF core facilitation in sidelying (into post depression and anterior elevation)   Education on sit to stand (not today) X 10 with good weight shift   Stairs (not today) Working on pelvic anterior elevation on right side with knee drive and toe lift   Hip and pelvic hike Side lie for innominate controlx 2 min   Knee extension (home) In sitting with Green band x 30 on R LE   Weight shift (not today) R LE planted with follow through and work on keeping right side long x 5 min   Balance (not today) Tandem stance 2 x 30 sec B       MANUAL THERAPY: (30 minutes): Joint mobilization and Soft tissue mobilization was utilized and necessary because of the patient's restricted joint motion and restricted motion of soft tissue. (Used abbreviations: SF - Superficial Fascia; BC - Bony contours; MP - muscle play; IASTM - instrument-assisted soft tissue mobilization; MET - muscle energy technique; a/p - anterior to posterior; p/a - posterior to anterior; Proprioceptive neuromuscular Facilitation-PNF) Manual treatment to improve soft tissue/joint mobility deficits, tissue integrity issues, trigger points and/or pain. -Supine mobilization of right hamstring, lateral IT band, rectus femoris, adductors  -Patellar mobilization in all directions with mini-plunger  -Hip ER stretch insupine  -Left side lie pelvic mobilization for anterior elevation and posterior depression  -PNF pattern to facilitate core into anterior elevation and posterior depression with working through end range holds and isometric reversals with the pelvis and at long full leg function    -Core exercises reviewed  [facility] Portal  Treatment/Session Summary:    · Response to Treatment: Patient shows improvement today from last time with his pelvic control and neuromuscular function. · Communication/Consultation:  None today  · Equipment provided today:  None today  · Recommendations/Intent for next treatment session: Next visit will focus on Core training/balance.     Total Treatment Billable Duration:  40 minutes of treatment,  PT Patient Time In/Time Out  Time In: 6752  Time Out: 27 [facility], PT    Future Appointments   Date Time Provider Nancy Eason   1/12/2021  2:00 PM Yumi Gonzales, PT [facility]   1/19/2021  2:00 PM Yumi Gonzales, PT [facility]   1/26/2021  2:00 PM [facility]

## 2021-01-12 ENCOUNTER — HOSPITAL ENCOUNTER (OUTPATIENT)
Dept: PHYSICAL THERAPY | Age: 76
Discharge: HOME | End: 2021-01-12
Payer: MEDICARE

## 2021-01-12 PROCEDURE — 97140 MANUAL THERAPY 1/> REGIONS: CPT

## 2021-01-12 NOTE — PROGRESS NOTES
Ted Means  : 1945  [place]  Secondary: Bshsi Generic [address] [facility]  at [facility]  [address] [place] [address]  [phone]   [phone]        OUTPATIENT PHYSICAL THERAPY: Daily Treatment Note 2021  Visit Count:  16    ICD-10: Treatment Diagnosis: Low back pain, M54.5  Difficulty walking, not elsewhere classified, R26.2  Pain in joint, right knee, M25.561  Precautions/Allergies: Other medication and Statins-hmg-coa reductase inhibitors   TREATMENT PLAN:  Effective Dates: 2020 TO 3/8/2021 (90 days). Frequency/Duration: 1 time a week for 12 weeks MEDICAL/REFERRING DIAGNOSIS:  Low back pain [M54.5]   DATE OF ONSET: Chronic  REFERRING PHYSICIAN: Per Hernandez MD MD Orders: Evaluate and Treat  Return MD Appointment: not scheduled       Pre-treatment Symptoms/Complaints:  Patient reports that he has still had some issues with motivation and feeling a little down when he is not able to perform his usual activities. He reports that he is trying day to day, but feels behind on all the things he needs to be doing. Pain: Initial: Pain Intensity 1: 4 /10 Post Session:  2/10   Medications Last Reviewed:  2021  Updated Objective Findings:  None Today  TREATMENT:     THERAPEUTIC EXERCISE: ((not today) minutes):  Exercises per grid below to improve mobility, strength and coordination. Required minimal visual, verbal, manual and tactile cues to promote proper body alignment, promote proper body posture, promote proper body mechanics and promote proper body breathing techniques. Progressed resistance and repetitions as indicated.    Date:  2021     Activity/Exercise Parameters   Core exercises Single knee push isometric in side lie x 5 min for facilitation  Pelvic tilt x 20  PNF core facilitation in sidelying (into post depression and anterior elevation)   Education on sit to stand (not today) X 10 with good weight shift   Stairs (not today) Working on pelvic anterior elevation on right side with knee drive and toe lift   Hip and pelvic hike Side lie for innominate controlx 2 min   Knee extension (home) In sitting with Green band x 30 on R LE   Weight shift (not today) R LE planted with follow through and work on keeping right side long x 5 min   Balance (not today) Tandem stance 2 x 30 sec B       MANUAL THERAPY: (30 minutes): Joint mobilization and Soft tissue mobilization was utilized and necessary because of the patient's restricted joint motion and restricted motion of soft tissue. (Used abbreviations: SF - Superficial Fascia; BC - Bony contours; MP - muscle play; IASTM - instrument-assisted soft tissue mobilization; MET - muscle energy technique; a/p - anterior to posterior; p/a - posterior to anterior; Proprioceptive neuromuscular Facilitation-PNF) Manual treatment to improve soft tissue/joint mobility deficits, tissue integrity issues, trigger points and/or pain. -Supine mobilization of right hamstring, lateral IT band, rectus femoris, adductors  -Patellar mobilization in all directions with mini-plunger  -Hip ER stretch insupine  -Left side lie pelvic mobilization for anterior elevation and posterior depression  -PNF pattern to facilitate core into anterior elevation and posterior depression with working through end range holds and isometric reversals with the pelvis and at long full leg function  -Core exercises reviewed    [facility]  Treatment/Session Summary:    · Response to Treatment: Patient shows improvement today from last time with his pelvic control and neuromuscular function. Still has weakness into the R LE with decreased push off and weight acceptance. · Communication/Consultation:  None today  · Equipment provided today:  None today  · Recommendations/Intent for next treatment session: Next visit will focus on Core training/balance.     Total Treatment Billable Duration:  30 minutes of treatment,  PT Patient Time In/Time Out  Time In: 1400  Time Out: 217 Darius Agarwal, SURINDER    Future Appointments   Date Time Provider Nancy Eason   1/19/2021  2:00 PM Jorge Traylor, [facility]   1/26/2021  2:00 PM Jorge Traylor PT [facility]

## 2021-01-19 ENCOUNTER — HOSPITAL ENCOUNTER (OUTPATIENT)
Dept: PHYSICAL THERAPY | Age: 76
Discharge: HOME | End: 2021-01-19
Payer: MEDICARE

## 2021-01-19 PROCEDURE — 97140 MANUAL THERAPY 1/> REGIONS: CPT

## 2021-01-19 NOTE — PROGRESS NOTES
Joanna Peterson  : 1945  [place]  Secondary: Bshsi Generic [address] at [facility]  [address] [place] [address]  [phone]   [phone]        OUTPATIENT PHYSICAL THERAPY: Daily Treatment Note 2021  Visit Count:  17    ICD-10: Treatment Diagnosis: Low back pain, M54.5  Difficulty walking, not elsewhere classified, R26.2  Pain in joint, right knee, M25.561  Precautions/Allergies: Other medication and Statins-hmg-coa reductase inhibitors   TREATMENT PLAN:  Effective Dates: 2020 TO 3/8/2021 (90 days). Frequency/Duration: 1 time a week for 12 weeks MEDICAL/REFERRING DIAGNOSIS:  Low back pain [M54.5]   DATE OF ONSET: Chronic  REFERRING PHYSICIAN: Venessa Newman MD MD Orders: Evaluate and Treat  Return MD Appointment: not scheduled       Pre-treatment Symptoms/Complaints:  Patient reports that he has been ok, but his walking has been off today for whatever reason. His back is hurting a little more too  Pain: Initial: Pain Intensity 1: 5 /10 Post Session:  2/10   Medications Last Reviewed:  2021  Updated Objective Findings:  None Today  TREATMENT:     THERAPEUTIC EXERCISE: ((not today) minutes):  Exercises per grid below to improve mobility, strength and coordination. Required minimal visual, verbal, manual and tactile cues to promote proper body alignment, promote proper body posture, promote proper body mechanics and promote proper body breathing techniques. Progressed resistance and repetitions as indicated.    Date:  2021     Activity/Exercise Parameters   Core exercises Single knee push isometric in side lie x 5 min for facilitation  Pelvic tilt x 20  PNF core facilitation in sidelying (into post depression and anterior elevation)   Education on sit to stand (not today) X 10 with good weight shift   Stairs (not today) Working on pelvic anterior elevation on right side with knee drive and toe lift Hip and pelvic hike Side lie for innominate controlx 2 min   Knee extension (home) In sitting with Green band x 30 on R LE   Weight shift (not today) R LE planted with follow through and work on keeping right side long x 5 min   Balance (not today) Tandem stance 2 x 30 sec B       MANUAL THERAPY: (40 minutes): Joint mobilization and Soft tissue mobilization was utilized and necessary because of the patient's restricted joint motion and restricted motion of soft tissue. (Used abbreviations: SF - Superficial Fascia; BC - Bony contours; MP - muscle play; IASTM - instrument-assisted soft tissue mobilization; MET - muscle energy technique; a/p - anterior to posterior; p/a - posterior to anterior; Proprioceptive neuromuscular Facilitation-PNF) Manual treatment to improve soft tissue/joint mobility deficits, tissue integrity issues, trigger points and/or pain. -Supine mobilization of right hamstring, lateral IT band, rectus femoris, adductors  -Patellar mobilization in all directions with mini-plunger  -Hip ER stretch insupine  -Left and right side lie pelvic mobilization for anterior elevation and posterior depression  -PNF pattern to facilitate core into anterior elevation and posterior depression with working through end range holds and isometric reversals with the pelvis and at long full leg function  -Core exercises     [facility]  Treatment/Session Summary:    · Response to Treatment: Patient shows improvement today from last time with his pelvic control and neuromuscular function. He still shows signs of overall functional deficit due to stenosis in his lower back. We will continue to work on functional control and gait strength  · Communication/Consultation:  None today  · Equipment provided today:  None today  · Recommendations/Intent for next treatment session: Next visit will focus on Core training/balance.     Total Treatment Billable Duration:  40 minutes of treatment,  PT Patient Time In/Time Out  Time In: 5659  Time Out: 217 Darius Agarwal, PT    Future Appointments   Date Time Provider Nancy Eason   1/26/2021  2:00 PM Marina Lopez, PT [facility]   2/2/2021  2:00 PM Marina Lopez, PT [facility]   2/9/2021  2:00 PM Marina Lopez, PT [facility]   2/16/2021  2:00 PM Marina Lopez, PT [facility]

## 2021-01-26 ENCOUNTER — HOSPITAL ENCOUNTER (OUTPATIENT)
Dept: PHYSICAL THERAPY | Age: 76
Discharge: HOME | End: 2021-01-26
Payer: MEDICARE

## 2021-01-26 PROCEDURE — 97140 MANUAL THERAPY 1/> REGIONS: CPT

## 2021-01-26 NOTE — PROGRESS NOTES
Chung Pollack  : 1945  [place]  Secondary: Bshsi Generic [address] [facility]  at [facility]  [address] [place] [address]  [phone]   [phone]        OUTPATIENT PHYSICAL THERAPY: Daily Treatment Note 2021  Visit Count:  18    ICD-10: Treatment Diagnosis: Low back pain, M54.5  Difficulty walking, not elsewhere classified, R26.2  Pain in joint, right knee, M25.561  Precautions/Allergies: Other medication and Statins-hmg-coa reductase inhibitors   TREATMENT PLAN:  Effective Dates: 2020 TO 3/8/2021 (90 days). Frequency/Duration: 1 time a week for 12 weeks MEDICAL/REFERRING DIAGNOSIS:  Low back pain [M54.5]   DATE OF ONSET: Chronic  REFERRING PHYSICIAN: Sylvia Martinez MD MD Orders: Evaluate and Treat  Return MD Appointment: not scheduled       Pre-treatment Symptoms/Complaints:  Patient reports that his back is still bothering him more still, but walking is ok  Pain: Initial: Pain Intensity 1: 4 /10 Post Session:  2/10   Medications Last Reviewed:  2021  Updated Objective Findings:  None Today  TREATMENT:     THERAPEUTIC EXERCISE: ((not today) minutes):  Exercises per grid below to improve mobility, strength and coordination. Required minimal visual, verbal, manual and tactile cues to promote proper body alignment, promote proper body posture, promote proper body mechanics and promote proper body breathing techniques. Progressed resistance and repetitions as indicated.    Date:  2021     Activity/Exercise Parameters   Core exercises Single knee push isometric in side lie x 5 min for facilitation  Pelvic tilt x 20  PNF core facilitation in sidelying (into post depression and anterior elevation)   Education on sit to stand (not today) X 10 with good weight shift   Stairs (not today) Working on pelvic anterior elevation on right side with knee drive and toe lift   Hip and pelvic hike Side lie for innominate controlx 2 min   Knee extension (home) In sitting with Green band x 30 on R LE   Weight shift (not today) R LE planted with follow through and work on keeping right side long x 5 min   Balance (not today) Tandem stance 2 x 30 sec B       MANUAL THERAPY: (40 minutes): Joint mobilization and Soft tissue mobilization was utilized and necessary because of the patient's restricted joint motion and restricted motion of soft tissue. (Used abbreviations: SF - Superficial Fascia; BC - Bony contours; MP - muscle play; IASTM - instrument-assisted soft tissue mobilization; MET - muscle energy technique; a/p - anterior to posterior; p/a - posterior to anterior; Proprioceptive neuromuscular Facilitation-PNF) Manual treatment to improve soft tissue/joint mobility deficits, tissue integrity issues, trigger points and/or pain. -Supine mobilization of right hamstring, lateral IT band, rectus femoris, adductors  -Patellar mobilization in all directions with mini-plunger  -Hip ER stretch insupine  -Left and right side lie pelvic mobilization for anterior elevation and posterior depression  -PNF pattern to facilitate core into anterior elevation and posterior depression with working through end range holds and isometric reversals with the pelvis and at long full leg function  -Core exercises     [facility]  Treatment/Session Summary:    · Response to Treatment: Patient shows improvement today from last time with his pelvic control and neuromuscular function. His back pain and stenosis seem to still be affecting him the most with his mobility. He still has to steady himself going from sit to stand and then becomes more stable as he walks more. · Communication/Consultation:  None today  · Equipment provided today:  None today  · Recommendations/Intent for next treatment session: Next visit will focus on Core training/balance.     Total Treatment Billable Duration:  40 minutes of treatment,  PT Patient Time In/Time Out  Time In: 1401  Time Out: 217 Darius Agarwal, PT    Future Appointments   Date Time Provider Nancy Eason   2/2/2021  2:00 PM Jake Valverde, PT [facility]   2/9/2021  2:00 PM Jake Valverde, PT [facility]   2/16/2021  2:00 PM Jake Valverde, PT [facility]

## 2021-02-02 ENCOUNTER — HOSPITAL ENCOUNTER (OUTPATIENT)
Dept: PHYSICAL THERAPY | Age: 76
Discharge: HOME | End: 2021-02-02
Payer: MEDICARE

## 2021-02-02 PROCEDURE — 97140 MANUAL THERAPY 1/> REGIONS: CPT

## 2021-02-02 PROCEDURE — 97110 THERAPEUTIC EXERCISES: CPT

## 2021-02-02 NOTE — PROGRESS NOTES
Dre Gene  : 1945  [place]  Secondary: Bshsi Generic [address] [facility]  at [facility]  [address] [place] [address]  [phone]   [phone]        OUTPATIENT PHYSICAL THERAPY: Daily Treatment Note 2021  Visit Count:  19    ICD-10: Treatment Diagnosis: Low back pain, M54.5  Difficulty walking, not elsewhere classified, R26.2  Pain in joint, right knee, M25.561  Precautions/Allergies: Other medication and Statins-hmg-coa reductase inhibitors   TREATMENT PLAN:  Effective Dates: 2020 TO 3/8/2021 (90 days). Frequency/Duration: 1 time a week for 12 weeks MEDICAL/REFERRING DIAGNOSIS:  Low back pain [M54.5]   DATE OF ONSET: Chronic  REFERRING PHYSICIAN: Charly Escobar MD MD Orders: Evaluate and Treat  Return MD Appointment: not scheduled       Pre-treatment Symptoms/Complaints:  Patient reports that his back is still bothering him more still, but walking is ok when he is able to do it. He reports not being able to get up and get going as much over the last week  Pain: Initial: Pain Intensity 1: 4 /10 Post Session:  2/10   Medications Last Reviewed:  2021  Updated Objective Findings:  None Today  TREATMENT:     THERAPEUTIC EXERCISE: (10 minutes):  Exercises per grid below to improve mobility, strength and coordination. Required minimal visual, verbal, manual and tactile cues to promote proper body alignment, promote proper body posture, promote proper body mechanics and promote proper body breathing techniques. Progressed resistance and repetitions as indicated.    Date:  2021     Activity/Exercise Parameters   Core exercises Single knee push isometric in side lie x 5 min for facilitation  Pelvic tilt x 20  PNF core facilitation in sidelying (into post depression and anterior elevation)   Education on sit to stand (not today) X 10 with good weight shift   Stairs (not today) Working on pelvic anterior elevation on right side with knee drive and toe lift   Hip and pelvic hike Not today   Knee extension (home) In sitting with Green band x 30 on R LE   Weight shift  R LE planted with follow through and work on keeping right side long x 5 min   Balance (not today) Tandem stance 2 x 30 sec B       MANUAL THERAPY: (30 minutes): Joint mobilization and Soft tissue mobilization was utilized and necessary because of the patient's restricted joint motion and restricted motion of soft tissue. (Used abbreviations: SF - Superficial Fascia; BC - Bony contours; MP - muscle play; IASTM - instrument-assisted soft tissue mobilization; MET - muscle energy technique; a/p - anterior to posterior; p/a - posterior to anterior; Proprioceptive neuromuscular Facilitation-PNF) Manual treatment to improve soft tissue/joint mobility deficits, tissue integrity issues, trigger points and/or pain. -Supine mobilization of right hamstring, lateral IT band, rectus femoris, adductors  -Patellar mobilization in all directions with mini-plunger  -Hip ER stretch insupine  -Left and right side lie pelvic mobilization for anterior elevation and posterior depression  -PNF pattern to facilitate core into anterior elevation and posterior depression with working through end range holds and isometric reversals with the pelvis and at long full leg function  -Core exercises     [facility] Portal  Treatment/Session Summary:    · Response to Treatment: Patient shows improvement today from last time with his pelvic control and neuromuscular function. His back pain and stenosis seem to still be affecting him the most with his mobility. He has difficulty with taking his first few steps and lacks the stability on the right side.   Discussed getting to the wellness arena for more walking if possible  · Communication/Consultation:  None today  · Equipment provided today:  None today  · Recommendations/Intent for next treatment session: Next visit will focus on Core training/balance.     Total Treatment Billable Duration:  40 minutes of treatment,  PT Patient Time In/Time Out  Time In: 6674  Time Out: 217 Lovers Stefano, PT    Future Appointments   Date Time Provider Nancy Eason   2/9/2021  2:00 PM Sylvia Badillo, PT [facility]   2/16/2021  2:00 PM Sylvia Badillo, PT [facility]

## 2021-02-09 ENCOUNTER — HOSPITAL ENCOUNTER (OUTPATIENT)
Dept: PHYSICAL THERAPY | Age: 76
Discharge: HOME | End: 2021-02-09
Payer: MEDICARE

## 2021-02-09 PROCEDURE — 97116 GAIT TRAINING THERAPY: CPT

## 2021-02-09 PROCEDURE — 97140 MANUAL THERAPY 1/> REGIONS: CPT

## 2021-02-09 NOTE — PROGRESS NOTES
Angeles Jackson  : 1945  [place]  Secondary: Bshsi Generic [facility]  at [facility]  [address] [place] [address]  [phone]   [phone]        OUTPATIENT PHYSICAL THERAPY: Daily Treatment Note 2021  Visit Count:  20    ICD-10: Treatment Diagnosis: Low back pain, M54.5  Difficulty walking, not elsewhere classified, R26.2  Pain in joint, right knee, M25.561  Precautions/Allergies: Other medication and Statins-hmg-coa reductase inhibitors   TREATMENT PLAN:  Effective Dates: 2020 TO 3/8/2021 (90 days). Frequency/Duration: 1 time a week for 12 weeks MEDICAL/REFERRING DIAGNOSIS:  Low back pain [M54.5]   DATE OF ONSET: Chronic  REFERRING PHYSICIAN: Sergio Lucio MD MD Orders: Evaluate and Treat  Return MD Appointment: not scheduled       Pre-treatment Symptoms/Complaints:  Patient reports that he did get a chance to go see Dr. Agustin Xiong and discussed the injection site before and his needs for L3-4 and got an injection there yesterday with some improvement. Pain: Initial: Pain Intensity 1: 4 /10 Post Session:  2/10   Medications Last Reviewed:  2021  Updated Objective Findings:  None Today  TREATMENT:     THERAPEUTIC EXERCISE: (0 minutes):  Exercises per grid below to improve mobility, strength and coordination. Required minimal visual, verbal, manual and tactile cues to promote proper body alignment, promote proper body posture, promote proper body mechanics and promote proper body breathing techniques. Progressed resistance and repetitions as indicated.    Date:  2021     Activity/Exercise Parameters   Core exercises Single knee push isometric in side lie x 5 min for facilitation  Pelvic tilt x 20  PNF core facilitation in sidelying (into post depression and anterior elevation)   Education on sit to stand (not today) X 10 with good weight shift   Stairs (not today) Working on pelvic anterior elevation on right side with knee drive and toe lift   Hip and pelvic hike Not today   Knee extension (home) In sitting with Green band x 30 on R LE   Weight shift  R LE planted with follow through and work on keeping right side long x 5 min   Balance (not today) Tandem stance 2 x 30 sec B       MANUAL THERAPY: (30 minutes): Joint mobilization and Soft tissue mobilization was utilized and necessary because of the patient's restricted joint motion and restricted motion of soft tissue. (Used abbreviations: SF - Superficial Fascia; BC - Bony contours; MP - muscle play; IASTM - instrument-assisted soft tissue mobilization; MET - muscle energy technique; a/p - anterior to posterior; p/a - posterior to anterior; Proprioceptive neuromuscular Facilitation-PNF) Manual treatment to improve soft tissue/joint mobility deficits, tissue integrity issues, trigger points and/or pain. -Supine mobilization of right hamstring, lateral IT band, rectus femoris, adductors  -Patellar mobilization in all directions with mini-plunger  -Hip ER stretch insupine  -Left and right side lie pelvic mobilization for anterior elevation and posterior depression  -PNF pattern to facilitate core into anterior elevation and posterior depression with working through end range holds and isometric reversals with the pelvis and at long full leg function  -Core exercises     GAIT TRAINING: (15 min) in hallway with work on use of single point cane for balance and support. Trying to focus on control and stability with push off through R LE and taking some pressure off midstance with the SPC. MedBridge Portal  Treatment/Session Summary:    · Response to Treatment: Patient shows improvement today from last time with his pelvic control and neuromuscular function. He had the injection this week that helped the pain, but the decreased function is still there.  We worked on specific gait training today with a [facility]  · Communication/Consultation:  None today  · Equipment provided today:  None today  · Recommendations/Intent for next treatment session: Next visit will focus on Core training/balance.     Total Treatment Billable Duration:  45 minutes of treatment,  PT Patient Time In/Time Out  Time In: 1400  Time Out: Romeo 65, PT    Future Appointments   Date Time Provider Nancy Eason   2/16/2021  2:00 PM Louetta Bosworth, PT [facility]

## 2021-02-16 ENCOUNTER — HOSPITAL ENCOUNTER (OUTPATIENT)
Dept: PHYSICAL THERAPY | Age: 76
Discharge: HOME | End: 2021-02-16
Payer: MEDICARE

## 2021-02-16 PROCEDURE — 97140 MANUAL THERAPY 1/> REGIONS: CPT

## 2021-02-23 ENCOUNTER — HOSPITAL ENCOUNTER (OUTPATIENT)
Dept: PHYSICAL THERAPY | Age: 76
Discharge: HOME | End: 2021-02-23
Payer: MEDICARE

## 2021-02-23 PROCEDURE — 97140 MANUAL THERAPY 1/> REGIONS: CPT

## 2021-02-23 NOTE — PROGRESS NOTES
Micah Garcia  : 1945  [place]  Secondary: Bshsi Generic [address] at [facility]  [address] [place] [address]  [phone]   [phone]        OUTPATIENT PHYSICAL THERAPY: Daily Treatment Note 2021  Visit Count:  22    ICD-10: Treatment Diagnosis: Low back pain, M54.5  Difficulty walking, not elsewhere classified, R26.2  Pain in joint, right knee, M25.561  Precautions/Allergies: Other medication and Statins-hmg-coa reductase inhibitors   TREATMENT PLAN:  Effective Dates: 21 TO 21 (90 days). Frequency/Duration: 1 time a week for 12 weeks MEDICAL/REFERRING DIAGNOSIS:  Low back pain [M54.5]   DATE OF ONSET: Chronic  REFERRING PHYSICIAN: Danni Manzo MD MD Orders: Evaluate and Treat  Return MD Appointment: not scheduled       Pre-treatment Symptoms/Complaints:  Patient reports that he still has some low energy and difficulty getting out and walking more. He reports some depression about not being able to do what he use to do. He is now off of the BP patch he was getting medication through due to lethargic side affects and will consult with the doctor later this week about what to do for that. Pain: Initial: Pain Intensity 1: 10 Post Session:  2/10   Medications Last Reviewed:  2021  Updated Objective Findings:  See evaluation note from today  TREATMENT:     THERAPEUTIC EXERCISE: (0 minutes):  Exercises per grid below to improve mobility, strength and coordination. Required minimal visual, verbal, manual and tactile cues to promote proper body alignment, promote proper body posture, promote proper body mechanics and promote proper body breathing techniques. Progressed resistance and repetitions as indicated.    Date:  2021     Activity/Exercise Parameters   Core exercises Single knee push isometric in side lie x 5 min for facilitation  Pelvic tilt x 20  PNF core facilitation in sidelying (into post depression and anterior elevation)   Education on sit to stand (not today) X 10 with good weight shift   Stairs (not today) Working on pelvic anterior elevation on right side with knee drive and toe lift   Hip and pelvic hike Not today   Knee extension (home) In sitting with Green band x 30 on R LE   Weight shift  R LE planted with follow through and work on keeping right side long x 5 min   Balance (not today) Tandem stance 2 x 30 sec B       MANUAL THERAPY: (30 minutes): Joint mobilization and Soft tissue mobilization was utilized and necessary because of the patient's restricted joint motion and restricted motion of soft tissue. (Used abbreviations: SF - Superficial Fascia; BC - Bony contours; MP - muscle play; IASTM - instrument-assisted soft tissue mobilization; MET - muscle energy technique; a/p - anterior to posterior; p/a - posterior to anterior; Proprioceptive neuromuscular Facilitation-PNF) Manual treatment to improve soft tissue/joint mobility deficits, tissue integrity issues, trigger points and/or pain. -Supine mobilization of right hamstring, lateral IT band, rectus femoris, adductors  -Patellar mobilization in all directions with mini-plunger  -Hip ER stretch insupine  -Left and right side lie pelvic mobilization for anterior elevation and posterior depression  -PNF pattern to facilitate core into anterior elevation and posterior depression with working through end range holds and isometric reversals with the pelvis and at long full leg function  -Core exercises     GAIT TRAINING: (0 min) in hallway with work on use of single point cane for balance and support. Trying to focus on control and stability with push off through R LE and taking some pressure off midstance with the SPC. MedBridge Portal  Treatment/Session Summary:    · Response to Treatment: Patient shows improvement with core facilitation.   We discussed significantly today his need to get back to activity to help regulate his body.  We discussed that he would benefit from coming back into the gym to use some machines since walking does not seem to be easy for him at this time. He will continue to follow up once a week to get him back to more independent activity. · Communication/Consultation:  None today  · Equipment provided today:  None today  · Recommendations/Intent for next treatment session: Next visit will focus on Core training/balance.     Total Treatment Billable Duration:  30 minutes of treatment,  PT Patient Time In/Time Out  Time In: 0933  Time Out: Maqruis 12, PT    Future Appointments   Date Time Provider Nancy Eason   3/1/2021  2:00 PM Chip Mckenna, PT [facility]   3/9/2021  2:00 PM Chip Mckenna, PT [facility]   3/16/2021  2:00 PM Chip Mckenna, PT [facility]

## 2021-02-23 NOTE — THERAPY RECERTIFICATION
Andria Persaud : 1945 [place] Secondary: [facility] 
[address] [place] [address] [phone]   [phone] OUTPATIENT PHYSICAL THERAPY:Recertification [id number] ICD-10: Treatment Diagnosis: Low back pain, M54.5 Difficulty walking, not elsewhere classified, R26.2 Pain in joint, right knee, M25.561 Precautions/Allergies: Other medication and Statins-hmg-coa reductase inhibitors TREATMENT PLAN: 
Effective Dates: 21 TO 21 (90 days). Frequency/Duration: 1 time a week for 12 weeks MEDICAL/REFERRING DIAGNOSIS: 
Low back pain [M54.5] DATE OF ONSET: Chronic REFERRING PHYSICIAN: Claudio Saavedra MD MD Orders: Evaluate and Treat Return MD Appointment: not scheduled Andria Persaud has been seen for 22 visits from 20 to 2021 for low back, right knee and R LE. Patient has performed therapeutic exercises, activities, and had manual therapy to increased strength, ROM and function. Patient has also used modalities for pain control in order to increase function. Patient has shown an increase in function per the LEFS with scores of 44/80. He has had an injection in the lower back that helped some with his discomfort, but still shows limitations in mobility. He was taken off the BP that was a patch and is going to follow up with his doctor this week about that and how to control it without making him so lethargic. We discussed his need for exercise to improve his overall condition and health with his heart and the rest of his body. Walking has become more difficult and I encouraged him to start back to the gym safely with other equipment that may help him reach his goals. We will continue with one time a week due to patients lack of mobility and continue to encourage independent exercises.   Patient has progressed well toward their goals and will benefit from continuing skilled PT in order to address their impairments. Rohit Vega, PT, DPT, OCS, CFMT INITIAL ASSESSMENT:  Mr. Ya Desai presents with chronic R LE weakness and pain along with peripheral neuropathy affecting his safety with gait and function. He shows limitation in walking and unsafe gait function. He shows continued deconditioning in the LE's. He is a good candidate for skilled PT in order to address listed impairments affecting his function. Rohit Vega, PT, DPT, OCS, CFMT PROBLEM LIST (Impacting functional limitations): 1. Decreased Strength 2. Decreased ADL/Functional Activities 3. Decreased Transfer Abilities 4. Decreased Ambulation Ability/Technique 5. Decreased Balance 6. Increased Pain 7. Decreased Activity Tolerance 8. Decreased Flexibility/Joint Mobility INTERVENTIONS PLANNED: (Treatment may consist of any combination of the following) 1. Balance Exercise 2. Bed Mobility 3. Electrical Stimulation 4. Gait Training 5. Heat 6. Manual Therapy 7. Neuromuscular Re-education/Strengthening 8. Range of Motion (ROM) 9. Therapeutic Activites 10. Therapeutic Exercise/Strengthening GOALS: (Goals have been discussed and agreed upon with patient.) Short-Term Functional Goals: Time Frame: 4 weeks 1. Patient will report walking for greater than 10 minutes in order to return to functional mobility (MET) 2. Patient will show equal hamstring length in order to progress functional mobility (MET) 3. Patient will be independent in [facility] for basic core strength training (ongoing) Discharge Goals: Time Frame: 12 weeks 1. Patient will show a greater than 10 point increase on the LEFS in order to show an increase in function (ongoing) 2. Patient will report joining a pool and walking in the water greater than 2 times a week in order to progress function (ongoing) 3. Patient will be independent in balance exercises in order to decrease fall risk (ongoing) OUTCOME MEASURE:  
Tool Used: Lower Extremity Functional Scale (LEFS) Score:  Initial: 37/80 Most Recent: 44/80 (Date: 2/23/21 ) Interpretation of Score: 20 questions each scored on a 5 point scale with 0 representing \"extreme difficulty or unable to perform\" and 4 representing \"no difficulty\". The lower the score, the greater the functional disability. 80/80 represents no disability. Minimal detectable change is 9 points. MEDICAL NECESSITY:  
· Patient is expected to demonstrate progress in strength, range of motion, balance, coordination and functional technique to improve functionalmobility and safety with walking. · Patient demonstrates good rehab potential due to higher previous functional level. · Skilled intervention continues to be required due to decreased mobility and increased risk of falls. REASON FOR SERVICES/OTHER COMMENTS: 
· Patient continues to require skilled intervention due to decreased mobility. Total Duration: PT Patient Time In/Time Out Time In: 9022 Time Out: 1455 Rehabilitation Potential For Stated Goals: Excellent Regarding James Potocki's therapy, I certify that the treatment plan above will be carried out by a therapist or under their direction. Thank you for this referral, Mikhail Palacios, PT Referring Physician Signature: Denise North MD _______________________________ Date _____________ PAIN/SUBJECTIVE:  
Initial: Pain Intensity 1: 4 /10 Post Session:  2/10 HISTORY:  
History of Injury/Illness (Reason for Referral): 
Patient has had a chronic history of R LE weakness and pain since he first had his hip replacement about 5 years ago. He has since had issues with spinal stenosis, disc herniation, right knee OA and has found out recently that he has peripheral neuropathy in both feet that affect his balance. He had a fall back in February/March that was concerning for him due to the leg giving out. He also had a hospitalization due to abnormal heartbeat.   He has finally stabilized with the heart. He did have an injection in his lower back (Dr. Umu Gonzáles) in the summer due to increasing left LE weakness and pain that has helped a lot. He is not comfortable going to the gym like he use to due to [place] and has become deconditioned. He reports doing some things at home, but still just feels like he is starting from square one. His goals are to get back to exercises and daily life without difficulty and with less fear of falling. Past Medical History/Comorbidities:  
Mr. Pat Vivar  has a past medical history of Anxiety, Edema of both legs, Enlarged prostate, Gout, Hip pain, History of elevated glucose, History of seasonal allergies, Hypercholesterolemia, Hypertension, Keratoacanthoma, S/P total hip arthroplasty (4/6/2015), Skin neoplasm, and Spinal stenosis. He also has no past medical history of Aneurysm ([address] Arrhythmia, Arthritis, Asthma, Autoimmune disease ([address] CAD (coronary artery disease), Cancer ([address] Chronic kidney disease, Chronic obstructive pulmonary disease ([address] Chronic pain, Coagulation disorder ([address] Difficult intubation, GERD (gastroesophageal reflux disease), Heart failure ([address] Ill-defined condition, Liver disease, Malignant hyperthermia due to anesthesia, Morbid obesity ([address] Nausea & vomiting, Pseudocholinesterase deficiency, Psychiatric disorder, PUD (peptic ulcer disease), Seizures ([address] Stroke ([address] Thromboembolus ([address] Thyroid disease, Unspecified adverse effect of anesthesia, or Unspecified sleep apnea. Mr. Pat Vivar  has a past surgical history that includes hx tonsillectomy (as child) and hx wisdom teeth extraction (as teenager). Social History/Living Environment:  
   
Social History Socioeconomic History  Marital status:  Spouse name: Not on file  Number of children: Not on file  Years of education: Not on file  Highest education level: Not on file Occupational History  Not on file Social Needs  Financial resource strain: Not on file  Food insecurity Worry: Not on file Inability: Not on file  Transportation needs Medical: Not on file Non-medical: Not on file Tobacco Use  Smoking status: Never Smoker Substance and Sexual Activity  Alcohol use: No  
 Drug use: Not on file  Sexual activity: Not on file Lifestyle  Physical activity Days per week: Not on file Minutes per session: Not on file  Stress: Not on file Relationships  Social connections Talks on phone: Not on file Gets together: Not on file Attends Qu[employer] service: Not on file Active member of club or organization: Not on file Attends meetings of clubs or organizations: Not on file Relationship status: Not on file  Intimate partner violence Fear of current or ex partner: Not on file Emotionally abused: Not on file Physically abused: Not on file Forced sexual activity: Not on file Other Topics Concern  Not on file Social History Narrative  Not on file Prior Level of Function/Work/Activity: 
Independent, retired Dominant Side:  
      [facility] H2 Model Falls Risk Assessment Risk Factors: 
     (1)  Gender [Male] 
     (5)  History of Recent Falls [w/in 3 months] 
     (1)  Orthostatic drop in BP Ability to Rise from Chair: 
     (1)  Pushes up, successful in one attempt Falls Prevention Plan: No modifications necessary Total: (5 or greater = High Risk): 8  
©2010 [facility] [address] [place] [address] [place] Patent #[id number]. Federal Law prohibits the replication, distribution or use without written permission from [facility] Current Medications:   
  
Current Outpatient Medications:  
  ergocalciferol (ERGOCALCIFEROL) 1,250 mcg (50,000 unit) capsule, Take 50,000 Units by mouth every seven (7) days. , Disp: , Rfl:  
  furosemide (LASIX) 20 mg tablet, Take 20 mg by mouth daily. , Disp: , Rfl:  
 lisinopriL (PRINIVIL, ZESTRIL) 20 mg tablet, Take 20 mg by mouth daily. , Disp: , Rfl:  
  celecoxib (CELEBREX) 200 mg capsule, Take 200 mg by mouth daily. , Disp: , Rfl:  
  allopurinoL (ZYLOPRIM) 300 mg tablet, Take 300 mg by mouth daily. , Disp: , Rfl:  
  spironolactone (ALDACTONE) 25 mg tablet, Take 25 mg by mouth daily. , Disp: , Rfl:  
  busPIRone (BUSPAR) 7.5 mg tablet, Take 7.5 mg by mouth two (2) times a day., Disp: , Rfl:  
  hydrOXYzine HCL (ATARAX) 25 mg tablet, Take 25 mg by mouth two (2) times a day., Disp: , Rfl:  
  POTASSIUM CHLORIDE PO, Take 10 mEq by mouth three (3) days a week., Disp: , Rfl:  
  cloNIDine (CATAPRES) 0.3 mg/24 hr, 1 Patch by TransDERmal route every seven (7) days. , Disp: , Rfl:  
  prazosin (MINIPRESS) 1 mg capsule, Take 1 mg by mouth two (2) times a day. TAKE AM OF SURGERY WITH SMALL SIP OF WATER   Indications: enlarged prostate, Disp: , Rfl:  
  finasteride (PROSCAR) 5 mg tablet, Take 5 mg by mouth daily. TAKE AM OF SURGERY WITH SMALL SIP OF WATER   Indications: BENIGN PROSTATIC HYPERTROPHY, Disp: , Rfl:   
Date Last Reviewed:  2/23/2021 Number of Personal Factors/Comorbidities that affect the Plan of Care: 1-2: MODERATE COMPLEXITY EXAMINATION:  
Observation/Orthostatic Postural Assessment:   
      Patient shows increased obesity with more abdominal weight and increased lordosis. He shows increased small step length with short distances. He tends to show increased right pelvic rotation in standing and gait Palpation:   
      Tightness along right paraspinals and into low back and pelvis. He shows tightness in lateral right hip and leg. Some noted atrophy in left glut compared to right side. 
-Restricted and limited in hamstring mobility B with increased right restriction more than left 
-Overall right hip motion appears good for artificial ANUJ.  
-Right knee shows increased restrictions at patella and medial and lateral joint line with pain with testing due to arthritic condition 
-Increased swelling in B LE and ankles with compression socks worn ROM:   
      Patient shows some decreased hamstrings at 70 deg right and 75 deg left 
-Flexion in lumbar spine to 30% in standing with difficulty with balance Strength:   
      Hip flexion at 3+/5  On right and 4-/5 on left Right knee extension at 4/5 Left knee extension at 4+/5 Right knee flexion at 4-/5 Left at 4/5 Neurological Screen: 
      Sensation: Patient has peripheral neuropathy on the plantar surface of both feet Nerve conduction did not reveal specific radiculopathy Functional Mobility:  
      Gait/Ambulation:  Patient shows increased Trendelenburg pattern B with increased right pelvic rotation that maintains during gait with right LE lagging some with shortened stance phase B. Patient has increased knee hike due to neuropathy. Transfers:  Patient shows ability to sit to stand with some aid from hands Bed Mobility:  Patient has some difficulty with supine to sit with needs for some aid Balance:   
      Poor for single leg stance, tandem stance and turning Body Structures Involved: 1. Nerves 2. Thoracic Cage 3. Bones 4. Joints 5. Muscles 6. Ligaments Body Functions Affected: 1. Sensory/Pain 2. Neuromusculoskeletal 
3. Movement Related Activities and Participation Affected: 1. General Tasks and Demands 2. Mobility 3. Self Care 4. Domestic Life 5. Interpersonal Interactions and Relationships Number of elements (examined above) that affect the Plan of Care: 4+: HIGH COMPLEXITY CLINICAL PRESENTATION:  
Presentation: Stable and uncomplicated: LOW COMPLEXITY CLINICAL DECISION MAKING:  
Use of outcome tool(s) and clinical judgement create a POC that gives a: Clear prediction of patient's progress: LOW COMPLEXITY

## 2021-03-01 ENCOUNTER — HOSPITAL ENCOUNTER (OUTPATIENT)
Dept: PHYSICAL THERAPY | Age: 76
Discharge: HOME | End: 2021-03-01
Payer: MEDICARE

## 2021-03-01 NOTE — PROGRESS NOTES
Leila Current  : 1945  [place]  Secondary: Bshsi Generic [address]  at [address] [place] [address]  [phone]   [phone]          OUTPATIENT DAILY NOTE    NAME/AGE/GENDER: Leila Current is a 64 y.o. male. DATE: 3/1/2021    SUBJECTIVE:  Patient cancelled his appointment today due to feeling sick. Will plan to follow up on next scheduled visit.     Andrea Almodovar PT,

## 2021-03-09 ENCOUNTER — HOSPITAL ENCOUNTER (OUTPATIENT)
Dept: PHYSICAL THERAPY | Age: 76
Discharge: HOME | End: 2021-03-09
Payer: MEDICARE

## 2021-03-09 PROCEDURE — 97110 THERAPEUTIC EXERCISES: CPT

## 2021-03-09 PROCEDURE — 97140 MANUAL THERAPY 1/> REGIONS: CPT

## 2021-03-09 NOTE — PROGRESS NOTES
Mahsa Davison  : 1945  [place]  Secondary: Bshsi Generic [address] [facility]  at [facility]  [address] [place] [address]  [phone]   [phone]        OUTPATIENT PHYSICAL THERAPY: Daily Treatment Note 3/9/2021  Visit Count:  23    ICD-10: Treatment Diagnosis: Low back pain, M54.5  Difficulty walking, not elsewhere classified, R26.2  Pain in joint, right knee, M25.561  Precautions/Allergies: Other medication and Statins-hmg-coa reductase inhibitors   TREATMENT PLAN:  Effective Dates: 21 TO 21 (90 days). Frequency/Duration: 1 time a week for 12 weeks MEDICAL/REFERRING DIAGNOSIS:  Low back pain [M54.5]   DATE OF ONSET: Chronic  REFERRING PHYSICIAN: Yehuda Lozada MD MD Orders: Evaluate and Treat  Return MD Appointment: not scheduled       Pre-treatment Symptoms/Complaints:  Patient reports that he felt the affects of the vaccine and felt rough for a few days last week. He is still getting checked at Dr. Diego Romero for his sodium levels being low and goes back again this Friday. He still reports having some affects from coming off of the other blood pressure medication too. Pain: Initial: Pain Intensity 1: 4 10 Post Session:  2/10   Medications Last Reviewed:  3/9/2021  Updated Objective Findings:  Gait function still difficult with increased trendelenburg on right side with weakness and atrophy in right quad  TREATMENT:     THERAPEUTIC EXERCISE: (10 minutes):  Exercises per grid below to improve mobility, strength and coordination. Required minimal visual, verbal, manual and tactile cues to promote proper body alignment, promote proper body posture, promote proper body mechanics and promote proper body breathing techniques. Progressed resistance and repetitions as indicated.    Date:  3/9/21   Activity/Exercise Parameters   Core exercises Single knee push isometric in side lie x 2 min for facilitation  Pelvic tilt x 20  PNF core facilitation in sidelying (into post depression and anterior elevation)   Education on sit to stand (not today) X 10 with good weight shift   Stairs (not today) Working on pelvic anterior elevation on right side with knee drive and toe lift   Hip and pelvic hike Not today   Knee extension (home) Continued with blue band at home   Weight shift  R LE planted with follow through and work on keeping right side long x 1 min   Balance (not today) Tandem stance 2 x 30 sec B       MANUAL THERAPY: (30 minutes): Joint mobilization and Soft tissue mobilization was utilized and necessary because of the patient's restricted joint motion and restricted motion of soft tissue. (Used abbreviations: SF - Superficial Fascia; BC - Bony contours; MP - muscle play; IASTM - instrument-assisted soft tissue mobilization; MET - muscle energy technique; a/p - anterior to posterior; p/a - posterior to anterior; Proprioceptive neuromuscular Facilitation-PNF) Manual treatment to improve soft tissue/joint mobility deficits, tissue integrity issues, trigger points and/or pain. -Supine mobilization of right hamstring, lateral IT band, rectus femoris, adductors  -Hip ER stretch insupine  -Left and right side lie pelvic mobilization for anterior elevation and posterior depression  -PNF pattern to facilitate core into anterior elevation and posterior depression with working through end range holds and isometric reversals with the pelvis and at long full leg function  -Core exercises     GAIT TRAINING: (0 min) in hallway with work on use of single point cane for balance and support. Trying to focus on control and stability with push off through R LE and taking some pressure off midstance with the SPC. [employer] Portal  Treatment/Session Summary:    · Response to Treatment: Patient shows improvement with core facilitation in side lie, but still lacks weight bearing ability to push through the R LE.   He will continue to follow up once a week to get him back to more independent activity. · Communication/Consultation:  None today  · Equipment provided today:  None today  · Recommendations/Intent for next treatment session: Next visit will focus on Core training/balance.     Total Treatment Billable Duration:  40 minutes of treatment,  PT Patient Time In/Time Out  Time In: 1402  Time Out: Romeo 65, PT    Future Appointments   Date Time Provider Nancy Eason   3/16/2021  2:00 PM Jeanne Gonzalez, PT [facility]   3/23/2021  2:00 PM Jeanne Gonzalez, PT [facility]   3/30/2021  2:00 PM Jeanne Gonzalez, PT [facility]

## 2021-03-16 ENCOUNTER — HOSPITAL ENCOUNTER (OUTPATIENT)
Dept: PHYSICAL THERAPY | Age: 76
Discharge: HOME | End: 2021-03-16
Payer: MEDICARE

## 2021-03-16 NOTE — PROGRESS NOTES
Maribel Seen  : 1945  [place]  Secondary: Bshsi Generic [address] [facility]  at [address] [place] [address]  [phone]   [phone]          OUTPATIENT DAILY NOTE    NAME/AGE/GENDER: Maribel Seen is a 64 y.o. male. DATE: 3/16/2021    SUBJECTIVE:  Patient cancelled his appointment today due to not feeling well. Will plan to follow up on next scheduled visit.     Rosi Gama, PT,

## 2021-03-23 ENCOUNTER — HOSPITAL ENCOUNTER (OUTPATIENT)
Dept: PHYSICAL THERAPY | Age: 76
Discharge: HOME | End: 2021-03-23
Payer: MEDICARE

## 2021-03-23 PROCEDURE — 97140 MANUAL THERAPY 1/> REGIONS: CPT

## 2021-03-23 NOTE — PROGRESS NOTES
Aime Acharyatead  : 1945  [place]  Secondary: Bshsi Generic [address] [facility]  at [facility]  [address] [place] [address]  [phone]   [phone]        OUTPATIENT PHYSICAL THERAPY: Daily Treatment Note 3/23/2021  Visit Count:  24    ICD-10: Treatment Diagnosis: Low back pain, M54.5  Difficulty walking, not elsewhere classified, R26.2  Pain in joint, right knee, M25.561  Precautions/Allergies: Other medication and Statins-hmg-coa reductase inhibitors   TREATMENT PLAN:  Effective Dates: 21 TO 21 (90 days). Frequency/Duration: 1 time a week for 12 weeks MEDICAL/REFERRING DIAGNOSIS:  Low back pain [M54.5]   DATE OF ONSET: Chronic  REFERRING PHYSICIAN: Ann Marie Walter MD MD Orders: Evaluate and Treat  Return MD Appointment: not scheduled       Pre-treatment Symptoms/Complaints:  Patient reports that he had to cancel last week due to electrolyte imbalance and is getting better with it through his doctors management. Pain: Initial: Pain Intensity 1: 3 /10 Post Session:  2/10   Medications Last Reviewed:  3/23/2021  Updated Objective Findings:  Gait function still difficult with increased trendelenburg on right side with weakness and atrophy in right quad  TREATMENT:     THERAPEUTIC EXERCISE: (0 minutes):  Exercises per grid below to improve mobility, strength and coordination. Required minimal visual, verbal, manual and tactile cues to promote proper body alignment, promote proper body posture, promote proper body mechanics and promote proper body breathing techniques. Progressed resistance and repetitions as indicated.    Date:  3/9/21   Activity/Exercise Parameters   Core exercises Single knee push isometric in side lie x 2 min for facilitation  Pelvic tilt x 20  PNF core facilitation in sidelying (into post depression and anterior elevation)   Education on sit to stand (not today) X 10 with good weight shift   Stairs (not today) Working on pelvic anterior elevation on right side with knee drive and toe lift   Hip and pelvic hike Not today   Knee extension (home) Continued with blue band at home   Weight shift  R LE planted with follow through and work on keeping right side long x 1 min   Balance (not today) Tandem stance 2 x 30 sec B       MANUAL THERAPY: (40 minutes): Joint mobilization and Soft tissue mobilization was utilized and necessary because of the patient's restricted joint motion and restricted motion of soft tissue. (Used abbreviations: SF - Superficial Fascia; BC - Bony contours; MP - muscle play; IASTM - instrument-assisted soft tissue mobilization; MET - muscle energy technique; a/p - anterior to posterior; p/a - posterior to anterior; Proprioceptive neuromuscular Facilitation-PNF) Manual treatment to improve soft tissue/joint mobility deficits, tissue integrity issues, trigger points and/or pain. -Supine mobilization of right hamstring, lateral IT band, rectus femoris, adductors  -Hip ER stretch insupine  -Left and right side lie pelvic mobilization for anterior elevation and posterior depression  -PNF pattern to facilitate core into anterior elevation and posterior depression with working through end range holds and isometric reversals with the pelvis and at long full leg function  -Core exercises     GAIT TRAINING: (0 min) in hallway with work on use of single point cane for balance and support. Trying to focus on control and stability with push off through R LE and taking some pressure off midstance with the [facility]. [employer] Portal  Treatment/Session Summary:    · Response to Treatment: Patient shows improvement with core facilitation in side lie, but still lacks weight bearing ability to push through the R LE.   He reports he is getting his second vaccine this week and will hopefully return to the gym soon  · Communication/Consultation:  None today  · Equipment provided today:  None today  · Recommendations/Intent for next treatment session: Next visit will focus on Core training/balance.     Total Treatment Billable Duration:  40 minutes of treatment,  PT Patient Time In/Time Out  Time In: 1095  Time Out: [address], PT    Future Appointments   Date Time Provider Nancy Eason   3/30/2021  2:00 PM Amalia Roth, PT [facility]

## 2021-03-30 ENCOUNTER — HOSPITAL ENCOUNTER (OUTPATIENT)
Dept: PHYSICAL THERAPY | Age: 76
Discharge: HOME | End: 2021-03-30
Payer: MEDICARE

## 2021-03-30 PROCEDURE — 97140 MANUAL THERAPY 1/> REGIONS: CPT

## 2021-03-30 NOTE — PROGRESS NOTES
El Mode  : 1945  [place]  Secondary: Bshsi Generic [address] [facility]  at [facility]  [address] [place] [address]  [phone]   [phone]        OUTPATIENT PHYSICAL THERAPY: Daily Treatment Note 3/30/2021  Visit Count:  25    ICD-10: Treatment Diagnosis: Low back pain, M54.5  Difficulty walking, not elsewhere classified, R26.2  Pain in joint, right knee, M25.561  Precautions/Allergies: Other medication and Statins-hmg-coa reductase inhibitors   TREATMENT PLAN:  Effective Dates: 21 TO 21 (90 days). Frequency/Duration: 1 time a week for 12 weeks MEDICAL/REFERRING DIAGNOSIS:  Low back pain [M54.5]   DATE OF ONSET: Chronic  REFERRING PHYSICIAN: Franklyn Richardson MD MD Orders: Evaluate and Treat  Return MD Appointment: not scheduled       Pre-treatment Symptoms/Complaints:  Patient reports that he has been doing what he can, and knows that he needs to get back in the gym  Pain: Initial: Pain Intensity 1: 3 /10 Post Session:  2/10   Medications Last Reviewed:  3/30/2021  Updated Objective Findings:  Gait function still difficult with increased trendelenburg on right side with weakness and atrophy in right quad  TREATMENT:     THERAPEUTIC EXERCISE: (0 minutes):  Exercises per grid below to improve mobility, strength and coordination. Required minimal visual, verbal, manual and tactile cues to promote proper body alignment, promote proper body posture, promote proper body mechanics and promote proper body breathing techniques. Progressed resistance and repetitions as indicated.    Date:  3/9/21   Activity/Exercise Parameters   Core exercises Single knee push isometric in side lie x 2 min for facilitation  Pelvic tilt x 20  PNF core facilitation in sidelying (into post depression and anterior elevation)   Education on sit to stand (not today) X 10 with good weight shift   Stairs (not today) Working on pelvic anterior elevation on right side with knee drive and toe lift   Hip and pelvic hike Not today   Knee extension (home) Continued with blue band at home   Weight shift  R LE planted with follow through and work on keeping right side long x 1 min   Balance (not today) Tandem stance 2 x 30 sec B       MANUAL THERAPY: (30 minutes): Joint mobilization and Soft tissue mobilization was utilized and necessary because of the patient's restricted joint motion and restricted motion of soft tissue. (Used abbreviations: SF - Superficial Fascia; BC - Bony contours; MP - muscle play; IASTM - instrument-assisted soft tissue mobilization; MET - muscle energy technique; a/p - anterior to posterior; p/a - posterior to anterior; Proprioceptive neuromuscular Facilitation-PNF) Manual treatment to improve soft tissue/joint mobility deficits, tissue integrity issues, trigger points and/or pain. -Supine mobilization of right hamstring, lateral IT band, rectus femoris, adductors  -Hip ER stretch insupine  -Left and right side lie pelvic mobilization for anterior elevation and posterior depression  -PNF pattern to facilitate core into anterior elevation and posterior depression with working through end range holds and isometric reversals with the pelvis and at long full leg function  -Core exercises     GAIT TRAINING: (0 min) in hallway with work on use of single point cane for balance and support. Trying to focus on control and stability with push off through R LE and taking some pressure off midstance with the SPC.     [employer] Portal  Treatment/Session Summary:    · Response to Treatment: Patient shows improvement with core facilitation in side lie, but still lacks weight bearing ability to push through the R LE. Very limited in step length and functional push off at toe off  · Communication/Consultation:  None today  · Equipment provided today:  None today  · Recommendations/Intent for next treatment session: Next visit will focus on Core training/balance.     Total Treatment Billable Duration:  40 minutes of treatment,  PT Patient Time In/Time Out  Time In: 3744  Time Out: Romeo 65, PT    Future Appointments   Date Time Provider Nancy Eason   4/7/2021  1:00 PM Briseida Ryan, PT [facility]   4/13/2021  2:00 PM Briseida Ryan, PT [facility]   4/20/2021  2:00 PM Briseida Ryan, PT [facility]   4/27/2021  2:00 PM Briseida Ryan, PT [facility]

## 2021-04-07 ENCOUNTER — HOSPITAL ENCOUNTER (OUTPATIENT)
Dept: PHYSICAL THERAPY | Age: 76
Discharge: HOME | End: 2021-04-07
Payer: MEDICARE

## 2021-04-07 PROCEDURE — 97140 MANUAL THERAPY 1/> REGIONS: CPT

## 2021-04-07 NOTE — PROGRESS NOTES
Cal Portillo  : 1945  [place]  Secondary: Bshsi Generic [address] [facility]  at [facility]  [address] [place] [address]  [phone]   [phone]        OUTPATIENT PHYSICAL THERAPY: Daily Treatment Note 2021  Visit Count:  26    ICD-10: Treatment Diagnosis: Low back pain, M54.5  Difficulty walking, not elsewhere classified, R26.2  Pain in joint, right knee, M25.561  Precautions/Allergies: Other medication and Statins-hmg-coa reductase inhibitors   TREATMENT PLAN:  Effective Dates: 21 TO 21 (90 days). Frequency/Duration: 1 time a week for 12 weeks MEDICAL/REFERRING DIAGNOSIS:  Low back pain [M54.5]   DATE OF ONSET: Chronic  REFERRING PHYSICIAN: Reyes Gregg MD MD Orders: Evaluate and Treat  Return MD Appointment: not scheduled       Pre-treatment Symptoms/Complaints:  Patient reports that he has been doing what he can, and knows that he needs to get back in the gym  Pain: Initial: Pain Intensity 1: 4 /10 Post Session:  2/10   Medications Last Reviewed:  2021  Updated Objective Findings:  Gait function still difficult with increased trendelenburg on right side with weakness and atrophy in right quad  TREATMENT:     THERAPEUTIC EXERCISE: (0 minutes):  Exercises per grid below to improve mobility, strength and coordination. Required minimal visual, verbal, manual and tactile cues to promote proper body alignment, promote proper body posture, promote proper body mechanics and promote proper body breathing techniques. Progressed resistance and repetitions as indicated.    Date:  3/9/21   Activity/Exercise Parameters   Core exercises Single knee push isometric in side lie x 2 min for facilitation  Pelvic tilt x 20  PNF core facilitation in sidelying (into post depression and anterior elevation)   Education on sit to stand (not today) X 10 with good weight shift   Stairs (not today) Working on pelvic anterior elevation on right side with knee drive and toe lift   Hip and pelvic hike Not today   Knee extension (home) Continued with blue band at home   Weight shift  R LE planted with follow through and work on keeping right side long x 1 min   Balance (not today) Tandem stance 2 x 30 sec B       MANUAL THERAPY: (40 minutes): Joint mobilization and Soft tissue mobilization was utilized and necessary because of the patient's restricted joint motion and restricted motion of soft tissue. (Used abbreviations: SF - Superficial Fascia; BC - Bony contours; MP - muscle play; IASTM - instrument-assisted soft tissue mobilization; MET - muscle energy technique; a/p - anterior to posterior; p/a - posterior to anterior; Proprioceptive neuromuscular Facilitation-PNF) Manual treatment to improve soft tissue/joint mobility deficits, tissue integrity issues, trigger points and/or pain. -Supine mobilization of right hamstring, lateral IT band, rectus femoris, adductors  -Hip ER stretch insupine  -Left and right side lie pelvic mobilization for anterior elevation and posterior depression  -PNF pattern to facilitate core into anterior elevation and posterior depression with working through end range holds and isometric reversals with the pelvis and at long full leg function  -Functional resistance through posterior depression and then hip abduction facilitation. GAIT TRAINING: (0 min) in hallway with work on use of single point cane for balance and support. Trying to focus on control and stability with push off through R LE and taking some pressure off midstance with the SPC. [employer] Portal  Treatment/Session Summary:    · Response to Treatment: Patient shows improvement with core facilitation. Starting back working on hip abduction. · Communication/Consultation:  None today  · Equipment provided today:  None today  · Recommendations/Intent for next treatment session: Next visit will focus on Core training/balance.     Total Treatment Billable Duration:  40 minutes of treatment,  PT Patient Time In/Time Out  Time In: 1300  Time Out: [address] [facility], PT    Future Appointments   Date Time Provider Nancy Eason   4/13/2021  2:00 PM Santos Maldonado, PT [facility]   4/20/2021  2:00 PM Santos Maldonado, PT [facility]   4/27/2021  2:00 PM Santos Maldonado, PT [facility]

## 2021-04-13 ENCOUNTER — HOSPITAL ENCOUNTER (OUTPATIENT)
Dept: PHYSICAL THERAPY | Age: 76
Discharge: HOME | End: 2021-04-13
Payer: MEDICARE

## 2021-04-13 PROCEDURE — 97110 THERAPEUTIC EXERCISES: CPT

## 2021-04-13 PROCEDURE — 97140 MANUAL THERAPY 1/> REGIONS: CPT

## 2021-04-13 NOTE — PROGRESS NOTES
Sagar Garcia  : 1945  [place]  Secondary: Bshsi Generic [address] [facility]  at [facility]  [address] [place] [address]  [phone]   [phone]        OUTPATIENT PHYSICAL THERAPY: Daily Treatment Note 2021  Visit Count:  27    ICD-10: Treatment Diagnosis: Low back pain, M54.5  Difficulty walking, not elsewhere classified, R26.2  Pain in joint, right knee, M25.561  Precautions/Allergies: Other medication and Statins-hmg-coa reductase inhibitors   TREATMENT PLAN:  Effective Dates: 21 TO 21 (90 days). Frequency/Duration: 1 time a week for 12 weeks MEDICAL/REFERRING DIAGNOSIS:  Low back pain [M54.5]   DATE OF ONSET: Chronic  REFERRING PHYSICIAN: Helene Vines MD MD Orders: Evaluate and Treat  Return MD Appointment: not scheduled       Pre-treatment Symptoms/Complaints:  Patient reports that he had a re-visit with the doctor yesterday and getting a little closer to my electrolyte imbalance control. But still has not gotten into the gym yet  Pain: Initial: Pain Intensity 1: 4 /10 Post Session:  2/10   Medications Last Reviewed:  2021  Updated Objective Findings:  Gait function still difficult with increased trendelenburg on right side with weakness and atrophy in right quad  TREATMENT:     THERAPEUTIC EXERCISE: (10 minutes):  Exercises per grid below to improve mobility, strength and coordination. Required minimal visual, verbal, manual and tactile cues to promote proper body alignment, promote proper body posture, promote proper body mechanics and promote proper body breathing techniques. Progressed resistance and repetitions as indicated.    Date:  21   Activity/Exercise Parameters   Core exercises Single knee push isometric in side lie x 2 min for facilitation  Pelvic tilt x 20  PNF core facilitation in sidelying (into post depression and anterior elevation)   Education on sit to stand (not today) X 10 with good weight shift   Stairs (not today) Working on pelvic anterior elevation on right side with knee drive and toe lift   Hip and pelvic hike Not today   Knee extension (home) Continued with blue band at home   Weight shift  R LE planted with follow through and work on keeping right side long x 1 min   Balance (not today) Tandem stance 2 x 30 sec B       MANUAL THERAPY: (35 minutes): Joint mobilization and Soft tissue mobilization was utilized and necessary because of the patient's restricted joint motion and restricted motion of soft tissue. (Used abbreviations: SF - Superficial Fascia; BC - Bony contours; MP - muscle play; IASTM - instrument-assisted soft tissue mobilization; MET - muscle energy technique; a/p - anterior to posterior; p/a - posterior to anterior; Proprioceptive neuromuscular Facilitation-PNF) Manual treatment to improve soft tissue/joint mobility deficits, tissue integrity issues, trigger points and/or pain. -Supine mobilization of right hamstring, lateral IT band, rectus femoris, adductors  -Hip ER stretch insupine  -Left and right side lie pelvic mobilization for anterior elevation and posterior depression  -PNF pattern to facilitate core into anterior elevation and posterior depression with working through end range holds and isometric reversals with the pelvis and at long full leg function  -Functional resistance through posterior depression and then hip abduction facilitation. GAIT TRAINING: (0 min) in hallway with work on use of single point cane for balance and support. Trying to focus on control and stability with push off through R LE and taking some pressure off midstance with the SPC. [employer] Portal  Treatment/Session Summary:    · Response to Treatment: Patient shows improvement with core facilitation. Continue to work R LE strength as tolerated. His goal is to get into the gym at least once by next appointment.   · Communication/Consultation:  None today  · Equipment provided today:  None today  · Recommendations/Intent for next treatment session: Next visit will focus on Core training/balance.     Total Treatment Billable Duration:  40 minutes of treatment,  PT Patient Time In/Time Out  Time In: 1410  Time Out: Romeo 65, PT    Future Appointments   Date Time Provider Nancy Eason   4/20/2021  2:00 PM Ani Khan, PT [facility]   4/27/2021  2:00 PM Ani Khan, PT [facility]

## 2021-04-20 ENCOUNTER — HOSPITAL ENCOUNTER (OUTPATIENT)
Dept: PHYSICAL THERAPY | Age: 76
Discharge: HOME | End: 2021-04-20
Payer: MEDICARE

## 2021-04-20 PROCEDURE — 97140 MANUAL THERAPY 1/> REGIONS: CPT

## 2021-04-20 PROCEDURE — 97110 THERAPEUTIC EXERCISES: CPT

## 2021-04-20 NOTE — PROGRESS NOTES
Manuel Noyola  : 1945  [place]  Secondary: Bshsi Generic [address] [facility]  at [facility]  [address] [place] [address]  [phone]   [phone]        OUTPATIENT PHYSICAL THERAPY: Daily Treatment Note 2021  Visit Count:  28    ICD-10: Treatment Diagnosis: Low back pain, M54.5  Difficulty walking, not elsewhere classified, R26.2  Pain in joint, right knee, M25.561  Precautions/Allergies: Other medication and Statins-hmg-coa reductase inhibitors   TREATMENT PLAN:  Effective Dates: 21 TO 21 (90 days). Frequency/Duration: 1 time a week for 12 weeks MEDICAL/REFERRING DIAGNOSIS:  Low back pain [M54.5]   DATE OF ONSET: Chronic  REFERRING PHYSICIAN: Patrica Peck MD MD Orders: Evaluate and Treat  Return MD Appointment: not scheduled       Pre-treatment Symptoms/Complaints:  Patient reports that he was finally able to get into the gym two times this past week and did 20 min on the bicycle  Pain: Initial: Pain Intensity 1: 4 /10 Post Session:  2/10   Medications Last Reviewed:  2021  Updated Objective Findings:  Gait function still difficult with increased trendelenburg on right side with weakness and atrophy in right quad  TREATMENT:     THERAPEUTIC EXERCISE: (10 minutes):  Exercises per grid below to improve mobility, strength and coordination. Required minimal visual, verbal, manual and tactile cues to promote proper body alignment, promote proper body posture, promote proper body mechanics and promote proper body breathing techniques. Progressed resistance and repetitions as indicated.    Date:  21   Activity/Exercise Parameters   Core exercises Single knee push isometric in side lie x 2 min for facilitation  Pelvic tilt x 20  PNF core facilitation in sidelying (into post depression and anterior elevation)   Education on sit to stand (not today) X 10 with good weight shift   Stairs (not today) Working on pelvic anterior elevation on right side with knee drive and toe lift   Hip and pelvic hike Not today   Knee extension (home) Continued with blue band at home   Weight shift  R LE planted with follow through and work on keeping right side long x 1 min   Balance (not today) Tandem stance 2 x 30 sec B   Sit to stand 2 x 5       MANUAL THERAPY: (35 minutes): Joint mobilization and Soft tissue mobilization was utilized and necessary because of the patient's restricted joint motion and restricted motion of soft tissue. (Used abbreviations: SF - Superficial Fascia; BC - Bony contours; MP - muscle play; IASTM - instrument-assisted soft tissue mobilization; MET - muscle energy technique; a/p - anterior to posterior; p/a - posterior to anterior; Proprioceptive neuromuscular Facilitation-PNF) Manual treatment to improve soft tissue/joint mobility deficits, tissue integrity issues, trigger points and/or pain. -Supine mobilization of right hamstring, lateral IT band, rectus femoris, adductors  -Hip ER stretch insupine  -Left and right side lie pelvic mobilization for anterior elevation and posterior depression  -PNF pattern to facilitate core into anterior elevation and posterior depression with working through end range holds and isometric reversals with the pelvis and at long full leg function  -Functional resistance through posterior depression and then hip abduction facilitation. GAIT TRAINING: (0 min) in hallway with work on use of single point cane for balance and support. Trying to focus on control and stability with push off through R LE and taking some pressure off midstance with the SPC. [employer] Portal  Treatment/Session Summary:    · Response to Treatment: Patient shows some improvement with sit to stand today from a raised height.  Discussed going back to the gym two times   · Communication/Consultation:  None today  · Equipment provided today:  None today  · Recommendations/Intent for next treatment session: Next visit will focus on Core training/balance.     Total Treatment Billable Duration:  45 minutes of treatment,  PT Patient Time In/Time Out  Time In: Charlie Sawyer  Time Out: 101 Ravindra J, PT    Future Appointments   Date Time Provider Nancy Eason   4/27/2021  2:00 PM Susy Osuna, PT [facility]

## 2021-04-27 ENCOUNTER — HOSPITAL ENCOUNTER (OUTPATIENT)
Dept: PHYSICAL THERAPY | Age: 76
Discharge: HOME | End: 2021-04-27
Payer: MEDICARE

## 2021-04-27 PROCEDURE — 97140 MANUAL THERAPY 1/> REGIONS: CPT

## 2021-04-27 NOTE — PROGRESS NOTES
Gladis Kwon  : 1945  [place]  Secondary: Bshsi Generic [address] [facility]  at [facility]  [address] [place] [address]  [phone]   [phone]        OUTPATIENT PHYSICAL THERAPY: Daily Treatment Note 2021  Visit Count:  29    ICD-10: Treatment Diagnosis: Low back pain, M54.5  Difficulty walking, not elsewhere classified, R26.2  Pain in joint, right knee, M25.561  Precautions/Allergies: Other medication and Statins-hmg-coa reductase inhibitors   TREATMENT PLAN:  Effective Dates: 21 TO 21 (90 days). Frequency/Duration: 1 time a week for 12 weeks MEDICAL/REFERRING DIAGNOSIS:  Low back pain [M54.5]   DATE OF ONSET: Chronic  REFERRING PHYSICIAN: Henrry Freitas MD MD Orders: Evaluate and Treat  Return MD Appointment: not scheduled       Pre-treatment Symptoms/Complaints:  Patient reports that he was able to get to the gym two times last week and felt tired   Pain: Initial: Pain Intensity 1: 4 /10 Post Session:  2/10   Medications Last Reviewed:  2021  Updated Objective Findings:  Gait function still difficult with increased trendelenburg on right side with weakness and atrophy in right quad  TREATMENT:     THERAPEUTIC EXERCISE: (0 minutes):  Exercises per grid below to improve mobility, strength and coordination. Required minimal visual, verbal, manual and tactile cues to promote proper body alignment, promote proper body posture, promote proper body mechanics and promote proper body breathing techniques. Progressed resistance and repetitions as indicated.    Date:  21   Activity/Exercise Parameters   Core exercises Single knee push isometric in side lie x 2 min for facilitation  Pelvic tilt x 20  PNF core facilitation in sidelying (into post depression and anterior elevation)   Education on sit to stand (not today) X 10 with good weight shift   Stairs (not today) Working on pelvic anterior elevation on right side with knee drive and toe lift   Hip and pelvic hike Not today   Knee extension (home) Continued with blue band at home   Weight shift  R LE planted with follow through and work on keeping right side long x 1 min   Balance (not today) Tandem stance 2 x 30 sec B   Sit to stand 2 x 5       MANUAL THERAPY: (35 minutes): Joint mobilization and Soft tissue mobilization was utilized and necessary because of the patient's restricted joint motion and restricted motion of soft tissue. (Used abbreviations: SF - Superficial Fascia; BC - Bony contours; MP - muscle play; IASTM - instrument-assisted soft tissue mobilization; MET - muscle energy technique; a/p - anterior to posterior; p/a - posterior to anterior; Proprioceptive neuromuscular Facilitation-PNF) Manual treatment to improve soft tissue/joint mobility deficits, tissue integrity issues, trigger points and/or pain. -Supine mobilization of right hamstring, lateral IT band, rectus femoris, adductors  -Hip ER stretch insupine  -Left and right side lie pelvic mobilization for anterior elevation and posterior depression  -PNF pattern to facilitate core into anterior elevation and posterior depression with working through end range holds and isometric reversals with the pelvis and at long full leg function  -Functional resistance through posterior depression and then hip abduction facilitation. GAIT TRAINING: (0 min) in hallway with work on use of single point cane for balance and support. Trying to focus on control and stability with push off through R LE and taking some pressure off midstance with the [facility] [employer] Portal  Treatment/Session Summary:    · Response to Treatment: Patient shows some improvement in mobility when he goes to the gym. He has some increased LE edema today.   · Communication/Consultation:  None today  · Equipment provided today:  None today  · Recommendations/Intent for next treatment session: Next visit will focus on Core training/balance.     Total Treatment Billable Duration:  35 minutes of treatment,  PT Patient Time In/Time Out  Time In: 6332  Time Out: Romeo 65, PT    Future Appointments   Date Time Provider Nancy Eason   5/4/2021  2:00 PM Prudence Lopez, PT [facility]   5/11/2021  2:00 PM Prudence Lopez, PT [facility]   5/18/2021  2:00 PM Prudence Lopez, PT [facility]   5/25/2021  2:00 PM Prudence Lopez, PT [facility]

## 2021-05-04 ENCOUNTER — HOSPITAL ENCOUNTER (OUTPATIENT)
Dept: PHYSICAL THERAPY | Age: 76
Discharge: HOME | End: 2021-05-04
Payer: MEDICARE

## 2021-05-04 PROCEDURE — 97140 MANUAL THERAPY 1/> REGIONS: CPT

## 2021-05-04 NOTE — PROGRESS NOTES
Lea Campos  : 1945  [place]  Secondary: Bshsi Generic [address] at [facility]  [address] [place] [address]  [phone]   [phone]        OUTPATIENT PHYSICAL THERAPY: Daily Treatment Note 2021  Visit Count:  30    ICD-10: Treatment Diagnosis: Low back pain, M54.5  Difficulty walking, not elsewhere classified, R26.2  Pain in joint, right knee, M25.561  Precautions/Allergies: Other medication and Statins-hmg-coa reductase inhibitors   TREATMENT PLAN:  Effective Dates: 21 TO 21 (90 days). Frequency/Duration: 1 time a week for 12 weeks MEDICAL/REFERRING DIAGNOSIS:  Low back pain [M54.5]   DATE OF ONSET: Chronic  REFERRING PHYSICIAN: Everardo Hercules MD MD Orders: Evaluate and Treat  Return MD Appointment: not scheduled       Pre-treatment Symptoms/Complaints:  Patient reports that he was able to get to the gym three times this past week with work on the bicycle and a machine or two  Pain: Initial: Pain Intensity 1: 4 /10 Post Session:  2/10   Medications Last Reviewed:  2021  Updated Objective Findings:  Gait function still difficult with increased trendelenburg on right side with weakness and atrophy in right quad  TREATMENT:     THERAPEUTIC EXERCISE: (0 minutes):  Exercises per grid below to improve mobility, strength and coordination. Required minimal visual, verbal, manual and tactile cues to promote proper body alignment, promote proper body posture, promote proper body mechanics and promote proper body breathing techniques. Progressed resistance and repetitions as indicated.    Date:  21   Activity/Exercise Parameters   Core exercises Single knee push isometric in side lie x 2 min for facilitation  Pelvic tilt x 20  PNF core facilitation in sidelying (into post depression and anterior elevation)   Education on sit to stand (not today) X 10 with good weight shift   Stairs (not today) Working on pelvic anterior elevation on right side with knee drive and toe lift   Hip and pelvic hike Not today   Knee extension (home) Continued with blue band at home   Weight shift  R LE planted with follow through and work on keeping right side long x 1 min   Balance (not today) Tandem stance 2 x 30 sec B   Sit to stand 2 x 5       MANUAL THERAPY: (35 minutes): Joint mobilization and Soft tissue mobilization was utilized and necessary because of the patient's restricted joint motion and restricted motion of soft tissue. (Used abbreviations: SF - Superficial Fascia; BC - Bony contours; MP - muscle play; IASTM - instrument-assisted soft tissue mobilization; MET - muscle energy technique; a/p - anterior to posterior; p/a - posterior to anterior; Proprioceptive neuromuscular Facilitation-PNF) Manual treatment to improve soft tissue/joint mobility deficits, tissue integrity issues, trigger points and/or pain. -Supine mobilization of right hamstring, lateral IT band, rectus femoris, adductors  -Hip ER stretch insupine  -Left and right side lie pelvic mobilization for anterior elevation and posterior depression  -PNF pattern to facilitate core into anterior elevation and posterior depression with working through end range holds and isometric reversals with the pelvis and at long full leg function  -Functional resistance through posterior depression and then hip abduction facilitation. GAIT TRAINING: (0 min) in hallway with work on use of single point cane for balance and support. Trying to focus on control and stability with push off through R LE and taking some pressure off midstance with the SPC. [employer] Portal  Treatment/Session Summary:    · Response to Treatment: Patient shows some improvement in mobility with his return to the gym.   Still limited in R LE function and strength  · Communication/Consultation:  None today  · Equipment provided today:  None today  · Recommendations/Intent for next treatment session: Next visit will focus on Core training/balance.     Total Treatment Billable Duration:  35 minutes of treatment,  PT Patient Time In/Time Out  Time In: 1380  Time Out: Romeo 65, PT    Future Appointments   Date Time Provider Nancy Eason   5/11/2021  2:00 PM Kvng Conn, PT [facility]   5/18/2021  2:00 PM Kvng Conn, PT [facility]   5/25/2021  2:00 PM Kvng Conn, PT [facility]

## 2021-05-11 ENCOUNTER — HOSPITAL ENCOUNTER (OUTPATIENT)
Dept: PHYSICAL THERAPY | Age: 76
Discharge: HOME | End: 2021-05-11
Payer: MEDICARE

## 2021-05-11 PROCEDURE — 97140 MANUAL THERAPY 1/> REGIONS: CPT

## 2021-05-11 NOTE — PROGRESS NOTES
Jaquelin Hickman  : 1945  [place]  Secondary: Bshsi Generic [address] [facility]  at [facility]  [address] [place] [address]  [phone]   [phone]        OUTPATIENT PHYSICAL THERAPY: Daily Treatment Note 2021  Visit Count:  31    ICD-10: Treatment Diagnosis: Low back pain, M54.5  Difficulty walking, not elsewhere classified, R26.2  Pain in joint, right knee, M25.561  Precautions/Allergies: Other medication and Statins-hmg-coa reductase inhibitors   TREATMENT PLAN:  Effective Dates: 21 TO 21 (90 days). Frequency/Duration: 1 time a week for 12 weeks MEDICAL/REFERRING DIAGNOSIS:  Low back pain [M54.5]   DATE OF ONSET: Chronic  REFERRING PHYSICIAN: Yung Sebastian MD MD Orders: Evaluate and Treat  Return MD Appointment: not scheduled       Pre-treatment Symptoms/Complaints:  Patient reports that he was able to get to the gym three times this past week with work on the bicycle and a machine or two. He is sore in his legs and arms. Pain: Initial: Pain Intensity 1: 3 /10 Post Session:  2/10   Medications Last Reviewed:  2021  Updated Objective Findings:  Gait function still difficult with increased trendelenburg on right side with weakness and atrophy in right quad  TREATMENT:     THERAPEUTIC EXERCISE: (0 minutes):  Exercises per grid below to improve mobility, strength and coordination. Required minimal visual, verbal, manual and tactile cues to promote proper body alignment, promote proper body posture, promote proper body mechanics and promote proper body breathing techniques. Progressed resistance and repetitions as indicated.    Date:  21   Activity/Exercise Parameters   Core exercises Single knee push isometric in side lie x 2 min for facilitation  Pelvic tilt x 20  PNF core facilitation in sidelying (into post depression and anterior elevation)   Education on sit to stand (not today) X 10 with good weight shift   Stairs (not today) Working on pelvic anterior elevation on right side with knee drive and toe lift   Hip and pelvic hike Not today   Knee extension (home) Continued with blue band at home   Weight shift  R LE planted with follow through and work on keeping right side long x 1 min   Balance (not today) Tandem stance 2 x 30 sec B   Sit to stand 2 x 5       MANUAL THERAPY: (45 minutes): Joint mobilization and Soft tissue mobilization was utilized and necessary because of the patient's restricted joint motion and restricted motion of soft tissue. (Used abbreviations: SF - Superficial Fascia; BC - Bony contours; MP - muscle play; IASTM - instrument-assisted soft tissue mobilization; MET - muscle energy technique; a/p - anterior to posterior; p/a - posterior to anterior; Proprioceptive neuromuscular Facilitation-PNF) Manual treatment to improve soft tissue/joint mobility deficits, tissue integrity issues, trigger points and/or pain. -Supine mobilization of right hamstring, lateral IT band, rectus femoris, adductors  -Hip ER stretch insupine  -Left and right side lie pelvic mobilization for anterior elevation and posterior depression  -PNF pattern to facilitate core into anterior elevation and posterior depression with working through end range holds and isometric reversals with the pelvis and at long full leg function  -Functional resistance through posterior depression and then hip abduction facilitation.  - Hamstring mobilization to right and left     GAIT TRAINING: (0 min) in hallway with work on use of single point cane for balance and support. Trying to focus on control and stability with push off through R LE and taking some pressure off midstance with the SPC. [employer] Portal  Treatment/Session Summary:    · Response to Treatment: Patient shows some improvement in mobility with his return to the gym.   He is dealing with a lot of family stress and stress on his mental capacity right now that seems to be pre-occupying him, but was able to get into the gym. · Communication/Consultation:  None today  · Equipment provided today:  None today  · Recommendations/Intent for next treatment session: Next visit will focus on Core training/balance.     Total Treatment Billable Duration:  45 minutes of treatment,  PT Patient Time In/Time Out  Time In: 7844  Time Out: 217 Darius Agarwal, SURINDER    Future Appointments   Date Time Provider Nancy Eason   5/18/2021  2:00 PM Briseida Ryan, PT [facility]   5/25/2021  2:00 PM Briseida Ryan, PT [facility]

## 2021-05-18 ENCOUNTER — HOSPITAL ENCOUNTER (OUTPATIENT)
Dept: PHYSICAL THERAPY | Age: 76
Discharge: HOME | End: 2021-05-18
Payer: MEDICARE

## 2021-05-18 PROCEDURE — 97140 MANUAL THERAPY 1/> REGIONS: CPT

## 2021-05-18 NOTE — PROGRESS NOTES
Elie Failing  : 1945  [place]  Secondary: Bshsi Generic [address] [facility]  at [facility]  [address] [place] [address]  [phone]   [phone]        OUTPATIENT PHYSICAL THERAPY: Daily Treatment Note 2021  Visit Count:  32    ICD-10: Treatment Diagnosis: Low back pain, M54.5  Difficulty walking, not elsewhere classified, R26.2  Pain in joint, right knee, M25.561  Precautions/Allergies: Other medication and Statins-hmg-coa reductase inhibitors   TREATMENT PLAN:  Effective Dates:21 TO 21 (90 days). Frequency/Duration: 1 time a week for 4 weeks then every other week for 8 weeks MEDICAL/REFERRING DIAGNOSIS:  Low back pain [M54.5]   DATE OF ONSET: Chronic  REFERRING PHYSICIAN: Juma Newell MD MD Orders: Evaluate and Treat  Return MD Appointment: not scheduled       Pre-treatment Symptoms/Complaints:  Patient reports that he was able to get to the gym four times this past week with work on the bicycle and a machine or two. He is still sore    Pain: Initial: Pain Intensity 1: 2 /10 Post Session:  2/10   Medications Last Reviewed:  2021  Updated Objective Findings:  Gait function still difficult with increased trendelenburg on right side with weakness and atrophy in right quad  TREATMENT:     THERAPEUTIC EXERCISE: (0 minutes):  Exercises per grid below to improve mobility, strength and coordination. Required minimal visual, verbal, manual and tactile cues to promote proper body alignment, promote proper body posture, promote proper body mechanics and promote proper body breathing techniques. Progressed resistance and repetitions as indicated.    Date:  21   Activity/Exercise Parameters   Core exercises Single knee push isometric in side lie x 2 min for facilitation  Pelvic tilt x 20  PNF core facilitation in sidelying (into post depression and anterior elevation)   Education on sit to stand (not today) X 10 with good weight shift   Stairs (not today) Working on pelvic anterior elevation on right side with knee drive and toe lift   Hip and pelvic hike Not today   Knee extension (home) Continued with blue band at home   Weight shift  R LE planted with follow through and work on keeping right side long x 1 min   Balance (not today) Tandem stance 2 x 30 sec B   Sit to stand 2 x 5       MANUAL THERAPY: (35 minutes): Joint mobilization and Soft tissue mobilization was utilized and necessary because of the patient's restricted joint motion and restricted motion of soft tissue. (Used abbreviations: SF - Superficial Fascia; BC - Bony contours; MP - muscle play; IASTM - instrument-assisted soft tissue mobilization; MET - muscle energy technique; a/p - anterior to posterior; p/a - posterior to anterior; Proprioceptive neuromuscular Facilitation-PNF) Manual treatment to improve soft tissue/joint mobility deficits, tissue integrity issues, trigger points and/or pain. -Supine mobilization of right hamstring, lateral IT band, rectus femoris, adductors  -Hip ER stretch insupine  -Left and right side lie pelvic mobilization for anterior elevation and posterior depression  -PNF pattern to facilitate core into anterior elevation and posterior depression with working through end range holds and isometric reversals with the pelvis and at long full leg function  -Functional resistance through posterior depression and then hip abduction facilitation.  - Hamstring mobilization to right and left     GAIT TRAINING: (0 min) in hallway with work on use of single point cane for balance and support. Trying to focus on control and stability with push off through R LE and taking some pressure off midstance with the SPC. [employer] Portal  Treatment/Session Summary:    · Response to Treatment: Patient shows some improvement in mobility with his return to the gym.   He reports increased time on the bicycle and feeling like he is finally getting into a little more of a groove  · Communication/Consultation:  None today  · Equipment provided today:  None today  · Recommendations/Intent for next treatment session: Next visit will focus on Core training/balance.     Total Treatment Billable Duration:  35 minutes of treatment,  PT Patient Time In/Time Out  Time In: 7665  Time Out: Romeo 65, PT    Future Appointments   Date Time Provider Nancy Eason   5/25/2021  2:00 PM Scott Bernal, PT [facility]   6/1/2021  2:00 PM Scott Bernal, PT [facility]   6/8/2021  1:00 PM Scott Bernal, PT [facility]   6/22/2021  2:00 PM Scott Bernal, PT [facility]   6/29/2021  2:00 PM Scott Bernal, PT [facility]

## 2021-05-18 NOTE — THERAPY RECERTIFICATION
Timo Chaney : 1945 [place] Secondary: [facility] 
[address] [place] [address] [phone]   [phone] OUTPATIENT PHYSICAL THERAPY:Recertification 3/82/1147 ICD-10: Treatment Diagnosis: Low back pain, M54.5 Difficulty walking, not elsewhere classified, R26.2 Pain in joint, right knee, M25.561 Precautions/Allergies: Other medication and Statins-hmg-coa reductase inhibitors TREATMENT PLAN: 
Effective Dates:21 TO 21 (90 days). Frequency/Duration: 1 time a week for 4 weeks then every other week for 8 weeks MEDICAL/REFERRING DIAGNOSIS: 
Low back pain [M54.5] DATE OF ONSET: Chronic REFERRING PHYSICIAN: Shawna Post MD MD Orders: Evaluate and Treat Return MD Appointment: not scheduled Timo Chaney has been seen for 28 visits from 20 to 2021 for low back, right knee and R LE. Patient has performed therapeutic exercises, activities, and had manual therapy to increased strength, ROM and function. Patient has also used modalities for pain control in order to increase function. Patient has shown an increase in function per the LEFS with scores of 41/80. He has returned to the gym up to 4 times last week and riding the stationary bike for about 25 min now. He is feeling more sore and achy, but he is trying to get back moving. He shows continued balance dysfunction and weakness into the R LE with poor core control. We will continue with one time a week due to patients lack of mobility and continue to encourage independent exercises. Patient has progressed well toward their goals and will benefit from continuing skilled PT in order to address their impairments. Ebenezer Ervin, PT, DPT, OCS, CFMT INITIAL ASSESSMENT:  Mr. Sven Melgoza presents with chronic R LE weakness and pain along with peripheral neuropathy affecting his safety with gait and function. He shows limitation in walking and unsafe gait function. He shows continued deconditioning in the LE's. He is a good candidate for skilled PT in order to address listed impairments affecting his function. Sarai Collins, PT, DPT, OCS, CFMT PROBLEM LIST (Impacting functional limitations): 1. Decreased Strength 2. Decreased ADL/Functional Activities 3. Decreased Transfer Abilities 4. Decreased Ambulation Ability/Technique 5. Decreased Balance 6. Increased Pain 7. Decreased Activity Tolerance 8. Decreased Flexibility/Joint Mobility INTERVENTIONS PLANNED: (Treatment may consist of any combination of the following) 1. Balance Exercise 2. Bed Mobility 3. Electrical Stimulation 4. Gait Training 5. Heat 6. Manual Therapy 7. Neuromuscular Re-education/Strengthening 8. Range of Motion (ROM) 9. Therapeutic Activites 10. Therapeutic Exercise/Strengthening GOALS: (Goals have been discussed and agreed upon with patient.) Short-Term Functional Goals: Time Frame: 4 weeks 1. Patient will report walking for greater than 10 minutes in order to return to functional mobility (MET) 2. Patient will show equal hamstring length in order to progress functional mobility (MET) 3. Patient will be independent in [facility] for basic core strength training (MET-5/18/21) Discharge Goals: Time Frame: 12 weeks 1. Patient will show a greater than 10 point increase on the LEFS in order to show an increase in function (ongoing) 2. Patient will report joining a pool and walking in the water greater than 2 times a week in order to progress function (ongoing) 3. Patient will be independent in balance exercises in order to decrease fall risk (ongoing) 4. NEW GOAL: 
5. Patient will report going to the gym consistently three times a week for two months. 6.  
 
OUTCOME MEASURE:  
Tool Used: Lower Extremity Functional Scale (LEFS) Score:  Initial: 37/80 Most Recent: 44/80 (Date: 5/18/21 ) Interpretation of Score: 20 questions each scored on a 5 point scale with 0 representing \"extreme difficulty or unable to perform\" and 4 representing \"no difficulty\". The lower the score, the greater the functional disability. 80/80 represents no disability. Minimal detectable change is 9 points. MEDICAL NECESSITY:  
· Patient is expected to demonstrate progress in strength, range of motion, balance, coordination and functional technique to improve functionalmobility and safety with walking. · Patient demonstrates good rehab potential due to higher previous functional level. · Skilled intervention continues to be required due to decreased mobility and increased risk of falls. REASON FOR SERVICES/OTHER COMMENTS: 
· Patient continues to require skilled intervention due to decreased mobility. Total Duration: PT Patient Time In/Time Out Time In: 4086 Time Out: 1500 Rehabilitation Potential For Stated Goals: Excellent Regarding James Potocki's therapy, I certify that the treatment plan above will be carried out by a therapist or under their direction. Thank you for this referral, Brian Everett, PT Referring Physician Signature: Johanna Serrano MD              
 
PAIN/SUBJECTIVE:  
Initial: Pain Intensity 1: 2 /10 Post Session:  2/10 HISTORY:  
History of Injury/Illness (Reason for Referral): 
Patient has had a chronic history of R LE weakness and pain since he first had his hip replacement about 5 years ago. He has since had issues with spinal stenosis, disc herniation, right knee OA and has found out recently that he has peripheral neuropathy in both feet that affect his balance. He had a fall back in February/March that was concerning for him due to the leg giving out. He also had a hospitalization due to abnormal heartbeat. He has finally stabilized with the heart. He did have an injection in his lower back (Dr. Radha Box) in the summer due to increasing left LE weakness and pain that has helped a lot.  He is not comfortable going to the gym like he use to due to Minh and has become deconditioned. He reports doing some things at home, but still just feels like he is starting from square one. His goals are to get back to exercises and daily life without difficulty and with less fear of falling. Past Medical History/Comorbidities:  
Mr. Genaro Lay  has a past medical history of Anxiety, Edema of both legs, Enlarged prostate, Gout, Hip pain, History of elevated glucose, History of seasonal allergies, Hypercholesterolemia, Hypertension, Keratoacanthoma, S/P total hip arthroplasty (4/6/2015), Skin neoplasm, and Spinal stenosis. He also has no past medical history of Aneurysm ([address] Arrhythmia, Arthritis, Asthma, Autoimmune disease ([address] CAD (coronary artery disease), Cancer ([address] Chronic kidney disease, Chronic obstructive pulmonary disease ([address] Chronic pain, Coagulation disorder ([address] Difficult intubation, GERD (gastroesophageal reflux disease), Heart failure ([address] Ill-defined condition, Liver disease, Malignant hyperthermia due to anesthesia, Morbid obesity ([address] Nausea & vomiting, Pseudocholinesterase deficiency, Psychiatric disorder, PUD (peptic ulcer disease), Seizures ([address] Stroke ([address] Thromboembolus ([address] Thyroid disease, Unspecified adverse effect of anesthesia, or Unspecified sleep apnea. Mr. Genaro Lay  has a past surgical history that includes hx tonsillectomy (as child) and hx wisdom teeth extraction (as teenager). Social History/Living Environment:  
   
Social History Socioeconomic History  Marital status:  Spouse name: Not on file  Number of children: Not on file  Years of education: Not on file  Highest education level: Not on file Occupational History  Not on file Tobacco Use  Smoking status: Never Smoker Substance and Sexual Activity  Alcohol use: No  
 Drug use: Not on file  Sexual activity: Not on file Other Topics Concern  Not on file Social History Narrative  Not on file Social Determinants of Health Financial Resource Strain:  Difficulty of Paying Living Expenses:   
Food Insecurity:  Worried About [address] in the Last Year:   
[address] in the Last Year:   
Transportation Needs:   
 Lack of Transportation (Medical):  Lack of Transportation (Non-Medical): Physical Activity:   
 Days of Exercise per Week:  Minutes of Exercise per Session:   
Stress:  Feeling of Stress :   
Social Connections:  Frequency of Communication with Friends and Family:  Frequency of Social Gatherings with Friends and Family:  Attends [employer] Services:  Active Member of Clubs or Organizations:  Attends Club or Organization Meetings:  Marital Status: Intimate Partner Violence:  Fear of Current or Ex-Partner:  Emotionally Abused:   
 Physically Abused:   
 Sexually Abused:   
 
 
Prior Level of Function/Work/Activity: 
Independent, retired Dominant Side:  
      [facility] H[facility] Model Falls Risk Assessment Risk Factors: 
     (1)  Gender [Male] 
     (5)  History of Recent Falls [w/in 3 months] 
     (1)  Orthostatic drop in BP Ability to Rise from Chair: 
     (1)  Pushes up, successful in one attempt Falls Prevention Plan: No modifications necessary Total: (5 or greater = High Risk): 8  
©2010 [facility] [address] [place] [address] States Patent #[id number]. Federal Law prohibits the replication, distribution or use without written permission from [facility] [employer] Current Medications:   
  
Current Outpatient Medications:  
  ergocalciferol (ERGOCALCIFEROL) 1,250 mcg (50,000 unit) capsule, Take 50,000 Units by mouth every seven (7) days. , Disp: , Rfl:  
  furosemide (LASIX) 20 mg tablet, Take 20 mg by mouth daily. , Disp: , Rfl:  
  lisinopriL (PRINIVIL, ZESTRIL) 20 mg tablet, Take 20 mg by mouth daily. , Disp: , Rfl:  
 celecoxib (CELEBREX) 200 mg capsule, Take 200 mg by mouth daily. , Disp: , Rfl:  
  allopurinoL (ZYLOPRIM) 300 mg tablet, Take 300 mg by mouth daily. , Disp: , Rfl:  
  spironolactone (ALDACTONE) 25 mg tablet, Take 25 mg by mouth daily. , Disp: , Rfl:  
  busPIRone (BUSPAR) 7.5 mg tablet, Take 7.5 mg by mouth two (2) times a day., Disp: , Rfl:  
  hydrOXYzine HCL (ATARAX) 25 mg tablet, Take 25 mg by mouth two (2) times a day., Disp: , Rfl:  
  POTASSIUM CHLORIDE PO, Take 10 mEq by mouth three (3) days a week., Disp: , Rfl:  
  cloNIDine (CATAPRES) 0.3 mg/24 hr, 1 Patch by TransDERmal route every seven (7) days. , Disp: , Rfl:  
  prazosin (MINIPRESS) 1 mg capsule, Take 1 mg by mouth two (2) times a day. TAKE AM OF SURGERY WITH SMALL SIP OF WATER   Indications: enlarged prostate, Disp: , Rfl:  
  finasteride (PROSCAR) 5 mg tablet, Take 5 mg by mouth daily. TAKE AM OF SURGERY WITH SMALL SIP OF WATER   Indications: BENIGN PROSTATIC HYPERTROPHY, Disp: , Rfl:   
Date Last Reviewed:  5/19/2021 Number of Personal Factors/Comorbidities that affect the Plan of Care: 1-2: MODERATE COMPLEXITY EXAMINATION:  
Observation/Orthostatic Postural Assessment:   
      Patient shows increased obesity with more abdominal weight and increased lordosis. He shows increased small step length with short distances. He tends to show increased right pelvic rotation in standing and gait Palpation:   
      Tightness along right paraspinals and into low back and pelvis. He shows tightness in lateral right hip and leg. Some noted atrophy in left glut compared to right side. 
-Restricted and limited in hamstring mobility B with increased right restriction more than left 
-Overall right hip motion appears good for artificial ANUJ.  
-Right knee shows increased restrictions at patella and medial and lateral joint line with pain with testing due to arthritic condition 
-Increased swelling in B LE and ankles with compression socks worn ROM:   
      Patient shows some decreased hamstrings at 70 deg right and 75 deg left 
-Flexion in lumbar spine to 30% in standing with difficulty with balance Strength:   
      Hip flexion at 3+/5  On right and 4-/5 on left Right knee extension at 4/5 Left knee extension at 4+/5 Right knee flexion at 4-/5 Left at 4/5 
right hip abduction 4-/5 Right hip extension 4/5 Neurological Screen: 
      Sensation: Patient has peripheral neuropathy on the plantar surface of both feet Nerve conduction did not reveal specific radiculopathy Functional Mobility:  
      Gait/Ambulation:  Patient shows increased Trendelenburg pattern B with increased right pelvic rotation that maintains during gait with right LE lagging some with shortened stance phase B. Patient has increased knee hike due to neuropathy. Transfers:  Patient shows ability to sit to stand with some aid from hands Bed Mobility:  Patient has some difficulty with supine to sit with needs for some aid Balance:   
      Poor for single leg stance, tandem stance and turning Body Structures Involved: 1. Nerves 2. Thoracic Cage 3. Bones 4. Joints 5. Muscles 6. Ligaments Body Functions Affected: 1. Sensory/Pain 2. Neuromusculoskeletal 
3. Movement Related Activities and Participation Affected: 1. General Tasks and Demands 2. Mobility 3. Self Care 4. Domestic Life 5. Interpersonal Interactions and Relationships Number of elements (examined above) that affect the Plan of Care: 4+: HIGH COMPLEXITY CLINICAL PRESENTATION:  
Presentation: Stable and uncomplicated: LOW COMPLEXITY CLINICAL DECISION MAKING:  
Use of outcome tool(s) and clinical judgement create a POC that gives a: Clear prediction of patient's progress: LOW COMPLEXITY

## 2021-05-25 ENCOUNTER — HOSPITAL ENCOUNTER (OUTPATIENT)
Dept: PHYSICAL THERAPY | Age: 76
Discharge: HOME | End: 2021-05-25
Payer: MEDICARE

## 2021-05-25 PROCEDURE — 97140 MANUAL THERAPY 1/> REGIONS: CPT

## 2021-05-25 NOTE — PROGRESS NOTES
Elie Failing  : 1945  [place]  Secondary: [employer] Generic [id number]  at [facility]  [address] [place] [address]  [phone]   [phone]        OUTPATIENT PHYSICAL THERAPY: Daily Treatment Note 2021  Visit Count:  33    ICD-10: Treatment Diagnosis: Low back pain, M54.5  Difficulty walking, not elsewhere classified, R26.2  Pain in joint, right knee, M25.561  Precautions/Allergies: Other medication and Statins-hmg-coa reductase inhibitors   TREATMENT PLAN:  Effective Dates:21 TO 21 (90 days). Frequency/Duration: 1 time a week for 4 weeks then every other week for 8 weeks MEDICAL/REFERRING DIAGNOSIS:  Low back pain [M54.5]   DATE OF ONSET: Chronic  REFERRING PHYSICIAN: Juma Newell MD MD Orders: Evaluate and Treat  Return MD Appointment: not scheduled       Pre-treatment Symptoms/Complaints:  Patient reports that he still feels sore the next day after the gym, but knows that it is working his muscles  Pain: Initial: Pain Intensity 1: 3 /10 Post Session:  2/10   Medications Last Reviewed:  2021  Updated Objective Findings:  Gait function still difficult with increased trendelenburg on right side with weakness and atrophy in right quad  TREATMENT:     THERAPEUTIC EXERCISE: (0 minutes):  Exercises per grid below to improve mobility, strength and coordination. Required minimal visual, verbal, manual and tactile cues to promote proper body alignment, promote proper body posture, promote proper body mechanics and promote proper body breathing techniques. Progressed resistance and repetitions as indicated.    Date:  21   Activity/Exercise Parameters   Core exercises Single knee push isometric in side lie x 2 min for facilitation  Pelvic tilt x 20  PNF core facilitation in sidelying (into post depression and anterior elevation)   Education on sit to stand (not today) X 10 with good weight shift   Stairs (not today) Working on pelvic anterior elevation on right side with knee drive and toe lift   Hip and pelvic hike Not today   Knee extension (home) Continued with blue band at home   Weight shift  R LE planted with follow through and work on keeping right side long x 1 min   Balance (not today) Tandem stance 2 x 30 sec B   Sit to stand 2 x 5       MANUAL THERAPY: (35 minutes): Joint mobilization and Soft tissue mobilization was utilized and necessary because of the patient's restricted joint motion and restricted motion of soft tissue. (Used abbreviations: SF - Superficial Fascia; BC - Bony contours; MP - muscle play; IASTM - instrument-assisted soft tissue mobilization; MET - muscle energy technique; a/p - anterior to posterior; p/a - posterior to anterior; Proprioceptive neuromuscular Facilitation-PNF) Manual treatment to improve soft tissue/joint mobility deficits, tissue integrity issues, trigger points and/or pain. -Supine mobilization of right hamstring, lateral IT band, rectus femoris, adductors  -Hip ER stretch insupine  -Left and right side lie pelvic mobilization for anterior elevation and posterior depression  -PNF pattern to facilitate core into anterior elevation and posterior depression with working through end range holds and isometric reversals with the pelvis and at long full leg function  -Functional resistance through posterior depression and then hip abduction facilitation.  - Hamstring mobilization to right and left     GAIT TRAINING: (0 min) in hallway with work on use of single point cane for balance and support. Trying to focus on control and stability with push off through R LE and taking some pressure off midstance with the [facility]. [employer] Portal  Treatment/Session Summary:    · Response to Treatment: Patient shows some improvement in mobility with soreness from working in the gym.   He is trying to do his exercises better at home as well  · Communication/Consultation:  None today  · Equipment provided today:  None today  · Recommendations/Intent for next treatment session: Next visit will focus on Core training/balance.     Total Treatment Billable Duration:  35 minutes of treatment,  PT Patient Time In/Time Out  Time In: 7670  Time Out: [address], PT    Future Appointments   Date Time Provider Nancy Eason   6/1/2021  2:00 PM Ani Khan, PT [facility]   6/8/2021  1:00 PM Ani Khan, PT [facility]   6/22/2021  2:00 PM Ani Khan, PT [facility]   6/29/2021  2:00 PM Ani Khan, PT [facility]

## 2021-06-01 ENCOUNTER — HOSPITAL ENCOUNTER (OUTPATIENT)
Dept: PHYSICAL THERAPY | Age: 76
Discharge: HOME | End: 2021-06-01
Payer: MEDICARE

## 2021-06-01 PROCEDURE — 97140 MANUAL THERAPY 1/> REGIONS: CPT

## 2021-06-08 ENCOUNTER — HOSPITAL ENCOUNTER (OUTPATIENT)
Dept: PHYSICAL THERAPY | Age: 76
Discharge: HOME | End: 2021-06-08
Payer: MEDICARE

## 2021-06-08 PROCEDURE — 97140 MANUAL THERAPY 1/> REGIONS: CPT

## 2021-06-08 NOTE — PROGRESS NOTES
Edgar Brochure  : 1945  [place]  Secondary: [employer]sh[employer] Generic [facility]  at [facility]  [address] [facility], [address] [place] [address]  [phone]   [phone]        OUTPATIENT PHYSICAL THERAPY: Daily Treatment Note 2021  Visit Count:  35    ICD-10: Treatment Diagnosis: Low back pain, M54.5  Difficulty walking, not elsewhere classified, R26.2  Pain in joint, right knee, M25.561  Precautions/Allergies: Other medication and Statins-hmg-coa reductase inhibitors   TREATMENT PLAN:  Effective Dates:21 TO 21 (90 days). Frequency/Duration: 1 time a week for 4 weeks then every other week for 8 weeks MEDICAL/REFERRING DIAGNOSIS:  Low back pain [M54.5]   DATE OF ONSET: Chronic  REFERRING PHYSICIAN: Jakob Barbosa MD MD Orders: Evaluate and Treat  Return MD Appointment: not scheduled       Pre-treatment Symptoms/Complaints:  Patient reports that he was able to get to the gym three times last week and did a little less, but feels ok today. He had a visit with Dr. Leda Bermudez and they are keeping an eye on his electrolytes. Pain: Initial: Pain Intensity 1: 10 Post Session:  2/10   Medications Last Reviewed:  2021  Updated Objective Findings:  Restrictions along lateral IT band and vastus lateralis B  TREATMENT:     THERAPEUTIC EXERCISE: (0 minutes):  Exercises per grid below to improve mobility, strength and coordination. Required minimal visual, verbal, manual and tactile cues to promote proper body alignment, promote proper body posture, promote proper body mechanics and promote proper body breathing techniques. Progressed resistance and repetitions as indicated.    Date:  21   Activity/Exercise Parameters   Core exercises Single knee push isometric in side lie x 2 min for facilitation  Pelvic tilt x 20  PNF core facilitation in sidelying (into post depression and anterior elevation)   Education on sit to stand (not today) X 10 with good weight shift   Stairs (not today) Working on pelvic anterior elevation on right side with knee drive and toe lift   Hip and pelvic hike Not today   Knee extension (home) Continued with blue band at home   Weight shift  R LE planted with follow through and work on keeping right side long x 1 min   Balance (not today) Tandem stance 2 x 30 sec B   Sit to stand 2 x 5       MANUAL THERAPY: (30 minutes): Joint mobilization and Soft tissue mobilization was utilized and necessary because of the patient's restricted joint motion and restricted motion of soft tissue. (Used abbreviations: SF - Superficial Fascia; BC - Bony contours; MP - muscle play; IASTM - instrument-assisted soft tissue mobilization; MET - muscle energy technique; a/p - anterior to posterior; p/a - posterior to anterior; Proprioceptive neuromuscular Facilitation-PNF) Manual treatment to improve soft tissue/joint mobility deficits, tissue integrity issues, trigger points and/or pain. -Supine mobilization of right hamstring, lateral IT band, rectus femoris, adductors  -Hip ER stretch insupine  -Left and right side lie pelvic mobilization for anterior elevation and posterior depression  -PNF pattern to facilitate core into anterior elevation and posterior depression with working through end range holds and isometric reversals with the pelvis and at long full leg function  -Functional resistance through posterior depression and then hip abduction facilitation.  - Hamstring mobilization to right and left   -Patellar mobilization right and left side with left IT band mobilized today as well    GAIT TRAINING: (0 min) in hallway with work on use of single point cane for balance and support. Trying to focus on control and stability with push off through R LE and taking some pressure off midstance with the [facility].     [employer] Portal  Treatment/Session Summary:    · Response to Treatment: Patient shows some improvement in mobility with soreness from working in [employer]. He shows some swelling in the lower legs. · Communication/Consultation:  None today  · Equipment provided today:  None today  · Recommendations/Intent for next treatment session: Next visit will focus on Core training/balance.     Total Treatment Billable Duration:  30 minutes of treatment,  PT Patient Time In/Time Out  Time In: 5785  Time Out: [address], PT    Future Appointments   Date Time Provider Nancy Eason   6/22/2021  2:00 PM Lucy Del Toro, PT [facility]   6/29/2021  2:00 PM Lucy Del Toro, PT [facility]

## 2021-06-22 ENCOUNTER — HOSPITAL ENCOUNTER (OUTPATIENT)
Dept: PHYSICAL THERAPY | Age: 76
Discharge: HOME | End: 2021-06-22
Payer: MEDICARE

## 2021-06-22 PROCEDURE — 97140 MANUAL THERAPY 1/> REGIONS: CPT

## 2021-06-22 NOTE — PROGRESS NOTES
Sarika Calderón  : 1945  [place]  Secondary: Bshsi Generic [address]  at [facility]  [address] [place] [address]  [phone]   [phone]        OUTPATIENT PHYSICAL THERAPY: Daily Treatment Note 2021  Visit Count:  36    ICD-10: Treatment Diagnosis: Low back pain, M54.5  Difficulty walking, not elsewhere classified, R26.2  Pain in joint, right knee, M25.561  Precautions/Allergies: Other medication and Statins-hmg-coa reductase inhibitors   TREATMENT PLAN:  Effective Dates:21 TO 21 (90 days). Frequency/Duration: 1 time a week for 4 weeks then every other week for 8 weeks MEDICAL/REFERRING DIAGNOSIS:  Low back pain [M54.5]   DATE OF ONSET: Chronic  REFERRING PHYSICIAN: Elida Magana MD MD Orders: Evaluate and Treat  Return MD Appointment: not scheduled       Pre-treatment Symptoms/Complaints:  Patient reports that he made it to the gym two times the last two weeks and feels a little better for it. He has tried the other machine and it seems to feel good as well. The knees are doing better, but still have a little tenderness and soreness. Pain: Initial: Pain Intensity 1: 3 /10 Post Session:  2/10   Medications Last Reviewed:  2021  Updated Objective Findings:  Restrictions along lateral IT band and vastus lateralis B, Increased superficial fascia and layer one tightness in lumbar spine around right Sacral base  TREATMENT:     THERAPEUTIC EXERCISE: (0 minutes):  Exercises per grid below to improve mobility, strength and coordination. Required minimal visual, verbal, manual and tactile cues to promote proper body alignment, promote proper body posture, promote proper body mechanics and promote proper body breathing techniques. Progressed resistance and repetitions as indicated.    Date:  21   Activity/Exercise Parameters   Core exercises Single knee push isometric in side lie x 2 min for facilitation  Pelvic tilt x 20  PNF core facilitation in sidelying (into post depression and anterior elevation)   Education on sit to stand (not today) X 10 with good weight shift   Stairs (not today) Working on pelvic anterior elevation on right side with knee drive and toe lift   Hip and pelvic hike Not today   Knee extension (home) Continued with blue band at home   Weight shift  R LE planted with follow through and work on keeping right side long x 1 min   Balance (not today) Tandem stance 2 x 30 sec B   Sit to stand 2 x 5       MANUAL THERAPY: (40 minutes): Joint mobilization and Soft tissue mobilization was utilized and necessary because of the patient's restricted joint motion and restricted motion of soft tissue. (Used abbreviations: SF - Superficial Fascia; BC - Bony contours; MP - muscle play; IASTM - instrument-assisted soft tissue mobilization; MET - muscle energy technique; a/p - anterior to posterior; p/a - posterior to anterior; Proprioceptive neuromuscular Facilitation-PNF) Manual treatment to improve soft tissue/joint mobility deficits, tissue integrity issues, trigger points and/or pain. -Supine mobilization of right hamstring, lateral IT band, rectus femoris, adductors  -Hip ER stretch insupine  -Left and right side lie pelvic mobilization for anterior elevation and posterior depression  -PNF pattern to facilitate core into anterior elevation and posterior depression with working through end range holds and isometric reversals with the pelvis and at long full leg function  -Functional resistance through posterior depression and then hip abduction facilitation.  - Hamstring mobilization to right and left   -Patellar mobilization right and left side with left IT band mobilized today as well    GAIT TRAINING: (0 min) in hallway with work on use of single point cane for balance and support.  Trying to focus on control and stability with push off through R LE and taking some pressure off midstance with the [facility]. [employer] Portal  Treatment/Session Summary:    · Response to Treatment: Patient shows improvement in soft tissue mobility in lateral IT bands, patella and low back. Improvement with gait function post treatment. · Communication/Consultation:  None today  · Equipment provided today:  None today  · Recommendations/Intent for next treatment session: Next visit will focus on Core training/balance.     Total Treatment Billable Duration:  40 minutes of treatment,  PT Patient Time In/Time Out  Time In: 9388  Time Out: [address], PT    Future Appointments   Date Time Provider Nancy Eason   6/29/2021  2:00 PM Johanne Benitez PT [facility]

## 2021-06-29 ENCOUNTER — HOSPITAL ENCOUNTER (OUTPATIENT)
Dept: PHYSICAL THERAPY | Age: 76
Discharge: HOME | End: 2021-06-29
Payer: MEDICARE

## 2021-06-29 PROCEDURE — 97140 MANUAL THERAPY 1/> REGIONS: CPT

## 2021-06-29 PROCEDURE — 97110 THERAPEUTIC EXERCISES: CPT

## 2021-06-29 NOTE — PROGRESS NOTES
Marina Dent  : 1945  [place]  Secondary: Bshsi Generic [address] [facility]  at [facility]  [address] [place] [address]  [phone]   [phone]        OUTPATIENT PHYSICAL THERAPY: Daily Treatment Note 2021  Visit Count:  37    ICD-10: Treatment Diagnosis: Low back pain, M54.5  Difficulty walking, not elsewhere classified, R26.2  Pain in joint, right knee, M25.561  Precautions/Allergies: Other medication and Statins-hmg-coa reductase inhibitors   TREATMENT PLAN:  Effective Dates:21 TO 21 (90 days). Frequency/Duration: 1 time a week for 4 weeks then every other week for 8 weeks MEDICAL/REFERRING DIAGNOSIS:  Low back pain [M54.5]   DATE OF ONSET: Chronic  REFERRING PHYSICIAN: Loretta Tamayo MD MD Orders: Evaluate and Treat  Return MD Appointment: not scheduled       Pre-treatment Symptoms/Complaints:  Patient reports that he made it to the gym 4 times this past week did the stepper with the arms that seemed to do well. Pain: Initial: Pain Intensity 1: 3 /10 Post Session:  2/10   Medications Last Reviewed:  2021  Updated Objective Findings:  Restrictions along lateral IT band and vastus lateralis B, Increased superficial fascia and layer one tightness in lumbar spine around right Sacral base  TREATMENT:     THERAPEUTIC EXERCISE: (10 minutes):  Exercises per grid below to improve mobility, strength and coordination. Required minimal visual, verbal, manual and tactile cues to promote proper body alignment, promote proper body posture, promote proper body mechanics and promote proper body breathing techniques. Progressed resistance and repetitions as indicated.    Date:  21   Activity/Exercise Parameters   Core exercises Supine hook lying push into blue ball 2 x 1 min holds   Education on sit to stand (not today) X 10 with good weight shift   Stairs (not today) Working on pelvic anterior elevation on right side with knee drive and toe lift   Hip and pelvic hike Not today   Knee extension (home) Continued with blue band at home   Weight shift  R LE planted with follow through and work on keeping right side long x 1 min   Balance (not today) Tandem stance 2 x 30 sec B   Sit to stand Just for function. MANUAL THERAPY: (30 minutes): Joint mobilization and Soft tissue mobilization was utilized and necessary because of the patient's restricted joint motion and restricted motion of soft tissue. (Used abbreviations: SF - Superficial Fascia; BC - Bony contours; MP - muscle play; IASTM - instrument-assisted soft tissue mobilization; MET - muscle energy technique; a/p - anterior to posterior; p/a - posterior to anterior; Proprioceptive neuromuscular Facilitation-PNF) Manual treatment to improve soft tissue/joint mobility deficits, tissue integrity issues, trigger points and/or pain. -Supine mobilization of right hamstring, lateral IT band, rectus femoris, adductors  -Hip ER stretch insupine  -Left and right side lie pelvic mobilization for anterior elevation and posterior depression  -PNF pattern to facilitate core into anterior elevation and posterior depression with working through end range holds and isometric reversals with the pelvis and at long full leg function  -Functional resistance through posterior depression and then hip abduction facilitation.  - Hamstring mobilization to right and left   -Patellar mobilization right and left side with left IT band mobilized today as well    GAIT TRAINING: (0 min) in hallway with work on use of single point cane for balance and support. Trying to focus on control and stability with push off through R LE and taking some pressure off midstance with the SPC. [employer] Portal  Treatment/Session Summary:    · Response to Treatment: Patient shows improvement in lumbar spine fascia with decreased restrictions. He continues to have increased IT band tightness. · Communication/Consultation:  None today  · Equipment provided today:  None today  · Recommendations/Intent for next treatment session: Next visit will focus on Core training/balance.     Total Treatment Billable Duration:  40 minutes of treatment,  PT Patient Time In/Time Out  Time In: 7593  Time Out: [address], PT    Future Appointments   Date Time Provider Nancy Eason   7/14/2021  2:00 PM Osman Dorman, PT [facility]   7/28/2021  1:45 PM Osman Dorman, PT [facility]

## 2021-07-14 ENCOUNTER — HOSPITAL ENCOUNTER (OUTPATIENT)
Dept: PHYSICAL THERAPY | Age: 76
Discharge: HOME | End: 2021-07-14
Payer: MEDICARE

## 2021-07-14 PROCEDURE — 97110 THERAPEUTIC EXERCISES: CPT

## 2021-07-14 PROCEDURE — 97140 MANUAL THERAPY 1/> REGIONS: CPT

## 2021-07-14 NOTE — PROGRESS NOTES
Dmaaso Jovel  : 1945  [place]  Secondary: Bshsi Generic [address]  at [facility]  [address] [place] [address]  [phone]   [phone]        OUTPATIENT PHYSICAL THERAPY: Daily Treatment Note 2021  Visit Count:  38    ICD-10: Treatment Diagnosis: Low back pain, M54.5  Difficulty walking, not elsewhere classified, R26.2  Pain in joint, right knee, M25.561  Precautions/Allergies: Other medication and Statins-hmg-coa reductase inhibitors   TREATMENT PLAN:  Effective Dates:21 TO 21 (90 days). Frequency/Duration: 1 time a week for 4 weeks then every other week for 8 weeks MEDICAL/REFERRING DIAGNOSIS:  Low back pain [M54.5]   DATE OF ONSET: Chronic  REFERRING PHYSICIAN: Mallika Wagoner MD MD Orders: Evaluate and Treat  Return MD Appointment: not scheduled       Pre-treatment Symptoms/Complaints:  Patient reports that he made it to the gym 4 times this past week did the stepper with the arms that seemed to do well. Pain: Initial: Pain Intensity 1: 4 /10 Post Session:  2/10   Medications Last Reviewed:  2021  Updated Objective Findings:  Restrictions along lateral IT band and vastus lateralis B, Increased superficial fascia and layer one tightness in lumbar spine around right Sacral base  TREATMENT:     THERAPEUTIC EXERCISE: (15 minutes):  Exercises per grid below to improve mobility, strength and coordination. Required minimal visual, verbal, manual and tactile cues to promote proper body alignment, promote proper body posture, promote proper body mechanics and promote proper body breathing techniques. Progressed resistance and repetitions as indicated.    Date:  21   Activity/Exercise Parameters   Core exercises Supine hook lying push into blue ball 2 x 1 min holds   Education on sit to stand (not today) X 10 with good weight shift   Stairs (not today) Working on pelvic anterior elevation on right side with knee drive and toe lift   Hip and pelvic hike Worked with bridge today   Knee extension (home) Continued with blue band at home   Weight shift  R LE planted with follow through and work on keeping right side long x 1 min   Balance (not today) Tandem stance 2 x 30 sec B   Sit to stand Just for function. MANUAL THERAPY: (30 minutes): Joint mobilization and Soft tissue mobilization was utilized and necessary because of the patient's restricted joint motion and restricted motion of soft tissue. (Used abbreviations: SF - Superficial Fascia; BC - Bony contours; MP - muscle play; IASTM - instrument-assisted soft tissue mobilization; MET - muscle energy technique; a/p - anterior to posterior; p/a - posterior to anterior; Proprioceptive neuromuscular Facilitation-PNF) Manual treatment to improve soft tissue/joint mobility deficits, tissue integrity issues, trigger points and/or pain. -Supine mobilization of right hamstring, lateral IT band, rectus femoris, adductors  -Hip ER stretch insupine  -Left and right side lie pelvic mobilization for anterior elevation and posterior depression  -PNF pattern to facilitate core into anterior elevation and posterior depression with working through end range holds and isometric reversals with the pelvis and at long full leg function  -Functional resistance through posterior depression and then hip abduction facilitation.  - Hamstring mobilization to right and left   -Patellar mobilization right and left side with left IT band mobilized today as well    GAIT TRAINING: (0 min) in hallway with work on use of single point cane for balance and support. Trying to focus on control and stability with push off through R LE and taking some pressure off midstance with the SPC. [employer] Portal  Treatment/Session Summary:    · Response to Treatment: Patient shows improvement in lumbar spine fascia with decreased restrictions.  Start to get back to balance exercises next visit  · Communication/Consultation:  None today  · Equipment provided today:  None today  · Recommendations/Intent for next treatment session: Next visit will focus on Core training/balance.     Total Treatment Billable Duration:  45 minutes of treatment,  PT Patient Time In/Time Out  Time In: 1711  Time Out: [address], PT    Future Appointments   Date Time Provider Nancy Eason   7/28/2021  1:45 PM Madyson Dumont PT [facility]

## 2021-07-28 ENCOUNTER — HOSPITAL ENCOUNTER (OUTPATIENT)
Dept: PHYSICAL THERAPY | Age: 76
Discharge: HOME | End: 2021-07-28
Payer: MEDICARE

## 2021-07-28 PROCEDURE — 97140 MANUAL THERAPY 1/> REGIONS: CPT

## 2021-07-28 NOTE — PROGRESS NOTES
Evelia Arenas  : 1945  [place]  Secondary: Bshsi Generic [address] [facility]  at [facility]  [address] [place] [address]  [phone]   [phone]        OUTPATIENT PHYSICAL THERAPY: Daily Treatment Note 2021  Visit Count:  39    ICD-10: Treatment Diagnosis: Low back pain, M54.5  Difficulty walking, not elsewhere classified, R26.2  Pain in joint, right knee, M25.561  Precautions/Allergies: Other medication and Statins-hmg-coa reductase inhibitors   TREATMENT PLAN:  Effective Dates:21 TO 21 (90 days). Frequency/Duration: 1 time a week for 4 weeks then every other week for 8 weeks MEDICAL/REFERRING DIAGNOSIS:  Low back pain [M54.5]   DATE OF ONSET: Chronic  REFERRING PHYSICIAN: Cristi Browning MD MD Orders: Evaluate and Treat  Return MD Appointment: not scheduled       Pre-treatment Symptoms/Complaints:  Patient reports that he is still able to get in the gym every second day and seems to being doing much better with his routine. He still has some trouble mentally due to his son going through some difficulty medical condition and lives far away. He did report slipping with socks on the other day and landing on his left knee. He was able to still get the gym and keep it loose so, it doesn't feel too bad now  Pain: Initial: Pain Intensity 1: 4 10 Post Session:  2/10   Medications Last Reviewed:  2021  Updated Objective Findings:  Restrictions along lateral IT band and vastus lateralis B, Increased superficial fascia and layer one tightness in lumbar spine around right Sacral base  TREATMENT:     THERAPEUTIC EXERCISE: (0 minutes):  Exercises per grid below to improve mobility, strength and coordination. Required minimal visual, verbal, manual and tactile cues to promote proper body alignment, promote proper body posture, promote proper body mechanics and promote proper body breathing techniques.   Progressed resistance and repetitions as indicated. Date:  7/14/21   Activity/Exercise Parameters   Core exercises Supine hook lying push into blue ball 2 x 1 min holds   Education on sit to stand (not today) X 10 with good weight shift   Stairs (not today) Working on pelvic anterior elevation on right side with knee drive and toe lift   Hip and pelvic hike Worked with bridge today   Knee extension (home) Continued with blue band at home   Weight shift  R LE planted with follow through and work on keeping right side long x 1 min   Balance (not today) Tandem stance 2 x 30 sec B   Sit to stand Just for function. MANUAL THERAPY: (30 minutes): Joint mobilization and Soft tissue mobilization was utilized and necessary because of the patient's restricted joint motion and restricted motion of soft tissue. (Used abbreviations: SF - Superficial Fascia; BC - Bony contours; MP - muscle play; IASTM - instrument-assisted soft tissue mobilization; MET - muscle energy technique; a/p - anterior to posterior; p/a - posterior to anterior; Proprioceptive neuromuscular Facilitation-PNF) Manual treatment to improve soft tissue/joint mobility deficits, tissue integrity issues, trigger points and/or pain. -Supine mobilization of right hamstring, lateral IT band, rectus femoris, adductors  -Hip ER stretch insupine  -Left and right side lie pelvic mobilization for anterior elevation and posterior depression  -PNF pattern to facilitate core into anterior elevation and posterior depression with working through end range holds and isometric reversals with the pelvis and at long full leg function  -Functional resistance through posterior depression and then hip abduction facilitation.  - Hamstring mobilization to right and left   -Patellar mobilization right and left side with left IT band mobilized today as well with mini-plunger    GAIT TRAINING: (0 min) in hallway with work on use of single point cane for balance and support.  Trying to focus on control and stability with push off through R LE and taking some pressure off midstance with the SPC. [employer] Portal  Treatment/Session Summary:    · Response to Treatment: Patient shows improvement in lumbar spine fascia with decreased restrictions. We worked more on the left knee today due to recent fall. He will follow up in two weeks  · Communication/Consultation:  None today  · Equipment provided today:  None today  · Recommendations/Intent for next treatment session: Next visit will focus on Core training/balance.     Total Treatment Billable Duration:  35 minutes of treatment,  PT Patient Time In/Time Out  Time In: 1400  Time Out: [address], SURINDER    Future Appointments   Date Time Provider Nancy Eason   8/10/2021  2:00 PM Madyson Dumont, PT [facility]   8/23/2021  2:00 PM Madyson Dumont, PT [facility]

## 2021-08-10 ENCOUNTER — HOSPITAL ENCOUNTER (OUTPATIENT)
Dept: PHYSICAL THERAPY | Age: 76
Discharge: HOME | End: 2021-08-10
Payer: MEDICARE

## 2021-08-10 PROCEDURE — 97110 THERAPEUTIC EXERCISES: CPT

## 2021-08-10 PROCEDURE — 97140 MANUAL THERAPY 1/> REGIONS: CPT

## 2021-08-10 NOTE — THERAPY RECERTIFICATION
Keren Rainey  : 1945  [place]  Secondary: Bshsi Generic [address] [facility]  at [address] [place] [address]  [phone]   [phone]          OUTPATIENT PHYSICAL THERAPY:Recertification [id number]   ICD-10: Treatment Diagnosis: Low back pain, M54.5  Difficulty walking, not elsewhere classified, R26.2  Pain in joint, right knee, M25.561  Precautions/Allergies: Other medication and Statins-hmg-coa reductase inhibitors   TREATMENT PLAN:  Effective Dates:8/10/21 TO 11/10/21 (90 days). Frequency/Duration: 1 time every other week for 12 weeks MEDICAL/REFERRING DIAGNOSIS:  Low back pain [M54.5]   DATE OF ONSET: Chronic  REFERRING PHYSICIAN: Ella Ordonez MD MD Orders: Evaluate and Treat  Return MD Appointment: not scheduled   Keren Rainey has been seen for 28 visits from 20 to 8/10/2021 for low back, right knee and R LE. Patient has performed therapeutic exercises, activities, and had manual therapy to increased strength, ROM and function. Patient has also used modalities for pain control in order to increase function. Patient has shown a decrease in function per the LEFS with scores of 35/80 this time and feels like his is having a little more trouble with the legs this week. He is planning to get back in touch with Dr. Heather Menezes about a lumbar spine injection for his stenosis. He has returned to the gym up to 4 times last week and riding the stationary bike for about 30 min now. He is feeling more sore and achy, but he is trying to get back moving. He shows continued balance dysfunction and weakness into the R LE with poor core control. We plan to have treatments every other week at this time. Patient has progressed well toward their goals and will benefit from continuing skilled PT in order to address their impairments.   Mirlande Rahman, PT, DPT, OCS, CFMT        INITIAL ASSESSMENT:  Mr. Mandie Lew presents with chronic R LE weakness and pain along with peripheral neuropathy affecting his safety with gait and function. He shows limitation in walking and unsafe gait function. He shows continued deconditioning in the LE's. He is a good candidate for skilled PT in order to address listed impairments affecting his function. Gricelda Fox, PT, DPT, OCS, CFMT      PROBLEM LIST (Impacting functional limitations):  1. Decreased Strength  2. Decreased ADL/Functional Activities  3. Decreased Transfer Abilities  4. Decreased Ambulation Ability/Technique  5. Decreased Balance  6. Increased Pain  7. Decreased Activity Tolerance  8. Decreased Flexibility/Joint Mobility INTERVENTIONS PLANNED: (Treatment may consist of any combination of the following)  1. Balance Exercise  2. Bed Mobility  3. Electrical Stimulation  4. Gait Training  5. Heat  6. Manual Therapy  7. Neuromuscular Re-education/Strengthening  8. Range of Motion (ROM)  9. Therapeutic Activites  10. Therapeutic Exercise/Strengthening     GOALS: (Goals have been discussed and agreed upon with patient.)    Discharge Goals: Time Frame: 12 weeks  1. Patient will show a greater than 10 point increase on the LEFS in order to show an increase in function (ongoing)  2. Patient will report joining a pool and walking in the water greater than 2 times a week in order to progress function (ongoing)  3. Patient will be independent in balance exercises in order to decrease fall risk (ongoing)  4. NEW GOAL:  5. Patient will report going to the gym consistently three times a week for two months. (ongoing)  6. OUTCOME MEASURE:   Tool Used: Lower Extremity Functional Scale (LEFS)  Score:  Initial: 37/80 Most Recent: 35/80 (Date: 8/10/21 )   Interpretation of Score: 20 questions each scored on a 5 point scale with 0 representing \"extreme difficulty or unable to perform\" and 4 representing \"no difficulty\". The lower the score, the greater the functional disability.  80/80 represents no disability. Minimal detectable change is 9 points. MEDICAL NECESSITY:   · Patient is expected to demonstrate progress in strength, range of motion, balance, coordination and functional technique to improve functionalmobility and safety with walking. · Patient demonstrates good rehab potential due to higher previous functional level. · Skilled intervention continues to be required due to decreased mobility and increased risk of falls. REASON FOR SERVICES/OTHER COMMENTS:  · Patient continues to require skilled intervention due to decreased mobility. Total Duration:  PT Patient Time In/Time Out  Time In: 1400  Time Out: 1455    Rehabilitation Potential For Stated Goals: Excellent  Regarding James Potocki's therapy, I certify that the treatment plan above will be carried out by a therapist or under their direction. Thank you for this referral,  Patrice Paul PT     Referring Physician Signature: Bhargav Owens MD                 PAIN/SUBJECTIVE:   Initial: Pain Intensity 1: 4 /10 Post Session:  2/10   HISTORY:   History of Injury/Illness (Reason for Referral):  Patient has had a chronic history of R LE weakness and pain since he first had his hip replacement about 5 years ago. He has since had issues with spinal stenosis, disc herniation, right knee OA and has found out recently that he has peripheral neuropathy in both feet that affect his balance. He had a fall back in February/March that was concerning for him due to the leg giving out. He also had a hospitalization due to abnormal heartbeat. He has finally stabilized with the heart. He did have an injection in his lower back (Dr. Janet Frye) in the summer due to increasing left LE weakness and pain that has helped a lot. He is not comfortable going to the gym like he use to due to Minh and has become deconditioned. He reports doing some things at home, but still just feels like he is starting from square one.  His goals are to get back to exercises and daily life without difficulty and with less fear of falling. Past Medical History/Comorbidities:   Mr. Po Valente  has a past medical history of Anxiety, Edema of both legs, Enlarged prostate, Gout, Hip pain, History of elevated glucose, History of seasonal allergies, Hypercholesterolemia, Hypertension, Keratoacanthoma, S/P total hip arthroplasty (4/6/2015), Skin neoplasm, and Spinal stenosis. He also has no past medical history of Aneurysm ([address] Arrhythmia, Arthritis, Asthma, Autoimmune disease ([address] CAD (coronary artery disease), Cancer ([address] Chronic kidney disease, Chronic obstructive pulmonary disease ([address] Chronic pain, Coagulation disorder ([address] Difficult intubation, GERD (gastroesophageal reflux disease), Heart failure ([address] Ill-defined condition, Liver disease, Malignant hyperthermia due to anesthesia, Morbid obesity ([address] Nausea & vomiting, Pseudocholinesterase deficiency, Psychiatric disorder, PUD (peptic ulcer disease), Seizures ([address] Stroke ([address] Thromboembolus ([address] Thyroid disease, Unspecified adverse effect of anesthesia, or Unspecified sleep apnea. Mr. Po Valente  has a past surgical history that includes hx tonsillectomy (as child) and hx wisdom teeth extraction (as teenager).   Social History/Living Environment:       Social History     Socioeconomic History    Marital status:      Spouse name: Not on file    Number of children: Not on file    Years of education: Not on file    Highest education level: Not on file   Occupational History    Not on file   Tobacco Use    Smoking status: Never Smoker   Substance and Sexual Activity    Alcohol use: No    Drug use: Not on file    Sexual activity: Not on file   Other Topics Concern    Not on file   Social History Narrative    Not on file     Social Determinants of Health     Financial Resource Strain:     Difficulty of Paying Living Expenses:    Food Insecurity:     Worried About Running Out of Food in the Last Year:     Ran Out of Food in the Last Year:    Transportation Needs:     Lack of Transportation (Medical):  Lack of Transportation (Non-Medical):    Physical Activity:     Days of Exercise per Week:     Minutes of Exercise per Session:    Stress:     Feeling of Stress :    Social Connections:     Frequency of Communication with Friends and Family:     Frequency of Social Gatherings with Friends and Family:     Attends Scientologist Services:     Active Member of Clubs or Organizations:     Attends Club or Organization Meetings:     Marital Status:    Intimate Partner Violence:     Fear of Current or Ex-Partner:     Emotionally Abused:     Physically Abused:     Sexually Abused:        Prior Level of Function/Work/Activity:  Independent, retired  Dominant Side:         RIGHT   Ambulatory/Rehab [facility] Risk Assessment   Risk Factors:       (1)  Gender [Male]       (5)  History of Recent Falls [w/in 3 months]       (1)  Orthostatic drop in BP Ability to Rise from Chair:       (1)  Pushes up, successful in one attempt   Falls Prevention Plan:       No modifications necessary   Total: (5 or greater = High Risk): 8   ©2010 [facility] [address] [place] [address] States Patent #[id number]. Federal Law prohibits the replication, distribution or use without written permission from [facility] [employer]   Current Medications:       Current Outpatient Medications:     ergocalciferol (ERGOCALCIFEROL) 1,250 mcg (50,000 unit) capsule, Take 50,000 Units by mouth every seven (7) days. , Disp: , Rfl:     furosemide (LASIX) 20 mg tablet, Take 20 mg by mouth daily. , Disp: , Rfl:     lisinopriL (PRINIVIL, ZESTRIL) 20 mg tablet, Take 20 mg by mouth daily. , Disp: , Rfl:     celecoxib (CELEBREX) 200 mg capsule, Take 200 mg by mouth daily. , Disp: , Rfl:     allopurinoL (ZYLOPRIM) 300 mg tablet, Take 300 mg by mouth daily. , Disp: , Rfl:     spironolactone (ALDACTONE) 25 mg tablet, Take 25 mg by mouth daily., Disp: , Rfl:     busPIRone (BUSPAR) 7.5 mg tablet, Take 7.5 mg by mouth two (2) times a day., Disp: , Rfl:     hydrOXYzine HCL (ATARAX) 25 mg tablet, Take 25 mg by mouth two (2) times a day., Disp: , Rfl:     POTASSIUM CHLORIDE PO, Take 10 mEq by mouth three (3) days a week., Disp: , Rfl:     cloNIDine (CATAPRES) 0.3 mg/24 hr, 1 Patch by TransDERmal route every seven (7) days. , Disp: , Rfl:     prazosin (MINIPRESS) 1 mg capsule, Take 1 mg by mouth two (2) times a day. TAKE AM OF SURGERY WITH SMALL SIP OF WATER   Indications: enlarged prostate, Disp: , Rfl:     finasteride (PROSCAR) 5 mg tablet, Take 5 mg by mouth daily. TAKE AM OF SURGERY WITH SMALL SIP OF WATER   Indications: BENIGN PROSTATIC HYPERTROPHY, Disp: , Rfl:    Date Last Reviewed:  8/10/2021     Number of Personal Factors/Comorbidities that affect the Plan of Care: 1-2: MODERATE COMPLEXITY   EXAMINATION:   Observation/Orthostatic Postural Assessment:          Patient shows increased obesity with more abdominal weight and increased lordosis. He shows increased small step length with short distances. He tends to show increased right pelvic rotation in standing and gait  Palpation:          Tightness along right paraspinals and into low back and pelvis. He shows tightness in lateral right hip and leg. Some noted atrophy in left glut compared to right side.  -Restricted and limited in hamstring mobility B with increased right restriction more than left  -Overall right hip motion appears good for artificial ANUJ.   -Right knee shows increased restrictions at patella and medial and lateral joint line with pain with testing due to arthritic condition  -Increased swelling in B LE and ankles with compression socks worn  ROM:          Patient shows some decreased hamstrings at 70 deg right and 75 deg left  -Flexion in lumbar spine to 30% in standing with difficulty with balance  Strength:          Hip flexion at 3+/5  On right and 4-/5 on left  Right knee extension at 4/5  Left knee extension at 4+/5  Right knee flexion at 4-/5  Left at 4/5  right hip abduction 4-/5  Right hip extension 4/5  Neurological Screen:        Sensation: Patient has peripheral neuropathy on the plantar surface of both feet  Nerve conduction did not reveal specific radiculopathy  Functional Mobility:         Gait/Ambulation:  Patient shows increased Trendelenburg pattern B with increased right pelvic rotation that maintains during gait with right LE lagging some with shortened stance phase B. Patient has increased knee hike due to neuropathy. Transfers:  Patient shows ability to sit to stand with some aid from hands        Bed Mobility:  Patient has some difficulty with supine to sit with needs for some aid  Balance:          Poor for single leg stance, tandem stance and turning   Body Structures Involved:  1. Nerves  2. Thoracic Cage  3. Bones  4. Joints  5. Muscles  6. Ligaments Body Functions Affected:  1. Sensory/Pain  2. Neuromusculoskeletal  3. Movement Related Activities and Participation Affected:  1. General Tasks and Demands  2. Mobility  3. Self Care  4. Domestic Life  5.  Interpersonal Interactions and Relationships   Number of elements (examined above) that affect the Plan of Care: 4+: HIGH COMPLEXITY   CLINICAL PRESENTATION:   Presentation: Stable and uncomplicated: LOW COMPLEXITY   CLINICAL DECISION MAKING:   Use of outcome tool(s) and clinical judgement create a POC that gives a: Clear prediction of patient's progress: LOW COMPLEXITY

## 2021-08-10 NOTE — PROGRESS NOTES
Susan Rankin  : 1945  [place]  Secondary: Bshsi Generic [address]  at [facility]  [address] [place] [address]  [phone]   [phone]        OUTPATIENT PHYSICAL THERAPY: Daily Treatment Note 8/10/2021  Visit Count:  40    ICD-10: Treatment Diagnosis: Low back pain, M54.5  Difficulty walking, not elsewhere classified, R26.2  Pain in joint, right knee, M25.561  Precautions/Allergies: Other medication and Statins-hmg-coa reductase inhibitors   TREATMENT PLAN:  Effective Dates:8/10/21 TO 11/10/21 (90 days). Frequency/Duration: 1 time every other week for 12 weeks MEDICAL/REFERRING DIAGNOSIS:  Low back pain [M54.5]   DATE OF ONSET: Chronic  REFERRING PHYSICIAN: Kirstie Terrell MD MD Orders: Evaluate and Treat  Return MD Appointment: not scheduled       Pre-treatment Symptoms/Complaints:  Patient reports that he is still able to get in the gym every second day still and feels some better for it. He is going to reach out to Dr. Jacy Stover about a lumbar injection again due to the legs just not feeling controled. Pain: Initial: Pain Intensity 1: 4 /10 Post Session:  2/10   Medications Last Reviewed:  8/10/2021  Updated Objective Findings:  Restrictions along lateral IT band and vastus lateralis B, Increased superficial fascia and layer one tightness in lumbar spine around right Sacral base  TREATMENT:     THERAPEUTIC EXERCISE: (10 minutes):  Exercises per grid below to improve mobility, strength and coordination. Required minimal visual, verbal, manual and tactile cues to promote proper body alignment, promote proper body posture, promote proper body mechanics and promote proper body breathing techniques. Progressed resistance and repetitions as indicated.    Date:  21   Activity/Exercise Parameters   Core exercises Supine hook lying push into blue ball 2 x 1 min holds  Side lie hip flexion isometric on left side 2 x 1 min  Pelvic tilt posterior in supine with breathing control x 3 min   Education on sit to stand (not today) X 10 with good weight shift   Stairs (not today) Working on pelvic anterior elevation on right side with knee drive and toe lift   Hip and pelvic hike Worked with bridge today   Knee extension (home) Continued with blue band at home   Weight shift  R LE planted with follow through and work on keeping right side long x 1 min   Balance (not today) Tandem stance 2 x 30 sec B   Sit to stand Just for function. MANUAL THERAPY: (30 minutes): Joint mobilization and Soft tissue mobilization was utilized and necessary because of the patient's restricted joint motion and restricted motion of soft tissue. (Used abbreviations: SF - Superficial Fascia; BC - Bony contours; MP - muscle play; IASTM - instrument-assisted soft tissue mobilization; MET - muscle energy technique; a/p - anterior to posterior; p/a - posterior to anterior; Proprioceptive neuromuscular Facilitation-PNF) Manual treatment to improve soft tissue/joint mobility deficits, tissue integrity issues, trigger points and/or pain. -Supine mobilization of right hamstring, lateral IT band, rectus femoris, adductors  -Hip ER stretch insupine  -Left and right side lie pelvic mobilization for anterior elevation and posterior depression  -PNF pattern to facilitate core into anterior elevation and posterior depression with working through end range holds and isometric reversals with the pelvis and at long full leg function  -Functional resistance through posterior depression and then hip abduction facilitation.  -Patellar mobilization right and left side with left IT band mobilized today as well with mini-plunger    GAIT TRAINING: (0 min) in hallway with work on use of single point cane for balance and support. Trying to focus on control and stability with push off through R LE and taking some pressure off midstance with the SPC.     Ayaan Cameron Portal  Treatment/Session Summary:    · Response to Treatment: Patient shows improvement in lumbar spine mobility and pelvic and hip control after getting the core working. Gait function continues to be difficult for him, but improved since he started back to the gym. We plan to follow up in two weeks. · Communication/Consultation:  None today  · Equipment provided today:  None today  · Recommendations/Intent for next treatment session: Next visit will focus on Core training/balance.     Total Treatment Billable Duration:  40 minutes of treatment,  PT Patient Time In/Time Out  Time In: 1400  Time Out: [address], PT    Future Appointments   Date Time Provider Nancy Lyoni   8/27/2021  1:00 PM Saumya Baig, PT [facility]   9/3/2021 10:30 AM Rangel Liang MD [facility]

## 2021-08-23 ENCOUNTER — APPOINTMENT (OUTPATIENT)
Dept: PHYSICAL THERAPY | Age: 76
End: 2021-08-23
Payer: MEDICARE

## 2021-08-27 ENCOUNTER — HOSPITAL ENCOUNTER (OUTPATIENT)
Dept: PHYSICAL THERAPY | Age: 76
Discharge: HOME | End: 2021-08-27
Payer: MEDICARE

## 2021-08-27 NOTE — PROGRESS NOTES
Tran Gil  : 1945  [place]  Secondary: Bshsi Generic [address]  at [address] [place] [address]  [phone]   [phone]          OUTPATIENT DAILY NOTE    NAME/AGE/GENDER: Tran Gil is a 64 y.o. male. DATE: 2021    SUBJECTIVE:  Patient canceled his appointment today due to just getting back from being out of town. Will plan to follow up on next scheduled visit.     Anusha Monge PT,

## 2021-08-31 ENCOUNTER — HOSPITAL ENCOUNTER (OUTPATIENT)
Dept: PHYSICAL THERAPY | Age: 76
Discharge: HOME | End: 2021-08-31
Payer: MEDICARE

## 2021-08-31 PROCEDURE — 97140 MANUAL THERAPY 1/> REGIONS: CPT

## 2021-08-31 NOTE — PROGRESS NOTES
Rolan Sharma  : 1945  [place]  Secondary: Bshsi Generic [address] at [facility]  [address] [place] [address]  [phone]   [id number]        OUTPATIENT PHYSICAL THERAPY: Daily Treatment Note 2021  Visit Count:  41    ICD-10: Treatment Diagnosis: Low back pain, M54.5  Difficulty walking, not elsewhere classified, R26.2  Pain in joint, right knee, M25.561  Precautions/Allergies: Other medication and Statins-hmg-coa reductase inhibitors   TREATMENT PLAN:  Effective Dates:8/10/21 TO 11/10/21 (90 days). Frequency/Duration: 1 time every other week for 12 weeks MEDICAL/REFERRING DIAGNOSIS:  Low back pain [M54.5]   DATE OF ONSET: Chronic  REFERRING PHYSICIAN: Diogo Page MD MD Orders: Evaluate and Treat  Return MD Appointment: not scheduled       Pre-treatment Symptoms/Complaints:  Patient reports that he had a good trip to go see his son who is having difficulty due to a cancer. He reports feeling ok with his travels and no real pain. He did have another injection last week that helps some with pain. He noticed his legs not as swollen today either. He is   Pain: Initial: Pain Intensity 1: 4 /10 Post Session:  2/10   Medications Last Reviewed:  2021  Updated Objective Findings:  Restrictions along lateral IT band and vastus lateralis B, Increased superficial fascia and layer one tightness in lumbar spine around right Sacral base  TREATMENT:     THERAPEUTIC EXERCISE: (5 minutes):  Exercises per grid below to improve mobility, strength and coordination. Required minimal visual, verbal, manual and tactile cues to promote proper body alignment, promote proper body posture, promote proper body mechanics and promote proper body breathing techniques. Progressed resistance and repetitions as indicated.    Date:  21   Activity/Exercise Parameters   Core exercises Supine hook lying push into blue ball 2 x 1 min holds  Side lie hip flexion isometric on left side 2 x 1 min  Pelvic tilt posterior in supine with breathing control x 3 min   Education on sit to stand (not today) X 10 with good weight shift   Stairs (not today) Working on pelvic anterior elevation on right side with knee drive and toe lift   Hip and pelvic hike Worked with bridge today   Knee extension (home) Continued with blue band at home   Weight shift  R LE planted with follow through and work on keeping right side long x 1 min   Balance (not today) Tandem stance 2 x 30 sec B   Sit to stand Just for function. MANUAL THERAPY: (30 minutes): Joint mobilization and Soft tissue mobilization was utilized and necessary because of the patient's restricted joint motion and restricted motion of soft tissue. (Used abbreviations: SF - Superficial Fascia; BC - Bony contours; MP - muscle play; IASTM - instrument-assisted soft tissue mobilization; MET - muscle energy technique; a/p - anterior to posterior; p/a - posterior to anterior; Proprioceptive neuromuscular Facilitation-PNF) Manual treatment to improve soft tissue/joint mobility deficits, tissue integrity issues, trigger points and/or pain. -Supine mobilization of right hamstring, lateral IT band, rectus femoris, adductors  -Hip ER stretch insupine  -Left and right side lie pelvic mobilization for anterior elevation and posterior depression  -PNF pattern to facilitate core into anterior elevation and posterior depression with working through end range holds and isometric reversals with the pelvis and at long full leg function  -Functional resistance through posterior depression and then hip abduction facilitation.  -Patellar mobilization right and left side with left IT band mobilized today as well with mini-plunger    GAIT TRAINING: (0 min) in hallway with work on use of single point cane for balance and support.  Trying to focus on control and stability with push off through R LE and taking some pressure off midstance with the SPC. [facility]  Treatment/Session Summary:    · Response to Treatment: Patient shows improvement in lumbar spine mobility and pelvic and hip control after getting the core working. Still has a lot of restrictions along right lumbar paraspinals and we are trying to maintain his core as well as we can. He still has difficulty with walking some days and still feels fatigued a lot of the time. · Communication/Consultation:  None today  · Equipment provided today:  None today  · Recommendations/Intent for next treatment session: Next visit will focus on Core training/balance.     Total Treatment Billable Duration:  35 minutes of treatment,   PT Patient Time In/Time Out  Time In: 1400  Time Out: [address], PT    Future Appointments   Date Time Provider Nancy Eason   9/3/2021 10:30 AM Sonya Raines MD [facility] [place]   9/17/2021  2:00 PM Madyson Dumont, SURINDER [facility]

## 2021-09-16 ENCOUNTER — HOSPITAL ENCOUNTER (OUTPATIENT)
Dept: PHYSICAL THERAPY | Age: 76
Discharge: HOME | End: 2021-09-16
Payer: MEDICARE

## 2021-09-16 PROCEDURE — 97140 MANUAL THERAPY 1/> REGIONS: CPT

## 2021-09-16 NOTE — PROGRESS NOTES
Benigno Pride  : 1945  [place]  Secondary: Bshsi Generic [address] [facility]  at [facility]  [address] [place] [address]  [phone]   [phone]        OUTPATIENT PHYSICAL THERAPY: Daily Treatment Note 2021  Visit Count:  42    ICD-10: Treatment Diagnosis: Low back pain, M54.5  Difficulty walking, not elsewhere classified, R26.2  Pain in joint, right knee, M25.561  Precautions/Allergies: Other medication and Statins-hmg-coa reductase inhibitors   TREATMENT PLAN:  Effective Dates:8/10/21 TO 11/10/21 (90 days). Frequency/Duration: 1 time every other week for 12 weeks MEDICAL/REFERRING DIAGNOSIS:  Low back pain [M54.5]   DATE OF ONSET: Chronic  REFERRING PHYSICIAN: Jina Neal MD MD Orders: Evaluate and Treat  Return MD Appointment: not scheduled       Pre-treatment Symptoms/Complaints:  Patient reports that he is still having trouble with his walking, but doing his best. He has been into the gym more and is still keeping the swelling out of the LE  Pain: Initial: Pain Intensity 1: 5 10 Post Session:  2/10   Medications Last Reviewed:  2021  Updated Objective Findings:  Restrictions along lateral IT band and vastus lateralis B, Increased superficial fascia and layer one tightness in lumbar spine around right Sacral base  TREATMENT:     THERAPEUTIC EXERCISE: (0 minutes):  Exercises per grid below to improve mobility, strength and coordination. Required minimal visual, verbal, manual and tactile cues to promote proper body alignment, promote proper body posture, promote proper body mechanics and promote proper body breathing techniques. Progressed resistance and repetitions as indicated.    Date:  21   Activity/Exercise Parameters   Core exercises Supine hook lying push into blue ball 2 x 1 min holds  Side lie hip flexion isometric on left side 2 x 1 min  Pelvic tilt posterior in supine with breathing control x 3 min   Education on sit to stand (not today) X 10 with good weight shift   Stairs (not today) Working on pelvic anterior elevation on right side with knee drive and toe lift   Hip and pelvic hike Worked with bridge today   Knee extension (home) Continued with blue band at home   Weight shift  R LE planted with follow through and work on keeping right side long x 1 min   Balance (not today) Tandem stance 2 x 30 sec B   Sit to stand Just for function. MANUAL THERAPY: (30 minutes): Joint mobilization and Soft tissue mobilization was utilized and necessary because of the patient's restricted joint motion and restricted motion of soft tissue. (Used abbreviations: SF - Superficial Fascia; BC - Bony contours; MP - muscle play; IASTM - instrument-assisted soft tissue mobilization; MET - muscle energy technique; a/p - anterior to posterior; p/a - posterior to anterior; Proprioceptive neuromuscular Facilitation-PNF) Manual treatment to improve soft tissue/joint mobility deficits, tissue integrity issues, trigger points and/or pain. -Supine mobilization of right hamstring, lateral IT band, rectus femoris, adductors  -Hip ER stretch insupine  -Left and right side lie pelvic mobilization for anterior elevation and posterior depression  -PNF pattern to facilitate core into anterior elevation and posterior depression with working through end range holds and isometric reversals with the pelvis and at long full leg function  -Functional resistance through posterior depression and then hip abduction facilitation.  -Patellar mobilization right and left side with left IT band mobilized today as well with mini-plunger    GAIT TRAINING: (0 min) in hallway with work on use of single point cane for balance and support. Trying to focus on control and stability with push off through R LE and taking some pressure off midstance with the [facility].     [facility] Portal  Treatment/Session Summary:    · Response to Treatment: Patient shows good mobility post treatment. He is scheduled for a follow up with Dr. Dangelo Guerra and possible injection. He is also looking at what surgical options there may be for him due to the stenosis  · Communication/Consultation:  None today  · Equipment provided today:  None today  · Recommendations/Intent for next treatment session: Next visit will focus on Core training/balance.     Total Treatment Billable Duration:  35 minutes of treatment,   PT Patient Time In/Time Out  Time In: 7973  Time Out: [address], PT    Future Appointments   Date Time Provider Nancy Eason   9/20/2021 10:30 AM Tyler Mena MD [facility]   10/1/2021  2:00 PM Aislinn Salter, PT [facility]

## 2021-10-01 ENCOUNTER — HOSPITAL ENCOUNTER (OUTPATIENT)
Dept: PHYSICAL THERAPY | Age: 76
Discharge: HOME | End: 2021-10-01
Payer: MEDICARE

## 2021-10-01 PROCEDURE — 97140 MANUAL THERAPY 1/> REGIONS: CPT

## 2021-10-01 NOTE — PROGRESS NOTES
Celestino Egan  : 1945  [place]  Secondary: Bshsi Generic [address] [facility]  at [facility]  [address] [place] [address]  [phone]   [phone]        OUTPATIENT PHYSICAL THERAPY: Daily Treatment Note 10/1/2021  Visit Count:  43    ICD-10: Treatment Diagnosis: Low back pain, M54.5  Difficulty walking, not elsewhere classified, R26.2  Pain in joint, right knee, M25.561  Precautions/Allergies: Other medication and Statins-hmg-coa reductase inhibitors   TREATMENT PLAN:  Effective Dates:8/10/21 TO 11/10/21 (90 days). Frequency/Duration: 1 time every other week for 12 weeks MEDICAL/REFERRING DIAGNOSIS:  Low back pain [M54.5]   DATE OF ONSET: Chronic  REFERRING PHYSICIAN: Cm Mcnally MD MD Orders: Evaluate and Treat  Return MD Appointment: not scheduled       Pre-treatment Symptoms/Complaints:  Patient reports that he had an injection last week and it felt much better for a few days. He still has trouble on most days with his legs, and wants to try to start to do more  Pain: Initial: Pain Intensity 1: 10 Post Session:  2/10   Medications Last Reviewed:  10/1/2021  Updated Objective Findings:  Restrictions along lateral IT band and vastus lateralis B, Increased superficial fascia and layer one tightness in lumbar spine around right Sacral base  TREATMENT:     THERAPEUTIC EXERCISE: (0 minutes):  Exercises per grid below to improve mobility, strength and coordination. Required minimal visual, verbal, manual and tactile cues to promote proper body alignment, promote proper body posture, promote proper body mechanics and promote proper body breathing techniques. Progressed resistance and repetitions as indicated.    Date:  21   Activity/Exercise Parameters   Core exercises Supine hook lying push into blue ball 2 x 1 min holds  Side lie hip flexion isometric on left side 2 x 1 min  Pelvic tilt posterior in supine with breathing control x 3 min   Education on sit to stand (not today) X 10 with good weight shift   Stairs (not today) Working on pelvic anterior elevation on right side with knee drive and toe lift   Hip and pelvic hike Worked with bridge today   Knee extension (home) Continued with blue band at home   Weight shift  R LE planted with follow through and work on keeping right side long x 1 min   Balance (not today) Tandem stance 2 x 30 sec B   Sit to stand Just for function. MANUAL THERAPY: (30 minutes): Joint mobilization and Soft tissue mobilization was utilized and necessary because of the patient's restricted joint motion and restricted motion of soft tissue. (Used abbreviations: SF - Superficial Fascia; BC - Bony contours; MP - muscle play; IASTM - instrument-assisted soft tissue mobilization; MET - muscle energy technique; a/p - anterior to posterior; p/a - posterior to anterior; Proprioceptive neuromuscular Facilitation-PNF) Manual treatment to improve soft tissue/joint mobility deficits, tissue integrity issues, trigger points and/or pain. -Supine mobilization of right hamstring, lateral IT band, rectus femoris, adductors  -Hip ER stretch insupine  -Left and right side lie pelvic mobilization for anterior elevation and posterior depression  -PNF pattern to facilitate core into anterior elevation and posterior depression with working through end range holds and isometric reversals with the pelvis and at long full leg function  -Functional resistance through posterior depression and then hip abduction facilitation.  -Patellar mobilization right and left side with left IT band mobilized today as well with mini-plunger    GAIT TRAINING: (0 min) in hallway with work on use of single point cane for balance and support. Trying to focus on control and stability with push off through R LE and taking some pressure off midstance with the [facility].     [facility] Portal  Treatment/Session Summary:    · Response to Treatment: Patient shows good mobility post treatment. Try to progress gym exercises next visit to gain some strength back in his LE to aid in gait function  · Communication/Consultation:  None today  · Equipment provided today:  None today  · Recommendations/Intent for next treatment session: Next visit will focus on Core training/balance.     Total Treatment Billable Duration:  35 minutes of treatment,   PT Patient Time In/Time Out  Time In: 7205  Time Out: Romeo Aleman, PT    Future Appointments   Date Time Provider Nancy Eason   10/19/2021  2:00 PM Jeanne Gonzalez, PT [facility]   11/2/2021  2:00 PM Jeanne Gonzalez, PT [facility]

## 2021-10-27 ENCOUNTER — HOSPITAL ENCOUNTER (OUTPATIENT)
Dept: PHYSICAL THERAPY | Age: 76
Discharge: HOME | End: 2021-10-27
Payer: MEDICARE

## 2021-10-27 PROCEDURE — 97140 MANUAL THERAPY 1/> REGIONS: CPT

## 2021-10-27 PROCEDURE — 97110 THERAPEUTIC EXERCISES: CPT

## 2021-10-27 NOTE — PROGRESS NOTES
Orethel Zen  : 1945  [place]  Secondary: Bshsi Generic [address] [facility]  at [facility]  [address] [place] [address]  [phone]   [phone]        OUTPATIENT PHYSICAL THERAPY: Daily Treatment Note 10/27/2021  Visit Count:  44    ICD-10: Treatment Diagnosis: Low back pain, M54.5  Difficulty walking, not elsewhere classified, R26.2  Pain in joint, right knee, M25.561  Precautions/Allergies: Other medication and Statins-hmg-coa reductase inhibitors   TREATMENT PLAN:  Effective Dates:8/10/21 TO 11/10/21 (90 days). Frequency/Duration: 1 time every other week for 12 weeks MEDICAL/REFERRING DIAGNOSIS:  Low back pain [M54.5]   DATE OF ONSET: Chronic  REFERRING PHYSICIAN: Elinor Marino MD MD Orders: Evaluate and Treat  Return MD Appointment: not scheduled       Pre-treatment Symptoms/Complaints:  Patient reports that he has been consistent since last time coming to the gym every other day, but it is still hard to get any energy going  Pain: Initial: Pain Intensity 1: 3 /10 Post Session:  2/10   Medications Last Reviewed:  10/27/2021  Updated Objective Findings:  Restrictions along lateral IT band and vastus lateralis B, Increased superficial fascia and layer one tightness in lumbar spine around right Sacral base  TREATMENT:     THERAPEUTIC EXERCISE: (10 minutes):  Exercises per grid below to improve mobility, strength and coordination. Required minimal visual, verbal, manual and tactile cues to promote proper body alignment, promote proper body posture, promote proper body mechanics and promote proper body breathing techniques. Progressed resistance and repetitions as indicated.    Date:  10/27/21   Activity/Exercise Parameters   Core exercises Supine hook lying push into blue ball 2 x 1 min holds  Side lie hip flexion isometric on left side 2 x 1 min  Pelvic tilt posterior in supine with breathing control x 3 min   Education on sit to stand (not today) X 10 with good weight shift   Knee extension (home) Continued with blue band at home   Weight shift  R LE planted with follow through and work on keeping right side long x 1 min   Balance (not today) Tandem stance 2 x 30 sec B   Sit to stand Just for function. Hip adduction For core and hip response 10 x 10 sec hold           MANUAL THERAPY: (30 minutes): Joint mobilization and Soft tissue mobilization was utilized and necessary because of the patient's restricted joint motion and restricted motion of soft tissue. (Used abbreviations: SF - Superficial Fascia; BC - Bony contours; MP - muscle play; IASTM - instrument-assisted soft tissue mobilization; MET - muscle energy technique; a/p - anterior to posterior; p/a - posterior to anterior; Proprioceptive neuromuscular Facilitation-PNF) Manual treatment to improve soft tissue/joint mobility deficits, tissue integrity issues, trigger points and/or pain. -Supine mobilization of right hamstring, lateral IT band, rectus femoris, adductors  -Hip ER stretch insupine  -Left and right side lie pelvic mobilization for anterior elevation and posterior depression  -PNF pattern to facilitate core into anterior elevation and posterior depression with working through end range holds and isometric reversals with the pelvis and at long full leg function  -Functional resistance through posterior depression and then hip abduction facilitation. GAIT TRAINING: (0 min) in hallway with work on use of single point cane for balance and support. Trying to focus on control and stability with push off through R LE and taking some pressure off midstance with the [facility]. [employer] Portal  Treatment/Session Summary:    · Response to Treatment: Patient shows good mobility post treatment.  We discussed going to the gym two days in a row to try to get him back to where he wants to be  · Communication/Consultation:  None today  · Equipment provided today:  None today  · Recommendations/Intent for next treatment session: Next visit will focus on Core training/balance.     Total Treatment Billable Duration:  40 minutes of treatment,   PT Patient Time In/Time Out  Time In: 0883  Time Out: [address] Agarwal, SURINDER    Future Appointments   Date Time Provider Nancy Eason   11/2/2021  2:00 PM Anika Villegas, PT [facility]

## 2021-11-02 ENCOUNTER — HOSPITAL ENCOUNTER (OUTPATIENT)
Dept: PHYSICAL THERAPY | Age: 76
Discharge: HOME | End: 2021-11-02
Payer: MEDICARE

## 2021-11-02 NOTE — PROGRESS NOTES
Joaquina Carlson  : 1945  [place]  Secondary: Bshsi Generic [address] [facility]  at [address] [place] [address]  [phone]   [phone]          OUTPATIENT DAILY NOTE    NAME/AGE/GENDER: Joaquina Carlson is a 64 y.o. male. DATE: 2021    SUBJECTIVE:  Patient canceled his appointment today due to schedule conflict. Will plan to follow up on next scheduled visit.     Laura Bethea PT,

## 2021-11-17 ENCOUNTER — HOSPITAL ENCOUNTER (OUTPATIENT)
Dept: PHYSICAL THERAPY | Age: 76
Discharge: HOME | End: 2021-11-17
Payer: MEDICARE

## 2021-11-17 PROCEDURE — 97110 THERAPEUTIC EXERCISES: CPT

## 2021-11-17 PROCEDURE — 97140 MANUAL THERAPY 1/> REGIONS: CPT

## 2021-11-17 NOTE — PROGRESS NOTES
Sandra Ortiz  : 1945  [place]  Secondary: Bshsi Generic [address] [facility]  at [facility]  [address] [place] [address], [facility], [address] [place] [address]  [phone]   [phone]        OUTPATIENT PHYSICAL THERAPY: Daily Treatment Note 2021  Visit Count:  45    ICD-10: Treatment Diagnosis: Low back pain, M54.5  Difficulty walking, not elsewhere classified, R26.2  Pain in joint, right knee, M25.561  Precautions/Allergies: Other medication and Statins-hmg-coa reductase inhibitors   TREATMENT PLAN:  Effective Dates:21 TO 22 . Frequency/Duration: 1 time every other week for 12 weeks MEDICAL/REFERRING DIAGNOSIS:  Low back pain [M54.5]   DATE OF ONSET: Chronic  REFERRING PHYSICIAN: Rex Matias MD MD Orders: Evaluate and Treat  Return MD Appointment: not scheduled       Pre-treatment Symptoms/Complaints:  Patient reports that he has been consistent since last time coming to the gym as much as he can. He is feeling better when he does make it to the gym than before. Pain: Initial: Pain Intensity 1: 4 /10 Post Session:  2/10   Medications Last Reviewed:  2021  Updated Objective Findings:  Restrictions along lateral IT band and vastus lateralis B, Increased superficial fascia and layer one tightness in lumbar spine around right Sacral base  TREATMENT:     THERAPEUTIC EXERCISE: (10 minutes):  Exercises per grid below to improve mobility, strength and coordination. Required minimal visual, verbal, manual and tactile cues to promote proper body alignment, promote proper body posture, promote proper body mechanics and promote proper body breathing techniques. Progressed resistance and repetitions as indicated.    Date:  10/27/21   Activity/Exercise Parameters   Core exercises Supine hook lying push into blue ball 2 x 1 min holds  Side lie hip flexion isometric on left side 2 x 1 min  Pelvic tilt posterior in supine with breathing control x 3 min  Seated pushing ball into thighs for core control in sitting   Education on sit to stand (not today) X 10 with good weight shift   Knee extension (home) Continued with blue band at home   Weight shift  R LE planted with follow through and work on keeping right side long x 1 min   Balance (not today) Tandem stance 2 x 30 sec B   Sit to stand Just for function. Hip adduction For core and hip response 10 x 10 sec hold           MANUAL THERAPY: (30 minutes): Joint mobilization and Soft tissue mobilization was utilized and necessary because of the patient's restricted joint motion and restricted motion of soft tissue. (Used abbreviations: SF - Superficial Fascia; BC - Bony contours; MP - muscle play; IASTM - instrument-assisted soft tissue mobilization; MET - muscle energy technique; a/p - anterior to posterior; p/a - posterior to anterior; Proprioceptive neuromuscular Facilitation-PNF) Manual treatment to improve soft tissue/joint mobility deficits, tissue integrity issues, trigger points and/or pain. -Supine mobilization of right hamstring, lateral IT band, rectus femoris, adductors  -Hip ER stretch insupine  -Left and right side lie pelvic mobilization for anterior elevation and posterior depression  -PNF pattern to facilitate core into anterior elevation and posterior depression with working through end range holds and isometric reversals with the pelvis and at long full leg function  -Functional resistance through posterior depression and then hip abduction facilitation. GAIT TRAINING: (0 min) in hallway with work on use of single point cane for balance and support. Trying to focus on control and stability with push off through R LE and taking some pressure off midstance with the [facility]. [employer] Portal  Treatment/Session Summary:    · Response to Treatment: Patient shows good mobility post treatment.  His gait function improved and he shows a little better bed mobility with turning today  · Communication/Consultation:  None today  · Equipment provided today:  None today  · Recommendations/Intent for next treatment session: Next visit will focus on Core training/balance.     Total Treatment Billable Duration:  40 minutes of treatment,   PT Patient Time In/Time Out  Time In: 1400  Time Out: [address], PT    Future Appointments   Date Time Provider Nancy Eason   12/7/2021  2:00 PM Marina Lopez, PT [facility]   12/21/2021  3:00 PM Marina Lopez PT [facility]

## 2021-11-17 NOTE — THERAPY RECERTIFICATION
Joaquina Carlson  : 1945  [place]  Secondary: Bshsi Generic [address]  at [address] [place] [address]  [phone]   [phone]          OUTPATIENT PHYSICAL THERAPY:Recertification    ICD-10: Treatment Diagnosis: Low back pain, M54.5  Difficulty walking, not elsewhere classified, R26.2  Pain in joint, right knee, M25.561  Precautions/Allergies: Other medication and Statins-hmg-coa reductase inhibitors   TREATMENT PLAN:  Effective Dates:21 TO 22 . Frequency/Duration: 1 time every other week for 12 weeks MEDICAL/REFERRING DIAGNOSIS:  Low back pain [M54.50]   DATE OF ONSET: Chronic  REFERRING PHYSICIAN: Agnieszka Heller MD MD Orders: Evaluate and Treat  Return MD Appointment: not scheduled   Joaquina Carlson has been seen for 55 visits from 20 to 2021 for low back, right knee and R LE. Patient has performed therapeutic exercises, activities, and had manual therapy to increased strength, ROM and function. Patient has also used modalities for pain control in order to increase function. Patient has shown a decrease in function per the LEFS with scores of 48/80 this time and feels like his is having a little more trouble with the legs this week. He is planning to get back in touch with Dr. Rose Mary Rodríguez about a lumbar spine injection for his stenosis. He has returned to the gym up to 4 times last week and riding the stationary bike for about 30 min now. He is feeling more sore and achy, but he is trying to get back moving. He shows continued balance dysfunction and weakness into the R LE with poor core control. We plan to have treatments every other week at this time. Patient has progressed well toward their goals and will benefit from continuing skilled PT in order to address their impairments.   Luiza Bradford, PT, DPT, OCS, CFMT        INITIAL ASSESSMENT:  Mr. Nolan Espinoza presents with chronic R LE weakness and pain along with peripheral neuropathy affecting his safety with gait and function. He shows limitation in walking and unsafe gait function. He shows continued deconditioning in the LE's. He is a good candidate for skilled PT in order to address listed impairments affecting his function. Jose Richards, PT, DPT, OCS, CFMT      PROBLEM LIST (Impacting functional limitations):  1. Decreased Strength  2. Decreased ADL/Functional Activities  3. Decreased Transfer Abilities  4. Decreased Ambulation Ability/Technique  5. Decreased Balance  6. Increased Pain  7. Decreased Activity Tolerance  8. Decreased Flexibility/Joint Mobility INTERVENTIONS PLANNED: (Treatment may consist of any combination of the following)  1. Balance Exercise  2. Bed Mobility  3. Electrical Stimulation  4. Gait Training  5. Heat  6. Manual Therapy  7. Neuromuscular Re-education/Strengthening  8. Range of Motion (ROM)  9. Therapeutic Activites  10. Therapeutic Exercise/Strengthening     GOALS: (Goals have been discussed and agreed upon with patient.)    Discharge Goals: Time Frame: 12 weeks  1. Patient will show a greater than 10 point increase on the LEFS in order to show an increase in function (MET-11/17/21)  2. Patient will report joining a pool and walking in the water greater than 2 times a week in order to progress function (ongoing)  3. Patient will be independent in balance exercises in order to decrease fall risk (ongoing)  4. NEW GOAL:  5. Patient will report going to the gym consistently three times a week for two months. (ongoing)  6. OUTCOME MEASURE:   Tool Used: Lower Extremity Functional Scale (LEFS)  Score:  Initial: 37/80 Most Recent: 48/80 (Date: 11/17/21 )   Interpretation of Score: 20 questions each scored on a 5 point scale with 0 representing \"extreme difficulty or unable to perform\" and 4 representing \"no difficulty\". The lower the score, the greater the functional disability. 80/80 represents no disability. Minimal detectable change is 9 points. MEDICAL NECESSITY:   · Patient is expected to demonstrate progress in strength, range of motion, balance, coordination and functional technique to improve functionalmobility and safety with walking. · Patient demonstrates good rehab potential due to higher previous functional level. · Skilled intervention continues to be required due to decreased mobility and increased risk of falls. REASON FOR SERVICES/OTHER COMMENTS:  · Patient continues to require skilled intervention due to decreased mobility. Total Duration:  PT Patient Time In/Time Out  Time In: 1400  Time Out: 1500    Rehabilitation Potential For Stated Goals: Excellent  Regarding James Potocki's therapy, I certify that the treatment plan above will be carried out by a therapist or under their direction. Thank you for this referral,  Lea Christensen, SURINDER     Referring Physician Signature: Amando Neff MD                 PAIN/SUBJECTIVE:   Initial: Pain Intensity 1: 4 /10 Post Session:  2/10   HISTORY:   History of Injury/Illness (Reason for Referral):  Patient has had a chronic history of R LE weakness and pain since he first had his hip replacement about 5 years ago. He has since had issues with spinal stenosis, disc herniation, right knee OA and has found out recently that he has peripheral neuropathy in both feet that affect his balance. He had a fall back in February/March that was concerning for him due to the leg giving out. He also had a hospitalization due to abnormal heartbeat. He has finally stabilized with the heart. He did have an injection in his lower back (Dr. Willian Isidro) in the summer due to increasing left LE weakness and pain that has helped a lot. He is not comfortable going to the gym like he use to due to Minh and has become deconditioned. He reports doing some things at home, but still just feels like he is starting from square one.  His goals are to get back to exercises and daily life without difficulty and with less fear of falling. Past Medical History/Comorbidities:   Mr. Twila Skinner  has a past medical history of Anxiety, Edema of both legs, Enlarged prostate, Gout, Hip pain, History of elevated glucose, History of seasonal allergies, Hypercholesterolemia, Hypertension, Keratoacanthoma, S/P total hip arthroplasty (4/6/2015), Skin neoplasm, and Spinal stenosis. He also has no past medical history of Aneurysm ([address] Arrhythmia, Arthritis, Asthma, Autoimmune disease ([address] CAD (coronary artery disease), Cancer ([address] Chronic kidney disease, Chronic obstructive pulmonary disease ([address] Chronic pain, Coagulation disorder ([address] Difficult intubation, GERD (gastroesophageal reflux disease), Heart failure ([address] Ill-defined condition, Liver disease, Malignant hyperthermia due to anesthesia, Morbid obesity ([address] Nausea & vomiting, Pseudocholinesterase deficiency, Psychiatric disorder, PUD (peptic ulcer disease), Seizures ([address] Stroke ([address] Thromboembolus ([address] Thyroid disease, Unspecified adverse effect of anesthesia, or Unspecified sleep apnea. Mr. Twila Skinner  has a past surgical history that includes hx tonsillectomy (as child) and hx wisdom teeth extraction (as teenager).   Social History/Living Environment:       Social History     Socioeconomic History    Marital status:      Spouse name: Not on file    Number of children: Not on file    Years of education: Not on file    Highest education level: Not on file   Occupational History    Not on file   Tobacco Use    Smoking status: Never Smoker    Smokeless tobacco: Not on file   Substance and Sexual Activity    Alcohol use: No    Drug use: Not on file    Sexual activity: Not on file   Other Topics Concern    Not on file   Social History Narrative    Not on file     Social Determinants of Health     Financial Resource Strain:     Difficulty of Paying Living Expenses: Not on file   Food Insecurity:     Worried About Running Out of Food in the Last Year: Not on file    Ran Out of Food in the Last Year: Not on file   Transportation Needs:     Lack of Transportation (Medical): Not on file    Lack of Transportation (Non-Medical): Not on file   Physical Activity:     Days of Exercise per Week: Not on file    Minutes of Exercise per Session: Not on file   Stress:     Feeling of Stress : Not on file   Social Connections:     Frequency of Communication with Friends and Family: Not on file    Frequency of Social Gatherings with Friends and Family: Not on file    Attends [employer] Services: Not on file    Active Member of [address] [place] [address] [employer] or Organizations: Not on file    Attends Club or Organization Meetings: Not on file    Marital Status: Not on file   Intimate Partner Violence:     Fear of Current or Ex-Partner: Not on file    Emotionally Abused: Not on file    Physically Abused: Not on file    Sexually Abused: Not on file   Housing Stability:     Unable to Pay for Housing in the Last Year: Not on file    Number of [place] in the Last Year: Not on file    Unstable Housing in the Last Year: Not on file       Prior Level of Function/Work/Activity:  Independent, retired  Dominant Side:         RIGHT   Ambulatory/Rehab [address] [place] [address] Assessment   Risk Factors:       (1)  Gender [Male]       (5)  History of Recent Falls [w/in 3 months]       (1)  Orthostatic drop in BP Ability to Rise from Chair:       (1)  Pushes up, successful in one attempt   Falls Prevention Plan:       No modifications necessary   Total: (5 or greater = High Risk): 8   ©2010 [facility] [address] [place] [address] States Patent #[id number]. Federal Law prohibits the replication, distribution or use without written permission from [facility]   Current Medications:       Current Outpatient Medications:     ergocalciferol (ERGOCALCIFEROL) 1,250 mcg (50,000 unit) capsule, Take 50,000 Units by mouth every seven (7) days. , Disp: , Rfl:     furosemide (LASIX) 20 mg tablet, Take 20 mg by mouth daily. , Disp: , Rfl:     lisinopriL (PRINIVIL, ZESTRIL) 20 mg tablet, Take 20 mg by mouth daily. , Disp: , Rfl:     celecoxib (CELEBREX) 200 mg capsule, Take 200 mg by mouth daily. , Disp: , Rfl:     allopurinoL (ZYLOPRIM) 300 mg tablet, Take 300 mg by mouth daily. , Disp: , Rfl:     spironolactone (ALDACTONE) 25 mg tablet, Take 25 mg by mouth daily. , Disp: , Rfl:     busPIRone (BUSPAR) 7.5 mg tablet, Take 7.5 mg by mouth two (2) times a day., Disp: , Rfl:     hydrOXYzine HCL (ATARAX) 25 mg tablet, Take 25 mg by mouth two (2) times a day., Disp: , Rfl:     POTASSIUM CHLORIDE PO, Take 10 mEq by mouth three (3) days a week., Disp: , Rfl:     cloNIDine (CATAPRES) 0.3 mg/24 hr, 1 Patch by TransDERmal route every seven (7) days. , Disp: , Rfl:     prazosin (MINIPRESS) 1 mg capsule, Take 1 mg by mouth two (2) times a day. TAKE AM OF SURGERY WITH SMALL SIP OF WATER   Indications: enlarged prostate, Disp: , Rfl:     finasteride (PROSCAR) 5 mg tablet, Take 5 mg by mouth daily. TAKE AM OF SURGERY WITH SMALL SIP OF WATER   Indications: BENIGN PROSTATIC HYPERTROPHY, Disp: , Rfl:    Date Last Reviewed:  11/17/2021     Number of Personal Factors/Comorbidities that affect the Plan of Care: 1-2: MODERATE COMPLEXITY   EXAMINATION:   Observation/Orthostatic Postural Assessment:          Patient shows increased obesity with more abdominal weight and increased lordosis. He shows increased small step length with short distances. He tends to show increased right pelvic rotation in standing and gait  Palpation:          Tightness along right paraspinals and into low back and pelvis. He shows tightness in lateral right hip and leg. Some noted atrophy in left glut compared to right side.  -Restricted and limited in hamstring mobility B with increased right restriction more than left  -Overall right hip motion appears good for artificial ANUJ.   -Right knee shows increased restrictions at patella and medial and lateral joint line with pain with testing due to arthritic condition  -Increased swelling in B LE and ankles with compression socks worn  ROM:          Patient shows some decreased hamstrings at 70 deg right and 75 deg left  -Flexion in lumbar spine to 30% in standing with difficulty with balance  Strength:          Hip flexion at 4-/5  On right and 4-/5 on left  Right knee extension at 4/5  Left knee extension at 4+/5  Right knee flexion at 4-/5  Left at 4/5  right hip abduction 4-/5  Right hip extension 4/5  Neurological Screen:        Sensation: Patient has peripheral neuropathy on the plantar surface of both feet  Nerve conduction did not reveal specific radiculopathy  Functional Mobility:         Gait/Ambulation:  Patient shows increased Trendelenburg pattern B with increased right pelvic rotation that maintains during gait with right LE lagging some with shortened stance phase B. Patient has increased knee hike due to neuropathy. Transfers:  Patient shows ability to sit to stand with some aid from hands        Bed Mobility:  Patient has some difficulty with supine to sit with needs for some aid  Balance:          Poor for single leg stance, tandem stance and turning   Body Structures Involved:  1. Nerves  2. Thoracic Cage  3. Bones  4. Joints  5. Muscles  6. Ligaments Body Functions Affected:  1. Sensory/Pain  2. Neuromusculoskeletal  3. Movement Related Activities and Participation Affected:  1. General Tasks and Demands  2. Mobility  3. Self Care  4. Domestic Life  5.  Interpersonal Interactions and Relationships   Number of elements (examined above) that affect the Plan of Care: 4+: HIGH COMPLEXITY   CLINICAL PRESENTATION:   Presentation: Stable and uncomplicated: LOW COMPLEXITY   CLINICAL DECISION MAKING:   Use of outcome tool(s) and clinical judgement create a POC that gives a: Clear prediction of patient's progress: LOW COMPLEXITY

## 2021-12-07 ENCOUNTER — HOSPITAL ENCOUNTER (OUTPATIENT)
Dept: PHYSICAL THERAPY | Age: 76
Discharge: HOME | End: 2021-12-07
Payer: MEDICARE

## 2021-12-07 PROCEDURE — 97140 MANUAL THERAPY 1/> REGIONS: CPT

## 2021-12-07 NOTE — PROGRESS NOTES
Herrera Tong  : 1945  [place]  Secondary: Bshsi Generic [address] at [facility]  [address] [place] [address]  [phone]   [phone]        OUTPATIENT PHYSICAL THERAPY: Daily Treatment Note 2021  Visit Count:  55    ICD-10: Treatment Diagnosis: Low back pain, M54.5  Difficulty walking, not elsewhere classified, R26.2  Pain in joint, right knee, M25.561  Precautions/Allergies: Other medication and Statins-hmg-coa reductase inhibitors   TREATMENT PLAN:  Effective Dates:21 TO 22 . Frequency/Duration: 1 time every other week for 12 weeks MEDICAL/REFERRING DIAGNOSIS:  Low back pain [M54.5]   DATE OF ONSET: Chronic  REFERRING PHYSICIAN: Maggie Zhou MD MD Orders: Evaluate and Treat  Return MD Appointment: not scheduled       Pre-treatment Symptoms/Complaints:  Patient reports that he has been consistent since last time coming to the gym, but feels like he still needs to make changes to he usual routine  Pain: Initial: Pain Intensity 1: 4 /10 Post Session:  2/10   Medications Last Reviewed:  2021  Updated Objective Findings:  Restrictions along lateral IT band and vastus lateralis B, Increased superficial fascia and layer one tightness in lumbar spine around right Sacral base  TREATMENT:     THERAPEUTIC EXERCISE: (0 minutes):  Exercises per grid below to improve mobility, strength and coordination. Required minimal visual, verbal, manual and tactile cues to promote proper body alignment, promote proper body posture, promote proper body mechanics and promote proper body breathing techniques. Progressed resistance and repetitions as indicated.    Date:  10/27/21   Activity/Exercise Parameters   Core exercises Supine hook lying push into blue ball 2 x 1 min holds  Side lie hip flexion isometric on left side 2 x 1 min  Pelvic tilt posterior in supine with breathing control x 3 min  Seated pushing ball into thighs for core control in sitting   Education on sit to stand (not today) X 10 with good weight shift   Knee extension (home) Continued with blue band at home   Weight shift  R LE planted with follow through and work on keeping right side long x 1 min   Balance (not today) Tandem stance 2 x 30 sec B   Sit to stand Just for function. Hip adduction For core and hip response 10 x 10 sec hold           MANUAL THERAPY: (30 minutes): Joint mobilization and Soft tissue mobilization was utilized and necessary because of the patient's restricted joint motion and restricted motion of soft tissue. (Used abbreviations: SF - Superficial Fascia; BC - Bony contours; MP - muscle play; IASTM - instrument-assisted soft tissue mobilization; MET - muscle energy technique; a/p - anterior to posterior; p/a - posterior to anterior; Proprioceptive neuromuscular Facilitation-PNF) Manual treatment to improve soft tissue/joint mobility deficits, tissue integrity issues, trigger points and/or pain. -Supine mobilization of right hamstring, lateral IT band, rectus femoris, adductors  -Hip ER stretch insupine  -Left and right side lie pelvic mobilization for anterior elevation and posterior depression  -PNF pattern to facilitate core into anterior elevation and posterior depression with working through end range holds and isometric reversals with the pelvis and at long full leg function  -Functional resistance through posterior depression and then hip abduction facilitation. GAIT TRAINING: (0 min) in hallway with work on use of single point cane for balance and support. Trying to focus on control and stability with push off through R LE and taking some pressure off midstance with the SPC. MedBridge Portal  Treatment/Session Summary:    · Response to Treatment: Patient shows good mobility post treatment. We continue to encourage his regular exercise program to help him maintain his mobility.  He goes for a lumbar injection next week to help with the back pain. · Communication/Consultation:  None today  · Equipment provided today:  None today  · Recommendations/Intent for next treatment session: Next visit will focus on Core training/balance.     Total Treatment Billable Duration:  30 minutes of treatment,   PT Patient Time In/Time Out  Time In: [address]  Time Out: [address] , PT    Future Appointments   Date Time Provider Nancy Eason   12/13/2021 10:30 AM Evgeny Engle MD [facility] PO[facility] POA   12/21/2021  3:00 PM Chip Mckenna, PT [facility]

## 2021-12-21 ENCOUNTER — APPOINTMENT (OUTPATIENT)
Dept: PHYSICAL THERAPY | Age: 76
End: 2021-12-21
Payer: MEDICARE

## 2022-01-07 ENCOUNTER — HOSPITAL ENCOUNTER (OUTPATIENT)
Dept: PHYSICAL THERAPY | Age: 77
Discharge: HOME | End: 2022-01-07
Payer: MEDICARE

## 2022-01-07 PROCEDURE — 97140 MANUAL THERAPY 1/> REGIONS: CPT

## 2022-01-07 NOTE — PROGRESS NOTES
Iris Hilton  : 1945  [place]  Secondary: Bshsi Generic [facility]  at [facility]  [address] [place] [address]  [phone]   [phone]        OUTPATIENT PHYSICAL THERAPY: Daily Treatment Note 2022  Visit Count:  52    ICD-10: Treatment Diagnosis: Low back pain, M54.5  Difficulty walking, not elsewhere classified, R26.2  Pain in joint, right knee, M25.561  Precautions/Allergies: Other medication and Statins-hmg-coa reductase inhibitors   TREATMENT PLAN:  Effective Dates:21 TO 22 . Frequency/Duration: 1 time every other week for 12 weeks MEDICAL/REFERRING DIAGNOSIS:  Low back pain [M54.5]   DATE OF ONSET: Chronic  REFERRING PHYSICIAN: Daya Mishra MD MD Orders: Evaluate and Treat  Return MD Appointment: not scheduled       Pre-treatment Symptoms/Complaints:  Patient reports that he has been consistent, but still frustrated with fatigue some days and feeling good other days. Pain: Initial: Pain Intensity 1: 4 /10 Post Session:  2/10   Medications Last Reviewed:  2022  Updated Objective Findings:  Restrictions along lateral IT band and vastus lateralis B, Increased superficial fascia and layer one tightness in lumbar spine around right Sacral base  TREATMENT:     THERAPEUTIC EXERCISE: (0 minutes):  Exercises per grid below to improve mobility, strength and coordination. Required minimal visual, verbal, manual and tactile cues to promote proper body alignment, promote proper body posture, promote proper body mechanics and promote proper body breathing techniques. Progressed resistance and repetitions as indicated.    Date:  10/27/21   Activity/Exercise Parameters   Core exercises Supine hook lying push into blue ball 2 x 1 min holds  Side lie hip flexion isometric on left side 2 x 1 min  Pelvic tilt posterior in supine with breathing control x 3 min  Seated pushing ball into thighs for core control in sitting   Education on sit to stand (not today) X 10 with good weight shift   Knee extension (home) Continued with blue band at home   Weight shift  R LE planted with follow through and work on keeping right side long x 1 min   Balance (not today) Tandem stance 2 x 30 sec B   Sit to stand Just for function. Hip adduction For core and hip response 10 x 10 sec hold           MANUAL THERAPY: (30 minutes): Joint mobilization and Soft tissue mobilization was utilized and necessary because of the patient's restricted joint motion and restricted motion of soft tissue. (Used abbreviations: SF - Superficial Fascia; BC - Bony contours; MP - muscle play; IASTM - instrument-assisted soft tissue mobilization; MET - muscle energy technique; a/p - anterior to posterior; p/a - posterior to anterior; Proprioceptive neuromuscular Facilitation-PNF) Manual treatment to improve soft tissue/joint mobility deficits, tissue integrity issues, trigger points and/or pain. -Supine mobilization of right hamstring, lateral IT band, rectus femoris, adductors  -Hip ER stretch insupine  -Left and right side lie pelvic mobilization for anterior elevation and posterior depression  -PNF pattern to facilitate core into anterior elevation and posterior depression with working through end range holds and isometric reversals with the pelvis and at long full leg function  -Functional resistance through posterior depression and then hip abduction facilitation. GAIT TRAINING: (0 min) in hallway with work on use of single point cane for balance and support. Trying to focus on control and stability with push off through R LE and taking some pressure off midstance with the SPC. MedBridge Portal  Treatment/Session Summary:    · Response to Treatment: Patient shows good mobility post treatment. He reports the injection helped a little, but not a lot.   He still feels much better when he gets moving than when he doesn't  · Communication/Consultation: None today  · Equipment provided today:  None today  · Recommendations/Intent for next treatment session: Next visit will focus on Core training/balance.     Total Treatment Billable Duration:  30 minutes of treatment,   PT Patient Time In/Time Out  Time In: 2875  Time Out: [address], PT    Future Appointments   Date Time Provider Nancy Eason   1/19/2022  2:00 PM Amanda Yi, PT [facility]   2/1/2022  2:00 PM Amanda Yi, PT [facility]

## 2022-01-19 ENCOUNTER — HOSPITAL ENCOUNTER (OUTPATIENT)
Dept: PHYSICAL THERAPY | Age: 77
Discharge: HOME | End: 2022-01-19
Payer: MEDICARE

## 2022-01-19 PROCEDURE — 97140 MANUAL THERAPY 1/> REGIONS: CPT

## 2022-01-19 NOTE — PROGRESS NOTES
Dora Diego  : 1945  [place]  Secondary: Bshsi Generic [address] [facility]  at [facility]  [address] [place] [address]  [phone]   [phone]        OUTPATIENT PHYSICAL THERAPY: Daily Treatment Note 2022  Visit Count:  48    ICD-10: Treatment Diagnosis: Low back pain, M54.5  Difficulty walking, not elsewhere classified, R26.2  Pain in joint, right knee, M25.561  Precautions/Allergies: Other medication and Statins-hmg-coa reductase inhibitors   TREATMENT PLAN:  Effective Dates:21 TO 22 . Frequency/Duration: 1 time every other week for 12 weeks MEDICAL/REFERRING DIAGNOSIS:  Low back pain [M54.5]   DATE OF ONSET: Chronic  REFERRING PHYSICIAN: Ashleigh Mcclendon MD MD Orders: Evaluate and Treat  Return MD Appointment: not scheduled       Pre-treatment Symptoms/Complaints:  Patient reports that he has been stuck inside due to the snow the last several days and can feel it in his back and knee a little today  Pain: Initial: Pain Intensity 1: 5 /10 Post Session:  2/10   Medications Last Reviewed:  2022  Updated Objective Findings:  Restrictions along lateral IT band and vastus lateralis B, Increased superficial fascia and layer one tightness in lumbar spine around right Sacral base  TREATMENT:     THERAPEUTIC EXERCISE: (0 minutes):  Exercises per grid below to improve mobility, strength and coordination. Required minimal visual, verbal, manual and tactile cues to promote proper body alignment, promote proper body posture, promote proper body mechanics and promote proper body breathing techniques. Progressed resistance and repetitions as indicated.    Date:  10/27/21   Activity/Exercise Parameters   Core exercises Supine hook lying push into blue ball 2 x 1 min holds  Side lie hip flexion isometric on left side 2 x 1 min  Pelvic tilt posterior in supine with breathing control x 3 min  Seated pushing ball into thighs for core control in sitting   Education on sit to stand (not today) X 10 with good weight shift   Knee extension (home) Continued with blue band at home   Weight shift  R LE planted with follow through and work on keeping right side long x 1 min   Balance (not today) Tandem stance 2 x 30 sec B   Sit to stand Just for function. Hip adduction For core and hip response 10 x 10 sec hold           MANUAL THERAPY: (40 minutes): Joint mobilization and Soft tissue mobilization was utilized and necessary because of the patient's restricted joint motion and restricted motion of soft tissue. (Used abbreviations: SF - Superficial Fascia; BC - Bony contours; MP - muscle play; IASTM - instrument-assisted soft tissue mobilization; MET - muscle energy technique; a/p - anterior to posterior; p/a - posterior to anterior; Proprioceptive neuromuscular Facilitation-PNF) Manual treatment to improve soft tissue/joint mobility deficits, tissue integrity issues, trigger points and/or pain. -Supine mobilization of right hamstring, lateral IT band, rectus femoris, adductors  -Hip ER stretch insupine  -Left and right side lie pelvic mobilization for anterior elevation and posterior depression  -PNF pattern to facilitate core into anterior elevation and posterior depression with working through end range holds and isometric reversals with the pelvis and at long full leg function  -Functional resistance through posterior depression and then hip abduction facilitation.  -Mobilization to right patella and knee joint glides      GAIT TRAINING: (0 min) in hallway with work on use of single point cane for balance and support. Trying to focus on control and stability with push off through R LE and taking some pressure off midstance with the SPC. [employer] Portal  Treatment/Session Summary:    · Response to Treatment: Patient shows good mobility post treatment.  He shows continued dysfunction with his gait and difficulty using the R LE due to pain ane weakness  · Communication/Consultation:  None today  · Equipment provided today:  None today  · Recommendations/Intent for next treatment session: Next visit will focus on Core training/balance.     Total Treatment Billable Duration:  40 minutes of treatment,   PT Patient Time In/Time Out  Time In: 1400  Time Out: Romeo 65, PT    Future Appointments   Date Time Provider Nancy Eason   2/1/2022  2:00 PM Amber Forman, PT [facility]

## 2022-02-01 ENCOUNTER — HOSPITAL ENCOUNTER (OUTPATIENT)
Dept: PHYSICAL THERAPY | Age: 77
Discharge: HOME | End: 2022-02-01
Payer: MEDICARE

## 2022-02-01 PROCEDURE — 97140 MANUAL THERAPY 1/> REGIONS: CPT

## 2022-02-01 NOTE — PROGRESS NOTES
Jolene  : 1945  [place]  Secondary: Bshsi Generic [address] [facility]  at [facility]  [address] [place] [address]  [phone]   [phone]        OUTPATIENT PHYSICAL THERAPY: Daily Treatment Note 2022  Visit Count:  49    ICD-10: Treatment Diagnosis: Low back pain, M54.5  Difficulty walking, not elsewhere classified, R26.2  Pain in joint, right knee, M25.561  Precautions/Allergies: Other medication and Statins-hmg-coa reductase inhibitors   TREATMENT PLAN:  Effective Dates:21 TO 22 . Frequency/Duration: 1 time every other week for 12 weeks MEDICAL/REFERRING DIAGNOSIS:  Low back pain [M54.5]   DATE OF ONSET: Chronic  REFERRING PHYSICIAN: Sharran Schilder, MD MD Orders: Evaluate and Treat  Return MD Appointment: not scheduled       Pre-treatment Symptoms/Complaints:  Patient reports that he had a little bit of a cold last week, but tried to make it into the gym three times since then  Pain: Initial:   /10 Post Session:  2/10   Medications Last Reviewed:  2022  Updated Objective Findings:  Restrictions along lateral IT band and vastus lateralis B, Increased superficial fascia and layer one tightness in lumbar spine around right Sacral base  TREATMENT:     THERAPEUTIC EXERCISE: (0 minutes):  Exercises per grid below to improve mobility, strength and coordination. Required minimal visual, verbal, manual and tactile cues to promote proper body alignment, promote proper body posture, promote proper body mechanics and promote proper body breathing techniques. Progressed resistance and repetitions as indicated.    Date:  10/27/21   Activity/Exercise Parameters   Core exercises Supine hook lying push into blue ball 2 x 1 min holds  Side lie hip flexion isometric on left side 2 x 1 min  Pelvic tilt posterior in supine with breathing control x 3 min  Seated pushing ball into thighs for core control in sitting Education on sit to stand (not today) X 10 with good weight shift   Knee extension (home) Continued with blue band at home   Weight shift  R LE planted with follow through and work on keeping right side long x 1 min   Balance (not today) Tandem stance 2 x 30 sec B   Sit to stand Just for function. Hip adduction For core and hip response 10 x 10 sec hold           MANUAL THERAPY: (40 minutes): Joint mobilization and Soft tissue mobilization was utilized and necessary because of the patient's restricted joint motion and restricted motion of soft tissue. (Used abbreviations: SF - Superficial Fascia; BC - Bony contours; MP - muscle play; IASTM - instrument-assisted soft tissue mobilization; MET - muscle energy technique; a/p - anterior to posterior; p/a - posterior to anterior; Proprioceptive neuromuscular Facilitation-PNF) Manual treatment to improve soft tissue/joint mobility deficits, tissue integrity issues, trigger points and/or pain. -Supine mobilization of right hamstring, lateral IT band, rectus femoris, adductors  -Hip ER stretch insupine  -Left and right side lie pelvic mobilization for anterior elevation and posterior depression  -PNF pattern to facilitate core into anterior elevation and posterior depression with working through end range holds and isometric reversals with the pelvis and at long full leg function  -Functional resistance through posterior depression and then hip abduction facilitation.  -Mobilization to right patella and knee joint glides      GAIT TRAINING: (0 min) in hallway with work on use of single point cane for balance and support. Trying to focus on control and stability with push off through R LE and taking some pressure off midstance with the SPC.     [employer] Portal  Treatment/Session Summary:    · Response to Treatment: Patient shows maintained mobility at this time, but was encouraged today again to try getting in the pool for exercises to take pressure off his back and allow more activity tolerance. Follow up in two weeks for re-assessment  · Communication/Consultation:  None today  · Equipment provided today:  None today  · Recommendations/Intent for next treatment session: Next visit will focus on Core training/balance.     Total Treatment Billable Duration:  40 minutes of treatment,   PT Patient Time In/Time Out  Time In: 1425  Time Out: Carlos Antonio PT    Future Appointments   Date Time Provider Nancy Eason   2/16/2022  3:00 PM Jean Pierre Salinas, [facility]

## 2022-02-16 ENCOUNTER — HOSPITAL ENCOUNTER (OUTPATIENT)
Dept: PHYSICAL THERAPY | Age: 77
Discharge: HOME | End: 2022-02-16
Payer: MEDICARE

## 2022-02-16 PROCEDURE — 97140 MANUAL THERAPY 1/> REGIONS: CPT

## 2022-02-16 NOTE — THERAPY RECERTIFICATION
Alphonso Wise  : 1945  [place]  Secondary: Bshsi Generic [address]  at [address] [place] [address]  [phone]   [phone]          OUTPATIENT PHYSICAL THERAPY:Recertification [id number]   ICD-10: Treatment Diagnosis: Low back pain, M54.5  Difficulty walking, not elsewhere classified, R26.2  Pain in joint, right knee, M25.561  Precautions/Allergies: Other medication and Statins-hmg-coa reductase inhibitors   TREATMENT PLAN:  Effective Dates:22 TO 22 . Frequency/Duration: 1 time every other week for 12 weeks MEDICAL/REFERRING DIAGNOSIS:  Low back pain [M54.50]   DATE OF ONSET: Chronic  REFERRING PHYSICIAN: Zay Selby MD MD Orders: Evaluate and Treat  Return MD Appointment: not scheduled   Alphonso Wise has been seen for 46 visits from 20 to 2022 for low back, right knee and R LE. Patient has performed therapeutic exercises, activities, and had manual therapy to increased strength, ROM and function. Patient has also used modalities for pain control in order to increase function. Patient has shown an increase in function per the LEFS with scores of 40/80 this time and feels like his is having a little more trouble with the legs this week. He is planning to get back in touch with Dr. Dalila Flores about a lumbar spine injection for his stenosis. He has returned to the gym up to 4 times last week and riding the stationary bike for about 30 min now. He is feeling more sore and achy, but he is trying to get back moving. He shows continued balance dysfunction and weakness into the R LE with poor core control. We plan to have treatments every other week at this time. Patient has progressed well toward their goals and will benefit from continuing skilled PT in order to address their impairments.   Cande Munoz, PT, DPT, OCS, CFMT        INITIAL ASSESSMENT:  Mr. Rosa Hernandez presents with chronic R LE weakness and pain along with peripheral neuropathy affecting his safety with gait and function. He shows limitation in walking and unsafe gait function. He shows continued deconditioning in the LE's. He is a good candidate for skilled PT in order to address listed impairments affecting his function. Garo Mccurdy, PT, DPT, OCS, CFMT      PROBLEM LIST (Impacting functional limitations):  1. Decreased Strength  2. Decreased ADL/Functional Activities  3. Decreased Transfer Abilities  4. Decreased Ambulation Ability/Technique  5. Decreased Balance  6. Increased Pain  7. Decreased Activity Tolerance  8. Decreased Flexibility/Joint Mobility INTERVENTIONS PLANNED: (Treatment may consist of any combination of the following)  1. Balance Exercise  2. Bed Mobility  3. Electrical Stimulation  4. Gait Training  5. Heat  6. Manual Therapy  7. Neuromuscular Re-education/Strengthening  8. Range of Motion (ROM)  9. Therapeutic Activites  10. Therapeutic Exercise/Strengthening     GOALS: (Goals have been discussed and agreed upon with patient.)    Discharge Goals: Time Frame: 12 weeks  1. Patient will show a greater than 10 point increase on the LEFS in order to show an increase in function (MET-11/17/21)  2. Patient will report joining a pool and walking in the water greater than 2 times a week in order to progress function (ongoing)  3. Patient will be independent in balance exercises in order to decrease fall risk (ongoing)  4. Patient will report going to the gym consistently three times a week for two months. (MET-2/16/22)  5. NEW GOALS:  6.  Patient will perform 10 sit to stands without UE use in order to progress strength and function    OUTCOME MEASURE:   Tool Used: Lower Extremity Functional Scale (LEFS)  Score:  Initial: 37/80 Most Recent: 40/80 (Date: 2/16/22 )   Interpretation of Score: 20 questions each scored on a 5 point scale with 0 representing \"extreme difficulty or unable to perform\" and 4 representing \"no difficulty\". The lower the score, the greater the functional disability. 80/80 represents no disability. Minimal detectable change is 9 points. MEDICAL NECESSITY:   · Patient is expected to demonstrate progress in strength, range of motion, balance, coordination and functional technique to improve functionalmobility and safety with walking. · Patient demonstrates good rehab potential due to higher previous functional level. · Skilled intervention continues to be required due to decreased mobility and increased risk of falls. REASON FOR SERVICES/OTHER COMMENTS:  · Patient continues to require skilled intervention due to decreased mobility. Total Duration:  PT Patient Time In/Time Out  Time In: 1500  Time Out: 1557    Rehabilitation Potential For Stated Goals: Excellent  Regarding Jose Fleet Potocki's therapy, I certify that the treatment plan above will be carried out by a therapist or under their direction. Thank you for this referral,  Val Huynh, PT     Referring Physician Signature: Jean Pierre Guillen MD                 PAIN/SUBJECTIVE:   Initial: Pain Intensity 1: 4 /10 Post Session:  2/10   HISTORY:   History of Injury/Illness (Reason for Referral):  Patient has had a chronic history of R LE weakness and pain since he first had his hip replacement about 5 years ago. He has since had issues with spinal stenosis, disc herniation, right knee OA and has found out recently that he has peripheral neuropathy in both feet that affect his balance. He had a fall back in February/March that was concerning for him due to the leg giving out. He also had a hospitalization due to abnormal heartbeat. He has finally stabilized with the heart. He did have an injection in his lower back (Dr. Eduardo Awad) in the summer due to increasing left LE weakness and pain that has helped a lot. He is not comfortable going to the gym like he use to due to Minh and has become deconditioned.   He reports doing some things at home, but still just feels like he is starting from square one. His goals are to get back to exercises and daily life without difficulty and with less fear of falling. Past Medical History/Comorbidities:   Mr. Whitney Romero  has a past medical history of Anxiety, Edema of both legs, Enlarged prostate, Gout, Hip pain, History of elevated glucose, History of seasonal allergies, Hypercholesterolemia, Hypertension, Keratoacanthoma, S/P total hip arthroplasty (4/6/2015), Skin neoplasm, and Spinal stenosis. He has no past medical history of Aneurysm ([address] Arrhythmia, Arthritis, Asthma, Autoimmune disease ([address] CAD (coronary artery disease), Cancer ([address] Chronic kidney disease, Chronic obstructive pulmonary disease ([address] Chronic pain, Coagulation disorder ([address] Difficult intubation, GERD (gastroesophageal reflux disease), Heart failure ([address] Ill-defined condition, Liver disease, Malignant hyperthermia due to anesthesia, Morbid obesity ([address] Nausea & vomiting, Pseudocholinesterase deficiency, Psychiatric disorder, PUD (peptic ulcer disease), Seizures ([address] Stroke ([address] Thromboembolus ([address] Thyroid disease, Unspecified adverse effect of anesthesia, or Unspecified sleep apnea. Mr. Whitney Romero  has a past surgical history that includes hx tonsillectomy (as child) and hx wisdom teeth extraction (as teenager).   Social History/Living Environment:       Social History     Socioeconomic History    Marital status:      Spouse name: Not on file    Number of children: Not on file    Years of education: Not on file    Highest education level: Not on file   Occupational History    Not on file   Tobacco Use    Smoking status: Never Smoker    Smokeless tobacco: Not on file   Substance and Sexual Activity    Alcohol use: No    Drug use: Not on file    Sexual activity: Not on file   Other Topics Concern    Not on file   Social History Narrative    Not on file     Social Determinants of Health     Financial Resource Strain:     Difficulty of Paying Living Expenses: Not on file   Food Insecurity:     Worried About Running Out of Food in the Last Year: Not on file    Melva of Food in the Last Year: Not on file   Transportation Needs:     Lack of Transportation (Medical): Not on file    Lack of Transportation (Non-Medical): Not on file   Physical Activity:     Days of Exercise per Week: Not on file    Minutes of Exercise per Session: Not on file   Stress:     Feeling of Stress : Not on file   Social Connections:     Frequency of Communication with Friends and Family: Not on file    Frequency of Social Gatherings with Friends and Family: Not on file    Attends [employer] Services: Not on file    Active Member of [address] [place] [address] or Organizations: Not on file    Attends Club or Organization Meetings: Not on file    Marital Status: Not on file   Intimate Partner Violence:     Fear of Current or Ex-Partner: Not on file    Emotionally Abused: Not on file    Physically Abused: Not on file    Sexually Abused: Not on file   Housing Stability:     Unable to Pay for Housing in the Last Year: Not on file    Number of [place] in the Last Year: Not on file    Unstable Housing in the Last Year: Not on file       Prior Level of Function/Work/Activity:  Independent, retired  Dominant Side:         RIGHT   Ambulatory/Rehab [address] [place] [address] St Assessment   Risk Factors:       (1)  Gender [Male]       (5)  History of Recent Falls [w/in 3 months]       (1)  Orthostatic drop in BP Ability to Rise from Chair:       (1)  Pushes up, successful in one attempt   Falls Prevention Plan:       No modifications necessary   Total: (5 or greater = High Risk): 8   ©2010 [facility] . [facility] Patent #[id number].  Federal Law prohibits the replication, distribution or use without written permission from [facility] [employer]   Current Medications:       Current Outpatient Medications:     ergocalciferol (ERGOCALCIFEROL) 1,250 mcg (50,000 unit) capsule, Take 50,000 Units by mouth every seven (7) days. , Disp: , Rfl:     furosemide (LASIX) 20 mg tablet, Take 20 mg by mouth daily. , Disp: , Rfl:     lisinopriL (PRINIVIL, ZESTRIL) 20 mg tablet, Take 20 mg by mouth daily. , Disp: , Rfl:     celecoxib (CELEBREX) 200 mg capsule, Take 200 mg by mouth daily. , Disp: , Rfl:     allopurinoL (ZYLOPRIM) 300 mg tablet, Take 300 mg by mouth daily. , Disp: , Rfl:     spironolactone (ALDACTONE) 25 mg tablet, Take 25 mg by mouth daily. , Disp: , Rfl:     busPIRone (BUSPAR) 7.5 mg tablet, Take 7.5 mg by mouth two (2) times a day., Disp: , Rfl:     hydrOXYzine HCL (ATARAX) 25 mg tablet, Take 25 mg by mouth two (2) times a day., Disp: , Rfl:     POTASSIUM CHLORIDE PO, Take 10 mEq by mouth three (3) days a week., Disp: , Rfl:     cloNIDine (CATAPRES) 0.3 mg/24 hr, 1 Patch by TransDERmal route every seven (7) days. , Disp: , Rfl:     prazosin (MINIPRESS) 1 mg capsule, Take 1 mg by mouth two (2) times a day. TAKE AM OF SURGERY WITH SMALL SIP OF WATER   Indications: enlarged prostate, Disp: , Rfl:     finasteride (PROSCAR) 5 mg tablet, Take 5 mg by mouth daily. TAKE AM OF SURGERY WITH SMALL SIP OF WATER   Indications: BENIGN PROSTATIC HYPERTROPHY, Disp: , Rfl:    Date Last Reviewed:  2/21/2022     Number of Personal Factors/Comorbidities that affect the Plan of Care: 1-2: MODERATE COMPLEXITY   EXAMINATION:   Observation/Orthostatic Postural Assessment:          Patient shows increased obesity with more abdominal weight and increased lordosis. He shows increased small step length with short distances. He tends to show increased right pelvic rotation in standing and gait  Palpation:          Tightness along right paraspinals and into low back and pelvis. He shows tightness in lateral right hip and leg.   Some noted atrophy in left glut compared to right side.  -Restricted and limited in hamstring mobility B with increased right restriction more than left  -Overall right hip motion appears good for artificial ANUJ. -Right knee shows increased restrictions at patella and medial and lateral joint line with pain with testing due to arthritic condition  -Increased swelling in B LE and ankles with compression socks worn  ROM:          Patient shows some decreased hamstrings at 70 deg right and 75 deg left  -Flexion in lumbar spine to 30% in standing with difficulty with balance  Strength:          Hip flexion at 4-/5  On right and 4-/5 on left  Right knee extension at 4/5  Left knee extension at 4+/5  Right knee flexion at 4-/5  Left at 4/5  right hip abduction 4-/5  Right hip extension 4/5  Neurological Screen:        Sensation: Patient has peripheral neuropathy on the plantar surface of both feet  Nerve conduction did not reveal specific radiculopathy  Functional Mobility:         Gait/Ambulation:  Patient shows increased Trendelenburg pattern B with increased right pelvic rotation that maintains during gait with right LE lagging some with shortened stance phase B. Patient has increased knee hike due to neuropathy. Transfers:  Patient shows ability to sit to stand with some aid from hands        Bed Mobility:  Patient has some difficulty with supine to sit with needs for some aid  Balance:          Poor for single leg stance, tandem stance and turning   Body Structures Involved:  1. Nerves  2. Thoracic Cage  3. Bones  4. Joints  5. Muscles  6. Ligaments Body Functions Affected:  1. Sensory/Pain  2. Neuromusculoskeletal  3. Movement Related Activities and Participation Affected:  1. General Tasks and Demands  2. Mobility  3. Self Care  4. Domestic Life  5.  Interpersonal Interactions and Relationships   Number of elements (examined above) that affect the Plan of Care: 4+: HIGH COMPLEXITY   CLINICAL PRESENTATION:   Presentation: Stable and uncomplicated: LOW COMPLEXITY   CLINICAL DECISION MAKING:   Use of outcome tool(s) and clinical judgement create a POC that gives a: Clear prediction of patient's progress: LOW COMPLEXITY

## 2022-02-16 NOTE — PROGRESS NOTES
Shelby Fuentes  : 1945  [place]  Secondary: Bshsi Generic [address] [facility]  at [facility]  [address] [place] [address]  [phone]   [phone]        OUTPATIENT PHYSICAL THERAPY: Daily Treatment Note 2022  Visit Count:  50    ICD-10: Treatment Diagnosis: Low back pain, M54.5  Difficulty walking, not elsewhere classified, R26.2  Pain in joint, right knee, M25.561  Precautions/Allergies: Other medication and Statins-hmg-coa reductase inhibitors   TREATMENT PLAN:  Effective Dates:22 TO 22 . Frequency/Duration: 1 time every other week for 12 weeks MEDICAL/REFERRING DIAGNOSIS:  Low back pain [M54.5]   DATE OF ONSET: Chronic  REFERRING PHYSICIAN: Jonny Rodriguez MD MD Orders: Evaluate and Treat  Return MD Appointment: not scheduled       Pre-treatment Symptoms/Complaints:  Patient reports that he is staying consistent with his gym days and that helps some. He had an injection and that seems to have helped some and he finally got in touch with Dr. Jaquelin Grier office and will have an appointment in May with her  Pain: Initial: Pain Intensity 1: 4 /10 Post Session:  2/10   Medications Last Reviewed:  2022  Updated Objective Findings:  Restrictions along lateral IT band and vastus lateralis B, Increased superficial fascia and layer one tightness in lumbar spine around right Sacral base  TREATMENT:     THERAPEUTIC EXERCISE: (0 minutes):  Exercises per grid below to improve mobility, strength and coordination. Required minimal visual, verbal, manual and tactile cues to promote proper body alignment, promote proper body posture, promote proper body mechanics and promote proper body breathing techniques. Progressed resistance and repetitions as indicated.    Date:  10/27/21   Activity/Exercise Parameters   Core exercises Supine hook lying push into blue ball 2 x 1 min holds  Side lie hip flexion isometric on left side 2 x 1 min  Pelvic tilt posterior in supine with breathing control x 3 min  Seated pushing ball into thighs for core control in sitting   Education on sit to stand (not today) X 10 with good weight shift   Knee extension (home) Continued with blue band at home   Weight shift  R LE planted with follow through and work on keeping right side long x 1 min   Balance (not today) Tandem stance 2 x 30 sec B   Sit to stand Just for function. Hip adduction For core and hip response 10 x 10 sec hold           MANUAL THERAPY: (40 minutes): Joint mobilization and Soft tissue mobilization was utilized and necessary because of the patient's restricted joint motion and restricted motion of soft tissue. (Used abbreviations: SF - Superficial Fascia; BC - Bony contours; MP - muscle play; IASTM - instrument-assisted soft tissue mobilization; MET - muscle energy technique; a/p - anterior to posterior; p/a - posterior to anterior; Proprioceptive neuromuscular Facilitation-PNF) Manual treatment to improve soft tissue/joint mobility deficits, tissue integrity issues, trigger points and/or pain. -Supine mobilization of right hamstring, lateral IT band, rectus femoris, adductors  -Hip ER stretch insupine  -Left and right side lie pelvic mobilization for anterior elevation and posterior depression  -PNF pattern to facilitate core into anterior elevation and posterior depression with working through end range holds and isometric reversals with the pelvis and at long full leg function  -Functional resistance through posterior depression and then hip abduction facilitation.  -Mobilization to right patella and knee joint glides      GAIT TRAINING: (0 min) in hallway with work on use of single point cane for balance and support. Trying to focus on control and stability with push off through R LE and taking some pressure off midstance with the SPC.     [employer] Portal  Treatment/Session Summary:    · Response to Treatment: Patient shows maintained mobility at this time, but was encouraged today again to try getting in the pool for exercises to take pressure off his back and allow more activity tolerance. Continue to work on HEP and core control due to difficulty with mobility and control  · Communication/Consultation:  None today  · Equipment provided today:  None today  · Recommendations/Intent for next treatment session: Next visit will focus on Core training/balance.     Total Treatment Billable Duration:  40 minutes of treatment,   PT Patient Time In/Time Out  Time In: 1500  Time Out: 455 Roslyn Agarwal PT    Future Appointments   Date Time Provider Nancy Eason   2/28/2022  2:00 PM Castillo Chicas, PT [facility]   3/15/2022  2:00 PM Castillo Chicas, PT [facility]   3/29/2022  2:00 PM Castillo Chicas, PT [facility]

## 2022-02-28 ENCOUNTER — HOSPITAL ENCOUNTER (OUTPATIENT)
Dept: PHYSICAL THERAPY | Age: 77
Discharge: HOME | End: 2022-02-28
Payer: MEDICARE

## 2022-02-28 PROCEDURE — 97110 THERAPEUTIC EXERCISES: CPT

## 2022-02-28 PROCEDURE — 97140 MANUAL THERAPY 1/> REGIONS: CPT

## 2022-02-28 NOTE — PROGRESS NOTES
Dora Diego  : 1945  [place]  Secondary: Bshsi Generic [address] [facility]  at [facility]  [address] [place] [address]  [phone]   [phone]        OUTPATIENT PHYSICAL THERAPY: Daily Treatment Note 2022  Visit Count:  51    ICD-10: Treatment Diagnosis: Low back pain, M54.5  Difficulty walking, not elsewhere classified, R26.2  Pain in joint, right knee, M25.561  Precautions/Allergies: Other medication and Statins-hmg-coa reductase inhibitors   TREATMENT PLAN:  Effective Dates:22 TO 22 . Frequency/Duration: 1 time every other week for 12 weeks MEDICAL/REFERRING DIAGNOSIS:  Low back pain [M54.5]   DATE OF ONSET: Chronic  REFERRING PHYSICIAN: Ashleigh Mcclendon MD MD Orders: Evaluate and Treat  Return MD Appointment: not scheduled       Pre-treatment Symptoms/Complaints:  Patient reports that he is staying consistent with his gym days and working the best he can. He still feels like he is unorganized with his life and is trying to make sense of it  Pain: Initial: Pain Intensity 1: 3 /10 Post Session:  2/10   Medications Last Reviewed:  2022  Updated Objective Findings:  Restrictions along lateral IT band and vastus lateralis B, Increased superficial fascia and layer one tightness in lumbar spine around right Sacral base  TREATMENT:     THERAPEUTIC EXERCISE: (10 minutes):  Exercises per grid below to improve mobility, strength and coordination. Required minimal visual, verbal, manual and tactile cues to promote proper body alignment, promote proper body posture, promote proper body mechanics and promote proper body breathing techniques. Progressed resistance and repetitions as indicated.    Date:  22   Activity/Exercise Parameters   Core exercises Supine hook lying push into blue ball 2 x 1 min holds  Side lie hip flexion isometric on left side 2 x 1 min  Pelvic tilt posterior in supine with breathing control x 3 min  Seated pushing ball into thighs for core control in sitting   Education on sit to stand (not today) X 10 with good weight shift   Knee extension (home) Continued with blue band at home   Weight shift  R LE planted with follow through and work on keeping right side long x 1 min   Balance (not today) Tandem stance 2 x 30 sec B   Sit to stand Just for function. Hip adduction For core and hip response 10 x 10 sec hold           MANUAL THERAPY: (30 minutes): Joint mobilization and Soft tissue mobilization was utilized and necessary because of the patient's restricted joint motion and restricted motion of soft tissue. (Used abbreviations: SF - Superficial Fascia; BC - Bony contours; MP - muscle play; IASTM - instrument-assisted soft tissue mobilization; MET - muscle energy technique; a/p - anterior to posterior; p/a - posterior to anterior; Proprioceptive neuromuscular Facilitation-PNF) Manual treatment to improve soft tissue/joint mobility deficits, tissue integrity issues, trigger points and/or pain. -Supine mobilization of right hamstring, lateral IT band, rectus femoris, adductors  -Hip ER stretch insupine  -Left and right side lie pelvic mobilization for anterior elevation and posterior depression  -PNF pattern to facilitate core into anterior elevation and posterior depression with working through end range holds and isometric reversals with the pelvis and at long full leg function  -Functional resistance through posterior depression and then hip abduction facilitation.  -Mobilization to right patella and knee joint glides      GAIT TRAINING: (0 min) in hallway with work on use of single point cane for balance and support. Trying to focus on control and stability with push off through R LE and taking some pressure off midstance with the SPC. [employer] Portal  Treatment/Session Summary:    · Response to Treatment: Patient shows maintained mobility at this time.  We worked to get more core activation today  · Communication/Consultation:  None today  · Equipment provided today:  None today  · Recommendations/Intent for next treatment session: Next visit will focus on Core training/balance.     Total Treatment Billable Duration:  40 minutes of treatment,   PT Patient Time In/Time Out  Time In: 8043  Time Out: [address], PT    Future Appointments   Date Time Provider Nancy Eason   3/15/2022  2:00 PM Jonny Lozano, PT [facility]   3/29/2022  2:00 PM Jonny Lozano, PT [facility]

## 2022-03-15 ENCOUNTER — HOSPITAL ENCOUNTER (OUTPATIENT)
Dept: PHYSICAL THERAPY | Age: 77
Discharge: HOME | End: 2022-03-15
Payer: MEDICARE

## 2022-03-15 PROCEDURE — 97140 MANUAL THERAPY 1/> REGIONS: CPT

## 2022-03-15 NOTE — PROGRESS NOTES
Doron Falcon  : 1945  [place]  Secondary: Bshsi Generic [address] [facility]  at [facility]  [address] [place] [address]  [phone]   [phone]        OUTPATIENT PHYSICAL THERAPY: Daily Treatment Note 3/15/2022  Visit Count:  52    ICD-10: Treatment Diagnosis: Low back pain, M54.5  Difficulty walking, not elsewhere classified, R26.2  Pain in joint, right knee, M25.561  Precautions/Allergies: Other medication and Statins-hmg-coa reductase inhibitors   TREATMENT PLAN:  Effective Dates:22 TO 22 . Frequency/Duration: 1 time every other week for 12 weeks MEDICAL/REFERRING DIAGNOSIS:  Low back pain [M54.5]   DATE OF ONSET: Chronic  REFERRING PHYSICIAN: Anna Pina MD MD Orders: Evaluate and Treat  Return MD Appointment: not scheduled       Pre-treatment Symptoms/Complaints:  Patient reports that he had a bit of a cold this past weekend, but is getting over it now. He was going everyday to the gym before that  Pain: Initial: Pain Intensity 1: 4 10 Post Session:  2/10   Medications Last Reviewed:  3/15/2022  Updated Objective Findings:  Restrictions along lateral IT band and vastus lateralis B, Increased superficial fascia and layer one tightness in lumbar spine around right Sacral base  TREATMENT:     THERAPEUTIC EXERCISE: (0 minutes):  Exercises per grid below to improve mobility, strength and coordination. Required minimal visual, verbal, manual and tactile cues to promote proper body alignment, promote proper body posture, promote proper body mechanics and promote proper body breathing techniques. Progressed resistance and repetitions as indicated.    Date:  22   Activity/Exercise Parameters   Core exercises Supine hook lying push into blue ball 2 x 1 min holds  Side lie hip flexion isometric on left side 2 x 1 min  Pelvic tilt posterior in supine with breathing control x 3 min  Seated pushing ball into thighs for core control in sitting   Education on sit to stand (not today) X 10 with good weight shift   Knee extension (home) Continued with blue band at home   Weight shift  R LE planted with follow through and work on keeping right side long x 1 min   Balance (not today) Tandem stance 2 x 30 sec B   Sit to stand Just for function. Hip adduction For core and hip response 10 x 10 sec hold           MANUAL THERAPY: (30 minutes): Joint mobilization and Soft tissue mobilization was utilized and necessary because of the patient's restricted joint motion and restricted motion of soft tissue. (Used abbreviations: SF - Superficial Fascia; BC - Bony contours; MP - muscle play; IASTM - instrument-assisted soft tissue mobilization; MET - muscle energy technique; a/p - anterior to posterior; p/a - posterior to anterior; Proprioceptive neuromuscular Facilitation-PNF) Manual treatment to improve soft tissue/joint mobility deficits, tissue integrity issues, trigger points and/or pain.   -Side lie mobilization to soft tissue of groove of the spine, on right side and scapular mechanics for shoulder.    -Supine mobilization of right hamstring, lateral IT band, rectus femoris, adductors  -Hip ER stretch insupine  -PNF pattern to facilitate core into anterior elevation and posterior depression with working through end range holds and isometric reversals with the pelvis and at long full leg function  -Functional resistance through posterior depression and then hip abduction facilitation.  -Anterior elevation resistance to pelvis for core training      GAIT TRAINING: (0 min) in hallway with work on use of single point cane for balance and support. Trying to focus on control and stability with push off through R LE and taking some pressure off midstance with the SPC. [employer] Portal  Treatment/Session Summary:    · Response to Treatment: Patient shows maintained mobility at this time.  Continuing to work on endurance and psychosocial factors to help him achieve his fitness goals  · Communication/Consultation:  None today  · Equipment provided today:  None today  · Recommendations/Intent for next treatment session: Next visit will focus on Core training/balance.     Total Treatment Billable Duration:  30 minutes of treatment,   PT Patient Time In/Time Out  Time In: 1400  Time Out: [address], PT    Future Appointments   Date Time Provider Nancy Eason   3/29/2022  2:00 PM Nu Corbett, PT [facility]

## 2022-03-19 PROBLEM — R20.2 PARESTHESIA OF LOWER EXTREMITY: Status: ACTIVE | Noted: 2020-06-24

## 2022-03-19 PROBLEM — R29.3 POSTURAL IMBALANCE: Status: ACTIVE | Noted: 2020-06-24

## 2022-03-19 PROBLEM — R29.898 WEAKNESS OF BOTH LEGS: Status: ACTIVE | Noted: 2020-06-24
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
## 2022-03-19 PROBLEM — E53.9 BURNING FEET SYNDROME: Status: ACTIVE | Noted: 2020-06-24

## 2022-03-29 ENCOUNTER — HOSPITAL ENCOUNTER (OUTPATIENT)
Dept: PHYSICAL THERAPY | Age: 77
Discharge: HOME | End: 2022-03-29
Payer: MEDICARE

## 2022-03-29 PROCEDURE — 97140 MANUAL THERAPY 1/> REGIONS: CPT

## 2022-03-29 PROCEDURE — 97110 THERAPEUTIC EXERCISES: CPT

## 2022-03-29 NOTE — PROGRESS NOTES
Anya Godfrey  : 1945  [place]  Secondary: Bshsi Generic [address]  at [facility]  [address] [place] [address]  [phone]   [id number] [id number]        OUTPATIENT PHYSICAL THERAPY: Daily Treatment Note 3/29/2022  Visit Count:  48    ICD-10: Treatment Diagnosis: Low back pain, M54.5  Difficulty walking, not elsewhere classified, R26.2  Pain in joint, right knee, M25.561  Precautions/Allergies: Other medication and Statins-hmg-coa reductase inhibitors   TREATMENT PLAN:  Effective Dates:22 TO 22 . Frequency/Duration: 1 time every other week for 12 weeks MEDICAL/REFERRING DIAGNOSIS:  Low back pain [M54.5]   DATE OF ONSET: Chronic  REFERRING PHYSICIAN: Venkatesh Long MD MD Orders: Evaluate and Treat  Return MD Appointment: not scheduled       Pre-treatment Symptoms/Complaints:  Patient reports that he has been doing well with more cardio and still staying steady with the UE weights. Pain: Initial: Pain Intensity 1: 3 /10 Post Session:  2/10   Medications Last Reviewed:  3/29/2022  Updated Objective Findings:  Restrictions along lateral IT band and vastus lateralis B, Increased superficial fascia and layer one tightness in lumbar spine around right Sacral base  TREATMENT:     THERAPEUTIC EXERCISE: (10 minutes):  Exercises per grid below to improve mobility, strength and coordination. Required minimal visual, verbal, manual and tactile cues to promote proper body alignment, promote proper body posture, promote proper body mechanics and promote proper body breathing techniques. Progressed resistance and repetitions as indicated.    Date:  3/29/22   Activity/Exercise Parameters   Core exercises Supine hook lying push into blue ball 2 x 1 min holds  Side lie hip flexion isometric on left side 2 x 1 min  Pelvic tilt posterior in supine with breathing control x 3 min  Seated pushing ball into thighs for core control in sitting Education on sit to stand (not today) X 10 with good weight shift   Knee extension (home) Continued with blue band at home   Weight shift  R LE planted with follow through and work on keeping right side long x 1 min   Balance (not today) Tandem stance 2 x 30 sec B   Sit to stand Just for function. Hip adduction For core and hip response 10 x 10 sec hold           MANUAL THERAPY: (30 minutes): Joint mobilization and Soft tissue mobilization was utilized and necessary because of the patient's restricted joint motion and restricted motion of soft tissue. (Used abbreviations: SF - Superficial Fascia; BC - Bony contours; MP - muscle play; IASTM - instrument-assisted soft tissue mobilization; MET - muscle energy technique; a/p - anterior to posterior; p/a - posterior to anterior; Proprioceptive neuromuscular Facilitation-PNF) Manual treatment to improve soft tissue/joint mobility deficits, tissue integrity issues, trigger points and/or pain.   -Side lie mobilization to soft tissue of groove of the spine, on right side and scapular mechanics for shoulder.    -Supine mobilization of right hamstring, lateral IT band, rectus femoris, adductors  -Hip ER stretch insupine  -PNF pattern to facilitate core into anterior elevation and posterior depression with working through end range holds and isometric reversals with the pelvis and at long full leg function  -Functional resistance through posterior depression and then hip abduction facilitation.  -Anterior elevation resistance to pelvis for core training      GAIT TRAINING: (0 min) in hallway with work on use of single point cane for balance and support. Trying to focus on control and stability with push off through R LE and taking some pressure off midstance with the SPC. [employer] Portal  Treatment/Session Summary:    · Response to Treatment: Patient shows maintained mobility at this time. He seems to always leave feeling good and walking better.  He has a difficult time maintaining  · Communication/Consultation:  None today  · Equipment provided today:  None today  · Recommendations/Intent for next treatment session: Next visit will focus on Core training/balance.     Total Treatment Billable Duration:  40 minutes of treatment,   PT Patient Time In/Time Out  Time In: [address]  Time Out: 217 Darius Agarwal PT    Future Appointments   Date Time Provider Nancy Eason   4/11/2022  2:00 PM Duglas Schaeffer, SURINDER [facility]   4/25/2022  2:00 PM Duglas Schaeffer PT [facility]

## 2022-04-11 ENCOUNTER — HOSPITAL ENCOUNTER (OUTPATIENT)
Dept: PHYSICAL THERAPY | Age: 77
Discharge: HOME | End: 2022-04-11
Payer: MEDICARE

## 2022-04-11 PROCEDURE — 97140 MANUAL THERAPY 1/> REGIONS: CPT

## 2022-04-11 NOTE — PROGRESS NOTES
Jelani Call  : 1945  [place]  Secondary: Bshsi Generic [address]  at [facility]  [address] [place] [address]  [phone]   [phone]        OUTPATIENT PHYSICAL THERAPY: Daily Treatment Note 2022  Visit Count:  54    ICD-10: Treatment Diagnosis: Low back pain, M54.5  Difficulty walking, not elsewhere classified, R26.2  Pain in joint, right knee, M25.561  Precautions/Allergies: Other medication and Statins-hmg-coa reductase inhibitors   TREATMENT PLAN:  Effective Dates:22 TO 22 . Frequency/Duration: 1 time every other week for 12 weeks MEDICAL/REFERRING DIAGNOSIS:  Low back pain [M54.5]   DATE OF ONSET: Chronic  REFERRING PHYSICIAN: Huey Todd MD MD Orders: Evaluate and Treat  Return MD Appointment: not scheduled       Pre-treatment Symptoms/Complaints:  Patient reports that he has been doing well with more cardio and still staying steady with the UE weights. He is doing his best to stay consistent  Pain: Initial: Pain Intensity 1: 4 /10 Post Session:  2/10   Medications Last Reviewed:  2022  Updated Objective Findings:  Restrictions along lateral IT band and vastus lateralis B, Increased superficial fascia and layer one tightness in lumbar spine around right Sacral base  TREATMENT:     THERAPEUTIC EXERCISE: (5 minutes):  Exercises per grid below to improve mobility, strength and coordination. Required minimal visual, verbal, manual and tactile cues to promote proper body alignment, promote proper body posture, promote proper body mechanics and promote proper body breathing techniques. Progressed resistance and repetitions as indicated.    Date:  3/29/22   Activity/Exercise Parameters   Core exercises Supine hook lying push into blue ball 2 x 1 min holds  Side lie hip flexion isometric on left side 2 x 1 min  Pelvic tilt posterior in supine with breathing control x 3 min  Seated pushing ball into thighs for core control in sitting   Education on sit to stand (not today) X 10 with good weight shift   Knee extension (home) Continued with blue band at home   Weight shift  R LE planted with follow through and work on keeping right side long x 1 min   Balance (not today) Tandem stance 2 x 30 sec B   Sit to stand Just for function. Hip adduction For core and hip response 10 x 10 sec hold           MANUAL THERAPY: (30 minutes): Joint mobilization and Soft tissue mobilization was utilized and necessary because of the patient's restricted joint motion and restricted motion of soft tissue. (Used abbreviations: SF - Superficial Fascia; BC - Bony contours; MP - muscle play; IASTM - instrument-assisted soft tissue mobilization; MET - muscle energy technique; a/p - anterior to posterior; p/a - posterior to anterior; Proprioceptive neuromuscular Facilitation-PNF) Manual treatment to improve soft tissue/joint mobility deficits, tissue integrity issues, trigger points and/or pain.   -Side lie mobilization to soft tissue of groove of the spine, on right side and scapular mechanics for shoulder.    -Supine mobilization of right hamstring, lateral IT band, rectus femoris, adductors  -Hip ER stretch insupine  -PNF pattern to facilitate core into anterior elevation and posterior depression with working through end range holds and isometric reversals with the pelvis and at long full leg function  -Functional resistance through posterior depression and then hip abduction facilitation.  -Anterior elevation resistance to pelvis for core training  -Educated and positioned in standing for posture to take pressure off his spine    GAIT TRAINING: (0 min) in hallway with work on use of single point cane for balance and support. Trying to focus on control and stability with push off through R LE and taking some pressure off midstance with the SPC.     [employer] Portal  Treatment/Session Summary:    · Response to Treatment: Patient shows maintained mobility at this time. We worked on posture and position more in standing today with need for moderate cues  · Communication/Consultation:  None today  · Equipment provided today:  None today  · Recommendations/Intent for next treatment session: Next visit will focus on Core training/balance.     Total Treatment Billable Duration:  35 minutes of treatment,   PT Patient Time In/Time Out  Time In: 7048  Time Out: [address] 12, PT    Future Appointments   Date Time Provider Nancy Eason   4/25/2022  2:00 PM Kam Goel, PT [facility]

## 2022-04-25 ENCOUNTER — HOSPITAL ENCOUNTER (OUTPATIENT)
Dept: PHYSICAL THERAPY | Age: 77
Discharge: HOME | End: 2022-04-25
Payer: MEDICARE

## 2022-04-25 PROCEDURE — 97140 MANUAL THERAPY 1/> REGIONS: CPT

## 2022-04-25 NOTE — PROGRESS NOTES
Avelino Thomason  : 1945  [place]  Secondary: Bshsi Generic [address]  at [facility]  [address] [place] [address]  [phone]   [phone]        OUTPATIENT PHYSICAL THERAPY: Daily Treatment Note 2022  Visit Count:  55    ICD-10: Treatment Diagnosis: Low back pain, M54.5  Difficulty walking, not elsewhere classified, R26.2  Pain in joint, right knee, M25.561  Precautions/Allergies: Other medication and Statins-hmg-coa reductase inhibitors   TREATMENT PLAN:  Effective Dates:22 TO 22 . Frequency/Duration: 1 time every other week for 12 weeks MEDICAL/REFERRING DIAGNOSIS:  Low back pain [M54.5]   DATE OF ONSET: Chronic  REFERRING PHYSICIAN: Garlin Bence, MD MD Orders: Evaluate and Treat  Return MD Appointment: not scheduled       Pre-treatment Symptoms/Complaints:  Patient reports that he has been doing well with more cardio and still staying steady with the UE weights. He would like to try to start doing more standing with his gym work soon  Pain: Initial: Pain Intensity 1: 4 /10 Post Session:  2/10   Medications Last Reviewed:  2022  Updated Objective Findings:  Restrictions along lateral IT band and vastus lateralis B, Increased superficial fascia and layer one tightness in lumbar spine around right Sacral base  TREATMENT:     THERAPEUTIC EXERCISE: (0 minutes):  Exercises per grid below to improve mobility, strength and coordination. Required minimal visual, verbal, manual and tactile cues to promote proper body alignment, promote proper body posture, promote proper body mechanics and promote proper body breathing techniques. Progressed resistance and repetitions as indicated.    Date:  3/29/22   Activity/Exercise Parameters   Core exercises Supine hook lying push into blue ball 2 x 1 min holds  Side lie hip flexion isometric on left side 2 x 1 min  Pelvic tilt posterior in supine with breathing control x 3 min  Seated pushing ball into thighs for core control in sitting   Education on sit to stand (not today) X 10 with good weight shift   Knee extension (home) Continued with blue band at home   Weight shift  R LE planted with follow through and work on keeping right side long x 1 min   Balance (not today) Tandem stance 2 x 30 sec B   Sit to stand Just for function. Hip adduction For core and hip response 10 x 10 sec hold           MANUAL THERAPY: (30 minutes): Joint mobilization and Soft tissue mobilization was utilized and necessary because of the patient's restricted joint motion and restricted motion of soft tissue. (Used abbreviations: SF - Superficial Fascia; BC - Bony contours; MP - muscle play; IASTM - instrument-assisted soft tissue mobilization; MET - muscle energy technique; a/p - anterior to posterior; p/a - posterior to anterior; Proprioceptive neuromuscular Facilitation-PNF) Manual treatment to improve soft tissue/joint mobility deficits, tissue integrity issues, trigger points and/or pain.   -Side lie mobilization to soft tissue of groove of the spine, on right side and scapular mechanics for shoulder.    -Supine mobilization of right hamstring, lateral IT band, rectus femoris, adductors  -Hip ER stretch insupine  -PNF pattern to facilitate core into anterior elevation and posterior depression with working through end range holds and isometric reversals with the pelvis and at long full leg function  -Functional resistance through posterior depression and then hip abduction facilitation.  -Anterior elevation resistance to pelvis for core training  -Educated and positioned in standing for posture to take pressure off his spine  -Discussed going to the gym next time for core training in standing    GAIT TRAINING: (0 min) in hallway with work on use of single point cane for balance and support.  Trying to focus on control and stability with push off through R LE and taking some pressure off midstance with the [facility]. [employer] Portal  Treatment/Session Summary:    · Response to Treatment: Patient shows maintained mobility at this time. We will discuss using cable machines next visit. · Communication/Consultation:  None today  · Equipment provided today:  None today  · Recommendations/Intent for next treatment session: Next visit will focus on Core training/balance.     Total Treatment Billable Duration:  35 minutes of treatment,   PT Patient Time In/Time Out  Time In: 9235  Time Out: Marquis 12, PT    Future Appointments   Date Time Provider Nancy Eason   5/10/2022  2:00 PM Vega Murphy, PT [facility]   5/24/2022  2:00 PM Vega Murphy, PT [facility]

## 2022-05-02 ENCOUNTER — HOSPITAL ENCOUNTER (OUTPATIENT)
Dept: PHYSICAL THERAPY | Age: 77
Setting detail: RECURRING SERIES
Discharge: HOME | End: 2022-05-05
Payer: MEDICARE

## 2022-05-10 ENCOUNTER — HOSPITAL ENCOUNTER (OUTPATIENT)
Dept: PHYSICAL THERAPY | Age: 77
Discharge: HOME | End: 2022-05-10
Payer: MEDICARE

## 2022-05-10 PROCEDURE — 97140 MANUAL THERAPY 1/> REGIONS: CPT

## 2022-05-10 PROCEDURE — 9990 CHARGE CONVERSION

## 2022-05-10 NOTE — THERAPY RECERTIFICATION
Toma Fuentes  : 1945  [place]  Secondary: Bshsi Generic [address]  at [facility] [address]  [phone]   [phone]          OUTPATIENT PHYSICAL THERAPY:Recertification    ICD-10: Treatment Diagnosis: Low back pain, M54.5  Difficulty walking, not elsewhere classified, R26.2  Pain in joint, right knee, M25.561  Precautions/Allergies: Other medication and Statins-hmg-coa reductase inhibitors   TREATMENT PLAN:  Effective Dates:5/10/22 TO 8/10/22 . Frequency/Duration: 1 time every other week for 12 weeks MEDICAL/REFERRING DIAGNOSIS:  Low back pain [M54.50]   DATE OF ONSET: Chronic  REFERRING PHYSICIAN: Toshia Jay MD MD Orders: Evaluate and Treat  Return MD Appointment: not scheduled   Toma Fuentes has been seen for 62 visits from 20 to 5/10/2022 for low back, right knee and R LE. Patient has performed therapeutic exercises, activities, and had manual therapy to increased strength, ROM and function. Patient has also used modalities for pain control in order to increase function. Patient has shown an increase in function per the LEFS with scores of 43/80. He continues to show issues with his gait function and is following up with Dr. Arianne Mabry at the end of the month. He shows good pelvic strength in side lie, but lacks stability in standing. We continue to progress toward his walking goals and he continue to do well with his regular exercises in the gym. Patient has progressed well toward their goals and will benefit from continuing skilled PT in order to address their impairments. Abida Ibrahim, PT, DPT, OCS, CFMT        INITIAL ASSESSMENT:  Mr. Yolette Bwoden presents with chronic R LE weakness and pain along with peripheral neuropathy affecting his safety with gait and function. He shows limitation in walking and unsafe gait function. He shows continued deconditioning in the LE's.  He is a good candidate for skilled PT in order to address listed impairments affecting his function. Rashaun Villafuerte, PT, DPT, OCS, CFMT      PROBLEM LIST (Impacting functional limitations):  1. Decreased Strength  2. Decreased ADL/Functional Activities  3. Decreased Transfer Abilities  4. Decreased Ambulation Ability/Technique  5. Decreased Balance  6. Increased Pain  7. Decreased Activity Tolerance  8. Decreased Flexibility/Joint Mobility INTERVENTIONS PLANNED: (Treatment may consist of any combination of the following)  1. Balance Exercise  2. Bed Mobility  3. Electrical Stimulation  4. Gait Training  5. Heat  6. Manual Therapy  7. Neuromuscular Re-education/Strengthening  8. Range of Motion (ROM)  9. Therapeutic Activites  10. Therapeutic Exercise/Strengthening     GOALS: (Goals have been discussed and agreed upon with patient.)    Discharge Goals: Time Frame: 12 weeks  1. Patient will show a greater than 10 point increase on the LEFS in order to show an increase in function (MET-11/17/21)  2. Patient will report joining a pool and walking in the water greater than 2 times a week in order to progress function (ongoing)  3. Patient will be independent in balance exercises in order to decrease fall risk (ongoing)  4. Patient will report going to the gym consistently three times a week for two months. (MET-2/16/22)  5. NEW GOALS:  6. Patient will perform 10 sit to stands without UE use in order to progress strength and function    OUTCOME MEASURE:   Tool Used: Lower Extremity Functional Scale (LEFS)  Score:  Initial: 37/80 Most Recent: 43/80 (Date: 5/10/22 )   Interpretation of Score: 20 questions each scored on a 5 point scale with 0 representing \"extreme difficulty or unable to perform\" and 4 representing \"no difficulty\". The lower the score, the greater the functional disability. 80/80 represents no disability. Minimal detectable change is 9 points.       MEDICAL NECESSITY:   · Patient is expected to demonstrate progress in strength, range of motion, balance, coordination and functional technique to improve functionalmobility and safety with walking. · Patient demonstrates good rehab potential due to higher previous functional level. · Skilled intervention continues to be required due to decreased mobility and increased risk of falls. REASON FOR SERVICES/OTHER COMMENTS:  · Patient continues to require skilled intervention due to decreased mobility. Total Duration:  PT Patient Time In/Time Out  Time In: 1402  Time Out: 1500    Rehabilitation Potential For Stated Goals: Excellent  Regarding Dorna Mad Potocki's therapy, I certify that the treatment plan above will be carried out by a therapist or under their direction. Thank you for this referral,  Lloyd Avila, PT     Referring Physician Signature: Garlin Bence, MD                 PAIN/SUBJECTIVE:   Initial: Pain Intensity 1: 5 /10 Post Session:  2/10   HISTORY:   History of Injury/Illness (Reason for Referral):  Patient has had a chronic history of R LE weakness and pain since he first had his hip replacement about 5 years ago. He has since had issues with spinal stenosis, disc herniation, right knee OA and has found out recently that he has peripheral neuropathy in both feet that affect his balance. He had a fall back in February/March that was concerning for him due to the leg giving out. He also had a hospitalization due to abnormal heartbeat. He has finally stabilized with the heart. He did have an injection in his lower back (Dr. Steven Posey) in the summer due to increasing left LE weakness and pain that has helped a lot. He is not comfortable going to the gym like he use to due to [place] and has become deconditioned. He reports doing some things at home, but still just feels like he is starting from square one. His goals are to get back to exercises and daily life without difficulty and with less fear of falling.   Past Medical History/Comorbidities:   Mr. Asif Geiger  has a past medical history of Anxiety, Edema of both legs, Enlarged prostate, Gout, Hip pain, History of elevated glucose, History of seasonal allergies, Hypercholesterolemia, Hypertension, Keratoacanthoma, S/P total hip arthroplasty (4/6/2015), Skin neoplasm, and Spinal stenosis. He has no past medical history of Aneurysm ([address] Arrhythmia, Arthritis, Asthma, Autoimmune disease ([address] CAD (coronary artery disease), Cancer ([address] Chronic kidney disease, Chronic obstructive pulmonary disease ([address] Chronic pain, Coagulation disorder ([address] Difficult intubation, GERD (gastroesophageal reflux disease), Heart failure ([address] Ill-defined condition, Liver disease, Malignant hyperthermia due to anesthesia, Morbid obesity ([address] Nausea & vomiting, Pseudocholinesterase deficiency, Psychiatric disorder, PUD (peptic ulcer disease), Seizures ([address] Stroke ([facility] Thromboembolus ([facility] Thyroid disease, Unspecified adverse effect of anesthesia, or Unspecified sleep apnea. Mr. Logan Greenberg  has a past surgical history that includes hx tonsillectomy (as child) and hx wisdom teeth extraction (as teenager).   Social History/Living Environment:       Social History     Socioeconomic History    Marital status:      Spouse name: Not on file    Number of children: Not on file    Years of education: Not on file    Highest education level: Not on file   Occupational History    Not on file   Tobacco Use    Smoking status: Never Smoker    Smokeless tobacco: Not on file   Substance and Sexual Activity    Alcohol use: No    Drug use: Not on file    Sexual activity: Not on file   Other Topics Concern    Not on file   Social History Narrative    Not on file     Social Determinants of Health     Financial Resource Strain:     Difficulty of Paying Living Expenses: Not on file   Food Insecurity:     Worried About Running Out of Food in the Last Year: Not on file    Melva of Food in the Last Year: Not on file   Transportation Needs:  Lack of Transportation (Medical): Not on file    Lack of Transportation (Non-Medical): Not on file   Physical Activity:     Days of Exercise per Week: Not on file    Minutes of Exercise per Session: Not on file   Stress:     Feeling of Stress : Not on file   Social Connections:     Frequency of Communication with Friends and Family: Not on file    Frequency of Social Gatherings with Friends and Family: Not on file    Attends Evangelical Services: Not on file    Active Member of [address] [place] [address] or Organizations: Not on file    Attends Club or Organization Meetings: Not on file    Marital Status: Not on file   Intimate Partner Violence:     Fear of Current or Ex-Partner: Not on file    Emotionally Abused: Not on file    Physically Abused: Not on file    Sexually Abused: Not on file   Housing Stability:     Unable to Pay for Housing in the Last Year: Not on file    Number of [place] in the Last Year: Not on file    Unstable Housing in the Last Year: Not on file       Prior Level of Function/Work/Activity:  Independent, retired  Dominant Side:         RIGHT   Ambulatory/Rehab [address] [place] [address] Assessment   Risk Factors:       (1)  Gender [Male]       (5)  History of Recent Falls [w/in 3 months]       (1)  Orthostatic drop in BP Ability to Rise from Chair:       (1)  Pushes up, successful in one attempt   Falls Prevention Plan:       No modifications necessary   Total: (5 or greater = High Risk): 8   ©2010 [facility] . [facility] States Patent #[id number]. Federal Law prohibits the replication, distribution or use without written permission from [facility] of [address] [place] [address]   Current Medications:       Current Outpatient Medications:     ergocalciferol (ERGOCALCIFEROL) 1,250 mcg (50,000 unit) capsule, Take 50,000 Units by mouth every seven (7) days. , Disp: , Rfl:     furosemide (LASIX) 20 mg tablet, Take 20 mg by mouth daily. , Disp: , Rfl:     lisinopriL (PRINIVIL, ZESTRIL) 20 mg tablet, Take 20 mg by mouth daily. , Disp: , Rfl:     celecoxib (CELEBREX) 200 mg capsule, Take 200 mg by mouth daily. , Disp: , Rfl:     allopurinoL (ZYLOPRIM) 300 mg tablet, Take 300 mg by mouth daily. , Disp: , Rfl:     spironolactone (ALDACTONE) 25 mg tablet, Take 25 mg by mouth daily. , Disp: , Rfl:     busPIRone (BUSPAR) 7.5 mg tablet, Take 7.5 mg by mouth two (2) times a day., Disp: , Rfl:     hydrOXYzine HCL (ATARAX) 25 mg tablet, Take 25 mg by mouth two (2) times a day., Disp: , Rfl:     POTASSIUM CHLORIDE PO, Take 10 mEq by mouth three (3) days a week., Disp: , Rfl:     cloNIDine (CATAPRES) 0.3 mg/24 hr, 1 Patch by TransDERmal route every seven (7) days. , Disp: , Rfl:     prazosin (MINIPRESS) 1 mg capsule, Take 1 mg by mouth two (2) times a day. TAKE AM OF SURGERY WITH SMALL SIP OF WATER   Indications: enlarged prostate, Disp: , Rfl:     finasteride (PROSCAR) 5 mg tablet, Take 5 mg by mouth daily. TAKE AM OF SURGERY WITH SMALL SIP OF WATER   Indications: BENIGN PROSTATIC HYPERTROPHY, Disp: , Rfl:    Date Last Reviewed:  5/10/2022     Number of Personal Factors/Comorbidities that affect the Plan of Care: 1-2: MODERATE COMPLEXITY   EXAMINATION:   Observation/Orthostatic Postural Assessment:          Patient shows increased obesity with more abdominal weight and increased lordosis. He shows increased small step length with short distances. He tends to show increased right pelvic rotation in standing and gait  Palpation:          Tightness along right paraspinals and into low back and pelvis. He shows tightness in lateral right hip and leg. Some noted atrophy in left glut compared to right side.  -Restricted and limited in hamstring mobility B with increased right restriction more than left  -Overall right hip motion appears good for artificial ANUJ.   -Right knee shows increased restrictions at patella and medial and lateral joint line with pain with testing due to arthritic condition  -Increased swelling in B LE and ankles with compression socks worn  ROM:          Patient shows improvement in hamstring mobility at -10 deg B from full  -Flexion in lumbar spine to 30% in standing with difficulty with balance  Strength:          Hip flexion at 4-/5  On right and 4-/5 on left  Right knee extension at 4+/5  Left knee extension at 4+/5  Right knee flexion at 4/5  Left at 4/5  right hip abduction 4/5  Right hip extension 4/5  Neurological Screen:        Sensation: Patient has peripheral neuropathy on the plantar surface of both feet  Nerve conduction did not reveal specific radiculopathy  Functional Mobility:         Gait/Ambulation:  Patient shows increased Trendelenburg pattern B with increased right pelvic rotation that maintains during gait with right LE lagging some with shortened stance phase B. Patient has increased knee hike due to neuropathy. Transfers:  Patient shows ability to sit to stand with some aid from hands        Bed Mobility:  Patient has some difficulty with supine to sit with needs for some aid  Balance:          Poor for single leg stance, tandem stance and turning   Body Structures Involved:  1. Nerves  2. Thoracic Cage  3. Bones  4. Joints  5. Muscles  6. Ligaments Body Functions Affected:  1. Sensory/Pain  2. Neuromusculoskeletal  3. Movement Related Activities and Participation Affected:  1. General Tasks and Demands  2. Mobility  3. Self Care  4. Domestic Life  5.  Interpersonal Interactions and Relationships   Number of elements (examined above) that affect the Plan of Care: 4+: HIGH COMPLEXITY   CLINICAL PRESENTATION:   Presentation: Stable and uncomplicated: LOW COMPLEXITY   CLINICAL DECISION MAKING:   Use of outcome tool(s) and clinical judgement create a POC that gives a: Clear prediction of patient's progress: LOW COMPLEXITY

## 2022-05-10 NOTE — PROGRESS NOTES
Anya Godfrey  : 1945  [place]  Secondary: Bshsi Generic [facility]  at [facility]  [address] [place] [address]  [phone]   [id number]        OUTPATIENT PHYSICAL THERAPY: Daily Treatment Note 5/10/2022  Visit Count:  56    ICD-10: Treatment Diagnosis: Low back pain, M54.5  Difficulty walking, not elsewhere classified, R26.2  Pain in joint, right knee, M25.561  Precautions/Allergies: Other medication and Statins-hmg-coa reductase inhibitors   TREATMENT PLAN:  Effective Dates:5/10/22 TO 8/10/22 . Frequency/Duration: 1 time every other week for 12 weeks MEDICAL/REFERRING DIAGNOSIS:  Low back pain [M54.5]   DATE OF ONSET: Chronic  REFERRING PHYSICIAN: Venkatesh Long MD MD Orders: Evaluate and Treat  Return MD Appointment: not scheduled       Pre-treatment Symptoms/Complaints:  Patient reports that he has been steady with his gym exercises. He is still having that pain and the difficulty walking. He goes to see Dr. Cristino Alegria about his back at the end of the month  Pain: Initial: Pain Intensity 1: 5 /10 Post Session:  2/10   Medications Last Reviewed:  5/10/2022  Updated Objective Findings:  Restrictions along lateral IT band and vastus lateralis B, Increased superficial fascia and layer one tightness in lumbar spine around right Sacral base  TREATMENT:     THERAPEUTIC EXERCISE: (0 minutes):  Exercises per grid below to improve mobility, strength and coordination. Required minimal visual, verbal, manual and tactile cues to promote proper body alignment, promote proper body posture, promote proper body mechanics and promote proper body breathing techniques. Progressed resistance and repetitions as indicated.    Date:  3/29/22   Activity/Exercise Parameters   Core exercises Supine hook lying push into blue ball 2 x 1 min holds  Side lie hip flexion isometric on left side 2 x 1 min  Pelvic tilt posterior in supine with breathing control x 3 min  Seated pushing ball into thighs for core control in sitting   Education on sit to stand (not today) X 10 with good weight shift   Knee extension (home) Continued with blue band at home   Weight shift  R LE planted with follow through and work on keeping right side long x 1 min   Balance (not today) Tandem stance 2 x 30 sec B   Sit to stand Just for function. Hip adduction For core and hip response 10 x 10 sec hold           MANUAL THERAPY: (30 minutes): Joint mobilization and Soft tissue mobilization was utilized and necessary because of the patient's restricted joint motion and restricted motion of soft tissue. (Used abbreviations: SF - Superficial Fascia; BC - Bony contours; MP - muscle play; IASTM - instrument-assisted soft tissue mobilization; MET - muscle energy technique; a/p - anterior to posterior; p/a - posterior to anterior; Proprioceptive neuromuscular Facilitation-PNF) Manual treatment to improve soft tissue/joint mobility deficits, tissue integrity issues, trigger points and/or pain.   -Side lie mobilization to soft tissue of groove of the spine, on right side and scapular mechanics for shoulder.    -Supine mobilization of right hamstring, lateral IT band, rectus femoris, adductors  -Hip ER stretch insupine  -PNF pattern to facilitate core into anterior elevation and posterior depression with working through end range holds and isometric reversals with the pelvis and at long full leg function  -Functional resistance through posterior depression and then hip abduction facilitation.  -Anterior elevation resistance to pelvis for core training  -Educated and positioned in standing for posture to take pressure off his spine  -Discussed going to the gym next time for core training in standing    GAIT TRAINING: (0 min) in hallway with work on use of single point cane for balance and support.  Trying to focus on control and stability with push off through R LE and taking some pressure off midstance with the [facility]. [employer] Portal  Treatment/Session Summary:    · Response to Treatment: Patient shows maintained mobility at this time. We will discuss using cable machines next visit. · Communication/Consultation:  None today  · Equipment provided today:  None today  · Recommendations/Intent for next treatment session: Next visit will focus on Core training/balance.     Total Treatment Billable Duration:  30 minutes of treatment,   PT Patient Time In/Time Out  Time In: 1402  Time Out: Romeo Aleman, PT    Future Appointments   Date Time Provider Nancy Eason   5/24/2022  2:00 PM Tuyet Wisdom, PT [facility]

## 2022-05-23 ENCOUNTER — APPOINTMENT (OUTPATIENT)
Dept: PHYSICAL THERAPY | Age: 77
End: 2022-05-23
Payer: MEDICARE

## 2022-05-24 ENCOUNTER — HOSPITAL ENCOUNTER (OUTPATIENT)
Dept: PHYSICAL THERAPY | Age: 77
Setting detail: RECURRING SERIES
Discharge: HOME | End: 2022-05-27
Payer: MEDICARE

## 2022-05-24 ENCOUNTER — APPOINTMENT (OUTPATIENT)
Dept: PHYSICAL THERAPY | Age: 77
End: 2022-05-24
Payer: MEDICARE

## 2022-05-24 PROCEDURE — 97140 MANUAL THERAPY 1/> REGIONS: CPT

## 2022-05-24 NOTE — THERAPY RECERTIFICATION
Lea Olea  MRN: [id number]  Kika Em PT   Physical Therapist   Specialty:  Physical Therapy   Therapy Recertification      Signed   Date of Service:  5/10/2022  2:00 PM          Reevaluation                     Liyah Dominguez  : 1945  [place]  Secondary: Bshsi Generic [facility]  at [address] [place] [address]  [phone]   [phone]           OUTPATIENT PHYSICAL THERAPY:Recertification    ICD-10: Treatment Diagnosis: Low back pain, M54.5  Difficulty walking, not elsewhere classified, R26.2  Pain in joint, right knee, M25.561  Precautions/Allergies: Other medication and Statins-hmg-coa reductase inhibitors   TREATMENT PLAN:  Effective Dates:5/10/22 TO 8/10/22 . Frequency/Duration: 1 time every other week for 12 weeks MEDICAL/REFERRING DIAGNOSIS:  Low back pain [M54.50]   DATE OF ONSET: Chronic  REFERRING PHYSICIAN: Marisol Gamez MD MD Orders: Evaluate and Treat  Return MD Appointment: not scheduled   Liyah Dominguez has been seen for 62 visits from 20 to 5/10/2022 for low back, right knee and R LE. Patient has performed therapeutic exercises, activities, and had manual therapy to increased strength, ROM and function. Patient has also used modalities for pain control in order to increase function. Patient has shown an increase in function per the LEFS with scores of 43/80. He continues to show issues with his gait function and is following up with Dr. Sade Turcios at the end of the month. He shows good pelvic strength in side lie, but lacks stability in standing. We continue to progress toward his walking goals and he continue to do well with his regular exercises in the gym. Patient has progressed well toward their goals and will benefit from continuing skilled PT in order to address their impairments.   Magan Calles, PT, DPT, OCS, CFMT           INITIAL ASSESSMENT:  Mr. Patricio Case presents with chronic R LE weakness and pain along with peripheral neuropathy affecting his safety with gait and function. He shows limitation in walking and unsafe gait function. He shows continued deconditioning in the LE's. He is a good candidate for skilled PT in order to address listed impairments affecting his function. Magan Calles, PT, DPT, OCS, CFMT      PROBLEM LIST (Impacting functional limitations):  1. Decreased Strength  2. Decreased ADL/Functional Activities  3. Decreased Transfer Abilities  4. Decreased Ambulation Ability/Technique  5. Decreased Balance  6. Increased Pain  7. Decreased Activity Tolerance  8. Decreased Flexibility/Joint Mobility INTERVENTIONS PLANNED: (Treatment may consist of any combination of the following)  1. Balance Exercise  2. Bed Mobility  3. Electrical Stimulation  4. Gait Training  5. Heat  6. Manual Therapy  7. Neuromuscular Re-education/Strengthening  8. Range of Motion (ROM)  9. Therapeutic Activites  10. Therapeutic Exercise/Strengthening      GOALS: (Goals have been discussed and agreed upon with patient.)     Discharge Goals: Time Frame: 12 weeks  1. Patient will show a greater than 10 point increase on the LEFS in order to show an increase in function (MET-11/17/21)  2. Patient will report joining a pool and walking in the water greater than 2 times a week in order to progress function (ongoing)  3. Patient will be independent in balance exercises in order to decrease fall risk (ongoing)  4. Patient will report going to the gym consistently three times a week for two months. (MET-2/16/22)  5. NEW GOALS:  6.  Patient will perform 10 sit to stands without UE use in order to progress strength and function     OUTCOME MEASURE:   Tool Used: Lower Extremity Functional Scale (LEFS)  Score:  Initial: 37/80 Most Recent: 43/80 (Date: 5/10/22 )   Interpretation of Score: 20 questions each scored on a 5 point scale with 0 representing \"extreme difficulty or unable to perform\" and 4 representing \"no difficulty\". The lower the score, the greater the functional disability. 80/80 represents no disability. Minimal detectable change is 9 points.        MEDICAL NECESSITY:   · Patient is expected to demonstrate progress in strength, range of motion, balance, coordination and functional technique to improve functionalmobility and safety with walking. · Patient demonstrates good rehab potential due to higher previous functional level. · Skilled intervention continues to be required due to decreased mobility and increased risk of falls. REASON FOR SERVICES/OTHER COMMENTS:  · Patient continues to require skilled intervention due to decreased mobility. Total Duration:  PT Patient Time In/Time Out  Time In: 1402  Time Out: 1500     Rehabilitation Potential For Stated Goals: Excellent  Regarding James Potocki's therapy, I certify that the treatment plan above will be carried out by a therapist or under their direction. Thank you for this referral,  Nima Hatfield, PT     Referring Physician Signature: Bernardo Muse MD                  PAIN/SUBJECTIVE:   Initial: Pain Intensity 1: 5 /10 Post Session:  2/10   HISTORY:   History of Injury/Illness (Reason for Referral):  Patient has had a chronic history of R LE weakness and pain since he first had his hip replacement about 5 years ago. He has since had issues with spinal stenosis, disc herniation, right knee OA and has found out recently that he has peripheral neuropathy in both feet that affect his balance. He had a fall back in February/March that was concerning for him due to the leg giving out. He also had a hospitalization due to abnormal heartbeat. He has finally stabilized with the heart. He did have an injection in his lower back (Dr. Yves Villanueva) in the summer due to increasing left LE weakness and pain that has helped a lot. He is not comfortable going to the gym like he use to due to Avani and has become deconditioned.   He reports doing some things at home, but still just feels like he is starting from square one. His goals are to get back to exercises and daily life without difficulty and with less fear of falling. Past Medical History/Comorbidities:   Mr. Kathryn Altamirano  has a past medical history of Anxiety, Edema of both legs, Enlarged prostate, Gout, Hip pain, History of elevated glucose, History of seasonal allergies, Hypercholesterolemia, Hypertension, Keratoacanthoma, S/P total hip arthroplasty (4/6/2015), Skin neoplasm, and Spinal stenosis.     He has no past medical history of Aneurysm ([address] Arrhythmia, Arthritis, Asthma, Autoimmune disease ([address] CAD (coronary artery disease), Cancer ([address] Chronic kidney disease, Chronic obstructive pulmonary disease ([address] Chronic pain, Coagulation disorder ([address] Difficult intubation, GERD (gastroesophageal reflux disease), Heart failure ([address] Ill-defined condition, Liver disease, Malignant hyperthermia due to anesthesia, Morbid obesity ([address] Nausea & vomiting, Pseudocholinesterase deficiency, Psychiatric disorder, PUD (peptic ulcer disease), Seizures ([address] Stroke ([address] Thromboembolus ([address] Thyroid disease, Unspecified adverse effect of anesthesia, or Unspecified sleep apnea. Mr. Kathryn Altamirano  has a past surgical history that includes hx tonsillectomy (as child) and hx wisdom teeth extraction (as teenager).   Social History/Living Environment:       Social History            Socioeconomic History    Marital status:        Spouse name: Not on file    Number of children: Not on file    Years of education: Not on file    Highest education level: Not on file   Occupational History    Not on file   Tobacco Use    Smoking status: Never Smoker    Smokeless tobacco: Not on file   Substance and Sexual Activity    Alcohol use: No    Drug use: Not on file    Sexual activity: Not on file   Other Topics Concern    Not on file   Social History Narrative    Not on file      Social Determinants of Health          Financial Resource Strain:     Difficulty of Paying Living Expenses: Not on file   Food Insecurity:     Worried About Running Out of Food in the Last Year: Not on file    Armen of Food in the Last Year: Not on file   Transportation Needs:     Lack of Transportation (Medical): Not on file    Lack of Transportation (Non-Medical): Not on file   Physical Activity:     Days of Exercise per Week: Not on file    Minutes of Exercise per Session: Not on file   Stress:     Feeling of Stress : Not on file   Social Connections:     Frequency of Communication with Friends and Family: Not on file    Frequency of Social Gatherings with Friends and Family: Not on file    Attends Protestant Services: Not on file    Active Member of [address] [place] [address] or Organizations: Not on file    Attends Club or Organization Meetings: Not on file    Marital Status: Not on file   Intimate Partner Violence:     Fear of Current or Ex-Partner: Not on file    Emotionally Abused: Not on file    Physically Abused: Not on file    Sexually Abused: Not on file   Housing Stability:     Unable to Pay for Housing in the Last Year: Not on file    Number of [place] in the Last Year: Not on file    Unstable Housing in the Last Year: Not on file         Prior Level of Function/Work/Activity:  Independent, retired  Dominant Side:         RIGHT   Ambulatory/Rehab [address] Assessment   Risk Factors:       (1)  Gender [Male]       (5)  History of Recent Falls [w/in 3 months]       (1)  Orthostatic drop in BP Ability to Rise from Chair:       (1)  Pushes up, successful in one attempt   Falls Prevention Plan:       No modifications necessary   Total: (5 or greater = High Risk): 8   ©2010 [facility]. [facility] Patent #[id number].  Federal Law prohibits the replication, distribution or use without written permission from [facility]   Current Medications:         Current Outpatient Medications:     ergocalciferol (ERGOCALCIFEROL) 1,250 mcg (50,000 unit) capsule, Take 50,000 Units by mouth every seven (7) days. , Disp: , Rfl:     furosemide (LASIX) 20 mg tablet, Take 20 mg by mouth daily. , Disp: , Rfl:     lisinopriL (PRINIVIL, ZESTRIL) 20 mg tablet, Take 20 mg by mouth daily. , Disp: , Rfl:     celecoxib (CELEBREX) 200 mg capsule, Take 200 mg by mouth daily. , Disp: , Rfl:     allopurinoL (ZYLOPRIM) 300 mg tablet, Take 300 mg by mouth daily. , Disp: , Rfl:     spironolactone (ALDACTONE) 25 mg tablet, Take 25 mg by mouth daily. , Disp: , Rfl:     busPIRone (BUSPAR) 7.5 mg tablet, Take 7.5 mg by mouth two (2) times a day., Disp: , Rfl:     hydrOXYzine HCL (ATARAX) 25 mg tablet, Take 25 mg by mouth two (2) times a day., Disp: , Rfl:     POTASSIUM CHLORIDE PO, Take 10 mEq by mouth three (3) days a week., Disp: , Rfl:     cloNIDine (CATAPRES) 0.3 mg/24 hr, 1 Patch by TransDERmal route every seven (7) days. , Disp: , Rfl:     prazosin (MINIPRESS) 1 mg capsule, Take 1 mg by mouth two (2) times a day. TAKE AM OF SURGERY WITH SMALL SIP OF WATER   Indications: enlarged prostate, Disp: , Rfl:     finasteride (PROSCAR) 5 mg tablet, Take 5 mg by mouth daily. TAKE AM OF SURGERY WITH SMALL SIP OF WATER   Indications: BENIGN PROSTATIC HYPERTROPHY, Disp: , Rfl:    Date Last Reviewed:  5/10/2022      Number of Personal Factors/Comorbidities that affect the Plan of Care: 1-2: MODERATE COMPLEXITY   EXAMINATION:   Observation/Orthostatic Postural Assessment:          Patient shows increased obesity with more abdominal weight and increased lordosis. He shows increased small step length with short distances. He tends to show increased right pelvic rotation in standing and gait  Palpation:          Tightness along right paraspinals and into low back and pelvis. He shows tightness in lateral right hip and leg.   Some noted atrophy in left glut compared to right side.  -Restricted and limited in hamstring mobility B with increased right restriction more than left  -Overall right hip motion appears good for artificial LIDIA. -Right knee shows increased restrictions at patella and medial and lateral joint line with pain with testing due to arthritic condition  -Increased swelling in B LE and ankles with compression socks worn  ROM:          Patient shows improvement in hamstring mobility at -10 deg B from full  -Flexion in lumbar spine to 30% in standing with difficulty with balance  Strength:          Hip flexion at 4-/5  On right and 4-/5 on left  Right knee extension at 4+/5  Left knee extension at 4+/5  Right knee flexion at 4/5  Left at 4/5  right hip abduction 4/5  Right hip extension 4/5  Neurological Screen:        Sensation: Patient has peripheral neuropathy on the plantar surface of both feet  Nerve conduction did not reveal specific radiculopathy  Functional Mobility:         Gait/Ambulation:  Patient shows increased Trendelenburg pattern B with increased right pelvic rotation that maintains during gait with right LE lagging some with shortened stance phase B. Patient has increased knee hike due to neuropathy. Transfers:  Patient shows ability to sit to stand with some aid from hands        Bed Mobility:  Patient has some difficulty with supine to sit with needs for some aid  Balance:          Poor for single leg stance, tandem stance and turning   Body Structures Involved:  1. Nerves  2. Thoracic Cage  3. Bones  4. Joints  5. Muscles  6. Ligaments Body Functions Affected:  1. Sensory/Pain  2. Neuromusculoskeletal  3. Movement Related Activities and Participation Affected:  1. General Tasks and Demands  2. Mobility  3. Self Care  4. Domestic Life  5.  Interpersonal Interactions and Relationships   Number of elements (examined above) that affect the Plan of Care: 4+: HIGH COMPLEXITY   CLINICAL PRESENTATION:   Presentation: Stable and uncomplicated: LOW COMPLEXITY   CLINICAL DECISION MAKING: Use of outcome tool(s) and clinical judgement create a POC that gives a: Clear prediction of patient's progress: LOW COMPLEXITY

## 2022-05-24 NOTE — PROGRESS NOTES
Kenny Alejandro  MRN: [id number]  Loida Ordonez PT   Physical Therapist   Specialty:  Physical Therapy   Progress Notes      Signed   Date of Service:  5/10/2022  2:06 PM                    Oliva Edge  : 1945  [place]  Secondary: Bshsi Generic [address]  at [facility]  [address] [place] [address]  [phone]   [phone]          OUTPATIENT PHYSICAL THERAPY: Daily Treatment Note 5/10/2022  Visit Count:  58     ICD-10: Treatment Diagnosis: Low back pain, M54.5  Difficulty walking, not elsewhere classified, R26.2  Pain in joint, right knee, M25.561  Precautions/Allergies: Other medication and Statins-hmg-coa reductase inhibitors   TREATMENT PLAN:  Effective Dates:5/10/22 TO 8/10/22 . Frequency/Duration: 1 time every other week for 12 weeks MEDICAL/REFERRING DIAGNOSIS:  Low back pain [M54.5]   DATE OF ONSET: Chronic  REFERRING PHYSICIAN: Christian Dodge MD MD Orders: Evaluate and Treat  Return MD Appointment: not scheduled         Pre-treatment Symptoms/Complaints:  Patient reports that he had his appointment with Dr Bowen Stallworth tomorrow. He feels like his is coming along slowly, but still has a lot of trouble with walking his normal distances. Pain: Initial: Pain Intensity 1: 5 /10 Post Session:  2/10   Medications Last Reviewed:  5/10/2022  Updated Objective Findings:  Restrictions along lateral IT band and vastus lateralis B, Increased superficial fascia and layer one tightness in lumbar spine around right Sacral base  TREATMENT:      THERAPEUTIC EXERCISE: (0 minutes):  Exercises per grid below to improve mobility, strength and coordination. Required minimal visual, verbal, manual and tactile cues to promote proper body alignment, promote proper body posture, promote proper body mechanics and promote proper body breathing techniques.   Progressed resistance and repetitions as indicated.    Date:  3/29/22   Activity/Exercise Parameters   Core exercises Supine hook lying push into blue ball 2 x 1 min holds  Side lie hip flexion isometric on left side 2 x 1 min  Pelvic tilt posterior in supine with breathing control x 3 min  Seated pushing ball into thighs for core control in sitting   Education on sit to stand (not today) X 10 with good weight shift   Knee extension (home) Continued with blue band at home   Weight shift  R LE planted with follow through and work on keeping right side long x 1 min   Balance (not today) Tandem stance 2 x 30 sec B   Sit to stand Just for function. Hip adduction For core and hip response 10 x 10 sec hold               MANUAL THERAPY: (40 minutes): Joint mobilization and Soft tissue mobilization was utilized and necessary because of the patient's restricted joint motion and restricted motion of soft tissue.  (Used abbreviations: SF - Superficial Fascia; BC - Bony contours; MP - muscle play; IASTM - instrument-assisted soft tissue mobilization; MET - muscle energy technique; a/p - anterior to posterior; p/a - posterior to anterior; Proprioceptive neuromuscular Facilitation-PNF) Manual treatment to improve soft tissue/joint mobility deficits, tissue integrity issues, trigger points and/or pain.   -Side lie mobilization to soft tissue of groove of the spine, on right side and scapular mechanics for shoulder.    -Supine mobilization of right hamstring, lateral IT band, rectus femoris, adductors  -Hip ER stretch insupine  -PNF pattern to facilitate core into anterior elevation and posterior depression with working through end range holds and isometric reversals with the pelvis and at long full leg function  -Functional resistance through posterior depression and then hip abduction facilitation.  -Anterior elevation resistance to pelvis for core training  -Educated and positioned in standing for posture to take pressure off his spine  -     GAIT TRAINING: (0 min) in hallway with work on use of single point cane for balance and support.  Trying to focus on control and stability with push off through R LE and taking some pressure off midstance with the [facility].     [employer] Portal  Treatment/Session Summary:    · Response to Treatment: Patient shows some improvement in right hip strength, but limited in restrictions in lateral right LE and pelvis  · Communication/Consultation:  None today  · Equipment provided today:  None today  · Recommendations/Intent for next treatment session: Next visit will focus on Core training/balance.     Total Treatment Billable Duration:  40 minutes of treatment,   PT Patient Time In/Time Out  Time In: 1402  Time Out: Merle [address], PT            Future Appointments   Date Time Provider Mario Leone   5/24/2022  2:00 PM Jorge L Farley, PT [facility]

## 2022-06-07 ENCOUNTER — HOSPITAL ENCOUNTER (OUTPATIENT)
Dept: PHYSICAL THERAPY | Age: 77
Setting detail: RECURRING SERIES
Discharge: HOME | End: 2022-06-10
Payer: MEDICARE

## 2022-06-07 PROCEDURE — 97140 MANUAL THERAPY 1/> REGIONS: CPT

## 2022-06-07 PROCEDURE — 97110 THERAPEUTIC EXERCISES: CPT

## 2022-06-07 ASSESSMENT — PAIN SCALES - GENERAL: PAINLEVEL_OUTOF10: 3

## 2022-06-07 NOTE — PROGRESS NOTES
Johnson Brown  : 1945  Primary: Medicare Part A And B  Secondary: Kayla [id number] Rod CARRERA Carrie Joshi @ [address] [place] [address] [facility]  Far[place] [address]  Phone: [phone]  Fax: [phone] No data recorded  No data recorded    PT Visit Info: Total # of Visits to Date: 61      OUTPATIENT PHYSICAL THERAPY:OP NOTE TYPE: Treatment Note 2022       Episode  }Appt Desk              Treatment Diagnosis:  Low back pain, M54.5  Difficulty walking, not elsewhere classified, R26.2  Pain in joint, right knee, M25.561  Medical/Referring Diagnosis:  Low back pain, unspecified [M54.50]  Referring Physician:  Judy Caceres., *  MD Orders:  PT Eval and Treat   TREATMENT PLAN:  Effective Dates:5/10/22 TO 8/10/22 .  Frequency/Duration: 1 time every other week for 12 weeks  Date of Onset:  No data recorded   Allergies:   Patient has no allergy information on record. Other medication and Statins-hmg-coa reductase inhibitors   Restrictions/Precautions:  No data recordedNo data recorded   Interventions Planned (Treatment may consist of any combination of the following):    1. Balance Exercise  2. Bed Mobility  3. Electrical Stimulation  4. Gait Training  5. Heat  6. Manual Therapy  7. Neuromuscular Re-education/Strengthening  8. Range of Motion (ROM)  9. Therapeutic Activites  10. Therapeutic Exercise/Strengthening  No data recorded   Subjective Comments:Patient reports getting into the gym steadily this last two weeks.       Initial:}    3/10Post Session:       2/10  Medications Last Reviewed:  2022  Updated Objective Findings: Glute medius weakness  Treatment   THERAPEUTIC EXERCISE: (10 minutes):  Exercises per grid below to improve mobility, strength and coordination.  Required minimal visual, verbal, manual and tactile cues to promote proper body alignment, promote proper body posture, promote proper body mechanics and promote proper body breathing techniques.  Progressed resistance and repetitions as indicated.    Date:  6/7/22   Activity/Exercise Parameters   Core exercises Supine hook lying push into blue ball 2 x 1 min holds  Side lie hip flexion isometric on left side 2 x 1 min  Pelvic tilt posterior in supine with breathing control x 3 min  Seated pushing ball into thighs for core control in sitting   Education on sit to stand (not today) X 10 with good weight shift   Knee extension (home) Continued with blue band at home   Weight shift  R LE planted with follow through and work on keeping right side long x 1 min   Balance (not today) Tandem stance 2 x 30 sec B   Sit to stand Just for function. Hip abduction Side lie calm shell and straight leg raise x 5 min with review for weakness               MANUAL THERAPY: (35 minutes): Joint mobilization and Soft tissue mobilization was utilized and necessary because of the patient's restricted joint motion and restricted motion of soft tissue.  (Used abbreviations: SF - Superficial Fascia; BC - Bony contours; MP - muscle play; IASTM - instrument-assisted soft tissue mobilization; MET - muscle energy technique; a/p - anterior to posterior; p/a - posterior to anterior; Proprioceptive neuromuscular Facilitation-PNF) Manual treatment to improve soft tissue/joint mobility deficits, tissue integrity issues, trigger points and/or pain.   -Side lie mobilization to soft tissue of groove of the spine, on right side and scapular mechanics for shoulder.    -Supine mobilization of right hamstring, lateral IT band, rectus femoris, adductors  -Hip ER stretch insupine  -PNF pattern to facilitate core into anterior elevation and posterior depression with working through end range holds and isometric reversals with the pelvis and at long full leg function  -Functional resistance through posterior depression and then hip abduction facilitation.  -Anterior elevation resistance to pelvis for core training  -Educated and positioned in standing for posture to take pressure off his spine  -     GAIT TRAINING: (0 min) in hallway with work on use of single point cane for balance and support. Trying to focus on control and stability with push off through R LE and taking some pressure off midstance with the SPC. Treatment/Session Summary:    · Treatment Assessment:Patient reports feeling better and some pressure in the right hip, but will work on lateral hip strength more the next few weeks     · Communication/Consultation:  None today  · Equipment provided today:  None  · Recommendations/Intent for next treatment session: Next visit will focus on Right hip strength.     Total Treatment Billable Duration:  40 minutes   Time In: 1400  Time Out: [address], PT       Charge Capture  }Post Session Pain  [employer] Portal  MD Guidelines  Scanned Media  Benefits  MyChart    Future Appointments   Date Time Provider Mario Leone   6/21/2022  2:00 PM Gianna Lowe, PT [facility]

## 2022-06-21 ENCOUNTER — HOSPITAL ENCOUNTER (OUTPATIENT)
Dept: PHYSICAL THERAPY | Age: 77
Setting detail: RECURRING SERIES
Discharge: HOME | End: 2022-06-24
Payer: MEDICARE

## 2022-06-21 PROCEDURE — 97110 THERAPEUTIC EXERCISES: CPT

## 2022-06-21 PROCEDURE — 97140 MANUAL THERAPY 1/> REGIONS: CPT

## 2022-06-21 ASSESSMENT — PAIN SCALES - GENERAL: PAINLEVEL_OUTOF10: 4

## 2022-06-21 NOTE — PROGRESS NOTES
Johana Ontiveros  : 1945  Primary: Medicare Part A And B  Secondary: Kayla [phone] Rod Joshi @ [address] [place] [address] Way [address]  Phone: [phone]  Fax: [phone] No data recorded  No data recorded    PT Visit Info: Total # of Visits to Date: 61      OUTPATIENT PHYSICAL THERAPY:OP NOTE TYPE: Treatment Note 2022       Episode  }Appt Desk              Treatment Diagnosis:  Low back pain, M54.5  Difficulty walking, not elsewhere classified, R26.2  Pain in joint, right knee, M25.561  Medical/Referring Diagnosis:  Low back pain, unspecified [M54.50]  Referring Physician:  Olegario Locke., *  MD Orders:  PT Eval and Treat   TREATMENT PLAN:  Effective Dates:5/10/22 TO 8/10/22 .  Frequency/Duration: 1 time every other week for 12 weeks  Date of Onset:  No data recorded   Allergies:   Patient has no allergy information on record. Other medication and Statins-hmg-coa reductase inhibitors   Restrictions/Precautions:  No data recordedNo data recorded   Interventions Planned (Treatment may consist of any combination of the following):    1. Balance Exercise  2. Bed Mobility  3. Electrical Stimulation  4. Gait Training  5. Heat  6. Manual Therapy  7. Neuromuscular Re-education/Strengthening  8. Range of Motion (ROM)  9. Therapeutic Activites  10.  Therapeutic Exercise/Strengthening  No data recorded   Subjective Comments:Patient reports that he has been working his hip strength more lately and can feel it start to come along more     Initial:}    4/10Post Session:       1/10  Medications Last Reviewed:  2022  Updated Objective Findings: Glute medius weakness  Treatment   THERAPEUTIC EXERCISE: (15 minutes):  Exercises per grid below to improve mobility, strength and coordination.  Required minimal visual, verbal, manual and tactile cues to promote proper body alignment, promote proper body posture, promote proper body mechanics and promote proper body breathing techniques.  Progressed resistance and repetitions as indicated.    Date:  6/21/22   Activity/Exercise Parameters   Core exercises Supine hook lying push into blue ball 2 x 1 min holds  Side lie hip flexion isometric on left side 2 x 1 min  Pelvic tilt posterior in supine with breathing control x 3 min  Seated pushing ball into thighs for core control in sitting   Education on sit to stand (not today) X 10 with good weight shift   Knee extension (home) Continued with blue band at home   Weight shift  R LE planted with follow through and work on keeping right side long x 1 min   Balance (not today) Tandem stance 2 x 30 sec B   Sit to stand Just for function. Hip abduction Side lie calm shell and straight leg raise x 5 min with review for weakness               MANUAL THERAPY: (30 minutes): Joint mobilization and Soft tissue mobilization was utilized and necessary because of the patient's restricted joint motion and restricted motion of soft tissue.  (Used abbreviations: SF - Superficial Fascia; BC - Bony contours; MP - muscle play; IASTM - instrument-assisted soft tissue mobilization; MET - muscle energy technique; a/p - anterior to posterior; p/a - posterior to anterior; Proprioceptive neuromuscular Facilitation-PNF) Manual treatment to improve soft tissue/joint mobility deficits, tissue integrity issues, trigger points and/or pain.   -Side lie mobilization to soft tissue of groove of the spine, on right side and scapular mechanics for shoulder.    -Supine mobilization of right hamstring, lateral IT band, rectus femoris, adductors  -Hip ER stretch insupine  -PNF pattern to facilitate core into anterior elevation and posterior depression with working through end range holds and isometric reversals with the pelvis and at long full leg function  -Functional resistance through posterior depression and then hip abduction facilitation.  -Anterior elevation resistance to pelvis for core training  -Educated and positioned in standing for posture to take pressure off his spine  -     GAIT TRAINING: (0 min) in hallway with work on use of single point cane for balance and support. Trying to focus on control and stability with push off through R LE and taking some pressure off midstance with the SPC. Treatment/Session Summary:    · Treatment Assessment:Patient reports feeling like he is getting a little better use out of the right hip and will continue to work that strength training     · Communication/Consultation:  None today  · Equipment provided today:  None  · Recommendations/Intent for next treatment session: Next visit will focus on Right hip strength.     Total Treatment Billable Duration:  45 minutes   Time In: 1400  Time Out: [address], PT       Charge Capture  }Post Session Pain  MedBridge Portal  MD Guidelines  Scanned Media  Benefits  MyChart    Future Appointments   Date Time Provider Mario Leone   7/5/2022  2:00 PM Jeramy Rae, PT [facility]   7/26/2022  2:00 PM Jeramy Rae, PT [facility]

## 2022-07-05 ENCOUNTER — HOSPITAL ENCOUNTER (OUTPATIENT)
Dept: PHYSICAL THERAPY | Age: 77
Setting detail: RECURRING SERIES
Discharge: HOME | End: 2022-07-08
Payer: MEDICARE

## 2022-07-05 PROCEDURE — 97140 MANUAL THERAPY 1/> REGIONS: CPT

## 2022-07-05 ASSESSMENT — PAIN SCALES - GENERAL: PAINLEVEL_OUTOF10: 4

## 2022-07-06 NOTE — PROGRESS NOTES
Emily Acosta  : 1945  Primary: Medicare Part A [place] B  Secondary: [address] @ [address]  Stephane [address] [address]  Phone: [phone]  Fax: [phone] No data recorded  No data recorded    PT Visit Info: Total # of Visits to Date: 61      OUTPATIENT PHYSICAL THERAPY:OP NOTE TYPE: Treatment Note 2022       Episode  }Appt Desk              Treatment Diagnosis:  Low back pain, M54.5  Difficulty walking, not elsewhere classified, R26.2  Pain in joint, right knee, M25.561  Medical/Referring Diagnosis:  Low back pain, unspecified [M54.50]  Referring Physician:  Mechelle Escobar, *  MD Orders:  PT Eval and Treat   TREATMENT PLAN:  Effective Dates:5/10/22 TO 8/10/22 .  Frequency/Duration: 1 time every other week for 12 weeks  Date of Onset:  No data recorded   Allergies:   Patient has no allergy information on record. Other medication and Statins-hmg-coa reductase inhibitors   Restrictions/Precautions:  No data recordedNo data recorded   Interventions Planned (Treatment may consist of any combination of the following):    1. Balance Exercise  2. Bed Mobility  3. Electrical Stimulation  4. Gait Training  5. Heat  6. Manual Therapy  7. Neuromuscular Re-education/Strengthening  8. Range of Motion (ROM)  9. Therapeutic Activites  10.  Therapeutic Exercise/Strengthening  No data recorded   Subjective Comments:Patient reports that he feels like he does not feel the pressure as much in his back, but more the lateral leg again and is still working on those hip strengthening exercises     Initial:}    4/10Post Session:       2/10  Medications Last Reviewed:  2022  Updated Objective Findings: Glute medius weakness  Treatment   THERAPEUTIC EXERCISE: (0 minutes):  Exercises per grid below to improve mobility, strength and coordination.  Required minimal visual, verbal, manual and tactile cues to promote proper body alignment, promote proper body posture, promote proper body mechanics and promote proper body breathing techniques.  Progressed resistance and repetitions as indicated.    Date:  6/21/22   Activity/Exercise Parameters   Core exercises Supine hook lying push into blue ball 2 x 1 min holds  Side lie hip flexion isometric on left side 2 x 1 min  Pelvic tilt posterior in supine with breathing control x 3 min  Seated pushing ball into thighs for core control in sitting   Education on sit to stand (not today) X 10 with good weight shift   Knee extension (home) Continued with blue band at home   Weight shift  R LE planted with follow through and work on keeping right side long x 1 min   Balance (not today) Tandem stance 2 x 30 sec B   Sit to stand Just for function. Hip abduction Side lie calm shell and straight leg raise x 5 min with review for weakness               MANUAL THERAPY: (30 minutes): Joint mobilization and Soft tissue mobilization was utilized and necessary because of the patient's restricted joint motion and restricted motion of soft tissue.  (Used abbreviations: SF - Superficial Fascia; BC - Bony contours; MP - muscle play; IASTM - instrument-assisted soft tissue mobilization; MET - muscle energy technique; a/p - anterior to posterior; p/a - posterior to anterior; Proprioceptive neuromuscular Facilitation-PNF) Manual treatment to improve soft tissue/joint mobility deficits, tissue integrity issues, trigger points and/or pain.     -Side lie mobilization to soft tissue of groove of the spine, on right side and scapular mechanics for shoulder.    -Supine mobilization of right hamstring, lateral IT band, rectus femoris, adductors  -Hip ER stretch insupine  -PNF pattern to facilitate core into anterior elevation and posterior depression with working through end range holds and isometric reversals with the pelvis and at long full leg function  -Functional resistance through posterior depression and then hip abduction facilitation.  -Anterior elevation resistance to pelvis for core training  -Educated and positioned in standing for posture to take pressure off his spine  -     GAIT TRAINING: (0 min) in hallway with work on use of single point cane for balance and support. Trying to focus on control and stability with push off through R LE and taking some pressure off midstance with the SPC. Treatment/Session Summary:    · Treatment Assessment:Patient reports that he feels like he can always walk better when leaving, but is trying to help more on his own when he can     · Communication/Consultation:  None today  · Equipment provided today:  None  · Recommendations/Intent for next treatment session: Next visit will focus on Right hip strength.     Total Treatment Billable Duration:  30 minutes   Time In: 1400  Time Out: Mahendra Leach PT       Charge Capture  }Post Session Pain  MedBridge Portal  MD Guidelines  Scanned Media  Benefits  MyChart    Future Appointments   Date Time Provider Mario Leone   7/26/2022  2:00 PM [facility]

## 2022-07-26 ENCOUNTER — HOSPITAL ENCOUNTER (OUTPATIENT)
Dept: PHYSICAL THERAPY | Age: 77
Setting detail: RECURRING SERIES
Discharge: HOME | End: 2022-07-29
Payer: MEDICARE

## 2022-07-26 PROCEDURE — 97140 MANUAL THERAPY 1/> REGIONS: CPT

## 2022-07-26 ASSESSMENT — PAIN SCALES - GENERAL: PAINLEVEL_OUTOF10: 4

## 2022-07-26 NOTE — PROGRESS NOTES
Inessa Harp  : 1945  Primary: Medicare Part A And B  Secondary: [address] @ [address]  Stephane Wheatley [address] [address]  Phone: [phone]  Fax: [phone] No data recorded  No data recorded    PT Visit Info: Total # of Visits to Date: 58      OUTPATIENT PHYSICAL THERAPY:OP NOTE TYPE: Treatment Note 2022       Episode  }Appt Desk              Treatment Diagnosis:  Low back pain, M54.5  Difficulty walking, not elsewhere classified, R26.2  Pain in joint, right knee, M25.561  Medical/Referring Diagnosis:  Low back pain, unspecified [M54.50]  Referring Physician:  Kush Magaña, *  MD Orders:  PT Eval and Treat   TREATMENT PLAN:  Effective Dates:5/10/22 TO 8/10/22 . Frequency/Duration: 1 time every other week for 12 weeks  Date of Onset:  No data recorded   Allergies:   Patient has no allergy information on record. Other medication and Statins-hmg-coa reductase inhibitors   Restrictions/Precautions:  No data recordedNo data recorded   Interventions Planned (Treatment may consist of any combination of the following):    Balance Exercise  Bed Mobility  Electrical Stimulation  Gait Training  Heat  Manual Therapy  Neuromuscular Re-education/Strengthening  Range of Motion (ROM)  Therapeutic Activites  Therapeutic Exercise/Strengthening  No data recorded   Subjective Comments:Patient reports that he has been working on his hip more. He is having some trouble with getting in touch with doctors     Initial:}    4/10Post Session:       1/10  Medications Last Reviewed:  2022  Updated Objective Findings: Glute medius weakness  Treatment   THERAPEUTIC EXERCISE: (0 minutes):  Exercises per grid below to improve mobility, strength and coordination.   Required minimal visual, verbal, manual and tactile cues to promote proper body alignment, promote proper body posture, promote proper body mechanics and promote proper body breathing positioned in standing for posture to take pressure off his spine  -     GAIT TRAINING: (0 min) in hallway with work on use of single point cane for balance and support. Trying to focus on control and stability with push off through R LE and taking some pressure off midstance with the SPC. Treatment/Session Summary:    Treatment Assessment:Patient shows better hip strength today with side lie raise, but still needed cues for form     Communication/Consultation:  None today  Equipment provided today:  None  Recommendations/Intent for next treatment session: Next visit will focus on Right hip strength.     Total Treatment Billable Duration:  30 minutes   Time In: 9990  Time Out: Mahendra Leach PT       Charge Capture  }Post Session Pain  MedBridge Portal  MD Guidelines  Scanned Media  Benefits  MyChart    Future Appointments   Date Time Provider Mario Leone   8/9/2022  2:00 PM Delia Mendoza, PT [facility]   8/23/2022  2:00 PM [facility]

## 2022-08-09 ENCOUNTER — HOSPITAL ENCOUNTER (OUTPATIENT)
Dept: PHYSICAL THERAPY | Age: 77
Setting detail: RECURRING SERIES
Discharge: HOME | End: 2022-08-12
Payer: MEDICARE

## 2022-08-09 PROCEDURE — 97110 THERAPEUTIC EXERCISES: CPT

## 2022-08-09 PROCEDURE — 97140 MANUAL THERAPY 1/> REGIONS: CPT

## 2022-08-09 NOTE — PROGRESS NOTES
Colton Edmondson  : 1945  Primary: Medicare Part A And B  Secondary: [address] @ [address]  Stephane Wheatley [address] [address]  Phone: [phone]  Fax: [phone] No data recorded  No data recorded    PT Visit Info: Total # of Visits to Date: 61      OUTPATIENT PHYSICAL THERAPY:OP NOTE TYPE: Treatment Note 2022       Episode  }Appt Desk              Treatment Diagnosis:  Low back pain, M54.5  Difficulty walking, not elsewhere classified, R26.2  Pain in joint, right knee, M25.561  Medical/Referring Diagnosis:  Low back pain, unspecified [M54.50]  Referring Physician:  Kem Weinstein, *  MD Orders:  PT Eval and Treat   TREATMENT PLAN:  Effective Dates:22 TO 22 . Frequency/Duration: 1 time every other week for 12 weeks  Date of Onset:  No data recorded   Allergies:   Patient has no allergy information on record. Other medication and Statins-hmg-coa reductase inhibitors   Restrictions/Precautions:  No data recordedNo data recorded   Interventions Planned (Treatment may consist of any combination of the following):    Balance Exercise  Bed Mobility  Electrical Stimulation  Gait Training  Heat  Manual Therapy  Neuromuscular Re-education/Strengthening  Range of Motion (ROM)  Therapeutic Activites  Therapeutic Exercise/Strengthening  No data recorded   Subjective Comments:Patient reports that he has been working on his hip more. He feels like the back is feeling better and now it is more the outside of the R LE and the knee that are bothering him     Initial:}    4/10Post Session:       2/10  Medications Last Reviewed:  2022  Updated Objective Findings: Glute medius weakness  Treatment   THERAPEUTIC EXERCISE: (10 minutes):  Exercises per grid below to improve mobility, strength and coordination.   Required minimal visual, verbal, manual and tactile cues to promote proper body alignment, promote proper body posture, promote proper body mechanics and promote proper body breathing techniques. Progressed resistance and repetitions as indicated. Date:  8/9/22   Activity/Exercise Parameters   Core exercises Review of hip flexion isometric   Education on sit to stand (not today) X 10 with good weight shift   Knee extension (home) Continued with blue band at home   Weight shift  R LE planted with follow through and work on keeping right side long x 1 min   Balance (not today) Tandem stance 2 x 30 sec B   Sit to stand Just for function. Hip abduction Side lie calm shell and straight leg raise x 5 min with review for weakness    Gait function  step through with pressure onto his R LE x 3 min         MANUAL THERAPY: (30 minutes): Joint mobilization and Soft tissue mobilization was utilized and necessary because of the patient's restricted joint motion and restricted motion of soft tissue.  (Used abbreviations: SF - Superficial Fascia; BC - Bony contours; MP - muscle play; IASTM - instrument-assisted soft tissue mobilization; MET - muscle energy technique; a/p - anterior to posterior; p/a - posterior to anterior; Proprioceptive neuromuscular Facilitation-PNF) Manual treatment to improve soft tissue/joint mobility deficits, tissue integrity issues, trigger points and/or pain.     -Side lie mobilization to soft tissue of groove of the spine, on right side and scapular mechanics for shoulder.    -Supine mobilization of right hamstring, lateral IT band, rectus femoris, adductors  -Hip ER stretch insupine  -PNF pattern to facilitate core into anterior elevation and posterior depression with working through end range holds and isometric reversals with the pelvis and at long full leg function  -Functional resistance through posterior depression and then hip abduction facilitation.  -Anterior elevation resistance to pelvis for core training  -Educated and positioned in standing for posture to take pressure off his spine  -     GAIT TRAINING: (0 min) in hallway with work on use of single point cane for balance and support. Trying to focus on control and stability with push off through R LE and taking some pressure off midstance with the SPC. Treatment/Session Summary:    Treatment Assessment:Patient shows continued hip strength improvement and he shows some better gait mechanics. He has less back irritation at this time and more leg issues     Communication/Consultation:  None today  Equipment provided today:  None  Recommendations/Intent for next treatment session: Next visit will focus on Right hip strength.     Total Treatment Billable Duration:  40 minutes   Time In: 9208  Time Out: [facility], PT       Charge Capture  }Post Session Pain  MedBridge Portal  MD Guidelines  Scanned Media  Benefits  MyChart    Future Appointments   Date Time Provider Mario Leone   8/23/2022  2:00 PM Oneil Frazier, PT [facility]

## 2022-08-09 NOTE — THERAPY RECERTIFICATION
Mary Kay Herrera  MRN: [id number]  Luis Miguel Gee PT   Physical Therapist   Specialty:  Physical Therapy   Therapy Recertification      Signed   Date of Service:  5/10/2022  2:00 PM          Reevaluation                     Jordan Hernández  : 1945  [place]  Secondary: [place] Generic [address] [facility]  at [address]  [phone]   [phone]           OUTPATIENT PHYSICAL THERAPY:Recertification 7451   ICD-10: Treatment Diagnosis: Low back pain, M54.5  Difficulty walking, not elsewhere classified, R26.2  Pain in joint, right knee, M25.561  Precautions/Allergies: Other medication and Statins-hmg-coa reductase inhibitors   TREATMENT PLAN:  Effective Dates:22 TO 22 . Frequency/Duration: 1 time every other week for 12 weeks MEDICAL/REFERRING DIAGNOSIS:  Low back pain [M54.50]   DATE OF ONSET: Chronic  REFERRING PHYSICIAN: Anay Moon MD MD Orders: Evaluate and Treat  Return MD Appointment: not scheduled   Jordan Hernández has been seen for 61 visits from 20 to 2022 for low back, right knee and R LE. Patient has performed therapeutic exercises, activities, and had manual therapy to increased strength, ROM and function. Patient has also used modalities for pain control in order to increase function. Patient has shown an increase in function per the LEFS with scores of 49/80. He continues to show issues with his gait function and we are steadily working to increase his hip strength and stability. He is consistently going to the gym 4-5 times a week to help his progress. Patient has progressed well toward their goals and will benefit from continuing skilled PT in order to address their impairments.   Eder Carrasquillo, PT, DPT, OCS, CFMT           INITIAL ASSESSMENT:  Mr. Joseph Belle presents with chronic R LE weakness and pain along with peripheral neuropathy affecting his safety with gait and function. He shows limitation in walking and unsafe gait function. He shows continued deconditioning in the LE's. He is a good candidate for skilled PT in order to address listed impairments affecting his function. Breanna Pastor, PT, DPT, OCS, CFMT      PROBLEM LIST (Impacting functional limitations):  Decreased Strength  Decreased ADL/Functional Activities  Decreased Transfer Abilities  Decreased Ambulation Ability/Technique  Decreased Balance  Increased Pain  Decreased Activity Tolerance  Decreased Flexibility/Joint Mobility INTERVENTIONS PLANNED: (Treatment may consist of any combination of the following)  Balance Exercise  Bed Mobility  Electrical Stimulation  Gait Training  Heat  Manual Therapy  Neuromuscular Re-education/Strengthening  Range of Motion (ROM)  Therapeutic Activites  Therapeutic Exercise/Strengthening      GOALS: (Goals have been discussed and agreed upon with patient.)     Discharge Goals: Time Frame: 12 weeks  Patient will show a greater than 10 point increase on the LEFS in order to show an increase in function (MET-8/9/22)  Patient will report joining a pool and walking in the water greater than 2 times a week in order to progress function (ongoing)  Patient will be independent in balance exercises in order to decrease fall risk (ongoing)  Patient will report going to the gym consistently three times a week for two months. (MET-2/16/22)  NEW GOALS:  Patient will perform 10 sit to stands without UE use in order to progress strength and function (ongoing)     OUTCOME MEASURE:   Tool Used: Lower Extremity Functional Scale (LEFS)  Score:  Initial: 37/80 Most Recent: 49/80 (Date: 5/10/22 )   Interpretation of Score: 20 questions each scored on a 5 point scale with 0 representing \"extreme difficulty or unable to perform\" and 4 representing \"no difficulty\". The lower the score, the greater the functional disability. 80/80 represents no disability. Minimal detectable change is 9 points. with less fear of falling. Past Medical History/Comorbidities:   Mr. Jade Turner  has a past medical history of Anxiety, Edema of both legs, Enlarged prostate, Gout, Hip pain, History of elevated glucose, History of seasonal allergies, Hypercholesterolemia, Hypertension, Keratoacanthoma, S/P total hip arthroplasty (4/6/2015), Skin neoplasm, and Spinal stenosis. He has no past medical history of Aneurysm ([address] Arrhythmia, Arthritis, Asthma, Autoimmune disease ([address] CAD (coronary artery disease), Cancer ([address] Chronic kidney disease, Chronic obstructive pulmonary disease ([address] Chronic pain, Coagulation disorder ([address] Difficult intubation, GERD (gastroesophageal reflux disease), Heart failure ([address] Ill-defined condition, Liver disease, Malignant hyperthermia due to anesthesia, Morbid obesity ([address] Nausea & vomiting, Pseudocholinesterase deficiency, Psychiatric disorder, PUD (peptic ulcer disease), Seizures ([address] Stroke ([address] Thromboembolus ([address] Thyroid disease, Unspecified adverse effect of anesthesia, or Unspecified sleep apnea. Mr. Jade Turner  has a past surgical history that includes hx tonsillectomy (as child) and hx wisdom teeth extraction (as teenager).   Social History/Living Environment:       Social History            Socioeconomic History    Marital status:        Spouse name: Not on file    Number of children: Not on file    Years of education: Not on file    Highest education level: Not on file   Occupational History    Not on file   Tobacco Use    Smoking status: Never Smoker    Smokeless tobacco: Not on file   Substance and Sexual Activity    Alcohol use: No    Drug use: Not on file    Sexual activity: Not on file   Other Topics Concern    Not on file   Social History Narrative    Not on file      Social Determinants of Health          Financial Resource Strain:     Difficulty of Paying Living Expenses: Not on file   Food Insecurity:     Worried About [address] in the Last Year: Not on file    Ran Out of Food in the Last Year: Not on file   Transportation Needs:     Lack of Transportation (Medical): Not on file    Lack of Transportation (Non-Medical): Not on file   Physical Activity:     Days of Exercise per Week: Not on file    Minutes of Exercise per Session: Not on file   Stress:     Feeling of Stress : Not on file   Social Connections:     Frequency of Communication with Friends and Family: Not on file    Frequency of Social Gatherings with Friends and Family: Not on file    Attends Rastafari Services: Not on file    Active Member of Clubs or Organizations: Not on file    Attends Club or Organization Meetings: Not on file    Marital Status: Not on file   Intimate Partner Violence:     Fear of Current or Ex-Partner: Not on file    Emotionally Abused: Not on file    Physically Abused: Not on file    Sexually Abused: Not on file   Housing Stability:     Unable to Pay for Housing in the Last Year: Not on file    Number of [place] in the Last Year: Not on file    Unstable Housing in the Last Year: Not on file         Prior Level of Function/Work/Activity:  Independent, retired  Dominant Side:         RIGHT   Ambulatory/Rehab [facility] Factors:       (1)  Gender [Male]       (5)  History of Recent Falls [w/in 3 months]       (1)  Orthostatic drop in BP Ability to Rise from Chair:       (1)  Pushes up, successful in one attempt   Falls Prevention Plan:       No modifications necessary   Total: (5 or greater = High Risk): 8   ©2010 [facility] [address] [place] [address] Patent #[id number]. Federal Law prohibits the replication, distribution or use without written permission from [facility]   Current Medications:         Current Outpatient Medications:     ergocalciferol (ERGOCALCIFEROL) 1,250 mcg (50,000 unit) capsule, Take 50,000 Units by mouth every seven (7) days. , Disp: , Rfl:     furosemide (LASIX) 20 mg tablet, Take 20 mg by mouth daily. , Disp: , Rfl:     lisinopriL (PRINIVIL, ZESTRIL) 20 mg tablet, Take 20 mg by mouth daily. , Disp: , Rfl:     celecoxib (CELEBREX) 200 mg capsule, Take 200 mg by mouth daily. , Disp: , Rfl:     allopurinoL (ZYLOPRIM) 300 mg tablet, Take 300 mg by mouth daily. , Disp: , Rfl:     spironolactone (ALDACTONE) 25 mg tablet, Take 25 mg by mouth daily. , Disp: , Rfl:     busPIRone (BUSPAR) 7.5 mg tablet, Take 7.5 mg by mouth two (2) times a day., Disp: , Rfl:     hydrOXYzine HCL (ATARAX) 25 mg tablet, Take 25 mg by mouth two (2) times a day., Disp: , Rfl:     POTASSIUM CHLORIDE PO, Take 10 mEq by mouth three (3) days a week., Disp: , Rfl:     cloNIDine (CATAPRES) 0.3 mg/24 hr, 1 Patch by TransDERmal route every seven (7) days. , Disp: , Rfl:     prazosin (MINIPRESS) 1 mg capsule, Take 1 mg by mouth two (2) times a day. TAKE AM OF SURGERY WITH SMALL SIP OF WATER   Indications: enlarged prostate, Disp: , Rfl:     finasteride (PROSCAR) 5 mg tablet, Take 5 mg by mouth daily. TAKE AM OF SURGERY WITH SMALL SIP OF WATER   Indications: BENIGN PROSTATIC HYPERTROPHY, Disp: , Rfl:    Date Last Reviewed:  8/9/2022      Number of Personal Factors/Comorbidities that affect the Plan of Care: 1-2: MODERATE COMPLEXITY   EXAMINATION:   Observation/Orthostatic Postural Assessment:          Patient shows increased obesity with more abdominal weight and increased lordosis. He shows increased small step length with short distances. He tends to show increased right pelvic rotation in standing and gait  Palpation:          Tightness along right paraspinals and into low back and pelvis. He shows tightness in lateral right hip and leg. Some noted atrophy in left glut compared to right side.  -Restricted and limited in hamstring mobility B with increased right restriction more than left  -Overall right hip motion appears good for artificial LIDIA.   -Right knee shows increased restrictions at patella and medial and lateral joint line with pain with testing due to arthritic condition  -Increased swelling in B LE and ankles with compression socks worn  ROM:          Patient shows improvement in hamstring mobility at -10 deg B from full  -Flexion in lumbar spine to 30% in standing with difficulty with balance  Strength:          Hip flexion at 4-/5  On right and 4-/5 on left  Right knee extension at 4+/5  Left knee extension at 4+/5  Right knee flexion at 4/5  Left at 4/5  right hip abduction 4/5  Right hip extension 4/5  Neurological Screen:        Sensation: Patient has peripheral neuropathy on the plantar surface of both feet  Nerve conduction did not reveal specific radiculopathy  Functional Mobility:         Gait/Ambulation:  Patient shows increased Trendelenburg pattern B with increased right pelvic rotation that maintains during gait with right LE lagging some with shortened stance phase B. Patient has increased knee hike due to neuropathy.          Transfers:  Patient shows ability to sit to stand with some aid from hands        Bed Mobility:  Patient has some difficulty with supine to sit with needs for some aid  Balance:          Poor for single leg stance, tandem stance and turning   Body Structures Involved:  Nerves  Thoracic Cage  Bones  Joints  Muscles  Ligaments Body Functions Affected:  Sensory/Pain  Neuromusculoskeletal  Movement Related Activities and Participation Affected:  General Tasks and Demands  Mobility  Self Care  Domestic Life  Interpersonal Interactions and Relationships   Number of elements (examined above) that affect the Plan of Care: 4+: HIGH COMPLEXITY   CLINICAL PRESENTATION:   Presentation: Stable and uncomplicated: LOW COMPLEXITY   CLINICAL DECISION MAKING:   Use of outcome tool(s) and clinical judgement create a POC that gives a: Clear prediction of patient's progress: LOW COMPLEXITY

## 2022-08-10 ASSESSMENT — PAIN SCALES - GENERAL: PAINLEVEL_OUTOF10: 4

## 2022-08-23 ENCOUNTER — HOSPITAL ENCOUNTER (OUTPATIENT)
Dept: PHYSICAL THERAPY | Age: 77
Setting detail: RECURRING SERIES
Discharge: HOME | End: 2022-08-26
Payer: MEDICARE

## 2022-08-23 PROCEDURE — 97140 MANUAL THERAPY 1/> REGIONS: CPT

## 2022-08-23 ASSESSMENT — PAIN SCALES - GENERAL: PAINLEVEL_OUTOF10: 5

## 2022-08-23 NOTE — PROGRESS NOTES
Maurilio Law  : 1945  Primary: Medicare Part A And B  Secondary: [address] @ [address]  Stephane Wheatley [address] [address]  Phone: [phone]  Fax: [phone] No data recorded  No data recorded    PT Visit Info: Total # of Visits to Date: 59      OUTPATIENT PHYSICAL THERAPY:OP NOTE TYPE: Treatment Note 2022       Episode  }Appt Desk              Treatment Diagnosis:  Low back pain, M54.5  Difficulty walking, not elsewhere classified, R26.2  Pain in joint, right knee, M25.561  Medical/Referring Diagnosis:  Low back pain, unspecified [M54.50]  Referring Physician:  Zane Parker., *  MD Orders:  PT Eval and Treat   TREATMENT PLAN:  Effective Dates:22 TO 22 . Frequency/Duration: 1 time every other week for 12 weeks  Date of Onset:  No data recorded   Allergies:   Patient has no allergy information on record. Other medication and Statins-hmg-coa reductase inhibitors   Restrictions/Precautions:  No data recordedNo data recorded   Interventions Planned (Treatment may consist of any combination of the following):    Balance Exercise  Bed Mobility  Electrical Stimulation  Gait Training  Heat  Manual Therapy  Neuromuscular Re-education/Strengthening  Range of Motion (ROM)  Therapeutic Activites  Therapeutic Exercise/Strengthening  No data recorded   Subjective Comments:Patient reports that he still has good days for about 3-4 days after then a couple of crummy ones     Initial:}    5/10Post Session:       2/10  Medications Last Reviewed:  2022  Updated Objective Findings: Glute medius weakness 3-/5  Treatment   THERAPEUTIC EXERCISE: (5 minutes):  Exercises per grid below to improve mobility, strength and coordination. Required minimal visual, verbal, manual and tactile cues to promote proper body alignment, promote proper body posture, promote proper body mechanics and promote proper body breathing techniques.   Progressed resistance and repetitions as indicated. Date:  8/9/22   Activity/Exercise Parameters   Core exercises Review of hip flexion isometric   Education on sit to stand (not today) X 10 with good weight shift   Knee extension (home) Continued with blue band at home   Weight shift  R LE planted with follow through and work on keeping right side long x 1 min   Balance (not today) Tandem stance 2 x 30 sec B   Sit to stand Just for function. Hip abduction Side lie calm shell and straight leg raise x 5 min with review for weakness    Gait function  step through with pressure onto his R LE x 3 min         MANUAL THERAPY: (40 minutes): Joint mobilization and Soft tissue mobilization was utilized and necessary because of the patient's restricted joint motion and restricted motion of soft tissue. (Used abbreviations: SF - Superficial Fascia; BC - Bony contours; MP - muscle play; IASTM - instrument-assisted soft tissue mobilization; MET - muscle energy technique; a/p - anterior to posterior; p/a - posterior to anterior; Proprioceptive neuromuscular Facilitation-PNF) Manual treatment to improve soft tissue/joint mobility deficits, tissue integrity issues, trigger points and/or pain.     -Side lie mobilization to soft tissue of groove of the spine, on right side and scapular mechanics for shoulder.    -Supine mobilization of right hamstring, lateral IT band, rectus femoris, adductors  -Hip ER stretch insupine  -PNF pattern to facilitate core into anterior elevation and posterior depression with working through end range holds and isometric reversals with the pelvis and at long full leg function  -Functional resistance through posterior depression and then hip abduction facilitation.  -Anterior elevation resistance to pelvis for core training  -Educated and positioned in standing for posture to take pressure off his spine  -     GAIT TRAINING: (0 min) in hallway with work on use of single point cane for balance and support. Trying to focus on control and stability with push off through R LE and taking some pressure off midstance with the SPC. Treatment/Session Summary:    Treatment Assessment:Patient shows continued hip strength improvement and he shows some better gait mechanics. He continues to work with his gait control, but needs frequent cues each visit     Communication/Consultation:  None today  Equipment provided today:  None  Recommendations/Intent for next treatment session: Next visit will focus on Right hip strength.     Total Treatment Billable Duration:  40 minutes   Time In: 1400  Time Out: [address], PT       Charge Capture  }Post Session Pain  MedBridge Portal  MD Guidelines  Scanned Media  Benefits  MyChart    Future Appointments   Date Time Provider Mario Leone   9/8/2022  2:00 PM Idris Branch, PT [facility] CARYN   9/20/2022  2:00 PM Idris Branch, PT [facility] MERRILLO

## 2022-09-08 ENCOUNTER — HOSPITAL ENCOUNTER (OUTPATIENT)
Dept: PHYSICAL THERAPY | Age: 77
Setting detail: RECURRING SERIES
Discharge: HOME | End: 2022-09-11
Payer: MEDICARE

## 2022-09-08 PROCEDURE — 97140 MANUAL THERAPY 1/> REGIONS: CPT

## 2022-09-08 ASSESSMENT — PAIN SCALES - GENERAL: PAINLEVEL_OUTOF10: 5

## 2022-09-08 NOTE — PROGRESS NOTES
Raffy Rogers  : 1945  Primary: Medicare Part A And B  Secondary: [address] @ [address]  Stephane Wheatley [address] [address]  Phone: [phone]  Fax: [phone] No data recorded  No data recorded    PT Visit Info: Total # of Visits to Date: 72      OUTPATIENT PHYSICAL THERAPY:OP NOTE TYPE: Treatment Note 2022       Episode  }Appt Desk              Treatment Diagnosis:  Low back pain, M54.5  Difficulty walking, not elsewhere classified, R26.2  Pain in joint, right knee, M25.561  Medical/Referring Diagnosis:  Low back pain, unspecified [M54.50]  Referring Physician:  John Horton., *  MD Orders:  PT Eval and Treat   TREATMENT PLAN:  Effective Dates:22 TO 22 . Frequency/Duration: 1 time every other week for 12 weeks  Date of Onset:  No data recorded   Allergies:   Patient has no allergy information on record. Other medication and Statins-hmg-coa reductase inhibitors   Restrictions/Precautions:  No data recordedNo data recorded   Interventions Planned (Treatment may consist of any combination of the following):    Balance Exercise  Bed Mobility  Electrical Stimulation  Gait Training  Heat  Manual Therapy  Neuromuscular Re-education/Strengthening  Range of Motion (ROM)  Therapeutic Activites  Therapeutic Exercise/Strengthening  No data recorded   Subjective Comments:Patient reports that he had a trip up to [facility]. and has really felt it around the hip and pelvis since getting back Monday. He reports the knee is doing ok today     Initial:}    5/10Post Session:       2/10  Medications Last Reviewed:  2022  Updated Objective Findings: Glute medius weakness 3-/5  Treatment   THERAPEUTIC EXERCISE: (5 minutes):  Exercises per grid below to improve mobility, strength and coordination.   Required minimal visual, verbal, manual and tactile cues to promote proper body alignment, promote proper body posture, promote proper body mechanics and promote proper body breathing techniques. Progressed resistance and repetitions as indicated. Date:  8/9/22   Activity/Exercise Parameters   Core exercises Review of hip flexion isometric   Education on sit to stand (not today) X 10 with good weight shift   Knee extension (home) Continued with blue band at home   Weight shift  R LE planted with follow through and work on keeping right side long x 1 min   Balance (not today) Tandem stance 2 x 30 sec B   Sit to stand Just for function. Hip abduction Side lie calm shell and straight leg raise x 5 min with review for weakness    Gait function  step through with pressure onto his R LE x 3 min         MANUAL THERAPY: (40 minutes): Joint mobilization and Soft tissue mobilization was utilized and necessary because of the patient's restricted joint motion and restricted motion of soft tissue.  (Used abbreviations: SF - Superficial Fascia; BC - Bony contours; MP - muscle play; IASTM - instrument-assisted soft tissue mobilization; MET - muscle energy technique; a/p - anterior to posterior; p/a - posterior to anterior; Proprioceptive neuromuscular Facilitation-PNF) Manual treatment to improve soft tissue/joint mobility deficits, tissue integrity issues, trigger points and/or pain.     -Side lie mobilization to soft tissue of groove of the spine, on right side and scapular mechanics for shoulder.    -Supine mobilization of right hamstring, lateral IT band, rectus femoris, adductors  -Hip ER stretch insupine  -PNF pattern to facilitate core into anterior elevation and posterior depression with working through end range holds and isometric reversals with the pelvis and at long full leg function  -Functional resistance through posterior depression and then hip abduction facilitation.  -Anterior elevation resistance to pelvis for core training  -Educated and positioned in standing for posture to take pressure off his spine  -     GAIT TRAINING: (0 min) [facility] with work on use of single point cane for balance and support. Trying to focus on control and stability with push off through R LE and taking some pressure off midstance with the SPC. Treatment/Session Summary:    Treatment Assessment:Patient shows more tightness in lumbar spine and right glute today after his trip. Manual mobilizations helped with pain, but still shows difficulty walking     Communication/Consultation:  None today  Equipment provided today:  None  Recommendations/Intent for next treatment session: Next visit will focus on Right hip strength.     Total Treatment Billable Duration:  40 minutes   Time In: 1400  Time Out: [address], PT       Charge Capture  }Post Session Pain  MedBridge Portal  MD Guidelines  Scanned Media  Benefits  MyChart    Future Appointments   Date Time Provider Mario Leone   9/20/2022  2:00 PM Monica Frank, PT [facility]

## 2022-09-20 ENCOUNTER — APPOINTMENT (OUTPATIENT)
Dept: PHYSICAL THERAPY | Age: 77
End: 2022-09-20
Payer: MEDICARE

## 2022-09-27 ENCOUNTER — TELEPHONE (OUTPATIENT)
Dept: ORTHOPEDIC SURGERY | Age: 77
End: 2022-09-27

## 2022-09-27 ENCOUNTER — APPOINTMENT (OUTPATIENT)
Dept: PHYSICAL THERAPY | Age: 77
End: 2022-09-27
Payer: MEDICARE

## 2022-09-28 ENCOUNTER — TELEPHONE (OUTPATIENT)
Dept: ORTHOPEDIC SURGERY | Age: 77
End: 2022-09-28

## 2022-09-28 NOTE — TELEPHONE ENCOUNTER
Call returned to patient and he would like a visit with Donold Dakin to discuss his visit with Dr. Seth Casas and some questions he has in regards to the MRI.

## 2022-09-28 NOTE — TELEPHONE ENCOUNTER
You referred to Dr Carlos Rehman, saw her and wants to talk to you about her visit and get your opion on some things

## 2022-09-29 ENCOUNTER — HOSPITAL ENCOUNTER (OUTPATIENT)
Dept: PHYSICAL THERAPY | Age: 77
Setting detail: RECURRING SERIES
Discharge: HOME | End: 2022-10-02
Payer: MEDICARE

## 2022-09-29 PROCEDURE — 97110 THERAPEUTIC EXERCISES: CPT

## 2022-09-29 PROCEDURE — 97140 MANUAL THERAPY 1/> REGIONS: CPT

## 2022-09-29 ASSESSMENT — PAIN SCALES - GENERAL: PAINLEVEL_OUTOF10: 5

## 2022-09-29 NOTE — PROGRESS NOTES
Luz Maria Mayorga  : 1945  Primary: Medicare Part A And B  Secondary: [address] @ [address]  Stephane Wheatley [address] [address]  Phone: [phone]  Fax: [phone] No data recorded  No data recorded    PT Visit Info: Total # of Visits to Date: 77      OUTPATIENT PHYSICAL THERAPY:OP NOTE TYPE: Treatment Note 2022       Episode  }Appt Desk              Treatment Diagnosis:  Low back pain, M54.5  Difficulty walking, not elsewhere classified, R26.2  Pain in joint, right knee, M25.561  Medical/Referring Diagnosis:  Low back pain, unspecified [M54.50]  Referring Physician:  Claribel Jorgensen, *  MD Orders:  PT Eval and Treat   TREATMENT PLAN:  Effective Dates:22 TO 22 . Frequency/Duration: 1 time every other week for 12 weeks  Date of Onset:  No data recorded   Allergies:   Patient has no allergy information on record. Other medication and Statins-hmg-coa reductase inhibitors   Restrictions/Precautions:  No data recordedNo data recorded   Interventions Planned (Treatment may consist of any combination of the following):    Balance Exercise  Bed Mobility  Electrical Stimulation  Gait Training  Heat  Manual Therapy  Neuromuscular Re-education/Strengthening  Range of Motion (ROM)  Therapeutic Activites  Therapeutic Exercise/Strengthening  No data recorded   Subjective Comments:Patient reports that he still has a good bit of back and leg pain. He reports that Dr. Edilma Andrade did not see anything that needed surgery     Initial:}    5/10Post Session:       3/10  Medications Last Reviewed:  2022  Updated Objective Findings: Glute medius weakness 3-/  Treatment   THERAPEUTIC EXERCISE: (10 minutes):  Exercises per grid below to improve mobility, strength and coordination.   Required minimal visual, verbal, manual and tactile cues to promote proper body alignment, promote proper body posture, promote proper body mechanics and promote proper body breathing techniques. Progressed resistance and repetitions as indicated. Date:  9/29/22   Activity/Exercise Parameters   Core exercises Review of hip flexion isometric   Education on sit to stand (not today) X 10 with good weight shift   Knee extension (home) Continued with blue band at home   Weight shift  R LE planted with follow through and work on keeping right side long x 1 min   Balance  Single leg support with hand held support   Sit to stand Just for function. Hip abduction Side lie calm shell and straight leg raise x 5 min with review for weakness    Gait function  step through with pressure onto his R LE x 3 min         MANUAL THERAPY: (30 minutes): Joint mobilization and Soft tissue mobilization was utilized and necessary because of the patient's restricted joint motion and restricted motion of soft tissue.  (Used abbreviations: SF - Superficial Fascia; BC - Bony contours; MP - muscle play; IASTM - instrument-assisted soft tissue mobilization; MET - muscle energy technique; a/p - anterior to posterior; p/a - posterior to anterior; Proprioceptive neuromuscular Facilitation-PNF) Manual treatment to improve soft tissue/joint mobility deficits, tissue integrity issues, trigger points and/or pain.     -Side lie mobilization to soft tissue of groove of the spine, on right side and scapular mechanics for shoulder.    -Supine mobilization of right hamstring, lateral IT band, rectus femoris, adductors  -Hip ER stretch insupine  -PNF pattern to facilitate core into anterior elevation and posterior depression with working through end range holds and isometric reversals with the pelvis and at long full leg function  -Functional resistance through posterior depression and then hip abduction facilitation.  -Anterior elevation resistance to pelvis for core training  -Educated and positioned in standing for posture to take pressure off his spine  -     GAIT TRAINING: (0 min) in hallway with work on use of single point cane for balance and support. Trying to focus on control and stability with push off through R LE and taking some pressure off midstance with the SPC. Treatment/Session Summary:    Treatment Assessment:Patient shows increased lateral knee tightness today along IT band. Still has difficulty with walking, but we discussed getting into the pool again. Communication/Consultation:  None today  Equipment provided today:  None  Recommendations/Intent for next treatment session: Next visit will focus on Right hip strength.     Total Treatment Billable Duration:  40 minutes   Time In: 1100  Time Out: 57 Donita Blanco PT       Charge Capture  }Post Session Pain  MedBridge Portal  MD Guidelines  Scanned Media  Benefits  MyChart    Future Appointments   Date Time Provider Mario Leone   10/12/2022  1:00 PM Aramis Penaloza, PT [facility]   10/14/2022  8:30 AM Amadou Kyle MD [facility]   10/26/2022  2:00 PM [facility]

## 2022-10-06 NOTE — PROGRESS NOTES
Pool Cha  : 1945  [place]  Secondary: [employer] [id number] [facility]  at [facility]  [address] [place] [address]  [phone]   [phone]        OUTPATIENT PHYSICAL THERAPY: Daily Treatment Note 2021  Visit Count:  34    ICD-10: Treatment Diagnosis: Low back pain, M54.5  Difficulty walking, not elsewhere classified, R26.2  Pain in joint, right knee, M25.561  Precautions/Allergies: Other medication and Statins-hmg-coa reductase inhibitors   TREATMENT PLAN:  Effective Dates:21 TO 21 (90 days). Frequency/Duration: 1 time a week for 4 weeks then every other week for 8 weeks MEDICAL/REFERRING DIAGNOSIS:  Low back pain [M54.5]   DATE OF ONSET: Chronic  REFERRING PHYSICIAN: Jade Wilkes MD MD Orders: Evaluate and Treat  Return MD Appointment: not scheduled       Pre-treatment Symptoms/Complaints:  Patient reports that he was only able to get to the gym one time last week due to having increased knee pain bilaterally with the leg press. He reports that he is feeling like he can get back to it now this week  Pain: Initial: Pain Intensity 1: 4 /10 Post Session:  2/10   Medications Last Reviewed:  2021  Updated Objective Findings:  Restrictions along lateral IT band and vastus lateralis B  TREATMENT:     THERAPEUTIC EXERCISE: (0 minutes):  Exercises per grid below to improve mobility, strength and coordination. Required minimal visual, verbal, manual and tactile cues to promote proper body alignment, promote proper body posture, promote proper body mechanics and promote proper body breathing techniques. Progressed resistance and repetitions as indicated.    Date:  21   Activity/Exercise Parameters   Core exercises Single knee push isometric in side lie x 2 min for facilitation  Pelvic tilt x 20  PNF core facilitation in sidelying (into post depression and anterior elevation)   Education on sit to stand (not Subjective:      Patient ID:  David Ortiz is a 6 y.o. female. HPI:    Patient presents today for otalgia Monday was elbowed in the LEFT ear at a soccer game and states her pain has been constant. Parents state she began to verbalize to them Tuesday night. Benedict the pain is behind her ear to inner ear. Blood came out onto her pillow on Wednesday morning. Pt went to the ER wnas was told she has a hematoma and scratch in her ear canal. Pt states she is experiencing a vibrating in her ear since wednesday. She states she has experienced an episode of popping 2x and then felt blood come out of her canal. Condition has been present for 3 day(s). History reviewed. No pertinent past medical history. History reviewed. No pertinent surgical history. History reviewed. No pertinent family history. Social History     Socioeconomic History    Marital status: Single     Spouse name: None    Number of children: None    Years of education: None    Highest education level: None   Tobacco Use    Smoking status: Never   Substance and Sexual Activity    Alcohol use: Never    Drug use: Never     No Known Allergies    Review of Systems   Constitutional:  Negative for chills, fatigue and fever. HENT:  Positive for ear discharge, ear pain, hearing loss and tinnitus. Negative for congestion, nosebleeds, postnasal drip and sinus pain. Objective: There were no vitals filed for this visit. Physical Exam  Constitutional:       General: She is active. Appearance: Normal appearance. She is normal weight. HENT:      Head: Normocephalic and atraumatic. Right Ear: Tympanic membrane, ear canal and external ear normal.      Left Ear: External ear normal.      Ears:      Comments: Left inferior ear canal has a scab not actively bleeding     Nose: Nose normal. No rhinorrhea. Mouth/Throat:      Mouth: Mucous membranes are dry. Pharynx: Oropharynx is clear.    Neurological:      Mental Status: She is alert.       Tymp:            Assessment:       Diagnosis Orders   1. Injury of ear canal, initial encounter  Tympanometry                 Plan:        Ear canal lacerated on the left side. I will recheck the ear in a week.    Follow up in 1 week(s) today) X 10 with good weight shift   Stairs (not today) Working on pelvic anterior elevation on right side with knee drive and toe lift   Hip and pelvic hike Not today   Knee extension (home) Continued with blue band at home   Weight shift  R LE planted with follow through and work on keeping right side long x 1 min   Balance (not today) Tandem stance 2 x 30 sec B   Sit to stand 2 x 5       MANUAL THERAPY: (45 minutes): Joint mobilization and Soft tissue mobilization was utilized and necessary because of the patient's restricted joint motion and restricted motion of soft tissue. (Used abbreviations: SF - Superficial Fascia; BC - Bony contours; MP - muscle play; IASTM - instrument-assisted soft tissue mobilization; MET - muscle energy technique; a/p - anterior to posterior; p/a - posterior to anterior; Proprioceptive neuromuscular Facilitation-PNF) Manual treatment to improve soft tissue/joint mobility deficits, tissue integrity issues, trigger points and/or pain. -Supine mobilization of right hamstring, lateral IT band, rectus femoris, adductors  -Hip ER stretch insupine  -Left and right side lie pelvic mobilization for anterior elevation and posterior depression  -PNF pattern to facilitate core into anterior elevation and posterior depression with working through end range holds and isometric reversals with the pelvis and at long full leg function  -Functional resistance through posterior depression and then hip abduction facilitation.  - Hamstring mobilization to right and left   -Patellar mobilization right and left side with left IT band mobilized today as well    GAIT TRAINING: (0 min) in hallway with work on use of single point cane for balance and support. Trying to focus on control and stability with push off through R LE and taking some pressure off midstance with the [facility]     MedBridge Portal  Treatment/Session Summary:    · Response to Treatment: Patient shows some improvement in mobility with soreness from working in the gym. Gait function seems a little quicker in the transition today and with sit to stand as well. · Communication/Consultation:  None today  · Equipment provided today:  None today  · Recommendations/Intent for next treatment session: Next visit will focus on Core training/balance.     Total Treatment Billable Duration:  45 minutes of treatment,  PT Patient Time In/Time Out  Time In: 5383  Time Out: Romeo 65, PT    Future Appointments   Date Time Provider Nancy Eason   6/8/2021  1:00 PM [place], PT [facility]   6/22/2021  2:00 PM [place], PT [facility]   6/29/2021  2:00 PM [place], PT [facility]

## 2022-10-12 ENCOUNTER — HOSPITAL ENCOUNTER (OUTPATIENT)
Dept: PHYSICAL THERAPY | Age: 77
Setting detail: RECURRING SERIES
Discharge: HOME | End: 2022-10-15
Payer: MEDICARE

## 2022-10-12 PROCEDURE — 97140 MANUAL THERAPY 1/> REGIONS: CPT

## 2022-10-12 PROCEDURE — 97110 THERAPEUTIC EXERCISES: CPT

## 2022-10-12 ASSESSMENT — PAIN SCALES - GENERAL: PAINLEVEL_OUTOF10: 5

## 2022-10-12 NOTE — PROGRESS NOTES
Kandace Mode  : 1945  Primary: Medicare Part A [place] B  Secondary: [address] @ [facility] [address]  [address] [address]  Phone: [phone]  Fax: [phone] No data recorded  No data recorded    PT Visit Info: Total # of Visits to Date: 79      OUTPATIENT PHYSICAL THERAPY:OP NOTE TYPE: Treatment Note 10/12/2022       Episode  }Appt Desk              Treatment Diagnosis:  Low back pain, M54.5  Difficulty walking, not elsewhere classified, R26.2  Pain in joint, right knee, M25.561  Medical/Referring Diagnosis:  Low back pain, unspecified [M54.50]  Referring Physician:  Cornell Murphy, *  MD Orders:  PT Eval and Treat   TREATMENT PLAN:  Effective Dates:22 TO 22 . Frequency/Duration: 1 time every other week for 12 weeks  Date of Onset:  No data recorded   Allergies:   Patient has no allergy information on record. Other medication and Statins-hmg-coa reductase inhibitors   Restrictions/Precautions:  No data recordedNo data recorded   Interventions Planned (Treatment may consist of any combination of the following):    Balance Exercise  Bed Mobility  Electrical Stimulation  Gait Training  Heat  Manual Therapy  Neuromuscular Re-education/Strengthening  Range of Motion (ROM)  Therapeutic Activites  Therapeutic Exercise/Strengthening  No data recorded   Subjective Comments:Patient reports that he is trying to push through his feet more with getting up and that seems to help. He still feels the back pain. Initial:}    5/10Post Session:       2/10  Medications Last Reviewed:  10/12/2022  Updated Objective Findings: Glute medius weakness 3-/  Treatment   THERAPEUTIC EXERCISE: (15 minutes):  Exercises per grid below to improve mobility, strength and coordination.   Required minimal visual, verbal, manual and tactile cues to promote proper body alignment, promote proper body posture, promote proper body mechanics and promote proper body breathing techniques. Progressed resistance and repetitions as indicated. Date:  10/12/22   Activity/Exercise Parameters   Core exercises Review of hip flexion isometric   Education on sit to stand  X 10 with good weight shift   Knee extension (home) Continued with blue band at home   Weight shift  R LE planted with follow through and work on keeping right side long x 1 min   Balance  Heel to toe raise x 20   Hip abduction Side lie calm shell and straight leg raise x 5 min with review for weakness    Gait function  step through with pressure onto his R LE x 3 min         MANUAL THERAPY: (30 minutes): Joint mobilization and Soft tissue mobilization was utilized and necessary because of the patient's restricted joint motion and restricted motion of soft tissue. (Used abbreviations: SF - Superficial Fascia; BC - Bony contours; MP - muscle play; IASTM - instrument-assisted soft tissue mobilization; MET - muscle energy technique; a/p - anterior to posterior; p/a - posterior to anterior; Proprioceptive neuromuscular Facilitation-PNF) Manual treatment to improve soft tissue/joint mobility deficits, tissue integrity issues, trigger points and/or pain.     -Side lie mobilization to soft tissue of groove of the spine, on right side and scapular mechanics for shoulder.    -Supine mobilization of right hamstring, lateral IT band, rectus femoris, adductors  -Hip ER stretch insupine  -PNF pattern to facilitate core into anterior elevation and posterior depression with working through end range holds and isometric reversals with the pelvis and at long full leg function  -Functional resistance through posterior depression and then hip abduction facilitation.  -Anterior elevation resistance to pelvis for core training  -Educated and positioned in standing for posture to take pressure off his spine  -     GAIT TRAINING: (0 min) in hallway with work on use of single point cane for balance and support.  Trying to focus on control and stability with push off through R LE and taking some pressure off midstance with the SPC. Treatment/Session Summary:    Treatment Assessment:Patient shows improvement in pelvic function. He still shows increased tightness along right low back. Communication/Consultation:  None today  Equipment provided today:  None  Recommendations/Intent for next treatment session: Next visit will focus on Right hip strength.     Total Treatment Billable Duration:  45 minutes   Time In: 1300  Time Out: MARLIN Barkley       Charge Capture  }Post Session Pain  MedBridge Portal  MD Guidelines  Scanned Media  Benefits  MyChart    Future Appointments   Date Time Provider Mario Leone   10/14/2022  8:30 AM Jazmin Lehman MD [facility]   10/26/2022  2:00 PM [facility]

## 2022-10-13 NOTE — PROGRESS NOTES
[facility]  Consultation Note  Virtual/Telephone Visit     Patient ID:  Name: Christiane Coffman  MRN: [id number]  AGE: 76 y.o.  : 1945    Date of Consultation:  2022    CC:   Chief Complaint   Patient presents with    Back Problem         HPI:  Today's visit was performed through a virtual visit with live audio and video feed. The patient was at home in [place]. I was in the office. No other persons were present. Verbal informed consent was obtained prior to today's visit, which lasted 23 minutes and included evaluation and management of symptoms, review of imaging, review of previous treatments, and discussion of treatment options. Please see the note below for more detailed information regarding today's visit. Mr. Manda Montelongo is a 52-year-old man who presents today for follow-up of low back pain. Since I last saw him, he saw Dr. Johnson Rothman, who did not feel that there was a surgical solution to his problem. He has pain in the right lower back. It radiates to the right hip and into his leg. Physical therapy helps only temporarily. He feels that the pain affects his functioning. MRI lumbar spine 2021 showed scoliosis with multilevel degenerative change. He has moderate right L3-4-5 neuroforaminal narrowing and moderate left L5-S1 neuroforaminal narrowing.      Past Medical History Includes:   Past Medical History:   Diagnosis Date    Anxiety     takes Ativan po 5-7 days weekly    Edema of both legs     \"from BP meds\", ECHO  on chart    Enlarged prostate     Gout     Hip pain     History of elevated glucose     hemoglobin A1C 5.7 2014    History of seasonal allergies     Hypercholesterolemia     Hypertension     Keratoacanthoma     S/P total hip arthroplasty 2015    Skin neoplasm     Spinal stenosis     injections - Dr. Justin Swenson   ,   Past Surgical History:   Procedure Laterality Date    TONSILLECTOMY  as child    WISDOM TOOTH EXTRACTION  as teenager     Family History:   Family History   Problem Relation Age of Onset    Stroke Father 30        bleed    Stroke Mother     Heart Attack Mother       Social History:   Social History     Tobacco Use    Smoking status: Never    Smokeless tobacco: Not on file   Substance Use Topics    Alcohol use: No       ALLERGIES: Not on File     Patient Medications    Current Outpatient Medications   Medication Sig    POTASSIUM CHLORIDE PO Take 10 mEq by mouth    allopurinol (ZYLOPRIM) 300 MG tablet Take 300 mg by mouth daily    busPIRone (BUSPAR) 7.5 MG tablet Take 7.5 mg by mouth 2 times daily    celecoxib (CELEBREX) 200 MG capsule Take 200 mg by mouth daily    cloNIDine (CATAPRES) 0.3 MG/24HR PTWK Place 1 patch onto the skin every 7 days    ergocalciferol (ERGOCALCIFEROL) 1.25 MG (82025 UT) capsule Take 50,000 Units by mouth every 7 days    finasteride (PROSCAR) 5 MG tablet Take 5 mg by mouth daily    furosemide (LASIX) 20 MG tablet Take 20 mg by mouth daily    hydrOXYzine (ATARAX) 25 MG tablet Take 25 mg by mouth 2 times daily    lisinopril (PRINIVIL;ZESTRIL) 20 MG tablet Take 20 mg by mouth daily    prazosin (MINIPRESS) 1 MG capsule Take 1 mg by mouth 2 times daily    spironolactone (ALDACTONE) 25 MG tablet Take 25 mg by mouth daily     No current facility-administered medications for this visit. ROS/Meds/PSH/PMH/FH/SH: I personally reviewed the patients standard intake form. No flowsheet data found. No results found for any visits on 10/14/22. Assessment and Plan:     ICD-10-CM    1. Lumbar spondylosis  M47.816 Injection Authorization - Spine      2. Disc degeneration, lumbar  M51.36 Injection Authorization - Spine      3.  Lumbar radiculopathy  M54.16 Injection Authorization - Spine          Orders Placed This Encounter   Procedures    Injection Authorization - Spine     Referral Priority:   Routine     Number of Visits Requested:   1        4   This is a chronic illness/condition with exacerbation and progression  Treatment at this time: Discussed Injection therapy at length today, including risks, complications and alternative treatment options. The patient wishes to proceed. The injection will be performed as right L3-4 and right L4-5 transforaminal epidural steroid injections. Studies ordered today: none    On this date 10/14/2022 I have spent 21 minutes reviewing previous notes, test results and face to face with the patient discussing the diagnosis and importance of compliance with the treatment plan as well as documenting on the day of the visit. Tavia Mei, was evaluated through a synchronous (real-time) audio-video encounter. The patient (or guardian if applicable) is aware that this is a billable service. Verbal consent to proceed has been obtained within the past 12 months. The visit was conducted pursuant to the emergency declaration under the [facility], [address] [place] [address] authority and the [employer] Act. Patient identification was verified, and a caregiver was present when appropriate. The patient was located in a state where the provider was credentialed to provide care.        Dami Villanueva MD  10/14/2022,  8:51 AM

## 2022-10-14 ENCOUNTER — TELEPHONE (OUTPATIENT)
Dept: ORTHOPEDIC SURGERY | Age: 77
End: 2022-10-14

## 2022-10-14 ENCOUNTER — TELEMEDICINE (OUTPATIENT)
Dept: ORTHOPEDIC SURGERY | Age: 77
End: 2022-10-14
Payer: MEDICARE

## 2022-10-14 DIAGNOSIS — M47.816 LUMBAR SPONDYLOSIS: Primary | ICD-10-CM

## 2022-10-14 DIAGNOSIS — M54.16 LUMBAR RADICULOPATHY: ICD-10-CM

## 2022-10-14 DIAGNOSIS — M51.36 DISC DEGENERATION, LUMBAR: ICD-10-CM

## 2022-10-14 PROCEDURE — G8484 FLU IMMUNIZE NO ADMIN: HCPCS | Performed by: PHYSICAL MEDICINE & REHABILITATION

## 2022-10-14 PROCEDURE — 4004F PT TOBACCO SCREEN RCVD TLK: CPT | Performed by: PHYSICAL MEDICINE & REHABILITATION

## 2022-10-14 PROCEDURE — G8421 BMI NOT CALCULATED: HCPCS | Performed by: PHYSICAL MEDICINE & REHABILITATION

## 2022-10-14 PROCEDURE — 99214 OFFICE O/P EST MOD 30 MIN: CPT | Performed by: PHYSICAL MEDICINE & REHABILITATION

## 2022-10-14 PROCEDURE — 1123F ACP DISCUSS/DSCN MKR DOCD: CPT | Performed by: PHYSICAL MEDICINE & REHABILITATION

## 2022-10-14 PROCEDURE — G8427 DOCREV CUR MEDS BY ELIG CLIN: HCPCS | Performed by: PHYSICAL MEDICINE & REHABILITATION

## 2022-10-14 NOTE — TELEPHONE ENCOUNTER
Call returned to patient and brought his MRI CD by the International office. I explained to him that we would be at this office on Monday. He stated to understand.

## 2022-10-14 NOTE — TELEPHONE ENCOUNTER
Pt  LVM  that  he  dropped  off a  disc at  front  desk ( did not  say  which office)  wanted  you to know  to  look for  the  disc

## 2022-10-14 NOTE — LETTER
Brianne Bad  1945  ______________________________________________________________________    Radiographic Studies:    Cervical MRI/ Contrast        Thoracic MRI/ Contrast        Lumbar MRI/ Contrast    CT Myelogram __________________________________________________    NCS/EMG______________________________________________________    MRI of ________________________________________________________    Other__________________________________________________________      Injections:    _______________________________________________________________    Authorization to stop blood thinners________________________________      Medications:    Oral steroids___________________    Muscle Relaxers___________________    Pain medications_____________________    NSAIDS_____________________    Neuropathic pain medication_________________________________________      Physical Therapy:    Lumbar     Thoracic     Cervical       Other_______________________________      Follow up/ Referral:    Pain referral_______________________________________________________    Referral___________________________________________________________    Follow up_________________________________________________________    Handicapped Parking_______________________________________________    Other_____________________________________________________________

## 2022-10-17 ENCOUNTER — OFFICE VISIT (OUTPATIENT)
Dept: ORTHOPEDIC SURGERY | Age: 77
End: 2022-10-17
Payer: MEDICARE

## 2022-10-17 DIAGNOSIS — M47.816 LUMBAR SPONDYLOSIS: Primary | ICD-10-CM

## 2022-10-17 DIAGNOSIS — M51.36 DISC DEGENERATION, LUMBAR: ICD-10-CM

## 2022-10-17 DIAGNOSIS — M54.16 LUMBAR RADICULOPATHY: ICD-10-CM

## 2022-10-17 PROCEDURE — 64483 NJX AA&/STRD TFRM EPI L/S 1: CPT | Performed by: PHYSICAL MEDICINE & REHABILITATION

## 2022-10-17 PROCEDURE — 64484 NJX AA&/STRD TFRM EPI L/S EA: CPT | Performed by: PHYSICAL MEDICINE & REHABILITATION

## 2022-10-17 RX ORDER — TRIAMCINOLONE ACETONIDE 40 MG/ML
40 INJECTION, SUSPENSION INTRA-ARTICULAR; INTRAMUSCULAR ONCE
Status: COMPLETED | OUTPATIENT
Start: 2022-10-17 | End: 2022-10-17

## 2022-10-17 RX ADMIN — TRIAMCINOLONE ACETONIDE 40 MG: 40 INJECTION, SUSPENSION INTRA-ARTICULAR; INTRAMUSCULAR at 10:39

## 2022-10-17 NOTE — PROGRESS NOTES
Name: Emilie Hernadez  YOB: 1945  Gender: male  MRN: [id number]        Transforaminal ADAM Procedure Note    Procedure: Right  L3-L4 and L4-L5 transforaminal epidural steroid injections     Precautions: Emilie Hernadez denies prior sensitivity to steroid, local anesthetic, iodine, or shellfish. Consent:  Consent was obtained prior to the procedure. The procedure was discussed at length with Emilie Hernadez. He was given the opportunity to ask questions regarding the procedure and its associated risks. In addition to the potential risks associated with the procedure itself, the patient was informed both verbally and in writing of potential side effects of the use glucocorticoids. The patient appeared to comprehend the informed consent and desired to have the procedure performed, and informed consent was signed. He was placed in a prone position on the fluoroscopy table and the skin was prepped and draped in a routine sterile fashion. The areas to be injected were each anesthetized with 1 ml of 1% Lidocaine. A 22 gauge 3.5 inch spinal needle was carefully advanced under fluoroscopic guidance to the right L3-L4 transforaminal space  0.5 ml of 70% of Omnipaque was injected to confirm proper needle placement and absence of subdural or vascular flow Once proper placement was confirmed, 0.5ml of 0.25 marcaine and 20 mg of kenalog were injected through the spinal needle. The above procedure was then repeated at the Right  L4-L5 transforaminal space using 20 mg of kenalog. Fluoroscopic guidance was used intermittently over a 10-minute period to insure proper needle placement and his safety. A hard copy of the fluoroscopic image has been placed in his chart and is saved on the C-arm hard drive. He was monitored for 30 minutes after the procedure and discharged home in a stable fashion with a routine follow up.     Procedural Diagnosis:     ICD-10-CM    1. Lumbar spondylosis  M47.816 FL NERVE BLOCK LUMBOSACRAL 1ST     FL NERVE BLOCK LUMBOSACRAL EACH ADD     triamcinolone acetonide (KENALOG-40) injection 40 mg      2. Disc degeneration, lumbar  M51.36 FL NERVE BLOCK LUMBOSACRAL 1ST     FL NERVE BLOCK LUMBOSACRAL EACH ADD     triamcinolone acetonide (KENALOG-40) injection 40 mg      3.  Lumbar radiculopathy  M54.16 FL NERVE BLOCK LUMBOSACRAL 1ST     FL NERVE BLOCK LUMBOSACRAL EACH ADD     triamcinolone acetonide (KENALOG-40) injection 40 mg         Krysten Capellan MD  10/17/22

## 2022-10-24 ENCOUNTER — HOSPITAL ENCOUNTER (OUTPATIENT)
Dept: PHYSICAL THERAPY | Age: 77
Setting detail: RECURRING SERIES
Discharge: HOME | End: 2022-10-27
Payer: MEDICARE

## 2022-10-24 PROCEDURE — 97140 MANUAL THERAPY 1/> REGIONS: CPT

## 2022-10-24 PROCEDURE — 97110 THERAPEUTIC EXERCISES: CPT

## 2022-10-24 ASSESSMENT — PAIN SCALES - GENERAL: PAINLEVEL_OUTOF10: 5

## 2022-10-24 NOTE — PROGRESS NOTES
Eulalio Saavedra  : 1945  Primary: Medicare Part [place] B  Secondary: [address] @ [address]  Stephane Wheatley [address] [address]  Phone: [phone]  Fax: [phone] No data recorded  No data recorded    PT Visit Info: Total # of Visits to Date: 79      OUTPATIENT PHYSICAL THERAPY:OP NOTE TYPE: Treatment Note 10/24/2022       Episode  }Appt Desk              Treatment Diagnosis:  Low back pain, M54.5  Difficulty walking, not elsewhere classified, R26.2  Pain in joint, right knee, M25.561  Medical/Referring Diagnosis:  Low back pain, unspecified [M54.50]  Referring Physician:  Austin Campbell., *  MD Orders:  PT Eval and Treat   TREATMENT PLAN:  Effective Dates:22 TO 22 . Frequency/Duration: 1 time every other week for 12 weeks  Date of Onset:  No data recorded   Allergies:   Patient has no allergy information on record. Other medication and Statins-hmg-coa reductase inhibitors   Restrictions/Precautions:  No data recordedNo data recorded   Interventions Planned (Treatment may consist of any combination of the following):    Balance Exercise  Bed Mobility  Electrical Stimulation  Gait Training  Heat  Manual Therapy  Neuromuscular Re-education/Strengthening  Range of Motion (ROM)  Therapeutic Activites  Therapeutic Exercise/Strengthening  No data recorded   Subjective Comments:Patient reports that he is trying to push through his feet more with getting up and that seems to help. He is up to two laps in the gym now      Initial:}    5/10Post Session:       2/10  Medications Last Reviewed:  10/24/2022  Updated Objective Findings: Glute medius weakness 3-/  Treatment   THERAPEUTIC EXERCISE: (15 minutes):  Exercises per grid below to improve mobility, strength and coordination.   Required minimal visual, verbal, manual and tactile cues to promote proper body alignment, promote proper body posture, promote proper body mechanics and promote proper body breathing techniques. Progressed resistance and repetitions as indicated. Date:  10/24/22   Activity/Exercise Parameters   Core exercises Supine marches x 20 B   Education on sit to stand  X 10 with good weight shift   Knee extension (home) Continued with blue band at home   Weight shift  R LE planted with follow through and work on keeping right side long x 1 min   Balance (not today) Heel to toe raise x 20   Hip abduction Supine with blue band and work on control x 30    Gait function  step through with pressure onto his R LE x 3 min         MANUAL THERAPY: (30 minutes): Joint mobilization and Soft tissue mobilization was utilized and necessary because of the patient's restricted joint motion and restricted motion of soft tissue. (Used abbreviations: SF - Superficial Fascia; BC - Bony contours; MP - muscle play; IASTM - instrument-assisted soft tissue mobilization; MET - muscle energy technique; a/p - anterior to posterior; p/a - posterior to anterior; Proprioceptive neuromuscular Facilitation-PNF) Manual treatment to improve soft tissue/joint mobility deficits, tissue integrity issues, trigger points and/or pain.     -Side lie mobilization to soft tissue of groove of the spine, on right side and scapular mechanics for shoulder.    -Supine mobilization of right hamstring, lateral IT band, rectus femoris, adductors  -Hip ER stretch insupine  -PNF pattern to facilitate core into anterior elevation and posterior depression with working through end range holds and isometric reversals with the pelvis and at long full leg function  -Functional resistance through posterior depression and then hip abduction facilitation.  -Anterior elevation resistance to pelvis for core training  -Educated and positioned in standing for posture to take pressure off his spine  -     GAIT TRAINING: (0 min) in hallway with work on use of single point cane for balance and support.  Trying to focus on control and stability with push off through R LE and taking some pressure off midstance with the SPC. Treatment/Session Summary:    Treatment Assessment:Patient shows improvement in pelvic function. Continue to work right hip strength and function through gait     Communication/Consultation:  None today  Equipment provided today:  None  Recommendations/Intent for next treatment session: Next visit will focus on Right hip strength.     Total Treatment Billable Duration:  45 minutes   Time In: 1000  Time Out: [address], PT       Charge Capture  }Post Session Pain  Med[employer] Portal  MD Guidelines  Scanned Media  Benefits  MyChart    Future Appointments   Date Time Provider Mario eLone   11/8/2022  2:00 PM Carri Espana, MAR[facility]   11/22/2022  2:00 PM Carri Espana PT [facility]

## 2022-11-08 ENCOUNTER — HOSPITAL ENCOUNTER (OUTPATIENT)
Dept: PHYSICAL THERAPY | Age: 77
Setting detail: RECURRING SERIES
Discharge: HOME | End: 2022-11-11
Payer: MEDICARE

## 2022-11-08 PROCEDURE — 97140 MANUAL THERAPY 1/> REGIONS: CPT

## 2022-11-08 PROCEDURE — 97110 THERAPEUTIC EXERCISES: CPT

## 2022-11-08 ASSESSMENT — PAIN SCALES - GENERAL: PAINLEVEL_OUTOF10: 4

## 2022-11-08 NOTE — PROGRESS NOTES
Reynold Bsuh  : 1945  Primary: Medicare Part [place] [employer] B  Secondary: [address] @ [address]  Stephane Wheatley [address] [address]  Phone: [phone]  Fax: [phone] No data recorded  No data recorded    PT Visit Info: Total # of Visits to Date: 71      OUTPATIENT PHYSICAL THERAPY:OP NOTE TYPE: Treatment Note 2022       Episode  }Appt Desk              Treatment Diagnosis:  Low back pain, M54.5  Difficulty walking, not elsewhere classified, R26.2  Pain in joint, right knee, M25.561  Medical/Referring Diagnosis:  Low back pain, unspecified [M54.50]  Referring Physician:  Rubén Crockett, *  MD Orders:  PT Eval and Treat   TREATMENT PLAN:  Effective Dates:22 TO 23 . Frequency/Duration: 1 time every other week for 12 weeks  Date of Onset:  No data recorded   Allergies:   Patient has no allergy information on record. Other medication and Statins-hmg-coa reductase inhibitors   Restrictions/Precautions:  No data recordedNo data recorded   Interventions Planned (Treatment may consist of any combination of the following):    Balance Exercise  Bed Mobility  Electrical Stimulation  Gait Training  Heat  Manual Therapy  Neuromuscular Re-education/Strengthening  Range of Motion (ROM)  Therapeutic Activites  Therapeutic Exercise/Strengthening  No data recorded   Subjective Comments:Patient reports that he has been doing similar activity in the gym and being consistent with it. He still feels the back today     Initial:}    4/10Post Session:       2/10  Medications Last Reviewed:  2022  Updated Objective Findings: Glute medius weakness 3-/5  Treatment   THERAPEUTIC EXERCISE: (15 minutes):  Exercises per grid below to improve mobility, strength and coordination.   Required minimal visual, verbal, manual and tactile cues to promote proper body alignment, promote proper body posture, promote proper body mechanics and promote proper body breathing techniques. Progressed resistance and repetitions as indicated. Date:  11/8/22   Activity/Exercise Parameters   Core exercises Side lie hip flexion isometric   Education on sit to stand  X 10 with good weight shift   Knee extension (home) Continued with blue band at home   Weight shift  R LE planted with follow through and work on keeping right side long x 1 min   Balance (not today) Heel to toe raise x 20   Hip abduction Supine with blue band and work on control x 30    Gait function  step through with pressure onto his R LE x 3 min         MANUAL THERAPY: (30 minutes): Joint mobilization and Soft tissue mobilization was utilized and necessary because of the patient's restricted joint motion and restricted motion of soft tissue. (Used abbreviations: SF - Superficial Fascia; BC - Bony contours; MP - muscle play; IASTM - instrument-assisted soft tissue mobilization; MET - muscle energy technique; a/p - anterior to posterior; p/a - posterior to anterior; Proprioceptive neuromuscular Facilitation-PNF) Manual treatment to improve soft tissue/joint mobility deficits, tissue integrity issues, trigger points and/or pain.     -Side lie mobilization to soft tissue of groove of the spine   -Supine mobilization of right hamstring, lateral IT band, rectus femoris, adductors  -Hip ER stretch insupine  -PNF pattern to facilitate core into anterior elevation and posterior depression with working through end range holds and isometric reversals with the pelvis and at long full leg function  -Functional resistance through posterior depression and then hip abduction facilitation.  -Anterior elevation resistance to pelvis for core training  -Educated and positioned in standing for posture to take pressure off his spine  -     GAIT TRAINING: (0 min) in hallway with work on use of single point cane for balance and support.  Trying to focus on control and stability with push off through R LE and taking some pressure off midstance with the SPC. Treatment/Session Summary:    Treatment Assessment:Patient shows improvement in pelvic function. Hip and right knee still weak and give him the most trouble with function. Communication/Consultation:  None today  Equipment provided today:  None  Recommendations/Intent for next treatment session: Next visit will focus on Right hip strength.     Total Treatment Billable Duration:  45 minutes   Time In: 1400  Time Out: [address], PT       Charge Capture  }Post Session Pain  MedBridge Portal  MD Guidelines  Scanned Media  Benefits  MyChart    Future Appointments   Date Time Provider Mario Leone   11/22/2022  2:00 PM Monica Mata, PT [facility]

## 2022-11-08 NOTE — THERAPY RECERTIFICATION
Libby Barthel  : 1945  [place]  Secondary: [employer] [facility]  at [address] [place] [address]  [phone]   [phone]           OUTPATIENT PHYSICAL THERAPY:Recertification 1389   ICD-10: Treatment Diagnosis: Low back pain, M54.5  Difficulty walking, not elsewhere classified, R26.2  Pain in joint, right knee, M25.561  Precautions/Allergies: Other medication and Statins-hmg-coa reductase inhibitors   TREATMENT PLAN:  Effective Dates:22 TO 23 . Frequency/Duration: 1 time every other week for 12 weeks MEDICAL/REFERRING DIAGNOSIS:  Low back pain [M54.50]   DATE OF ONSET: Chronic  REFERRING PHYSICIAN: Too Jose MD MD Orders: Evaluate and Treat  Return MD Appointment: not scheduled   Libby Barthel has been seen for 71 visits from 20 to 2022 for low back, right knee and R LE. Patient has performed therapeutic exercises, activities, and had manual therapy to increased strength, ROM and function. Patient has also used modalities for pain control in order to increase function. Patient has shown an increase in function per the LEFS with scores of 45/80. He continues to show issues with his gait function and we are steadily working to increase his hip strength and stability. He is consistently going to the gym 4-5 times a week to help his progress. Patient has progressed well toward their goals and will benefit from continuing skilled PT in order to address their impairments. Pietro Valles, PT, DPT, OCS, CFMT           INITIAL ASSESSMENT:  Mr. Marsha Ontiveros presents with chronic R LE weakness and pain along with peripheral neuropathy affecting his safety with gait and function. He shows limitation in walking and unsafe gait function. He shows continued deconditioning in the LE's.  He is a good candidate for skilled PT in order to address listed impairments affecting his function. Edmundo Odell, PT, DPT, OCS, CFMT      PROBLEM LIST (Impacting functional limitations):  Decreased Strength  Decreased ADL/Functional Activities  Decreased Transfer Abilities  Decreased Ambulation Ability/Technique  Decreased Balance  Increased Pain  Decreased Activity Tolerance  Decreased Flexibility/Joint Mobility INTERVENTIONS PLANNED: (Treatment may consist of any combination of the following)  Balance Exercise  Bed Mobility  Electrical Stimulation  Gait Training  Heat  Manual Therapy  Neuromuscular Re-education/Strengthening  Range of Motion (ROM)  Therapeutic Activites  Therapeutic Exercise/Strengthening      GOALS: (Goals have been discussed and agreed upon with patient.)     Discharge Goals: Time Frame: 12 weeks  Patient will show a greater than 10 point increase on the LEFS in order to show an increase in function (MET-8/9/22)  Patient will report joining a pool and walking in the water greater than 2 times a week in order to progress function (ongoing)  Patient will be independent in balance exercises in order to decrease fall risk (ongoing)  Patient will report going to the gym consistently three times a week for two months. (MET-2/16/22)  NEW GOALS:  Patient will perform 10 sit to stands without UE use in order to progress strength and function (ongoing)     OUTCOME MEASURE:   Tool Used: Lower Extremity Functional Scale (LEFS)  Score:  Initial: 37/80 Most Recent: 45/80 (Date: 11/8/22 )   Interpretation of Score: 20 questions each scored on a 5 point scale with 0 representing \"extreme difficulty or unable to perform\" and 4 representing \"no difficulty\". The lower the score, the greater the functional disability. 80/80 represents no disability. Minimal detectable change is 9 points. MEDICAL NECESSITY:   Patient is expected to demonstrate progress in strength, range of motion, balance, coordination and functional technique to improve functionalmobility and safety with walking.   Patient demonstrates good rehab potential due to higher previous functional level. Skilled intervention continues to be required due to decreased mobility and increased risk of falls. REASON FOR SERVICES/OTHER COMMENTS:  Patient continues to require skilled intervention due to decreased mobility. Total Duration:  PT Patient Time In/Time Out  Time In: 1400  Time Out: 1500     Rehabilitation Potential For Stated Goals: Excellent  Regarding James Potocki's therapy, I certify that the treatment plan above will be carried out by a therapist or under their direction. Thank you for this referral,  Pedro Pablo Malone, PT     Referring Physician Signature: Avis Arreola MD                           PAIN/SUBJECTIVE:   Initial: Pain Intensity 1: 4 /10 (more in leg) Post Session:  2/10   HISTORY:   History of Injury/Illness (Reason for Referral):  Patient has had a chronic history of R LE weakness and pain since he first had his hip replacement about 5 years ago. He has since had issues with spinal stenosis, disc herniation, right knee OA and has found out recently that he has peripheral neuropathy in both feet that affect his balance. He had a fall back in February/March that was concerning for him due to the leg giving out. He also had a hospitalization due to abnormal heartbeat. He has finally stabilized with the heart. He did have an injection in his lower back (Dr. Gabriela Venegas) in the summer due to increasing left LE weakness and pain that has helped a lot. He is not comfortable going to the gym like he use to due to Avani and has become deconditioned. He reports doing some things at home, but still just feels like he is starting from square one. His goals are to get back to exercises and daily life without difficulty and with less fear of falling.   Past Medical History/Comorbidities:   Mr. Lindy Cardenas  has a past medical history of Anxiety, Edema of both legs, Enlarged prostate, Gout, Hip pain, History of elevated glucose, History of seasonal allergies, Hypercholesterolemia, Hypertension, Keratoacanthoma, S/P total hip arthroplasty (4/6/2015), Skin neoplasm, and Spinal stenosis. He has no past medical history of Aneurysm ([address] Arrhythmia, Arthritis, Asthma, Autoimmune disease ([address] CAD (coronary artery disease), Cancer ([address] Chronic kidney disease, Chronic obstructive pulmonary disease ([address] Chronic pain, Coagulation disorder ([address] Difficult intubation, GERD (gastroesophageal reflux disease), Heart failure ([address] Ill-defined condition, Liver disease, Malignant hyperthermia due to anesthesia, Morbid obesity ([address] Nausea & vomiting, Pseudocholinesterase deficiency, Psychiatric disorder, PUD (peptic ulcer disease), Seizures ([address] Stroke ([address] Thromboembolus ([facility] [address] Thyroid disease, Unspecified adverse effect of anesthesia, or Unspecified sleep apnea. Mr. Karel Dougherty  has a past surgical history that includes hx tonsillectomy (as child) and hx wisdom teeth extraction (as teenager). Social History/Living Environment:       Social History            Socioeconomic History    Marital status:        Spouse name: Not on file    Number of children: Not on file    Years of education: Not on file    Highest education level: Not on file   Occupational History    Not on file   Tobacco Use    Smoking status: Never Smoker    Smokeless tobacco: Not on file   Substance and Sexual Activity    Alcohol use: No    Drug use: Not on file    Sexual activity: Not on file   Other Topics Concern    Not on file   Social History Narrative    Not on file      Social Determinants of Health          Financial Resource Strain:     Difficulty of Paying Living Expenses: Not on file   Food Insecurity:     Worried About [employer] in the Last Year: Not on file    Ran Out of Food in the Last Year: Not on file   Transportation Needs:     Lack of Transportation (Medical): Not on file    Lack of Transportation (Non-Medical):  Not on file   Physical Activity:     Days of Exercise per Week: Not on file    Minutes of Exercise per Session: Not on file   Stress:     Feeling of Stress : Not on file   Social Connections:     Frequency of Communication with Friends and Family: Not on file    Frequency of Social Gatherings with Friends and Family: Not on file    Attends Mu-ism Services: Not on file    Active Member of Clubs or Organizations: Not on file    Attends Club or Organization Meetings: Not on file    Marital Status: Not on file   Intimate Partner Violence:     Fear of Current or Ex-Partner: Not on file    Emotionally Abused: Not on file    Physically Abused: Not on file    Sexually Abused: Not on file   Housing Stability:     Unable to Pay for Housing in the Last Year: Not on file    Number of [place] in the Last Year: Not on file    Unstable Housing in the Last Year: Not on file         Prior Level of Function/Work/Activity:  Independent, retired  Dominant Side:         RIGHT   Ambulatory/Rehab [address] Factors:       (1)  Gender [Male]       (5)  History of Recent Falls [w/in 3 months]       (1)  Orthostatic drop in BP Ability to Rise from Chair:       (1)  Pushes up, successful in one attempt   Falls Prevention Plan:       No modifications necessary   Total: (5 or greater = High Risk): 8   ©2010 [facility] [address] [place] [address] States Patent #[id number]. Federal Law prohibits the replication, distribution or use without written permission from [facility] [employer]   Current Medications:         Current Outpatient Medications:     ergocalciferol (ERGOCALCIFEROL) 1,250 mcg (50,000 unit) capsule, Take 50,000 Units by mouth every seven (7) days. , Disp: , Rfl:     furosemide (LASIX) 20 mg tablet, Take 20 mg by mouth daily. , Disp: , Rfl:     lisinopriL (PRINIVIL, ZESTRIL) 20 mg tablet, Take 20 mg by mouth daily. , Disp: , Rfl:     celecoxib (CELEBREX) 200 mg capsule, Take 200 mg by mouth daily. , Disp: , Rfl:     allopurinoL (ZYLOPRIM) 300 mg tablet, Take 300 mg by mouth daily. , Disp: , Rfl:     spironolactone (ALDACTONE) 25 mg tablet, Take 25 mg by mouth daily. , Disp: , Rfl:     busPIRone (BUSPAR) 7.5 mg tablet, Take 7.5 mg by mouth two (2) times a day., Disp: , Rfl:     hydrOXYzine HCL (ATARAX) 25 mg tablet, Take 25 mg by mouth two (2) times a day., Disp: , Rfl:     POTASSIUM CHLORIDE PO, Take 10 mEq by mouth three (3) days a week., Disp: , Rfl:     cloNIDine (CATAPRES) 0.3 mg/24 hr, 1 Patch by TransDERmal route every seven (7) days. , Disp: , Rfl:     prazosin (MINIPRESS) 1 mg capsule, Take 1 mg by mouth two (2) times a day. TAKE AM OF SURGERY WITH SMALL SIP OF WATER   Indications: enlarged prostate, Disp: , Rfl:     finasteride (PROSCAR) 5 mg tablet, Take 5 mg by mouth daily. TAKE AM OF SURGERY WITH SMALL SIP OF WATER   Indications: BENIGN PROSTATIC HYPERTROPHY, Disp: , Rfl:    Date Last Reviewed:  8/9/2022      Number of Personal Factors/Comorbidities that affect the Plan of Care: 1-2: MODERATE COMPLEXITY   EXAMINATION:   Observation/Orthostatic Postural Assessment:          Patient shows increased obesity with more abdominal weight and increased lordosis. He shows increased small step length with short distances. He tends to show increased right pelvic rotation in standing and gait  Palpation:          Tightness along right paraspinals and into low back and pelvis. He shows tightness in lateral right hip and leg. Some noted atrophy in left glut compared to right side.  -Restricted and limited in hamstring mobility B with increased right restriction more than left  -Overall right hip motion appears good for artificial LIDIA.   -Right knee shows increased restrictions at patella and medial and lateral joint line with pain with testing due to arthritic condition  -Increased swelling in B LE and ankles with compression socks worn  ROM:          Patient shows improvement in hamstring mobility at -10 deg B from full  -Flexion in lumbar spine to 30% in standing with difficulty with balance  Strength:          Hip flexion at 4-/5  On right and 4-/5 on left  Right knee extension at 4+/5  Left knee extension at 4+/5  Right knee flexion at 4/5  Left at 4/5  right hip abduction 4/5  Right hip extension 4/5  Neurological Screen:        Sensation: Patient has peripheral neuropathy on the plantar surface of both feet  Nerve conduction did not reveal specific radiculopathy  Functional Mobility:         Gait/Ambulation:  Patient shows increased Trendelenburg pattern B with increased right pelvic rotation that maintains during gait with right LE lagging some with shortened stance phase B. Patient has increased knee hike due to neuropathy.          Transfers:  Patient shows ability to sit to stand with some aid from hands        Bed Mobility:  Patient has some difficulty with supine to sit with needs for some aid  Balance:          Poor for single leg stance, tandem stance and turning   Body Structures Involved:  Nerves  Thoracic Cage  Bones  Joints  Muscles  Ligaments Body Functions Affected:  Sensory/Pain  Neuromusculoskeletal  Movement Related Activities and Participation Affected:  General Tasks and Demands  Mobility  Self Care  Domestic Life  Interpersonal Interactions and Relationships   Number of elements (examined above) that affect the Plan of Care: 4+: HIGH COMPLEXITY   CLINICAL PRESENTATION:   Presentation: Stable and uncomplicated: LOW COMPLEXITY   CLINICAL DECISION MAKING:   Use of outcome tool(s) and clinical judgement create a POC that gives a: Clear prediction of patient's progress: LOW COMPLEXITY

## 2022-11-22 ENCOUNTER — HOSPITAL ENCOUNTER (OUTPATIENT)
Dept: PHYSICAL THERAPY | Age: 77
Setting detail: RECURRING SERIES
Discharge: HOME | End: 2022-11-25
Payer: MEDICARE

## 2022-11-22 PROCEDURE — 97140 MANUAL THERAPY 1/> REGIONS: CPT

## 2022-11-22 ASSESSMENT — PAIN SCALES - GENERAL: PAINLEVEL_OUTOF10: 4

## 2022-11-22 NOTE — PROGRESS NOTES
Mago Ballard  : 1945  Primary: Medicare Part [place] And B  Secondary: [address] @ [address]  Ste[address]ane [address] [address]  Phone: [phone]  Fax: [phone] No data recorded  No data recorded    PT Visit Info: Total # of Visits to Date: 71      OUTPATIENT PHYSICAL THERAPY:OP NOTE TYPE: Treatment Note 2022       Episode  }Appt Desk              Treatment Diagnosis:  Low back pain, M54.5  Difficulty walking, not elsewhere classified, R26.2  Pain in joint, right knee, M25.561  Medical/Referring Diagnosis:  Low back pain, unspecified [M54.50]  Referring Physician:  Diego Cai, *  MD Orders:  PT Eval and Treat   TREATMENT PLAN:  Effective Dates:22 TO 23 . Frequency/Duration: 1 time every other week for 12 weeks  Date of Onset:  No data recorded   Allergies:   Patient has no allergy information on record. Other medication and Statins-hmg-coa reductase inhibitors   Restrictions/Precautions:  No data recordedNo data recorded   Interventions Planned (Treatment may consist of any combination of the following):    Balance Exercise  Bed Mobility  Electrical Stimulation  Gait Training  Heat  Manual Therapy  Neuromuscular Re-education/Strengthening  Range of Motion (ROM)  Therapeutic Activites  Therapeutic Exercise/Strengthening  No data recorded   Subjective Comments:Patient reports that he laid low due to eye surgery, but still   Initial:}    4/10Post Session:       2/10  Medications Last Reviewed:  2022  Updated Objective Findings: Glute medius weakness 3-/5  Treatment   THERAPEUTIC EXERCISE: ( minutes):  Exercises per grid below to improve mobility, strength and coordination. Required minimal visual, verbal, manual and tactile cues to promote proper body alignment, promote proper body posture, promote proper body mechanics and promote proper body breathing techniques.   Progressed resistance and repetitions as indicated. Date:  11/8/22   Activity/Exercise Parameters   Core exercises Side lie hip flexion isometric   Education on sit to stand  X 10 with good weight shift   Knee extension (home) Continued with blue band at home   Weight shift  R LE planted with follow through and work on keeping right side long x 1 min   Balance (not today) Heel to toe raise x 20   Hip abduction Supine with blue band and work on control x 30    Gait function  step through with pressure onto his R LE x 3 min         MANUAL THERAPY: (40 minutes): Joint mobilization and Soft tissue mobilization was utilized and necessary because of the patient's restricted joint motion and restricted motion of soft tissue. (Used abbreviations: SF - Superficial Fascia; BC - Bony contours; MP - muscle play; IASTM - instrument-assisted soft tissue mobilization; MET - muscle energy technique; a/p - anterior to posterior; p/a - posterior to anterior; Proprioceptive neuromuscular Facilitation-PNF) Manual treatment to improve soft tissue/joint mobility deficits, tissue integrity issues, trigger points and/or pain.     -Side lie mobilization to soft tissue of groove of the spine   -Supine mobilization of right hamstring, lateral IT band, rectus femoris, adductors  -Hip ER stretch insupine  -PNF pattern to facilitate core into anterior elevation and posterior depression with working through end range holds and isometric reversals with the pelvis and at long full leg function  -Functional resistance through posterior depression and then hip abduction facilitation.  -Anterior elevation resistance to pelvis for core training  -Educated and positioned in standing for posture to take pressure off his spine  -     GAIT TRAINING: (0 min) in hallway with work on use of single point cane for balance and support. Trying to focus on control and stability with push off through R LE and taking some pressure off midstance with the SPC.         Treatment/Session Summary: Treatment Assessment:Patient shows improvement in pelvic function. We are still working on core function and training to help with his endurance     Communication/Consultation:  None today  Equipment provided today:  None  Recommendations/Intent for next treatment session: Next visit will focus on Right hip strength.     Total Treatment Billable Duration:  45 minutes   Time In: 1400  Time Out: [facility], PT       Charge Capture  }Post Session Pain  MedBridge Portal  MD Guidelines  Scanned Media  Benefits  MyChart    Future Appointments   Date Time Provider Mario Leone   12/6/2022  2:00 PM Lucho Sykes, PT [facility]   12/20/2022  2:00 PM Lucho Sykes, PT [facility]

## 2022-12-06 ENCOUNTER — HOSPITAL ENCOUNTER (OUTPATIENT)
Dept: PHYSICAL THERAPY | Age: 77
Setting detail: RECURRING SERIES
Discharge: HOME | End: 2022-12-09
Payer: MEDICARE

## 2022-12-06 PROCEDURE — 97140 MANUAL THERAPY 1/> REGIONS: CPT

## 2022-12-06 ASSESSMENT — PAIN SCALES - GENERAL: PAINLEVEL_OUTOF10: 3

## 2022-12-06 NOTE — PROGRESS NOTES
Lauren Burt  : 1945  Primary: Medicare Part [place] B  Secondary: [address] @ [address]  Stephane Wheatley [address] [address]  Phone: [phone]  Fax: [phone] No data recorded  No data recorded    PT Visit Info: Total # of Visits to Date: 71      OUTPATIENT PHYSICAL THERAPY:OP NOTE TYPE: Treatment Note 2022       Episode  }Appt Desk              Treatment Diagnosis:  Low back pain, M54.5  Difficulty walking, not elsewhere classified, R26.2  Pain in joint, right knee, M25.561  Medical/Referring Diagnosis:  Low back pain, unspecified [M54.50]  Referring Physician:  Marcin Gallardo, *  MD Orders:  PT Eval and Treat   TREATMENT PLAN:  Effective Dates:22 TO 23 . Frequency/Duration: 1 time every other week for 12 weeks  Date of Onset:  No data recorded   Allergies:   Patient has no allergy information on record. Other medication and Statins-hmg-coa reductase inhibitors   Restrictions/Precautions:  No data recordedNo data recorded   Interventions Planned (Treatment may consist of any combination of the following):    Balance Exercise  Bed Mobility  Electrical Stimulation  Gait Training  Heat  Manual Therapy  Neuromuscular Re-education/Strengthening  Range of Motion (ROM)  Therapeutic Activites  Therapeutic Exercise/Strengthening  No data recorded   Subjective Comments:Patient reports that he feels pretty achy today and it might be due to weather. He still feels the spot in the right lower back  Initial:}    3/10Post Session:       1/10  Medications Last Reviewed:  2022  Updated Objective Findings: Glute medius weakness 3-/  Treatment   THERAPEUTIC EXERCISE: ( minutes):  Exercises per grid below to improve mobility, strength and coordination.   Required minimal visual, verbal, manual and tactile cues to promote proper body alignment, promote proper body posture, promote proper body mechanics and promote proper body breathing techniques. Progressed resistance and repetitions as indicated. Date:  11/8/22   Activity/Exercise Parameters   Core exercises Side lie hip flexion isometric   Education on sit to stand  X 10 with good weight shift   Knee extension (home) Continued with blue band at home   Weight shift  R LE planted with follow through and work on keeping right side long x 1 min   Balance (not today) Heel to toe raise x 20   Hip abduction Supine with blue band and work on control x 30    Gait function  step through with pressure onto his R LE x 3 min         MANUAL THERAPY: (45 minutes): Joint mobilization and Soft tissue mobilization was utilized and necessary because of the patient's restricted joint motion and restricted motion of soft tissue.  (Used abbreviations: SF - Superficial Fascia; BC - Bony contours; MP - muscle play; IASTM - instrument-assisted soft tissue mobilization; MET - muscle energy technique; a/p - anterior to posterior; p/a - posterior to anterior; Proprioceptive neuromuscular Facilitation-PNF) Manual treatment to improve soft tissue/joint mobility deficits, tissue integrity issues, trigger points and/or pain.     -Side lie mobilization to soft tissue of groove of the spine   -Supine mobilization of right hamstring, lateral IT band, rectus femoris, adductors  -Hip ER stretch insupine  -PNF pattern to facilitate core into anterior elevation and posterior depression with working through end range holds and isometric reversals with the pelvis and at long full leg function  -Functional resistance through posterior depression and then hip abduction facilitation.  -Anterior elevation resistance to pelvis for core training  -Educated and positioned in standing for posture to take pressure off his spine    -Instrument assisted soft tissue mobilization to right lumbar spine with point stim applied for pain reduction and facilitation of muscles (5 min not charged for)  Dry Needles Used: 5   Dry Needles Removed: 5          GAIT TRAINING: (0 min) in hallway with work on use of single point cane for balance and support. Trying to focus on control and stability with push off through R LE and taking some pressure off midstance with the SPC. Treatment/Session Summary:    Treatment Assessment:Patient shows improvement in pelvic function. Continue to try dry needling next time if patient has good response this time after treatment     Communication/Consultation:  None today  Equipment provided today:  None  Recommendations/Intent for next treatment session: Next visit will focus on Right hip strength.     Total Treatment Billable Duration:  45 minutes   Time In: 1400  Time Out: Mahendra Leach PT       Charge Capture  }Post Session Pain  MedBridge Portal  MD Guidelines  Scanned Media  Benefits  MyChart    Future Appointments   Date Time Provider Mario Leone   12/20/2022  2:00 PM Cici Velazquez PT [facility]

## 2022-12-20 ENCOUNTER — HOSPITAL ENCOUNTER (OUTPATIENT)
Dept: PHYSICAL THERAPY | Age: 77
Setting detail: RECURRING SERIES
Discharge: HOME | End: 2022-12-23
Payer: MEDICARE

## 2022-12-20 PROCEDURE — 97110 THERAPEUTIC EXERCISES: CPT

## 2022-12-20 PROCEDURE — 97140 MANUAL THERAPY 1/> REGIONS: CPT

## 2022-12-20 ASSESSMENT — PAIN SCALES - GENERAL: PAINLEVEL_OUTOF10: 4

## 2022-12-20 NOTE — PROGRESS NOTES
Oneil Rasmussen  : 1945  Primary: Medicare Part A And B  Secondary: [address] @ [address]  Stephane [address] [address]  Phone: [phone]  Fax: [phone] No data recorded  No data recorded    PT Visit Info: Total # of Visits to Date: 71      OUTPATIENT PHYSICAL THERAPY:OP NOTE TYPE: Treatment Note 2022       Episode  }Appt Desk              Treatment Diagnosis:  Low back pain, M54.5  Difficulty walking, not elsewhere classified, R26.2  Pain in joint, right knee, M25.561  Medical/Referring Diagnosis:  Low back pain, unspecified [M54.50]  Referring Physician:  Marianne Drake, *  MD Orders:  PT Eval and Treat   TREATMENT PLAN:  Effective Dates:22 TO 23 . Frequency/Duration: 1 time every other week for 12 weeks  Date of Onset:  No data recorded   Allergies:   Patient has no allergy information on record. Other medication and Statins-hmg-coa reductase inhibitors   Restrictions/Precautions:  No data recordedNo data recorded   Interventions Planned (Treatment may consist of any combination of the following):    Balance Exercise  Bed Mobility  Electrical Stimulation  Gait Training  Heat  Manual Therapy  Neuromuscular Re-education/Strengthening  Range of Motion (ROM)  Therapeutic Activites  Therapeutic Exercise/Strengthening  No data recorded   Subjective Comments:Patient reports doing pretty good for the most part and feeling better after the needling last session  Initial:}    4/10Post Session:       1/10  Medications Last Reviewed:  2022  Updated Objective Findings: Glute medius weakness 3-/5  Treatment   THERAPEUTIC EXERCISE: (10 minutes):  Exercises per grid below to improve mobility, strength and coordination. Required minimal visual, verbal, manual and tactile cues to promote proper body alignment, promote proper body posture, promote proper body mechanics and promote proper body breathing techniques. Progressed resistance and repetitions as indicated. Date:  12/20/22   Activity/Exercise Parameters   Core exercises Side lie hip flexion isometric 3 x 20 sec hold   Education on sit to stand  X 10 with good weight shift   Knee extension (home) Continued with blue band at home   Weight shift  R LE planted with follow through and work on keeping right side long x 1 min   Balance  Heel to toe raise x 20  Standing slow march x 2 min   Hip abduction Not today    Gait function  step through with pressure onto his R LE x 3 min         MANUAL THERAPY: (48 minutes): Joint mobilization and Soft tissue mobilization was utilized and necessary because of the patient's restricted joint motion and restricted motion of soft tissue.  (Used abbreviations: SF - Superficial Fascia; BC - Bony contours; MP - muscle play; IASTM - instrument-assisted soft tissue mobilization; MET - muscle energy technique; a/p - anterior to posterior; p/a - posterior to anterior; Proprioceptive neuromuscular Facilitation-PNF) Manual treatment to improve soft tissue/joint mobility deficits, tissue integrity issues, trigger points and/or pain.     -Side lie mobilization to soft tissue of groove of the spine   -Supine mobilization of right hamstring, lateral IT band, rectus femoris, adductors  -Hip ER stretch insupine  -PNF pattern to facilitate core into anterior elevation and posterior depression with working through end range holds and isometric reversals with the pelvis and at long full leg function  -Functional resistance through posterior depression and then hip abduction facilitation.  -Anterior elevation resistance to pelvis for core training  -Educated and positioned in standing for posture to take pressure off his spine    -Instrument assisted soft tissue mobilization to right lumbar spine with point stim applied for pain reduction and facilitation of muscles (5 min not charged for)  Dry Needles Used: 5   Dry Needles Removed: 5          GAIT TRAINING: (0 min) in hallway with work on use of single point cane for balance and support. Trying to focus on control and stability with push off through R LE and taking some pressure off midstance with the SPC. Treatment/Session Summary:    Treatment Assessment:Patient shows improvement in pelvic function. He responded well to needling, so we performed it again and continues to work on pelvic facilitation. Communication/Consultation:  None today  Equipment provided today:  None  Recommendations/Intent for next treatment session: Next visit will focus on Right hip strength.     Total Treatment Billable Duration:  58 minutes   Time In: 1400  Time Out: [facility], PT       Charge Capture  }Post Session Pain  MedBridge Portal  MD Guidelines  Scanned Media  Benefits  MyChart    Future Appointments   Date Time Provider Mario Leone   1/9/2023  2:00 PM Hoa Avery, PT [facility] CARYN   1/23/2023  2:00 PM Hoa Avery, PT [facility] MERRILLO

## 2023-01-09 ENCOUNTER — HOSPITAL ENCOUNTER (OUTPATIENT)
Dept: PHYSICAL THERAPY | Age: 78
Setting detail: RECURRING SERIES
Discharge: HOME | End: 2023-01-12
Payer: MEDICARE

## 2023-01-09 PROCEDURE — 97140 MANUAL THERAPY 1/> REGIONS: CPT

## 2023-01-09 PROCEDURE — 97110 THERAPEUTIC EXERCISES: CPT

## 2023-01-09 ASSESSMENT — PAIN SCALES - GENERAL: PAINLEVEL_OUTOF10: 4

## 2023-01-09 NOTE — PROGRESS NOTES
Libby Barthel  : 1945  Primary: Medicare Part A [place] B  Secondary: [address] @ [address]  Stephane [address] [address]  Phone: [phone]  Fax: [phone] No data recorded  No data recorded    PT Visit Info: Total # of Visits to Date: 71      OUTPATIENT PHYSICAL THERAPY:OP NOTE TYPE: Treatment Note 2023       Episode  }Appt Desk              Treatment Diagnosis:  Low back pain, M54.5  Difficulty walking, not elsewhere classified, R26.2  Pain in joint, right knee, M25.561  Medical/Referring Diagnosis:  Low back pain, unspecified [M54.50]  Referring Physician:  Jaja Ji., *  MD Orders:  PT Eval and Treat   TREATMENT PLAN:  Effective Dates:22 TO 23 . Frequency/Duration: 1 time every other week for 12 weeks  Date of Onset:  No data recorded   Allergies:   Patient has no allergy information on record. Other medication and Statins-hmg-coa reductase inhibitors   Restrictions/Precautions:  No data recordedNo data recorded   Interventions Planned (Treatment may consist of any combination of the following):    Balance Exercise  Bed Mobility  Electrical Stimulation  Gait Training  Heat  Manual Therapy  Neuromuscular Re-education/Strengthening  Range of Motion (ROM)  Therapeutic Activites  Therapeutic Exercise/Strengthening  No data recorded   Subjective Comments:Patient reports doing pretty good with his exercises and keeping going  Initial:}    4/10Post Session:       1/10  Medications Last Reviewed:  2023  Updated Objective Findings: Glute medius weakness 3-/5  Treatment   THERAPEUTIC EXERCISE: (10 minutes):  Exercises per grid below to improve mobility, strength and coordination. Required minimal visual, verbal, manual and tactile cues to promote proper body alignment, promote proper body posture, promote proper body mechanics and promote proper body breathing techniques.   Progressed resistance and repetitions as indicated. Date:  1/9/2023     Activity/Exercise Parameters   Core exercises Push down into theraball 5 x 5 breath holds  Marches x 3 min   Education on sit to stand  X 10 with good weight shift   Knee extension (home) Continued with blue band at home   Weight shift  R LE planted with follow through and work on keeping right side long x 1 min   Balance (not today) Heel to toe raise x 20  Standing slow march x 2 min   Hip abduction Not today    Gait function  step through with pressure onto his R LE x 3 min         MANUAL THERAPY: (35 minutes): Joint mobilization and Soft tissue mobilization was utilized and necessary because of the patient's restricted joint motion and restricted motion of soft tissue.  (Used abbreviations: SF - Superficial Fascia; BC - Bony contours; MP - muscle play; IASTM - instrument-assisted soft tissue mobilization; MET - muscle energy technique; a/p - anterior to posterior; p/a - posterior to anterior; Proprioceptive neuromuscular Facilitation-PNF) Manual treatment to improve soft tissue/joint mobility deficits, tissue integrity issues, trigger points and/or pain.     -Side lie mobilization to soft tissue of groove of the spine   -Supine mobilization of right hamstring, lateral IT band, rectus femoris, adductors  -Hip ER stretch insupine  -PNF pattern to facilitate core into anterior elevation and posterior depression with working through end range holds and isometric reversals with the pelvis and at long full leg function  -Functional resistance through posterior depression and then hip abduction facilitation.  -Anterior elevation resistance to pelvis for core training  -Educated and positioned in standing for posture to take pressure off his spine    -Instrument assisted soft tissue mobilization to right lumbar spine with point stim applied for pain reduction and facilitation of muscles (5 min not charged for)  Dry Needles Used: 5   Dry Needles Removed: 5          GAIT TRAINING: (0 min) in hallway with work on use of single point cane for balance and support. Trying to focus on control and stability with push off through R LE and taking some pressure off midstance with the SPC. Treatment/Session Summary:    Treatment Assessment:Patient shows improvement in core control. Continue to work on low back pain and core control at the hips     Communication/Consultation:  None today  Equipment provided today:  None  Recommendations/Intent for next treatment session: Next visit will focus on Right hip strength.     Total Treatment Billable Duration:  55 minutes   Time In: 5403  Time Out: [address], PT       Charge Capture  }Post Session Pain  MedBridge Portal  MD Guidelines  Scanned Media  Benefits  MyChart    Future Appointments   Date Time Provider Mario Leone   1/23/2023  2:00 PM [facility]

## 2023-01-23 ENCOUNTER — HOSPITAL ENCOUNTER (OUTPATIENT)
Dept: PHYSICAL THERAPY | Age: 78
Setting detail: RECURRING SERIES
Discharge: HOME | End: 2023-01-26
Payer: MEDICARE

## 2023-01-23 PROCEDURE — 97110 THERAPEUTIC EXERCISES: CPT

## 2023-01-23 PROCEDURE — 97140 MANUAL THERAPY 1/> REGIONS: CPT

## 2023-01-23 ASSESSMENT — PAIN SCALES - GENERAL: PAINLEVEL_OUTOF10: 4

## 2023-01-23 NOTE — PROGRESS NOTES
Jordan Hernández  : 1945  Primary: Medicare Part A And B  Secondary: [address] @ [address]  Stephane Wheatley [address] [address]  Phone: [phone]  Fax: [phone] No data recorded  No data recorded    PT Visit Info: Total # of Visits to Date: 68      OUTPATIENT PHYSICAL THERAPY:OP NOTE TYPE: Treatment Note 2023       Episode  }Appt Desk              Treatment Diagnosis:  Low back pain, M54.5  Difficulty walking, not elsewhere classified, R26.2  Pain in joint, right knee, M25.561  Medical/Referring Diagnosis:  Low back pain, unspecified [M54.50]  Referring Physician:  Indira Matthews, *  MD Orders:  PT Eval and Treat   TREATMENT PLAN:  Effective Dates:22 TO 23 . Frequency/Duration: 1 time every other week for 12 weeks  Date of Onset:  No data recorded   Allergies:   Patient has no allergy information on record. Other medication and Statins-hmg-coa reductase inhibitors   Restrictions/Precautions:  No data recordedNo data recorded   Interventions Planned (Treatment may consist of any combination of the following):    Balance Exercise  Bed Mobility  Electrical Stimulation  Gait Training  Heat  Manual Therapy  Neuromuscular Re-education/Strengthening  Range of Motion (ROM)  Therapeutic Activites  Therapeutic Exercise/Strengthening  No data recorded   Subjective Comments:Patient reports not getting as much don the other week due to having a cold, but getting back to it this last week  Initial:}    4/10Post Session:       1/10  Medications Last Reviewed:  2023  Updated Objective Findings: Glute medius weakness 3-/5  Treatment   THERAPEUTIC EXERCISE: (10 minutes):  Exercises per grid below to improve mobility, strength and coordination.   Required minimal visual, verbal, manual and tactile cues to promote proper body alignment, promote proper body posture, promote proper body mechanics and promote proper body breathing techniques. Progressed resistance and repetitions as indicated. Date:  1/23/2023     Activity/Exercise Parameters   Core exercises Push down into theraball 5 x 5 breath holds  Marches x 3 min  Dead bug x 2 min   Education on sit to stand  X 10 with good weight shift   Knee extension (home) Continued with blue band at home   Weight shift  R LE planted with follow through and work on keeping right side long x 1 min   Balance (not today) Heel to toe raise x 20  Standing slow march x 2 min   Hip abduction Not today    Gait function (not today)  step through with pressure onto his R LE x 3 min         MANUAL THERAPY: (35 minutes): Joint mobilization and Soft tissue mobilization was utilized and necessary because of the patient's restricted joint motion and restricted motion of soft tissue.  (Used abbreviations: SF - Superficial Fascia; BC - Bony contours; MP - muscle play; IASTM - instrument-assisted soft tissue mobilization; MET - muscle energy technique; a/p - anterior to posterior; p/a - posterior to anterior; Proprioceptive neuromuscular Facilitation-PNF) Manual treatment to improve soft tissue/joint mobility deficits, tissue integrity issues, trigger points and/or pain.     -Side lie mobilization to soft tissue of groove of the spine   -Supine mobilization of right hamstring, lateral IT band, rectus femoris, adductors  -Hip ER stretch insupine  -PNF pattern to facilitate core into anterior elevation and posterior depression with working through end range holds and isometric reversals with the pelvis and at long full leg function  -Functional resistance through posterior depression and then hip abduction facilitation.  -Anterior elevation resistance to pelvis for core training  -Educated and positioned in standing for posture to take pressure off his spine    -Instrument assisted soft tissue mobilization to right lumbar spine with point stim applied for pain reduction and facilitation of muscles (5 min not charged for)  Dry Needles Used: 5   Dry Needles Removed: 5          GAIT TRAINING: (0 min) in hallway with work on use of single point cane for balance and support. Trying to focus on control and stability with push off through R LE and taking some pressure off midstance with the SPC. Treatment/Session Summary:    Treatment Assessment:Patient shows improvement in core control with increased challenges today. Communication/Consultation:  None today  Equipment provided today:  None  Recommendations/Intent for next treatment session: Next visit will focus on Right hip strength. Total Treatment Billable Duration:  55 minutes   Time In: 1400  Time Out: [address], PT       Charge Capture  }Post Session Pain  MedBridge Portal  MD Guidelines  Scanned Media  Benefits  MyChart    No future appointments.

## 2023-02-07 ENCOUNTER — HOSPITAL ENCOUNTER (OUTPATIENT)
Dept: PHYSICAL THERAPY | Age: 78
Setting detail: RECURRING SERIES
Discharge: HOME | End: 2023-02-10
Payer: MEDICARE

## 2023-02-07 PROCEDURE — 97140 MANUAL THERAPY 1/> REGIONS: CPT

## 2023-02-07 ASSESSMENT — PAIN SCALES - GENERAL: PAINLEVEL_OUTOF10: 3

## 2023-02-07 NOTE — THERAPY RECERTIFICATION
Geno Forman  : 1945  [place]  Secondary: [employer] [address] [facility]  at [address] [place] [address]  [phone]   [phone]           OUTPATIENT PHYSICAL THERAPY:Recertification 54   ICD-10: Treatment Diagnosis: Low back pain, M54.5  Difficulty walking, not elsewhere classified, R26.2  Pain in joint, right knee, M25.561  Precautions/Allergies: Other medication and Statins-hmg-coa reductase inhibitors   TREATMENT PLAN:  Effective Dates:23-23. Frequency/Duration: 1 time every other week for 12 weeks MEDICAL/REFERRING DIAGNOSIS:  Low back pain [M54.50]   DATE OF ONSET: Chronic  REFERRING PHYSICIAN: Narayan Hernández MD MD Orders: Evaluate and Treat  Return MD Appointment: not scheduled   Geno Forman has been seen for 76 visits from 20 to 2023   for low back, right knee and R LE. Patient has performed therapeutic exercises, activities, and had manual therapy to increased strength, ROM and function. Patient has also used modalities for pain control in order to increase function. Patient has shown an increase in function per the LEFS with scores of 54/80. He continues to show issues with his gait function and we are steadily working to increase his hip strength and stability. His knee on the right side still gives him difficulty as well. He reports that the treatments we provide give him several days of relief and then start to wear off for his walking. The chronic nature of his spine continues to benefit from therapy interventions to keep him from losing function. He is consistently going to the gym 4-5 times a week to help his progress. Patient has progressed well toward their goals and will benefit from continuing skilled PT in order to address their impairments.   Gallo Zaman, PT, DPT, OCS, CFMT           INITIAL ASSESSMENT:  Mr. Shaista Valderrama presents with chronic R LE weakness and pain along with peripheral neuropathy affecting his safety with gait and function. He shows limitation in walking and unsafe gait function. He shows continued deconditioning in the LE's. He is a good candidate for skilled PT in order to address listed impairments affecting his function. Lv Castillo, PT, DPT, OCS, CFMT      PROBLEM LIST (Impacting functional limitations):  Decreased Strength  Decreased ADL/Functional Activities  Decreased Transfer Abilities  Decreased Ambulation Ability/Technique  Decreased Balance  Increased Pain  Decreased Activity Tolerance  Decreased Flexibility/Joint Mobility INTERVENTIONS PLANNED: (Treatment may consist of any combination of the following)  Balance Exercise  Bed Mobility  Electrical Stimulation  Gait Training  Heat  Manual Therapy  Neuromuscular Re-education/Strengthening  Range of Motion (ROM)  Therapeutic Activites  Therapeutic Exercise/Strengthening      GOALS: (Goals have been discussed and agreed upon with patient.)     Discharge Goals: Time Frame: 12 weeks  Patient will show a greater than 10 point increase on the LEFS in order to show an increase in function (MET-8/9/22)  Patient will report joining a pool and walking in the water greater than 2 times a week in order to progress function (ongoing)  Patient will be independent in balance exercises in order to decrease fall risk (ongoing)  Patient will report going to the gym consistently three times a week for two months. (MET-2/16/22)  NEW GOALS:  Patient will perform 10 sit to stands without UE use in order to progress strength and function (ongoing)     OUTCOME MEASURE:   Tool Used: Lower Extremity Functional Scale (LEFS)  Score:  Initial: 37/80 Most Recent: 54/80 (Date: 2/7/23)   Interpretation of Score: 20 questions each scored on a 5 point scale with 0 representing \"extreme difficulty or unable to perform\" and 4 representing \"no difficulty\". The lower the score, the greater the functional disability. 80/80 represents no disability. Minimal detectable change is 9 points. MEDICAL NECESSITY:   Patient is expected to demonstrate progress in strength, range of motion, balance, coordination and functional technique to improve functionalmobility and safety with walking. Patient demonstrates good rehab potential due to higher previous functional level. Skilled intervention continues to be required due to decreased mobility and increased risk of falls. REASON FOR SERVICES/OTHER COMMENTS:  Patient continues to require skilled intervention due to decreased mobility. Total Duration:  PT Patient Time In/Time Out  Time In: 1500  Time Out: 1600     Rehabilitation Potential For Stated Goals: Excellent  Regarding James Potocki's therapy, I certify that the treatment plan above will be carried out by a therapist or under their direction. Thank you for this referral,  Javier Yap, PT     Referring Physician Signature: Joaquín Tucker MD                           PAIN/SUBJECTIVE:   Initial: Pain Intensity 1: 4 /10 (more in leg) Post Session:  1/10   HISTORY:   History of Injury/Illness (Reason for Referral):  Patient has had a chronic history of R LE weakness and pain since he first had his hip replacement about 5 years ago. He has since had issues with spinal stenosis, disc herniation, right knee OA and has found out recently that he has peripheral neuropathy in both feet that affect his balance. He had a fall back in February/March that was concerning for him due to the leg giving out. He also had a hospitalization due to abnormal heartbeat. He has finally stabilized with the heart. He did have an injection in his lower back (Dr. Angeles Rapp) in the summer due to increasing left LE weakness and pain that has helped a lot. He is not comfortable going to the gym like he use to due to Avani and has become deconditioned. He reports doing some things at home, but still just feels like he is starting from square one. His goals are to get back to exercises and daily life without difficulty and with less fear of falling. Past Medical History/Comorbidities:   Mr. Dariel Doherty  has a past medical history of Anxiety, Edema of both legs, Enlarged prostate, Gout, Hip pain, History of elevated glucose, History of seasonal allergies, Hypercholesterolemia, Hypertension, Keratoacanthoma, S/P total hip arthroplasty (4/6/2015), Skin neoplasm, and Spinal stenosis. He has no past medical history of Aneurysm ([address] Arrhythmia, Arthritis, Asthma, Autoimmune disease ([address] CAD (coronary artery disease), Cancer ([address] Chronic kidney disease, Chronic obstructive pulmonary disease ([address] Chronic pain, Coagulation disorder ([address] Difficult intubation, GERD (gastroesophageal reflux disease), Heart failure ([address] Ill-defined condition, Liver disease, Malignant hyperthermia due to anesthesia, Morbid obesity ([address] Nausea & vomiting, Pseudocholinesterase deficiency, Psychiatric disorder, PUD (peptic ulcer disease), Seizures ([address] Stroke ([address] Thromboembolus ([address] Thyroid disease, Unspecified adverse effect of anesthesia, or Unspecified sleep apnea. Mr. Dariel Doherty  has a past surgical history that includes hx tonsillectomy (as child) and hx wisdom teeth extraction (as teenager).   Social History/Living Environment:       Social History            Socioeconomic History    Marital status:        Spouse name: Not on file    Number of children: Not on file    Years of education: Not on file    Highest education level: Not on file   Occupational History    Not on file   Tobacco Use    Smoking status: Never Smoker    Smokeless tobacco: Not on file   Substance and Sexual Activity    Alcohol use: No    Drug use: Not on file    Sexual activity: Not on file   Other Topics Concern    Not on file   Social History Narrative    Not on file      Social Determinants of Health          Financial Resource Strain:     Difficulty of Paying Living Expenses: Not on file   Food Insecurity:     Worried About [facility] in the Last Year: Not on file    [place] of Food in the Last Year: Not on file   Transportation Needs:     Lack of Transportation (Medical): Not on file    Lack of Transportation (Non-Medical): Not on file   Physical Activity:     Days of Exercise per Week: Not on file    Minutes of Exercise per Session: Not on file   Stress:     Feeling of Stress : Not on file   Social Connections:     Frequency of Communication with Friends and Family: Not on file    Frequency of Social Gatherings with Friends and Family: Not on file    Attends [employer] Services: Not on file    Active Member of Clubs or Organizations: Not on file    Attends Club or Organization Meetings: Not on file    Marital Status: Not on file   Intimate Partner Violence:     Fear of Current or Ex-Partner: Not on file    Emotionally Abused: Not on file    Physically Abused: Not on file    Sexually Abused: Not on file   Housing Stability:     Unable to Pay for Housing in the Last Year: Not on file    Number of [place] in the Last Year: Not on file    Unstable Housing in the Last Year: Not on file         Prior Level of Function/Work/Activity:  Independent, retired  Dominant Side:         RIGHT   Ambulatory/Rehab [facility] Factors:       (1)  Gender [Male]       (5)  History of Recent Falls [w/in 3 months]       (1)  Orthostatic drop in BP Ability to Rise from Chair:       (1)  Pushes up, successful in one attempt   Falls Prevention Plan:       No modifications necessary   Total: (5 or greater = High Risk): 8   ©2010 [facility] [address] [place] [address] States Patent #[id number].  Federal Law prohibits the replication, distribution or use without written permission from [facility]   Current Medications:         Current Outpatient Medications:     ergocalciferol (ERGOCALCIFEROL) 1,250 mcg (50,000 unit) capsule, Take 50,000 Units by mouth every seven (7) days. , Disp: , Rfl:     furosemide (LASIX) 20 mg tablet, Take 20 mg by mouth daily. , Disp: , Rfl:     lisinopriL (PRINIVIL, ZESTRIL) 20 mg tablet, Take 20 mg by mouth daily. , Disp: , Rfl:     celecoxib (CELEBREX) 200 mg capsule, Take 200 mg by mouth daily. , Disp: , Rfl:     allopurinoL (ZYLOPRIM) 300 mg tablet, Take 300 mg by mouth daily. , Disp: , Rfl:     spironolactone (ALDACTONE) 25 mg tablet, Take 25 mg by mouth daily. , Disp: , Rfl:     busPIRone (BUSPAR) 7.5 mg tablet, Take 7.5 mg by mouth two (2) times a day., Disp: , Rfl:     hydrOXYzine HCL (ATARAX) 25 mg tablet, Take 25 mg by mouth two (2) times a day., Disp: , Rfl:     POTASSIUM CHLORIDE PO, Take 10 mEq by mouth three (3) days a week., Disp: , Rfl:     cloNIDine (CATAPRES) 0.3 mg/24 hr, 1 Patch by TransDERmal route every seven (7) days. , Disp: , Rfl:     prazosin (MINIPRESS) 1 mg capsule, Take 1 mg by mouth two (2) times a day. TAKE AM OF SURGERY WITH SMALL SIP OF WATER   Indications: enlarged prostate, Disp: , Rfl:     finasteride (PROSCAR) 5 mg tablet, Take 5 mg by mouth daily. TAKE AM OF SURGERY WITH SMALL SIP OF WATER   Indications: BENIGN PROSTATIC HYPERTROPHY, Disp: , Rfl:    Date Last Reviewed:  8/9/2022      Number of Personal Factors/Comorbidities that affect the Plan of Care: 1-2: MODERATE COMPLEXITY   EXAMINATION:   Observation/Orthostatic Postural Assessment:          Patient shows increased obesity with more abdominal weight and increased lordosis. He shows increased small step length with short distances. He tends to show increased right pelvic rotation in standing and gait  Palpation:          Tightness along right paraspinals and into low back and pelvis. He shows tightness in lateral right hip and leg.   Some noted atrophy in left glut compared to right side.  -Restricted and limited in hamstring mobility B with increased right restriction more than left  -Overall right hip motion appears good for artificial LIDIA.  -Right knee shows increased restrictions at patella and medial and lateral joint line with pain with testing due to arthritic condition    ROM:          Patient shows improvement in hamstring mobility at -10 deg B from full  -Flexion in lumbar spine to 30% in standing with difficulty with balance  Strength:          Hip flexion at 4-/5  On right and 4-/5 on left  Right knee extension at 4+/5  Left knee extension at 4+/5  Right knee flexion at 4/5  Left at 4/5  right hip abduction 4/5-improved! Right hip extension 4/5  Neurological Screen:        Sensation: Patient has peripheral neuropathy on the plantar surface of both feet  Nerve conduction did not reveal specific radiculopathy  Functional Mobility:         Gait/Ambulation:  Patient shows increased Trendelenburg pattern B with increased right pelvic rotation that maintains during gait with right LE lagging some with shortened stance phase B. Patient has increased knee hike due to neuropathy.          Transfers:  Patient shows ability to sit to stand with some aid from hands        Bed Mobility:  Patient has some difficulty with supine to sit with needs for some aid  Balance:          Poor for single leg stance, tandem stance and turning with more difficulty on the right   Body Structures Involved:  Nerves  Thoracic Cage  Bones  Joints  Muscles  Ligaments Body Functions Affected:  Sensory/Pain  Neuromusculoskeletal  Movement Related Activities and Participation Affected:  General Tasks and Demands  Mobility  Self Care  Domestic Life  Interpersonal Interactions and Relationships   Number of elements (examined above) that affect the Plan of Care: 4+: HIGH COMPLEXITY   CLINICAL PRESENTATION:   Presentation: Stable and uncomplicated: LOW COMPLEXITY   CLINICAL DECISION MAKING:   Use of outcome tool(s) and clinical judgement create a POC that gives a: Clear prediction of patient's progress: LOW COMPLEXITY

## 2023-02-07 NOTE — PROGRESS NOTES
Janice Grade  : 1945  Primary: Medicare Part A And B  Secondary: [address] @ [facility] [address]  [address] [place]  [place] F[place] [address]  Phone: [phone]  Fax: [phone] No data recorded  No data recorded    PT Visit Info: Total # of Visits to Date: 68      OUTPATIENT PHYSICAL THERAPY:OP NOTE TYPE: Treatment Note 2023       Episode  }Appt Desk              Treatment Diagnosis:  Low back pain, M54.5  Difficulty walking, not elsewhere classified, R26.2  Pain in joint, right knee, M25.561  Medical/Referring Diagnosis:  Low back pain, unspecified [M54.50]  Referring Physician:  Barry Jones, *  MD Orders:  PT Eval and Treat   TREATMENT PLAN:  Effective Dates:23-23. Frequency/Duration: 1 time every other week for 12 weeks  Date of Onset:  No data recorded   Allergies:   Patient has no allergy information on record. Other medication and Statins-hmg-coa reductase inhibitors   Restrictions/Precautions:  No data recordedNo data recorded   Interventions Planned (Treatment may consist of any combination of the following):    Balance Exercise  Bed Mobility  Electrical Stimulation  Gait Training  Heat  Manual Therapy  Neuromuscular Re-education/Strengthening  Range of Motion (ROM)  Therapeutic Activites  Therapeutic Exercise/Strengthening  No data recorded   Subjective Comments:Patient reports being back in his routine and getting in the gym with some of the same issues. He is going to have another MRI per his doctor to check the back again  Initial:}    3/10Post Session:       2/10  Medications Last Reviewed:  2023  Updated Objective Findings: Glute medius weakness 3-/  Treatment   THERAPEUTIC EXERCISE: (5 minutes):  Exercises per grid below to improve mobility, strength and coordination.   Required minimal visual, verbal, manual and tactile cues to promote proper body alignment, promote proper body posture, promote proper body mechanics and promote proper body breathing techniques. Progressed resistance and repetitions as indicated. Date:  2/7/2023     Activity/Exercise Parameters   Core exercises Push down into theraball 5 x 5 breath holds  Marches x 3 min  Dead bug x 2 min   Education on sit to stand  X 10 with good weight shift   Knee extension (home) Continued with blue band at home   Weight shift  R LE planted with follow through and work on keeping right side long x 1 min   Balance (not today) Heel to toe raise x 20  Standing slow march x 2 min   Hip abduction Not today    Gait function (not today)  step through with pressure onto his R LE x 3 min         MANUAL THERAPY: (45 minutes): Joint mobilization and Soft tissue mobilization was utilized and necessary because of the patient's restricted joint motion and restricted motion of soft tissue.  (Used abbreviations: SF - Superficial Fascia; BC - Bony contours; MP - muscle play; IASTM - instrument-assisted soft tissue mobilization; MET - muscle energy technique; a/p - anterior to posterior; p/a - posterior to anterior; Proprioceptive neuromuscular Facilitation-PNF) Manual treatment to improve soft tissue/joint mobility deficits, tissue integrity issues, trigger points and/or pain.     -Side lie mobilization to soft tissue of groove of the spine   -Supine mobilization of right hamstring, lateral IT band, rectus femoris, adductors  -Hip ER stretch insupine  -PNF pattern to facilitate core into anterior elevation and posterior depression with working through end range holds and isometric reversals with the pelvis and at long full leg function  -Functional resistance through posterior depression and then hip abduction facilitation.  -Anterior elevation resistance to pelvis for core training  -Educated and positioned in standing for posture to take pressure off his spine    -Instrument assisted soft tissue mobilization to right lumbar spine with point stim applied for pain reduction and facilitation of muscles (5 min not charged for)  Dry Needles Used: 5   Dry Needles Removed: 5          GAIT TRAINING: (0 min) in hallway with work on use of single point cane for balance and support. Trying to focus on control and stability with push off through R LE and taking some pressure off midstance with the SPC. Treatment/Session Summary:    Treatment Assessment:Patient shows improvement in core control He still gets a good glute response with the needles. Communication/Consultation:  None today  Equipment provided today:  None  Recommendations/Intent for next treatment session: Next visit will focus on Right hip strength.     Total Treatment Billable Duration:  60 minutes   Time In: 1500  Time Out: [address], PT       Charge Capture  }Post Session Pain  MedBridge Portal  MD Guidelines  Scanned Media  Benefits  MyChart    Future Appointments   Date Time Provider Mario Leone   2/21/2023 11:00 AM [facility]   3/7/2023  2:00 PM [facility]   3/21/2023  2:00 PM [facility], [facility]

## 2023-02-21 ENCOUNTER — HOSPITAL ENCOUNTER (OUTPATIENT)
Dept: PHYSICAL THERAPY | Age: 78
Setting detail: RECURRING SERIES
Discharge: HOME | End: 2023-02-24
Payer: MEDICARE

## 2023-02-21 PROCEDURE — 97110 THERAPEUTIC EXERCISES: CPT

## 2023-02-21 PROCEDURE — 97140 MANUAL THERAPY 1/> REGIONS: CPT

## 2023-02-21 ASSESSMENT — PAIN SCALES - GENERAL: PAINLEVEL_OUTOF10: 4

## 2023-02-21 NOTE — PROGRESS NOTES
Julio Mcguire  : 1945  Primary: Medicare Part [place] And B  Secondary: [address] @ [address]  [address] [address]  Phone: [phone]  Fax: [phone] No data recorded  No data recorded    PT Visit Info: Total # of Visits to Date: 68      OUTPATIENT PHYSICAL THERAPY:OP NOTE TYPE: Treatment Note 2023       Episode  }Appt Desk              Treatment Diagnosis:  Low back pain, M54.5  Difficulty walking, not elsewhere classified, R26.2  Pain in joint, right knee, M25.561  Medical/Referring Diagnosis:  Low back pain, unspecified [M54.50]  Referring Physician:  Marci Reed, *  MD Orders:  PT Eval and Treat   TREATMENT PLAN:  Effective Dates:23-23. Frequency/Duration: 1 time every other week for 12 weeks  Date of Onset:  No data recorded   Allergies:   Patient has no allergy information on record. Other medication and Statins-hmg-coa reductase inhibitors   Restrictions/Precautions:  No data recordedNo data recorded   Interventions Planned (Treatment may consist of any combination of the following):    Balance Exercise  Bed Mobility  Electrical Stimulation  Gait Training  Heat  Manual Therapy  Neuromuscular Re-education/Strengthening  Range of Motion (ROM)  Therapeutic Activites  Therapeutic Exercise/Strengthening  No data recorded   Subjective Comments:Patient reports that he did have his MRI again and the doctor is sending another referral to Dr. Adriana Noonan about it. He reports he walked better for about a week after last treatment which was a big improvement  Initial:}    4/10Post Session:       2/10  Medications Last Reviewed:  2023  Updated Objective Findings: Glute medius weakness 3-/5  Treatment   THERAPEUTIC EXERCISE: (15 minutes):  Exercises per grid below to improve mobility, strength and coordination.   Required minimal visual, verbal, manual and tactile cues to promote proper body alignment, promote proper body posture, promote proper body mechanics and promote proper body breathing techniques. Progressed resistance and repetitions as indicated. Date:  2/21/2023     Activity/Exercise Parameters   Core exercises Push down into theraball 5 x 5 breath holds  Marches x 3 min  Dead bug x 2 min   Education on sit to stand  X 10 with good weight shift   Knee extension (home) Continued with blue band at home   Weight shift (not today) R LE planted with follow through and work on keeping right side long x 1 min   Balance (not today) Heel to toe raise x 20  Standing slow march x 2 min   Hip abduction Blue band in hook lying x 20    Gait function (not today)  step through with pressure onto his R LE x 3 min         MANUAL THERAPY: (40 minutes): Joint mobilization and Soft tissue mobilization was utilized and necessary because of the patient's restricted joint motion and restricted motion of soft tissue.  (Used abbreviations: SF - Superficial Fascia; BC - Bony contours; MP - muscle play; IASTM - instrument-assisted soft tissue mobilization; MET - muscle energy technique; a/p - anterior to posterior; p/a - posterior to anterior; Proprioceptive neuromuscular Facilitation-PNF) Manual treatment to improve soft tissue/joint mobility deficits, tissue integrity issues, trigger points and/or pain.     -Side lie mobilization to soft tissue of groove of the spine   -Supine mobilization of right hamstring, lateral IT band, rectus femoris, adductors  -Hip ER stretch insupine  -PNF pattern to facilitate core into anterior elevation and posterior depression with working through end range holds and isometric reversals with the pelvis and at long full leg function  -Functional resistance through posterior depression and then hip abduction facilitation.  -Anterior elevation resistance to pelvis for core training  -Educated and positioned in standing for posture to take pressure off his spine    -Instrument assisted soft tissue mobilization to right lumbar spine with point stim applied for pain reduction and facilitation of muscles (5 min not charged for)  Dry Needles Used: 5   Dry Needles Removed: 5          GAIT TRAINING: (0 min) in hallway with work on use of single point cane for balance and support. Trying to focus on control and stability with push off through R LE and taking some pressure off midstance with the SPC. Treatment/Session Summary:    Treatment Assessment:Patient shows improvement in core control. Continue to work needling and multifidi activation     Communication/Consultation:  None today  Equipment provided today:  None  Recommendations/Intent for next treatment session: Next visit will focus on Right hip strength.     Total Treatment Billable Duration:  60 minutes   Time In: 1100  Time Out: 57 Donita Blanco, PT       Charge Capture  }Post Session Pain  MedBridge Portal  MD Guidelines  Scanned Media  Benefits  MyChart    Future Appointments   Date Time Provider Mario Leone   3/7/2023  2:00 PM Carole Pederson, PT [facility]   3/21/2023  2:00 PM [facility]

## 2023-03-07 ENCOUNTER — HOSPITAL ENCOUNTER (OUTPATIENT)
Dept: PHYSICAL THERAPY | Age: 78
Setting detail: RECURRING SERIES
Discharge: HOME | End: 2023-03-10
Payer: MEDICARE

## 2023-03-07 PROCEDURE — 97140 MANUAL THERAPY 1/> REGIONS: CPT

## 2023-03-07 PROCEDURE — 97110 THERAPEUTIC EXERCISES: CPT

## 2023-03-07 ASSESSMENT — PAIN SCALES - GENERAL: PAINLEVEL_OUTOF10: 6

## 2023-03-07 NOTE — PROGRESS NOTES
Asiya Garrison  : 1945  Primary: Medicare Part A And B  Secondary: [address] @ [address]  Stephane Wheatley [address] [address]  Phone: [phone]  Fax: [phone] No data recorded  No data recorded    PT Visit Info: Total # of Visits to Date: 68      OUTPATIENT PHYSICAL THERAPY:OP NOTE TYPE: Treatment Note 3/7/2023       Episode  }Appt Desk              Treatment Diagnosis:  Low back pain, M54.5  Difficulty walking, not elsewhere classified, R26.2  Pain in joint, right knee, M25.561  Medical/Referring Diagnosis:  Low back pain, unspecified [M54.50]  Referring Physician:  Kamini Russell, *  MD Orders:  PT Eval and Treat   TREATMENT PLAN:  Effective Dates:23-23. Frequency/Duration: 1 time every other week for 12 weeks  Date of Onset:  No data recorded   Allergies:   Patient has no allergy information on record. Other medication and Statins-hmg-coa reductase inhibitors   Restrictions/Precautions:  No data recordedNo data recorded   Interventions Planned (Treatment may consist of any combination of the following):    Balance Exercise  Bed Mobility  Electrical Stimulation  Gait Training  Heat  Manual Therapy  Neuromuscular Re-education/Strengthening  Range of Motion (ROM)  Therapeutic Activites  Therapeutic Exercise/Strengthening  No data recorded   Subjective Comments:Patient reports that he had a really difficult time this weekend walking around a car show and just felt like the leg and back were bothering him a lot. It is a little better today, but doing that and driving his classic car with manual transmission is difficult  Initial:}    6/10Post Session:       2/10  Medications Last Reviewed:  3/7/2023  Updated Objective Findings: Glute medius weakness 3-/5 right side  Treatment   THERAPEUTIC EXERCISE: (15 minutes):  Exercises per grid below to improve mobility, strength and coordination.   Required minimal visual, verbal, manual and tactile cues to promote proper body alignment, promote proper body posture, promote proper body mechanics and promote proper body breathing techniques. Progressed resistance and repetitions as indicated. Date:  3/7/2023     Activity/Exercise Parameters   Core exercises Push down into theraball 5 x 5 breath holds  Marches x 3 min  Dead bug x 2 min   Education on sit to stand  X 10 with good weight shift   Knee extension (home) Continued with blue band at home   Weight shift (not today) R LE planted with follow through and work on keeping right side long x 1 min   Balance (not today) Heel to toe raise x 20  Standing slow march x 2 min   Hip abduction Blue band in hook lying x 20    Gait function (not today)  step through with pressure onto his R LE x 3 min         MANUAL THERAPY: (45 minutes): Joint mobilization and Soft tissue mobilization was utilized and necessary because of the patient's restricted joint motion and restricted motion of soft tissue.  (Used abbreviations: SF - Superficial Fascia; BC - Bony contours; MP - muscle play; IASTM - instrument-assisted soft tissue mobilization; MET - muscle energy technique; a/p - anterior to posterior; p/a - posterior to anterior; Proprioceptive neuromuscular Facilitation-PNF) Manual treatment to improve soft tissue/joint mobility deficits, tissue integrity issues, trigger points and/or pain.     -Side lie mobilization to soft tissue of groove of the spine   -Supine mobilization of right hamstring, lateral IT band, rectus femoris, adductors  -Hip ER stretch insupine  -PNF pattern to facilitate core into anterior elevation and posterior depression with working through end range holds and isometric reversals with the pelvis and at long full leg function  -Functional resistance through posterior depression and then hip abduction facilitation.  -Anterior elevation resistance to pelvis for core training  -Educated and positioned in standing for posture to take pressure off his spine    -Instrument assisted soft tissue mobilization to right lumbar spine with point stim applied for pain reduction and facilitation of muscles (5 min not charged for)  Dry Needles Used: 6   Dry Needles Removed: 6          GAIT TRAINING: (0 min) in hallway with work on use of single point cane for balance and support. Trying to focus on control and stability with push off through R LE and taking some pressure off midstance with the SPC. Treatment/Session Summary:    Treatment Assessment:Patient shows improvement in core control. He continues to have difficulty with control in the glutes and quad on the right side     Communication/Consultation:  None today  Equipment provided today:  None  Recommendations/Intent for next treatment session: Next visit will focus on Right hip strength.     Total Treatment Billable Duration:  60 minutes   Time In: 1400  Time Out: [address], PT       Charge Capture  }Post Session Pain  MedBridge Portal  MD Guidelines  Scanned Media  Benefits  MyChart    Future Appointments   Date Time Provider Mario Leone   3/21/2023  2:00 PM Margarita Epperson, PT [facility]   4/4/2023  2:00 PM [facility]

## 2023-03-21 ENCOUNTER — HOSPITAL ENCOUNTER (OUTPATIENT)
Dept: PHYSICAL THERAPY | Age: 78
Setting detail: RECURRING SERIES
Discharge: HOME | End: 2023-03-24
Payer: MEDICARE

## 2023-03-21 PROCEDURE — 97140 MANUAL THERAPY 1/> REGIONS: CPT

## 2023-03-21 PROCEDURE — 97110 THERAPEUTIC EXERCISES: CPT

## 2023-03-21 ASSESSMENT — PAIN SCALES - GENERAL: PAINLEVEL_OUTOF10: 5

## 2023-03-21 NOTE — PROGRESS NOTES
applied for pain reduction and facilitation of muscles (5 min not charged for)  Dry Needles Used: 6   Dry Needles Removed: 6          GAIT TRAINING: (0 min) in hallway with work on use of single point cane for balance and support. Trying to focus on control and stability with push off through R LE and taking some pressure off midstance with the SPC. Treatment/Session Summary:    Treatment Assessment:Patient shows improvement in core control. Still some weakness in Gluteus medius     Communication/Consultation:  None today  Equipment provided today:  None  Recommendations/Intent for next treatment session: Next visit will focus on Right hip strength.     Total Treatment Billable Duration:  55 minutes   Time In: 1400  Time Out: Mahendra Leach PT       Charge Capture  }Post Session Pain  MedBridge Portal  MD Guidelines  Scanned Media  Benefits  MyChart    Future Appointments   Date Time Provider Mario Leone   4/4/2023  2:00 PM Walker Swanson PT [facility]

## 2023-04-03 ENCOUNTER — OFFICE VISIT (OUTPATIENT)
Dept: ORTHOPEDIC SURGERY | Age: 78
End: 2023-04-03

## 2023-04-03 DIAGNOSIS — M51.36 DISC DEGENERATION, LUMBAR: ICD-10-CM

## 2023-04-03 DIAGNOSIS — M54.16 LUMBAR RADICULOPATHY: ICD-10-CM

## 2023-04-03 DIAGNOSIS — M47.816 LUMBAR SPONDYLOSIS: Primary | ICD-10-CM

## 2023-04-03 RX ORDER — BETAMETHASONE SODIUM PHOSPHATE AND BETAMETHASONE ACETATE 3; 3 MG/ML; MG/ML
12 INJECTION, SUSPENSION INTRA-ARTICULAR; INTRALESIONAL; INTRAMUSCULAR; SOFT TISSUE ONCE
Status: COMPLETED | OUTPATIENT
Start: 2023-04-03 | End: 2023-04-03

## 2023-04-03 RX ADMIN — BETAMETHASONE SODIUM PHOSPHATE AND BETAMETHASONE ACETATE 12 MG: 3; 3 INJECTION, SUSPENSION INTRA-ARTICULAR; INTRALESIONAL; INTRAMUSCULAR; SOFT TISSUE at 15:05

## 2023-04-03 NOTE — PROGRESS NOTES
sodium phosphate (CELESTONE) injection 12 mg      2. Disc degeneration, lumbar  M51.36 XR INJ SPINE THER SUBST LUM/SAC W IMG     External Referral to Neurosurgery     betamethasone acetate-betamethasone sodium phosphate (CELESTONE) injection 12 mg      3.  Lumbar radiculopathy  M54.16 XR INJ SPINE THER SUBST LUM/SAC W IMG     External Referral to Neurosurgery     betamethasone acetate-betamethasone sodium phosphate (CELESTONE) injection 12 mg         Emelia Sood MD  04/03/23

## 2023-04-04 ENCOUNTER — HOSPITAL ENCOUNTER (OUTPATIENT)
Dept: PHYSICAL THERAPY | Age: 78
Setting detail: RECURRING SERIES
Discharge: HOME | End: 2023-04-07
Payer: MEDICARE

## 2023-04-04 PROCEDURE — 97140 MANUAL THERAPY 1/> REGIONS: CPT

## 2023-04-04 ASSESSMENT — PAIN SCALES - GENERAL: PAINLEVEL_OUTOF10: 5

## 2023-04-04 NOTE — PROGRESS NOTES
point stim applied for pain reduction and facilitation of muscles (5 min not charged for)  Dry Needles Used: 6   Dry Needles Removed: 6          GAIT TRAINING: (0 min) in hallway with work on use of single point cane for balance and support. Trying to focus on control and stability with push off through R LE and taking some pressure off midstance with the SPC. Treatment/Session Summary:    Treatment Assessment:Patient shows improvement in hip mobility today. Communication/Consultation:  None today  Equipment provided today:  None  Recommendations/Intent for next treatment session: Next visit will focus on Right hip strength.     Total Treatment Billable Duration:  35 minutes   Time In: 1400  Time Out: [address], PT       Charge Capture  }Post Session Pain  MedBridge Portal  MD Guidelines  Scanned Media  Benefits  MyChart    Future Appointments   Date Time Provider Mario Leone   4/18/2023  3:00 PM Lucho Sykes, PT [facility]   5/2/2023  2:00 PM [facility]

## 2023-04-18 ENCOUNTER — HOSPITAL ENCOUNTER (OUTPATIENT)
Dept: PHYSICAL THERAPY | Age: 78
Setting detail: RECURRING SERIES
Discharge: HOME | End: 2023-04-21
Payer: MEDICARE

## 2023-04-18 PROCEDURE — 97140 MANUAL THERAPY 1/> REGIONS: CPT

## 2023-04-18 ASSESSMENT — PAIN SCALES - GENERAL: PAINLEVEL_OUTOF10: 4

## 2023-04-18 NOTE — PROGRESS NOTES
Emilee Lopez  : 1945  Primary: Medicare Part A [place] B  Secondary: [address] @ [address]  Stephane [address] [address]  Phone: [phone]  Fax: [phone] No data recorded  No data recorded    PT Visit Info: Total # of Visits to Date: 68      OUTPATIENT PHYSICAL THERAPY:OP NOTE TYPE: Treatment Note 2023       Episode  }Appt Desk              Treatment Diagnosis:  Low back pain, M54.5  Difficulty walking, not elsewhere classified, R26.2  Pain in joint, right knee, M25.561  Medical/Referring Diagnosis:  Low back pain, unspecified [M54.50]  Referring Physician:  Bridger Medina., *  MD Orders:  PT Eval and Treat   TREATMENT PLAN:  Effective Dates:23-23. Frequency/Duration: 1 time every other week for 12 weeks  Date of Onset:  No data recorded   Allergies:   Patient has no allergy information on record. Other medication and Statins-hmg-coa reductase inhibitors   Restrictions/Precautions:  No data recordedNo data recorded   Interventions Planned (Treatment may consist of any combination of the following):    Balance Exercise  Bed Mobility  Electrical Stimulation  Gait Training  Heat  Manual Therapy  Neuromuscular Re-education/Strengthening  Range of Motion (ROM)  Therapeutic Activites  Therapeutic Exercise/Strengthening  No data recorded   Subjective Comments:Patient reports that the injection seems to be holding steady and feeling pretty good this past week. He was still able to get back in the gym 5 days last week  Initial:}    4/10Post Session:       2/10  Medications Last Reviewed:  2023  Updated Objective Findings: Glute medius weakness 3-/5 right side  Treatment   THERAPEUTIC EXERCISE: (0 minutes):  Exercises per grid below to improve mobility, strength and coordination.   Required minimal visual, verbal, manual and tactile cues to promote proper body alignment, promote proper body posture, promote proper body

## 2023-05-02 ENCOUNTER — HOSPITAL ENCOUNTER (OUTPATIENT)
Dept: PHYSICAL THERAPY | Age: 78
Setting detail: RECURRING SERIES
Discharge: HOME | End: 2023-05-05
Payer: MEDICARE

## 2023-05-02 ENCOUNTER — TELEPHONE (OUTPATIENT)
Dept: ORTHOPEDIC SURGERY | Age: 78
End: 2023-05-02

## 2023-05-02 PROCEDURE — 97140 MANUAL THERAPY 1/> REGIONS: CPT

## 2023-05-02 ASSESSMENT — PAIN SCALES - GENERAL: PAINLEVEL_OUTOF10: 5

## 2023-05-02 NOTE — TELEPHONE ENCOUNTER
Returned call to patient and he has not heard from Dr. Gera Melendrez. I will resend the referral to his office today.

## 2023-05-02 NOTE — PROGRESS NOTES
Rosa Morris  : 1945  Primary: Medicare Part A [place] B  Secondary: [address] @ [address]  Stephane [address] [address]  Phone: [phone]  Fax: [phone] No data recorded  No data recorded    PT Visit Info: Total # of Visits to Date: 80      OUTPATIENT PHYSICAL THERAPY:OP NOTE TYPE: Treatment Note 2023       Episode  }Appt Desk              Treatment Diagnosis:  Low back pain, M54.5  Difficulty walking, not elsewhere classified, R26.2  Pain in joint, right knee, M25.561  Medical/Referring Diagnosis:  Low back pain, unspecified [M54.50]  Referring Physician:  Jordan Rhodes., *  MD Orders:  PT Eval and Treat   TREATMENT PLAN:  Effective Dates:23-23. Frequency/Duration: 1 time every other week for 12 weeks  Date of Onset:  No data recorded   Allergies:   Patient has no allergy information on record. Other medication and Statins-hmg-coa reductase inhibitors   Restrictions/Precautions:  No data recordedNo data recorded   Interventions Planned (Treatment may consist of any combination of the following):    Balance Exercise  Bed Mobility  Electrical Stimulation  Gait Training  Heat  Manual Therapy  Neuromuscular Re-education/Strengthening  Range of Motion (ROM)  Therapeutic Activites  Therapeutic Exercise/Strengthening  No data recorded   Subjective Comments:Patient reports that he is still getting frustrated with not figuring out what to do going forward. He feels like therapy helps him continue to work on his mobility, but the back doesn't seem to get much better. He is still looking at other opinions about his MRI  Initial:}    5/10Post Session:       2/10  Medications Last Reviewed:  2023  Updated Objective Findings: Glute medius weakness 3-/5 right side  Treatment   THERAPEUTIC EXERCISE: (0 minutes):  Exercises per grid below to improve mobility, strength and coordination.   Required minimal visual, verbal, manual

## 2023-05-02 NOTE — THERAPY RECERTIFICATION
Xenia Epps  : 1945  [place]  Secondary: [employer] [address] [facility]  at [address] [place] [address]  [phone]   [phone]           OUTPATIENT PHYSICAL THERAPY:Recertification 48   ICD-10: Treatment Diagnosis: Low back pain, M54.5  Difficulty walking, not elsewhere classified, R26.2  Pain in joint, right knee, M25.561  Precautions/Allergies: Other medication and Statins-hmg-coa reductase inhibitors   TREATMENT PLAN:  Effective Dates:23-23. Frequency/Duration: 1 time every other week for 12 weeks MEDICAL/REFERRING DIAGNOSIS:  Low back pain [M54.50]   DATE OF ONSET: Chronic  REFERRING PHYSICIAN: Radha Miller MD MD Orders: Evaluate and Treat  Return MD Appointment: not scheduled   Xenia Epps has been seen for 80 visits from 20 to 2023   for low back, right knee and R LE. Patient has performed therapeutic exercises, activities, and had manual therapy to increased strength, ROM and function. Patient has also used modalities for pain control in order to increase function. Patient has shown an increase in function per the LEFS with scores of 53/80. He continues to show issues with his gait function and we are steadily working to increase his hip strength and stability. His knee on the right side still gives him difficulty as well. He reports that the treatments we provide give him several days of relief and then start to wear off for his walking. The chronic nature of his spine continues to benefit from therapy interventions to keep him from losing function. He is consistently going to the gym 4-5 times a week to help his progress. Patient has progressed well toward their goals and will benefit from continuing skilled PT in order to address their impairments.   Josef Ledesma, PT, DPT, OCS, CFMT           INITIAL ASSESSMENT:  Mr. Adela Walsh presents with chronic R LE

## 2023-05-16 ENCOUNTER — HOSPITAL ENCOUNTER (OUTPATIENT)
Dept: PHYSICAL THERAPY | Age: 78
Setting detail: RECURRING SERIES
Discharge: HOME | End: 2023-05-19
Payer: MEDICARE

## 2023-05-16 PROCEDURE — 97140 MANUAL THERAPY 1/> REGIONS: CPT

## 2023-05-16 PROCEDURE — 97110 THERAPEUTIC EXERCISES: CPT

## 2023-05-16 ASSESSMENT — PAIN SCALES - GENERAL: PAINLEVEL_OUTOF10: 4

## 2023-05-16 NOTE — PROGRESS NOTES
Brianne Pietro  : 1945  Primary: Medicare Part [place]  Secondary: [address] @ [address]  Stephane Wheatley [address] [address]  Phone: [phone]  Fax: [phone] No data recorded  No data recorded    PT Visit Info: Total # of Visits to Date: 80      OUTPATIENT PHYSICAL THERAPY:OP NOTE TYPE: Treatment Note 2023       Episode  }Appt Desk              Treatment Diagnosis:  Low back pain, M54.5  Difficulty walking, not elsewhere classified, R26.2  Pain in joint, right knee, M25.561  Medical/Referring Diagnosis:  Low back pain, unspecified [M54.50]  Referring Physician:  Fabian Palomo, *  MD Orders:  PT Eval and Treat   TREATMENT PLAN:  Effective Dates:23-23. Frequency/Duration: 1 time every other week for 12 weeks  Date of Onset:  No data recorded   Allergies:   Patient has no allergy information on record. Other medication and Statins-hmg-coa reductase inhibitors   Restrictions/Precautions:  No data recordedNo data recorded   Interventions Planned (Treatment may consist of any combination of the following):    Balance Exercise  Bed Mobility  Electrical Stimulation  Gait Training  Heat  Manual Therapy  Neuromuscular Re-education/Strengthening  Range of Motion (ROM)  Therapeutic Activites  Therapeutic Exercise/Strengthening  No data recorded   Subjective Comments:Patient reports that he has been ok the last couple of days. Initial:}    4/10Post Session:        /10  Medications Last Reviewed:  2023  Updated Objective Findings: Glute medius weakness 3-/5 right side  Treatment   THERAPEUTIC EXERCISE: (10 minutes):  Exercises per grid below to improve mobility, strength and coordination. Required minimal visual, verbal, manual and tactile cues to promote proper body alignment, promote proper body posture, promote proper body mechanics and promote proper body breathing techniques.   Progressed resistance and repetitions as

## 2023-05-30 ENCOUNTER — HOSPITAL ENCOUNTER (OUTPATIENT)
Dept: PHYSICAL THERAPY | Age: 78
Setting detail: RECURRING SERIES
Discharge: HOME | End: 2023-06-02
Payer: MEDICARE

## 2023-05-30 PROCEDURE — 97140 MANUAL THERAPY 1/> REGIONS: CPT

## 2023-05-30 ASSESSMENT — PAIN SCALES - GENERAL: PAINLEVEL_OUTOF10: 5

## 2023-05-30 NOTE — PROGRESS NOTES
Josh Boas  : 1945  Primary: Medicare Part A [place] B  Secondary: [address] @ [address]  Stephane [address] [address]  Phone: [phone]  Fax: [phone] No data recorded  No data recorded    PT Visit Info: Total # of Visits to Date: 80      OUTPATIENT PHYSICAL THERAPY:OP NOTE TYPE: Treatment Note 2023       Episode  }Appt Desk              Treatment Diagnosis:  Low back pain, M54.5  Difficulty walking, not elsewhere classified, R26.2  Pain in joint, right knee, M25.561  Medical/Referring Diagnosis:  Low back pain, unspecified [M54.50]  Referring Physician:  Jerod Reyez, *  MD Orders:  PT Eval and Treat   TREATMENT PLAN:  Effective Dates:23-23. Frequency/Duration: 1 time every other week for 12 weeks  Date of Onset:  No data recorded   Allergies:   Patient has no allergy information on record. Other medication and Statins-hmg-coa reductase inhibitors   Restrictions/Precautions:  No data recordedNo data recorded   Interventions Planned (Treatment may consist of any combination of the following):    Balance Exercise  Bed Mobility  Electrical Stimulation  Gait Training  Heat  Manual Therapy  Neuromuscular Re-education/Strengthening  Range of Motion (ROM)  Therapeutic Activites  Therapeutic Exercise/Strengthening  No data recorded   Subjective Comments:Patient reports that he had a really good week after last treatment. He had some increased achy feeling. Initial:}    5/10Post Session:       2/10  Medications Last Reviewed:  2023  Updated Objective Findings: Glute medius weakness 3-/5 right side  Treatment   THERAPEUTIC EXERCISE: (0 minutes):  Exercises per grid below to improve mobility, strength and coordination. Required minimal visual, verbal, manual and tactile cues to promote proper body alignment, promote proper body posture, promote proper body mechanics and promote proper body breathing techniques.

## 2023-06-27 ENCOUNTER — HOSPITAL ENCOUNTER (OUTPATIENT)
Dept: PHYSICAL THERAPY | Age: 78
Setting detail: RECURRING SERIES
Discharge: HOME | End: 2023-06-30
Payer: MEDICARE

## 2023-06-27 PROCEDURE — 97140 MANUAL THERAPY 1/> REGIONS: CPT

## 2023-06-27 ASSESSMENT — PAIN SCALES - GENERAL: PAINLEVEL_OUTOF10: 6

## 2023-07-11 ENCOUNTER — HOSPITAL ENCOUNTER (OUTPATIENT)
Dept: PHYSICAL THERAPY | Age: 78
Setting detail: RECURRING SERIES
Discharge: HOME | End: 2023-07-14
Payer: MEDICARE

## 2023-07-11 PROCEDURE — 97140 MANUAL THERAPY 1/> REGIONS: CPT

## 2023-07-11 PROCEDURE — 97110 THERAPEUTIC EXERCISES: CPT

## 2023-07-11 ASSESSMENT — PAIN SCALES - GENERAL: PAINLEVEL_OUTOF10: 5

## 2023-07-11 NOTE — PROGRESS NOTES
Fabimarty Edmondson  : 1945  Primary: Medicare Part [place] B  Secondary: [address] @ [address]  [address] [place] [address]  Phone: [phone]  Fax: [phone] No data recorded  No data recorded    PT Visit Info: Total # of Visits to Date: 80      OUTPATIENT PHYSICAL THERAPY:OP NOTE TYPE: Treatment Note 2023       Episode  }Appt Desk              Treatment Diagnosis:  Low back pain, M54.5  Difficulty walking, not elsewhere classified, R26.2  Pain in joint, right knee, M25.561  Medical/Referring Diagnosis:  Low back pain, unspecified [M54.50]  Referring Physician:  Julianne Toribio, *  MD Orders:  PT Eval and Treat   TREATMENT PLAN:  Effective Dates:23-23. Frequency/Duration: 1 time every other week for 12 weeks  Date of Onset:  No data recorded   Allergies:   Patient has no allergy information on record. Other medication and Statins-hmg-coa reductase inhibitors   Restrictions/Precautions:  No data recordedNo data recorded   Interventions Planned (Treatment may consist of any combination of the following):    Balance Exercise  Bed Mobility  Electrical Stimulation  Gait Training  Heat  Manual Therapy  Neuromuscular Re-education/Strengthening  Range of Motion (ROM)  Therapeutic Activites  Therapeutic Exercise/Strengthening  No data recorded   Subjective Comments:Patient reports that he felt good for a full week after last time. He still has the same walking issue, but taking it day by day  Initial:}    5/10Post Session:       2/10  Medications Last Reviewed:  2023  Updated Objective Findings: Glute medius weakness 3-/5 right side  Treatment   THERAPEUTIC EXERCISE: (15 minutes):  Exercises per grid below to improve mobility, strength and coordination.   Required minimal visual, verbal, manual and tactile cues to promote proper body alignment, promote proper body posture, promote proper body mechanics and promote proper body

## 2023-07-25 ENCOUNTER — HOSPITAL ENCOUNTER (OUTPATIENT)
Dept: PHYSICAL THERAPY | Age: 78
Setting detail: RECURRING SERIES
Discharge: HOME | End: 2023-07-28
Payer: MEDICARE

## 2023-07-25 PROCEDURE — 97140 MANUAL THERAPY 1/> REGIONS: CPT

## 2023-07-25 ASSESSMENT — PAIN SCALES - GENERAL: PAINLEVEL_OUTOF10: 5

## 2023-07-25 NOTE — PROGRESS NOTES
Magdaleno Munoz  : 1945  Primary: Medicare Part A And B  Secondary: [address] @ [address]  [address] [place] [address]  Phone: [phone]  Fax: [phone] No data recorded  No data recorded    PT Visit Info: Total # of Visits to Date: 80      OUTPATIENT PHYSICAL THERAPY:OP NOTE TYPE: Treatment Note 2023       Episode  }Appt Desk              Treatment Diagnosis:  Low back pain, M54.5  Difficulty walking, not elsewhere classified, R26.2  Pain in joint, right knee, M25.561  Medical/Referring Diagnosis:  Low back pain, unspecified [M54.50]  Referring Physician:  Rina Lafleur, *  MD Orders:  PT Eval and Treat   TREATMENT PLAN:  Effective Dates:23-23. Frequency/Duration: 1 time every other week for 12 weeks  Date of Onset:  No data recorded   Allergies:   Patient has no allergy information on record. Other medication and Statins-hmg-coa reductase inhibitors   Restrictions/Precautions:  No data recordedNo data recorded   Interventions Planned (Treatment may consist of any combination of the following):    Balance Exercise  Bed Mobility  Electrical Stimulation  Gait Training  Heat  Manual Therapy  Neuromuscular Re-education/Strengthening  Range of Motion (ROM)  Therapeutic Activites  Therapeutic Exercise/Strengthening  No data recorded   Subjective Comments:Patient reports that he is still feeling difficulty with his walking. Initial:}    5/10Post Session:       3/10  Medications Last Reviewed:  2023  Updated Objective Findings: Glute medius weakness 3-/5 right side  Treatment   THERAPEUTIC EXERCISE: (0 minutes):  Exercises per grid below to improve mobility, strength and coordination. Required minimal visual, verbal, manual and tactile cues to promote proper body alignment, promote proper body posture, promote proper body mechanics and promote proper body breathing techniques.   Progressed resistance and

## 2023-08-11 ENCOUNTER — HOSPITAL ENCOUNTER (OUTPATIENT)
Dept: PHYSICAL THERAPY | Age: 78
Setting detail: RECURRING SERIES
Discharge: HOME | End: 2023-08-14
Payer: MEDICARE

## 2023-08-11 PROCEDURE — 97164 PT RE-EVAL EST PLAN CARE: CPT

## 2023-08-11 PROCEDURE — 97140 MANUAL THERAPY 1/> REGIONS: CPT

## 2023-08-11 ASSESSMENT — PAIN SCALES - GENERAL: PAINLEVEL_OUTOF10: 5

## 2023-08-11 NOTE — THERAPY RECERTIFICATION
Darren Pedro  : 1945  [place]  Secondary: [employer] [address] at [address] [facility] [address]  [phone]   [phone]           OUTPATIENT PHYSICAL THERAPY:[address]   ICD-10: Treatment Diagnosis: Low back pain, M54.5  Difficulty walking, not elsewhere classified, R26.2  Pain in joint, right knee, M25.561  Precautions/Allergies: Other medication and Statins-hmg-coa reductase inhibitors   TREATMENT PLAN:  Effective Dates:23-23. Frequency/Duration: 1 time every other week for 12 weeks MEDICAL/REFERRING DIAGNOSIS:  Low back pain [M54.50]   DATE OF ONSET: Chronic  REFERRING PHYSICIAN: Inessa Lange MD MD Orders: Evaluate and Treat  Return MD Appointment: not scheduled   Darren Pedro has been seen for 80 visits from 20 to 2023   for low back, right knee and R LE. Patient has performed therapeutic exercises, activities, and had manual therapy to increased strength, ROM and function. Patient has also used modalities for pain control in order to increase function. Patient has shown an increase in function per the LEFS with scores of 53/80. He continues to show issues with his gait function and we are steadily working to increase his hip strength and stability. His knee on the right side still gives him difficulty as well. He reports that the treatments we provide give him several days of relief and then start to wear off for his walking. The chronic nature of his spine continues to benefit from therapy interventions to keep him from losing function. He is consistently going to the gym 4-5 times a week to help his progress. We are transitioning him to a maintenance program at this time to help prevent decline and maintain functional mobility.  Patient has progressed well toward their goals and will benefit from continuing skilled PT in order to address their

## 2023-08-11 NOTE — PROGRESS NOTES
Bassem Rebollar  : 1945  Primary: Medicare Part A And B  Secondary: [address] @ [address]  [address] [place] [address] [facility] [address]  Phone: [phone]  Fax: [phone] No data recorded  No data recorded    PT Visit Info: Total # of Visits to Date: 80      OUTPATIENT PHYSICAL THERAPY:OP NOTE TYPE: Treatment Note 2023       Episode  }Appt Desk              Treatment Diagnosis:  Low back pain, M54.5  Difficulty walking, not elsewhere classified, R26.2  Pain in joint, right knee, M25.561  Medical/Referring Diagnosis:  Low back pain, unspecified [M54.50]  Referring Physician:  Lori Brunner, *  MD Orders:  PT Eval and Treat   TREATMENT PLAN:  Effective Dates:23-23. Frequency/Duration: 1 time every other week for 12 weeks, beginning of maintenance program  Date of Onset:  No data recorded   Allergies:   Patient has no allergy information on record. Other medication and Statins-hmg-coa reductase inhibitors   Restrictions/Precautions:  No data recordedNo data recorded   Interventions Planned (Treatment may consist of any combination of the following):    Balance Exercise  Bed Mobility  Electrical Stimulation  Gait Training  Heat  Manual Therapy  Neuromuscular Re-education/Strengthening  Range of Motion (ROM)  Therapeutic Activites  Therapeutic Exercise/Strengthening  No data recorded   Subjective Comments:Patient reports that he still is getting to the gym and did get in his pool to do some walking  Initial:}    5/10Post Session:       3/10  Medications Last Reviewed:  2023  Updated Objective Findings: Glute medius weakness 4-/5 right side  Treatment   THERAPEUTIC EXERCISE: (0 minutes):  Exercises per grid below to improve mobility, strength and coordination.   Required minimal visual, verbal, manual and tactile cues to promote proper body alignment, promote proper body posture, promote proper body mechanics and promote proper

## 2023-08-22 ENCOUNTER — APPOINTMENT (OUTPATIENT)
Dept: PHYSICAL THERAPY | Age: 78
End: 2023-08-22
Payer: MEDICARE

## 2023-09-06 ENCOUNTER — HOSPITAL ENCOUNTER (OUTPATIENT)
Dept: PHYSICAL THERAPY | Age: 78
Setting detail: RECURRING SERIES
Discharge: HOME | End: 2023-09-09
Payer: MEDICARE

## 2023-09-06 PROCEDURE — 97110 THERAPEUTIC EXERCISES: CPT

## 2023-09-06 PROCEDURE — 97140 MANUAL THERAPY 1/> REGIONS: CPT

## 2023-09-06 ASSESSMENT — PAIN SCALES - GENERAL: PAINLEVEL_OUTOF10: 5

## 2023-09-06 NOTE — PROGRESS NOTES
today  -Educated with walking training and weaving it into his gym program    -Instrument assisted soft tissue mobilization to right lumbar spine with point stim applied for pain reduction and facilitation of muscles (5 min not charged for)  Dry Needles Used: 6   Dry Needles Removed: 6          GAIT TRAINING: (0 min) in hallway with work on use of single point cane for balance and support. Trying to focus on control and stability with push off through R LE and taking some pressure off midstance with the SPC. Treatment/Session Summary:    Treatment Assessment:Patient shows continued difficulty with walking. We discussed possible aquatics at some point once he gets use to getting in the pool more     Communication/Consultation:  None today  Equipment provided today:  None  Recommendations/Intent for next treatment session: Next visit will focus on Right hip strength.     Total Treatment Billable Duration:  50 minutes   Time In: 8303  Time Out: [place], PT       Charge Capture  }Post Session Pain  MedBridge Portal  MD Guidelines  Scanned Media  Benefits  MyChart    Future Appointments   Date Time Provider [address]   9/29/2023  2:00 PM Luke Prom, PT [facility]   10/10/2023  2:00 PM Luke Prom, PT [facility]

## 2023-09-20 ENCOUNTER — HOSPITAL ENCOUNTER (OUTPATIENT)
Dept: PHYSICAL THERAPY | Age: 78
Setting detail: RECURRING SERIES
Discharge: HOME | End: 2023-09-23
Payer: MEDICARE

## 2023-09-20 ENCOUNTER — TELEPHONE (OUTPATIENT)
Dept: ORTHOPEDIC SURGERY | Age: 78
End: 2023-09-20

## 2023-09-20 DIAGNOSIS — M47.816 LUMBAR SPONDYLOSIS: Primary | ICD-10-CM

## 2023-09-20 DIAGNOSIS — M51.36 DISC DEGENERATION, LUMBAR: ICD-10-CM

## 2023-09-20 DIAGNOSIS — M54.16 LUMBAR RADICULOPATHY: ICD-10-CM

## 2023-09-20 DIAGNOSIS — M51.36 OTHER INTERVERTEBRAL DISC DEGENERATION, LUMBAR REGION: ICD-10-CM

## 2023-09-20 PROCEDURE — 97110 THERAPEUTIC EXERCISES: CPT

## 2023-09-20 PROCEDURE — 97140 MANUAL THERAPY 1/> REGIONS: CPT

## 2023-09-20 ASSESSMENT — PAIN SCALES - GENERAL: PAINLEVEL_OUTOF10: 6

## 2023-09-20 NOTE — PROGRESS NOTES
Mehrdad Carnes  : 1945  Primary: Medicare Part A [place] B  Secondary: [address] @ [address]  [address] [place] [address]  Phone: [phone]  Fax: [phone] No data recorded  No data recorded    PT Visit Info: Total # of Visits to Date: 80      OUTPATIENT PHYSICAL THERAPY:OP NOTE TYPE: Treatment Note 2023       Episode  }Appt Desk              Treatment Diagnosis:  Low back pain, M54.5  Difficulty walking, not elsewhere classified, R26.2  Pain in joint, right knee, M25.561  Medical/Referring Diagnosis:  Low back pain, unspecified [M54.50]  Referring Physician:  Jaimie Venegas, *  MD Orders:  PT Eval and Treat   TREATMENT PLAN:  Effective Dates:23-23. Frequency/Duration: 1 time every other week for 12 weeks, beginning of maintenance program  Date of Onset:  No data recorded   Allergies:   Patient has no allergy information on record. Other medication and Statins-hmg-coa reductase inhibitors   Restrictions/Precautions:  No data recordedNo data recorded   Interventions Planned (Treatment may consist of any combination of the following):    Balance Exercise  Bed Mobility  Electrical Stimulation  Gait Training  Heat  Manual Therapy  Neuromuscular Re-education/Strengthening  Range of Motion (ROM)  Therapeutic Activites  Therapeutic Exercise/Strengthening  No data recorded   Subjective Comments:Patient reports that he continues to have difficulty with his walking. He has been in the pool a few times since last time and it feels pretty good. He still wants to figure out more of what's going on  Initial:}    6/10Post Session:       3/10  Medications Last Reviewed:  2023  Updated Objective Findings: Glute medius weakness 4-/5 right side  Treatment   THERAPEUTIC EXERCISE: (10 minutes):  Exercises per grid below to improve mobility, strength and coordination.   Required minimal visual, verbal, manual and tactile cues to

## 2023-09-20 NOTE — TELEPHONE ENCOUNTER
Pt  ask that you call him  he  is  not  sure  wether  to get  another injection . Hilary Hoffman  Has  medical  questions

## 2023-09-21 ENCOUNTER — OFFICE VISIT (OUTPATIENT)
Dept: ORTHOPEDIC SURGERY | Age: 78
End: 2023-09-21
Payer: MEDICARE

## 2023-09-21 DIAGNOSIS — M48.061 SPINAL STENOSIS, LUMBAR REGION WITHOUT NEUROGENIC CLAUDICATION: ICD-10-CM

## 2023-09-21 DIAGNOSIS — M54.16 LUMBAR RADICULOPATHY: ICD-10-CM

## 2023-09-21 DIAGNOSIS — M47.816 LUMBAR SPONDYLOSIS: Primary | ICD-10-CM

## 2023-09-21 PROCEDURE — 62323 NJX INTERLAMINAR LMBR/SAC: CPT | Performed by: PHYSICAL MEDICINE & REHABILITATION

## 2023-09-21 RX ORDER — BETAMETHASONE SODIUM PHOSPHATE AND BETAMETHASONE ACETATE 3; 3 MG/ML; MG/ML
12 INJECTION, SUSPENSION INTRA-ARTICULAR; INTRALESIONAL; INTRAMUSCULAR; SOFT TISSUE ONCE
Status: COMPLETED | OUTPATIENT
Start: 2023-09-21 | End: 2023-09-21

## 2023-09-21 RX ADMIN — BETAMETHASONE SODIUM PHOSPHATE AND BETAMETHASONE ACETATE 12 MG: 3; 3 INJECTION, SUSPENSION INTRA-ARTICULAR; INTRALESIONAL; INTRAMUSCULAR; SOFT TISSUE at 14:44

## 2023-09-21 NOTE — PROGRESS NOTES
Name: Rhonda Plaza  YOB: 1945  Gender: male  MRN: [id number]        Interlaminar ADAM Procedure Note      Procedure: L3-L4 interlaminar epidural steroid injection    Precautions: Rhonda Plaza denies prior sensitivity to steroid, local anesthetic, iodine, or shellfish. Consent:  Consent was obtained prior to the procedure. The procedure was discussed at length with Rhonda Plaza. He was given the opportunity to ask questions regarding the procedure and its associated risks. In addition to the potential risks associated with the procedure itself, the patient was informed both verbally and in writing of potential side effects of the use glucocorticoids. The patient appeared to comprehend the informed consent and desired to have the procedure performed, and informed consent was signed. He was placed in a prone position on the fluoroscopy table and the skin was prepped and draped in a routine sterile fashion. The areas to be injected were each anesthetized with 1 ml of 1% Lidocaine. A 22 gauge 3.5 inch spinal needle was carefully advanced under fluoroscopic guidance to the L3-L4 interlaminar space (right paramedian). 0.5 ml of 70% of Omnipaque was injected to confirm proper needle placement and absence of subdural or vascular flow Once proper placement was confirmed, 2ml of sterile water and 12 mg of betamethasone were injected through the spinal needle. Fluoroscopic guidance was used intermittently over a 10-minute period to insure proper needle placement and his safety. A hard copy of the fluoroscopic image has been placed in his chart and is saved on the C-arm hard drive. He was monitored for 30 minutes after the procedure and discharged home in a stable fashion with a routine follow up.     Procedural Diagnosis:     ICD-10-CM    1. Lumbar spondylosis  M47.816 XR INJ SPINE THER SUBST LUM/SAC W IMG     betamethasone acetate-betamethasone sodium phosphate (CELESTONE) injection

## 2023-09-29 ENCOUNTER — TELEMEDICINE (OUTPATIENT)
Dept: ORTHOPEDIC SURGERY | Age: 78
End: 2023-09-29
Payer: MEDICARE

## 2023-09-29 ENCOUNTER — HOSPITAL ENCOUNTER (OUTPATIENT)
Dept: PHYSICAL THERAPY | Age: 78
Setting detail: RECURRING SERIES
End: 2023-09-29
Payer: MEDICARE

## 2023-09-29 ENCOUNTER — TELEPHONE (OUTPATIENT)
Dept: ORTHOPEDIC SURGERY | Age: 78
End: 2023-09-29

## 2023-09-29 DIAGNOSIS — M48.062 SPINAL STENOSIS OF LUMBAR REGION WITH NEUROGENIC CLAUDICATION: ICD-10-CM

## 2023-09-29 DIAGNOSIS — M54.16 LUMBAR RADICULOPATHY: Primary | ICD-10-CM

## 2023-09-29 DIAGNOSIS — M51.36 DISC DEGENERATION, LUMBAR: ICD-10-CM

## 2023-09-29 DIAGNOSIS — M47.816 LUMBAR SPONDYLOSIS: ICD-10-CM

## 2023-09-29 PROCEDURE — 4004F PT TOBACCO SCREEN RCVD TLK: CPT | Performed by: PHYSICAL MEDICINE & REHABILITATION

## 2023-09-29 PROCEDURE — G8421 BMI NOT CALCULATED: HCPCS | Performed by: PHYSICAL MEDICINE & REHABILITATION

## 2023-09-29 PROCEDURE — 99214 OFFICE O/P EST MOD 30 MIN: CPT | Performed by: PHYSICAL MEDICINE & REHABILITATION

## 2023-09-29 PROCEDURE — 1123F ACP DISCUSS/DSCN MKR DOCD: CPT | Performed by: PHYSICAL MEDICINE & REHABILITATION

## 2023-09-29 PROCEDURE — 97140 MANUAL THERAPY 1/> REGIONS: CPT

## 2023-09-29 PROCEDURE — 97110 THERAPEUTIC EXERCISES: CPT

## 2023-09-29 PROCEDURE — G8427 DOCREV CUR MEDS BY ELIG CLIN: HCPCS | Performed by: PHYSICAL MEDICINE & REHABILITATION

## 2023-09-29 ASSESSMENT — PAIN SCALES - GENERAL: PAINLEVEL_OUTOF10: 5

## 2023-09-29 NOTE — PROGRESS NOTES
Marion Legacy  : 1945  Primary: Medicare Part A [place] B  Secondary: [address] @ [address]  [address] [place], [place] [address] [address]  Phone: [phone]  Fax: [phone] No data recorded  No data recorded    PT Visit Info: Total # of Visits to Date: 80      OUTPATIENT PHYSICAL THERAPY:OP NOTE TYPE: Treatment Note 2023       Episode  }Appt Desk              Treatment Diagnosis:  Low back pain, M54.5  Difficulty walking, not elsewhere classified, R26.2  Pain in joint, right knee, M25.561  Medical/Referring Diagnosis:  Low back pain, unspecified [M54.50]  Referring Physician:  Cheyenne Mohan, *  MD Orders:  PT Eval and Treat   TREATMENT PLAN:  Effective Dates:23-23. Frequency/Duration: 1 time every other week for 12 weeks, beginning of maintenance program  Date of Onset:  No data recorded   Allergies:   Patient has no allergy information on record. Other medication and Statins-hmg-coa reductase inhibitors   Restrictions/Precautions:  No data recordedNo data recorded   Interventions Planned (Treatment may consist of any combination of the following):    Balance Exercise  Bed Mobility  Electrical Stimulation  Gait Training  Heat  Manual Therapy  Neuromuscular Re-education/Strengthening  Range of Motion (ROM)  Therapeutic Activites  Therapeutic Exercise/Strengthening  No data recorded   Subjective Comments:Patient reports that he had a meeting and injection with Dr. Farrah Flood last week that helped for the short term. He had another MRI that he discussed with Dr. Farrah Flood and is being referred to Dr. Gabriela pinedo to come up with a plan  Initial:}    5/10Post Session:       3/10  Medications Last Reviewed:  2023  Updated Objective Findings: Glute medius weakness 4-/5 right side  Treatment   THERAPEUTIC EXERCISE: (10 minutes):  Exercises per grid below to improve mobility, strength and coordination.   Required minimal visual, verbal, manual initiation more today  -Educated with walking training and weaving it into his gym program    -Instrument assisted soft tissue mobilization to right lumbar spine with point stim applied for pain reduction and facilitation of muscles (5 min not charged for)  Dry Needles Used: 6   Dry Needles Removed: 6          GAIT TRAINING: (0 min) in hallway with work on use of single point cane for balance and support. Trying to focus on control and stability with push off through R LE and taking some pressure off midstance with the SPC. Treatment/Session Summary:    Treatment Assessment:Patient shows continued difficulty with walking. He shows improvement post treatment, but the improvement is only lasting a short time. He is scheduled to visit with Dr. Regulo Ferris about his back soon     Communication/Consultation:  None today  Equipment provided today:  None  Recommendations/Intent for next treatment session: Next visit will focus on Right hip strength.     Total Treatment Billable Duration:  50 minutes   Time In: 8005  Time Out: [address], PT       Charge Capture  }Post Session Pain  MedBridge Portal  MD Guidelines  Scanned Media  Benefits  MyChart    Future Appointments   Date Time Provider [address] [place] [address]   10/6/2023 10:30 AM Cherelle Lay MD [facility]   10/10/2023  2:00 PM Tomer Sarabia, PT [facility]   10/24/2023  3:00 PM Tomer Sarabia, PT [facility]   11/7/2023  2:00 PM Tomer Sarabia, PT [facility]

## 2023-09-29 NOTE — PROGRESS NOTES
[facility]  Consultation Note  Virtual/Telephone Visit     Patient ID:  Name: Tad Nielsen  MRN: [id number]  AGE: 78 y.o.  : 1945    Date of Consultation:  2023    CC:   Chief Complaint   Patient presents with    Back Problem         HPI:  Mr. Porter Nowak is an 57-year-old man who presents today for follow-up of low back pain. MRI lumbar spine was performed 2023. On my review of films, compared to the MRI from , there has not been significant change. He continues to have central stenosis at L3-4. He has anterolisthesis L4-5. He has right L3-4-5 neuroforaminal narrowing and left L5-S1 neuroforaminal narrowing. The radiologist also notes prostatomegaly and a partially imaged distended urinary bladder. Of note, the patient does take finasteride for prostatomegaly. He has seen a urologist in the past and had prostate biopsies but is not under the care of a urologist for chronic care. We discussed his MRI findings at length today. He underwent interlaminar L3-4 epidural steroid injection 2023. It provided approximately 1 week of relief. The pain continues to severely affect his function. He has been attending physical therapy chronically with temporary relief. Home exercises have only helped mildly. The pain severely affects his functioning. Over time, he has seen less improvement in his symptoms with conservative treatment.      Past Medical History Includes:   Past Medical History:   Diagnosis Date    Anxiety     takes Ativan po 5-7 days weekly    Edema of both legs     \"from BP meds\", ECHO  on chart    Enlarged prostate     Gout     Hip pain     History of elevated glucose     hemoglobin A1C 5.7 2014    History of seasonal allergies     Hypercholesterolemia     Hypertension     Keratoacanthoma     S/P total hip arthroplasty 2015    Skin neoplasm     Spinal stenosis     injections - Dr. Paulino Barnes   ,   Past Surgical

## 2023-10-02 ENCOUNTER — TELEPHONE (OUTPATIENT)
Dept: ORTHOPEDIC SURGERY | Age: 78
End: 2023-10-02

## 2023-10-02 NOTE — TELEPHONE ENCOUNTER
Call returned to patient and he wanted to verify that the referral to Urology has been sent to [facility]. I informed patient that the referral was sent on 09/29/2023 with confirmation. Patient stated to understand.

## 2023-10-06 ENCOUNTER — OFFICE VISIT (OUTPATIENT)
Dept: ORTHOPEDIC SURGERY | Age: 78
End: 2023-10-06

## 2023-10-06 VITALS — WEIGHT: 252 LBS | HEIGHT: 70 IN | BODY MASS INDEX: 36.08 KG/M2

## 2023-10-06 DIAGNOSIS — M48.062 SPINAL STENOSIS OF LUMBAR REGION WITH NEUROGENIC CLAUDICATION: ICD-10-CM

## 2023-10-06 DIAGNOSIS — M47.816 LUMBAR SPONDYLOSIS: ICD-10-CM

## 2023-10-06 DIAGNOSIS — M54.16 LUMBAR RADICULOPATHY: Primary | ICD-10-CM

## 2023-10-06 NOTE — PROGRESS NOTES
Name: Isaiah Ashby  YOB: 1945  Gender: male  MRN: [id number]  Age: 78 y.o. Chief Complaint: Back pain and walking intolerance    History of Present Illness: This is a very pleasant 78 y.o. male who presents with a chronic history of back pain and walking intolerance. He also has pain that radiates down his buttock to his right lateral thigh and knee. He states he can only walk for about 12 mile before you sit down. He symptoms go away when he sits down. He is not able to stand for any long periods of time. He has no symptoms when he rides a stationary bike. He has back pain which is mostly right-sided. He states sometimes his right leg feels weak. He is very active and goes to the gym multiple times a week. He does not smoke. He does not have diabetes. He has done injection in the past which initially provided relief however he no longer gets significantly from the injections.       Prior Surgery: No  Back Pain: 60 %  Leg Pain: 40 %    Conservative treatment tried:   Physical Thereapy: Yes   Injections: Yes      Medications:       Current Outpatient Medications:     POTASSIUM CHLORIDE PO, Take 10 mEq by mouth, Disp: , Rfl:     allopurinol (ZYLOPRIM) 300 MG tablet, Take 1 tablet by mouth daily, Disp: , Rfl:     busPIRone (BUSPAR) 7.5 MG tablet, Take 1 tablet by mouth 2 times daily, Disp: , Rfl:     celecoxib (CELEBREX) 200 MG capsule, Take 1 capsule by mouth daily, Disp: , Rfl:     cloNIDine (CATAPRES) 0.3 MG/24HR PTWK, Place 1 patch onto the skin every 7 days, Disp: , Rfl:     ergocalciferol (ERGOCALCIFEROL) 1.25 MG (17103 UT) capsule, Take 1 capsule by mouth every 7 days, Disp: , Rfl:     finasteride (PROSCAR) 5 MG tablet, Take 1 tablet by mouth daily, Disp: , Rfl:     furosemide (LASIX) 20 MG tablet, Take 1 tablet by mouth daily, Disp: , Rfl:     hydrOXYzine (ATARAX) 25 MG tablet, Take 1 tablet by mouth 2 times daily, Disp: , Rfl:     lisinopril (PRINIVIL;ZESTRIL) 20 MG

## 2023-10-09 ENCOUNTER — TELEPHONE (OUTPATIENT)
Dept: ORTHOPEDIC SURGERY | Age: 78
End: 2023-10-09

## 2023-10-09 NOTE — TELEPHONE ENCOUNTER
Call returned to patient and he would like to discuss with Dr. Dell Lanza his options. Patient would like a virtual visit to discuss.

## 2023-10-10 ENCOUNTER — HOSPITAL ENCOUNTER (OUTPATIENT)
Dept: PHYSICAL THERAPY | Age: 78
Setting detail: RECURRING SERIES
Discharge: HOME | End: 2023-10-13
Payer: MEDICARE

## 2023-10-10 PROCEDURE — 97110 THERAPEUTIC EXERCISES: CPT

## 2023-10-10 PROCEDURE — 97140 MANUAL THERAPY 1/> REGIONS: CPT

## 2023-10-10 ASSESSMENT — PAIN SCALES - GENERAL: PAINLEVEL_OUTOF10: 4

## 2023-10-10 NOTE — PROGRESS NOTES
Baldemar Donohue  : 1945  Primary: Medicare Part A [place] B  Secondary: [address] @ [address] [address] [facility] [address]  Phone: [phone]  Fax: [phone] No data recorded  No data recorded    PT Visit Info: Total # of Visits to Date: 80      OUTPATIENT PHYSICAL THERAPY:OP NOTE TYPE: Treatment Note 10/10/2023       Episode  }Appt Desk              Treatment Diagnosis:  Low back pain, M54.5  Difficulty walking, not elsewhere classified, R26.2  Pain in joint, right knee, M25.561  Medical/Referring Diagnosis:  Low back pain, unspecified [M54.50]  Referring Physician:  Emmy Cano, *  MD Orders:  PT Eval and Treat   TREATMENT PLAN:  Effective Dates:23-23. Frequency/Duration: 1 time every other week for 12 weeks, beginning of maintenance program  Date of Onset:  No data recorded   Allergies:   Patient has no known allergies. Other medication and Statins-hmg-coa reductase inhibitors   Restrictions/Precautions:  No data recordedNo data recorded   Interventions Planned (Treatment may consist of any combination of the following):    Balance Exercise  Bed Mobility  Electrical Stimulation  Gait Training  Heat  Manual Therapy  Neuromuscular Re-education/Strengthening  Range of Motion (ROM)  Therapeutic Activites  Therapeutic Exercise/Strengthening  No data recorded   Subjective Comments:Patient reports that he is about status quo with exercises and made it in the water one day  Initial:}    4/10Post Session:       2/10  Medications Last Reviewed:  10/10/2023  Updated Objective Findings: Glute medius weakness 4-/5 right side  Treatment   THERAPEUTIC EXERCISE: (10 minutes):  Exercises per grid below to improve mobility, strength and coordination.   Required minimal visual, verbal, manual and tactile cues to promote proper body alignment, promote proper body posture, promote proper body mechanics and promote proper body breathing

## 2023-10-24 ENCOUNTER — HOSPITAL ENCOUNTER (OUTPATIENT)
Dept: PHYSICAL THERAPY | Age: 78
Setting detail: RECURRING SERIES
Discharge: HOME | End: 2023-10-27
Payer: MEDICARE

## 2023-10-24 PROCEDURE — 97140 MANUAL THERAPY 1/> REGIONS: CPT

## 2023-10-24 ASSESSMENT — PAIN SCALES - GENERAL: PAINLEVEL_OUTOF10: 5

## 2023-10-24 NOTE — PROGRESS NOTES
Ranjit Rad  : 1945  Primary: Medicare Part A [place] B  Secondary: [address] @ [address]  [address] [place] [address]  Phone: [phone]  Fax: [phone] No data recorded  No data recorded    PT Visit Info: Total # of Visits to Date: 80      OUTPATIENT PHYSICAL THERAPY:OP NOTE TYPE: Treatment Note 10/24/2023       Episode  }Appt Desk              Treatment Diagnosis:  Low back pain, M54.5  Difficulty walking, not elsewhere classified, R26.2  Pain in joint, right knee, M25.561  Medical/Referring Diagnosis:  Low back pain, unspecified [M54.50]  Referring Physician:  Camilo Ho, *  MD Orders:  PT Eval and Treat   TREATMENT PLAN:  Effective Dates:23-23. Frequency/Duration: 1 time every other week for 12 weeks, beginning of maintenance program  Date of Onset:  No data recorded   Allergies:   Patient has no known allergies. Other medication and Statins-hmg-coa reductase inhibitors   Restrictions/Precautions:  No data recordedNo data recorded   Interventions Planned (Treatment may consist of any combination of the following):    Balance Exercise  Bed Mobility  Electrical Stimulation  Gait Training  Heat  Manual Therapy  Neuromuscular Re-education/Strengthening  Range of Motion (ROM)  Therapeutic Activites  Therapeutic Exercise/Strengthening  No data recorded   Subjective Comments:Patient reports that he feels about the same and gotten in the water the last week or so a few times  Initial:}    5/10Post Session:       2/10  Medications Last Reviewed:  10/24/2023  Updated Objective Findings: Glute medius weakness 4-/5 right side  Treatment   THERAPEUTIC EXERCISE: (0 minutes):  Exercises per grid below to improve mobility, strength and coordination.   Required minimal visual, verbal, manual and tactile cues to promote proper body alignment, promote proper body posture, promote proper body mechanics and promote proper body

## 2023-10-26 NOTE — PROGRESS NOTES
[facility]  Consultation Note  Virtual/Telephone Visit     Patient ID:  Name: Jono Hurst  MRN: [id number]  AGE: 78 y.o.  : 1945    Date of Consultation:  2023    CC:   Chief Complaint   Patient presents with    Back Problem         HPI:  Mr. Zulema Jefferson is an 78-year-old male who presents today for follow-up of low back pain. Since I last saw him, he saw Dr. Farooq Dejesus, and they discussed the possibility of lumbar spine surgery. He continues to have low back pain that radiates to the right leg. The pain severely affects his functioning. He has only had temporary relief with medications, physical therapy, and lumbar epidural steroid injections. MRI lumbar spine showed significant central stenosis L3-4 and moderate to severe central stenosis at L2-3 and L4-5 anterolisthesis.      Past Medical History Includes:   Past Medical History:   Diagnosis Date    Anxiety     takes Ativan po 5-7 days weekly    Edema of both legs     \"from BP meds\", ECHO  on chart    Enlarged prostate     Gout     Hip pain     History of elevated glucose     hemoglobin A1C 5.7 2014    History of seasonal allergies     Hypercholesterolemia     Hypertension     Keratoacanthoma     S/P total hip arthroplasty 2015    Skin neoplasm     Spinal stenosis     injections - Dr. Arturo Smith   ,   Past Surgical History:   Procedure Laterality Date    TONSILLECTOMY  as child    WISDOM TOOTH EXTRACTION  as teenager     Family History:   Family History   Problem Relation Age of Onset    Stroke Father 27        bleed    Stroke Mother     Heart Attack Mother       Social History:   Social History     Tobacco Use    Smoking status: Never    Smokeless tobacco: Not on file   Substance Use Topics    Alcohol use: No       ALLERGIES: No Known Allergies     Patient Medications    Current Outpatient Medications   Medication Sig    POTASSIUM CHLORIDE PO Take 10 mEq by mouth    allopurinol (ZYLOPRIM) 300 MG tablet Take 1

## 2023-10-27 ENCOUNTER — TELEMEDICINE (OUTPATIENT)
Dept: ORTHOPEDIC SURGERY | Age: 78
End: 2023-10-27
Payer: MEDICARE

## 2023-10-27 DIAGNOSIS — M47.816 LUMBAR SPONDYLOSIS: ICD-10-CM

## 2023-10-27 DIAGNOSIS — M48.062 SPINAL STENOSIS OF LUMBAR REGION WITH NEUROGENIC CLAUDICATION: Primary | ICD-10-CM

## 2023-10-27 DIAGNOSIS — M51.36 DISC DEGENERATION, LUMBAR: ICD-10-CM

## 2023-10-27 PROCEDURE — 1123F ACP DISCUSS/DSCN MKR DOCD: CPT | Performed by: PHYSICAL MEDICINE & REHABILITATION

## 2023-10-27 PROCEDURE — G8484 FLU IMMUNIZE NO ADMIN: HCPCS | Performed by: PHYSICAL MEDICINE & REHABILITATION

## 2023-10-27 PROCEDURE — 1036F TOBACCO NON-USER: CPT | Performed by: PHYSICAL MEDICINE & REHABILITATION

## 2023-10-27 PROCEDURE — G8427 DOCREV CUR MEDS BY ELIG CLIN: HCPCS | Performed by: PHYSICAL MEDICINE & REHABILITATION

## 2023-10-27 PROCEDURE — G8417 CALC BMI ABV UP PARAM F/U: HCPCS | Performed by: PHYSICAL MEDICINE & REHABILITATION

## 2023-10-27 PROCEDURE — 99213 OFFICE O/P EST LOW 20 MIN: CPT | Performed by: PHYSICAL MEDICINE & REHABILITATION

## 2023-11-07 ENCOUNTER — HOSPITAL ENCOUNTER (OUTPATIENT)
Dept: PHYSICAL THERAPY | Age: 78
Setting detail: RECURRING SERIES
Discharge: HOME | End: 2023-11-10
Payer: MEDICARE

## 2023-11-07 PROCEDURE — 97140 MANUAL THERAPY 1/> REGIONS: CPT

## 2023-11-07 PROCEDURE — 97110 THERAPEUTIC EXERCISES: CPT

## 2023-11-07 ASSESSMENT — PAIN SCALES - GENERAL: PAINLEVEL_OUTOF10: 5

## 2023-11-07 NOTE — THERAPY RECERTIFICATION
Rachael Serrano  : 1945  [place]  Secondary: [employer] [address] at [address] [facility] [address]  [phone]   [phone]           OUTPATIENT PHYSICAL THERAPY:[address]   ICD-10: Treatment Diagnosis: Low back pain, M54.5  Difficulty walking, not elsewhere classified, R26.2  Pain in joint, right knee, M25.561  Precautions/Allergies: Other medication and Statins-hmg-coa reductase inhibitors   TREATMENT PLAN:  Effective Dates:23-23. Frequency/Duration: 1 time every other week for 8 weeks MEDICAL/REFERRING DIAGNOSIS:  Low back pain [M54.50]   DATE OF ONSET: Chronic  REFERRING PHYSICIAN: Juan Diego Arzola MD MD Orders: Evaluate and Treat  Return MD Appointment: not scheduled   Rachael Serrano has been seen for 94 visits from 20 to 2023   for low back, right knee and R LE. Patient has performed therapeutic exercises, activities, and had manual therapy to increased strength, ROM and function. Patient has also used modalities for pain control in order to increase function. Patient has shown an increase in function per the LEFS with scores of 49/80. He continues to show issues with his gait function and we are steadily working to increase his hip strength and stability. His knee on the right side still gives him difficulty as well. He reports that the treatments we provide give him several days of relief and then start to wear off for his walking. The chronic nature of his spine continues to benefit from therapy interventions to keep him from losing function. He is consistently going to the gym 4-5 times a week to help his progress. We are transitioning him to a maintenance program at this time to help prevent decline and maintain functional mobility.  Patient has progressed well toward their goals and will benefit from continuing skilled PT in order to address their

## 2023-11-07 NOTE — PROGRESS NOTES
body posture, promote proper body mechanics and promote proper body breathing techniques. Progressed resistance and repetitions as indicated. Date:  11/7/2023     Activity/Exercise Parameters   Core exercises Push down into theraball 5 x 5 breath holds  Marches x 3 min  Hip flexion isometric  Dead bug x 2 min    sit to stand  2 X 5 with good weight shift         MANUAL THERAPY: (35 minutes): Joint mobilization and Soft tissue mobilization was utilized and necessary because of the patient's restricted joint motion and restricted motion of soft tissue. (Used abbreviations: SF - Superficial Fascia; BC - Bony contours; MP - muscle play; IASTM - instrument-assisted soft tissue mobilization; MET - muscle energy technique; a/p - anterior to posterior; p/a - posterior to anterior; Proprioceptive neuromuscular Facilitation-PNF) Manual treatment to improve soft tissue/joint mobility deficits, tissue integrity issues, trigger points and/or pain.     -Side lie mobilization to soft tissue of groove of the spine   -Supine mobilization of right hamstring, lateral IT band, rectus femoris, adductors  -Hip ER stretch insupine  -PNF pattern to facilitate core into anterior elevation and posterior depression with working through end range holds and isometric reversals with the pelvis and at long full leg function (not today)  -Functional resistance through posterior depression and then hip abduction facilitation.  -Anterior elevation resistance to pelvis for core training with focus and work on self initiation more today  -Educated with walking training and weaving it into his gym program    -Instrument assisted soft tissue mobilization to right lumbar spine with point stim applied for pain reduction and facilitation of muscles (5 min not charged for)  Dry Needles Used: 6   Dry Needles Removed: 6          GAIT TRAINING: (0 min) in hallway with work on use of single point cane for balance and support.  Trying to focus on control and

## 2023-11-21 ENCOUNTER — HOSPITAL ENCOUNTER (OUTPATIENT)
Dept: PHYSICAL THERAPY | Age: 78
Setting detail: RECURRING SERIES
Discharge: HOME | End: 2023-11-24
Payer: MEDICARE

## 2023-11-21 PROCEDURE — 97110 THERAPEUTIC EXERCISES: CPT

## 2023-11-21 PROCEDURE — 97140 MANUAL THERAPY 1/> REGIONS: CPT

## 2023-11-21 NOTE — PROGRESS NOTES
Tad Nielsen  : 1945  Primary: Medicare Part A And B  Secondary: [address] @ [address]  [address] [place] [address]  Phone: [phone]  Fax: [phone] No data recorded  No data recorded    PT Visit Info: Total # of Visits to Date: 80      OUTPATIENT PHYSICAL THERAPY:OP NOTE TYPE: Treatment Note 2023       Episode  }Appt Desk              Treatment Diagnosis:  Low back pain, M54.5  Difficulty walking, not elsewhere classified, R26.2  Pain in joint, right knee, M25.561  Medical/Referring Diagnosis:  Low back pain, unspecified [M54.50]  Referring Physician:  Avani Dhillon., *  MD Orders:  PT Eval and Treat   TREATMENT PLAN:  Effective Dates:23-23. Frequency/Duration: 1 time every other week for 8 weeks  Date of Onset:  No data recorded   Allergies:   Patient has no known allergies. Other medication and Statins-hmg-coa reductase inhibitors   Restrictions/Precautions:  No data recordedNo data recorded   Interventions Planned (Treatment may consist of any combination of the following):    Balance Exercise  Bed Mobility  Electrical Stimulation  Gait Training  Heat  Manual Therapy  Neuromuscular Re-education/Strengthening  Range of Motion (ROM)  Therapeutic Activites  Therapeutic Exercise/Strengthening  No data recorded   Subjective Comments:Patient reports that he is doing ok today with the similar back stiffness and pain and some good days and bad with walking  Initial:}     /10Post Session:        /10  Medications Last Reviewed:  2023  Updated Objective Findings: Glute medius weakness 4-/5 right side  Treatment   THERAPEUTIC EXERCISE: (10 minutes):  Exercises per grid below to improve mobility, strength and coordination.   Required minimal visual, verbal, manual and tactile cues to promote proper body alignment, promote proper body posture, promote proper body mechanics and promote proper body breathing

## 2023-11-30 ENCOUNTER — TELEPHONE (OUTPATIENT)
Dept: ORTHOPEDIC SURGERY | Age: 78
End: 2023-11-30

## 2023-11-30 NOTE — TELEPHONE ENCOUNTER
Call returned to patient and he saw Dr. Slick Funk for a second surgical opinion and was told that surgery could be performed on his back, but is now very confused because with the first surgical opinion he was told that he did not need surgery. Patient feels like he needs an office visit to discuss his back with Dr. Darcy Munoz. He would like a virtual visit. Appointment has been made.

## 2023-12-05 ENCOUNTER — HOSPITAL ENCOUNTER (OUTPATIENT)
Dept: PHYSICAL THERAPY | Age: 78
Setting detail: RECURRING SERIES
Discharge: HOME | End: 2023-12-08
Payer: MEDICARE

## 2023-12-05 PROCEDURE — 97140 MANUAL THERAPY 1/> REGIONS: CPT

## 2023-12-05 PROCEDURE — 97110 THERAPEUTIC EXERCISES: CPT

## 2023-12-05 ASSESSMENT — PAIN SCALES - GENERAL: PAINLEVEL_OUTOF10: 5

## 2023-12-05 NOTE — PROGRESS NOTES
midstance with the SPC. Treatment/Session Summary:    Treatment Assessment:Patient shows maintenance of hip and pelvic motion. He has sought out another opinion on his back and the doctor suggests a surgical intervention due to stenosis. He is going to think about it more before deciding. Communication/Consultation:  None today  Equipment provided today:  None  Recommendations/Intent for next treatment session: Next visit will focus on Right hip strength.     Total Treatment Billable Duration:  45 minutes   Time In: 1400  Time Out: 823 [facility], PT       Charge Capture  }Post Session Pain  [facility] Portal  MD Guidelines  Scanned Media  Benefits  MyChart    Future Appointments   Date Time Provider [address] [place] [address]   12/15/2023  9:30 AM Jocelyn Bailey MD [facility]   12/18/2023  3:00 PM [facility]

## 2024-01-05 ENCOUNTER — TELEMEDICINE (OUTPATIENT)
Dept: ORTHOPEDIC SURGERY | Age: 79
End: 2024-01-05
Payer: MEDICARE

## 2024-01-05 DIAGNOSIS — M54.16 LUMBAR RADICULOPATHY: ICD-10-CM

## 2024-01-05 DIAGNOSIS — M47.816 LUMBAR SPONDYLOSIS: ICD-10-CM

## 2024-01-05 DIAGNOSIS — M48.062 SPINAL STENOSIS OF LUMBAR REGION WITH NEUROGENIC CLAUDICATION: Primary | ICD-10-CM

## 2024-01-05 DIAGNOSIS — M51.36 DISC DEGENERATION, LUMBAR: ICD-10-CM

## 2024-01-05 PROCEDURE — 1123F ACP DISCUSS/DSCN MKR DOCD: CPT | Performed by: PHYSICAL MEDICINE & REHABILITATION

## 2024-01-05 PROCEDURE — G8427 DOCREV CUR MEDS BY ELIG CLIN: HCPCS | Performed by: PHYSICAL MEDICINE & REHABILITATION

## 2024-01-05 PROCEDURE — G8417 CALC BMI ABV UP PARAM F/U: HCPCS | Performed by: PHYSICAL MEDICINE & REHABILITATION

## 2024-01-05 PROCEDURE — G8484 FLU IMMUNIZE NO ADMIN: HCPCS | Performed by: PHYSICAL MEDICINE & REHABILITATION

## 2024-01-05 PROCEDURE — 99213 OFFICE O/P EST LOW 20 MIN: CPT | Performed by: PHYSICAL MEDICINE & REHABILITATION

## 2024-01-05 PROCEDURE — 1036F TOBACCO NON-USER: CPT | Performed by: PHYSICAL MEDICINE & REHABILITATION

## 2024-01-05 NOTE — PROGRESS NOTES
Alcohol use: No       ALLERGIES: No Known Allergies     Patient Medications    Current Outpatient Medications   Medication Sig    POTASSIUM CHLORIDE PO Take 10 mEq by mouth    allopurinol (ZYLOPRIM) 300 MG tablet Take 1 tablet by mouth daily    busPIRone (BUSPAR) 7.5 MG tablet Take 1 tablet by mouth 2 times daily    celecoxib (CELEBREX) 200 MG capsule Take 1 capsule by mouth daily    cloNIDine (CATAPRES) 0.3 MG/24HR PTWK Place 1 patch onto the skin every 7 days    ergocalciferol (ERGOCALCIFEROL) 1.25 MG (76805 UT) capsule Take 1 capsule by mouth every 7 days    finasteride (PROSCAR) 5 MG tablet Take 1 tablet by mouth daily    furosemide (LASIX) 20 MG tablet Take 1 tablet by mouth daily    hydrOXYzine (ATARAX) 25 MG tablet Take 1 tablet by mouth 2 times daily    lisinopril (PRINIVIL;ZESTRIL) 20 MG tablet Take 1 tablet by mouth daily    prazosin (MINIPRESS) 1 MG capsule Take 1 capsule by mouth 2 times daily    spironolactone (ALDACTONE) 25 MG tablet Take 1 tablet by mouth daily     No current facility-administered medications for this visit.         ROS/Meds/PSH/PMH/FH/SH: I personally reviewed the patients standard intake form.                    No results found for any visits on 01/05/24.          Assessment and Plan:     ICD-10-CM    1. Spinal stenosis of lumbar region with neurogenic claudication  M48.062       2. Lumbar spondylosis  M47.816       3. Disc degeneration, lumbar  M51.36       4. Lumbar radiculopathy  M54.16           No orders of the defined types were placed in this encounter.       4   This is a chronic illness/condition with exacerbation and progression  Treatment at this time: He would like to continue with intermittent injections as needed for the pain.  He is also restarting physical therapy.  We discussed that if the symptoms progress to the point that they are severely affecting his function, he may want to revisit surgical options.    Studies ordered today: none    James Potocki, was

## 2024-01-12 ENCOUNTER — HOSPITAL ENCOUNTER (OUTPATIENT)
Dept: PHYSICAL THERAPY | Age: 79
Setting detail: RECURRING SERIES
Discharge: HOME | End: 2024-01-15
Attending: PHYSICAL MEDICINE & REHABILITATION
Payer: MEDICARE

## 2024-01-12 DIAGNOSIS — M54.51 VERTEBROGENIC LOW BACK PAIN: Primary | ICD-10-CM

## 2024-01-12 DIAGNOSIS — M25.561 PAIN IN JOINT OF RIGHT KNEE: ICD-10-CM

## 2024-01-12 DIAGNOSIS — M25.551 PAIN IN JOINT OF RIGHT HIP: ICD-10-CM

## 2024-01-12 DIAGNOSIS — R26.2 DIFFICULTY IN WALKING, NOT ELSEWHERE CLASSIFIED: ICD-10-CM

## 2024-01-12 PROCEDURE — 97140 MANUAL THERAPY 1/> REGIONS: CPT

## 2024-01-12 PROCEDURE — 97163 PT EVAL HIGH COMPLEX 45 MIN: CPT

## 2024-01-12 NOTE — THERAPY EVALUATION
James Potocki  : 1945  Primary: Medicare Part A And B (Medicare)  Secondary: AETNA SENIOR MEDICARE SUPP [facility] @ [facility] [address]  [facility] [address]  Phone: [phone]  Fax: [phone] Plan Frequency: 1 time every other week for 12 weeks    Plan of Care/Certification Expiration Date: 24        Plan of Care/Certification Expiration Date:  Plan of Care/Certification Expiration Date: 24    Frequency/Duration: Plan Frequency: 1 time every other week for 12 weeks      Time In/Out:   Time In: 1500  Time Out: 1557      PT Visit Info:    Plan Frequency: 1 time every other week for 12 weeks      Visit Count:  1                OUTPATIENT PHYSICAL THERAPY:             Initial Assessment 2024               Episode (Lumbar stenosis/Radiculopathy)         Treatment Diagnosis:     Vertebrogenic low back pain  Difficulty in walking, not elsewhere classified  Pain in joint of right hip  Pain in joint of right knee  Medical/Referring Diagnosis:    Spinal stenosis of lumbar region with neurogenic claudication  Lumbar spondylosis  Disc degeneration, lumbar  Lumbar radiculopathy      Referring Physician:  Archana Britt MD MD Orders:  PT Eval and Treat   Return MD Appt:  not scheduled  Date of Onset:    2016  Allergies:  Patient has no known allergies.  Restrictions/Precautions:    None      Medications Last Reviewed:  2024     SUBJECTIVE   History of Injury/Illness (Reason for Referral):  Patient reports that he had a hip replacement in  that helped fix some hip issues, but has since had difficulty with walking and increasing low back pain. He reports he has increased arthritis in the right knee with weakness with walking. He has had scans and seen a few different neurosurgeons about possible surgery. Scans show increased stenosis in L4-5 and some in L3-4 areas with disc degeneration. More issues on his right side with weakness.  He

## 2024-01-12 NOTE — PROGRESS NOTES
James Potocki  : 1945  Primary: Medicare Part A And B (Medicare)  Secondary: AETNA SENIOR MEDICARE SUPP [facility] @ [facility] [address]  [place] [address]  Phone: [phone]  Fax: [phone] Plan Frequency: 1 time every other week for 12 weeks  Plan of Care/Certification Expiration Date: 24        Plan of Care/Certification Expiration Date:  Plan of Care/Certification Expiration Date: 24    Frequency/Duration: Plan Frequency: 1 time every other week for 12 weeks      Time In/Out:   Time In: 1500  Time Out: 1557      PT Visit Info:    Plan Frequency: 1 time every other week for 12 weeks      Visit Count:  1    OUTPATIENT PHYSICAL THERAPY:   Treatment Note 2024       Episode  (Lumbar stenosis/Radiculopathy)               Treatment Diagnosis:    Vertebrogenic low back pain  Difficulty in walking, not elsewhere classified  Pain in joint of right hip  Pain in joint of right knee  Medical/Referring Diagnosis:    Spinal stenosis of lumbar region with neurogenic claudication  Lumbar spondylosis  Disc degeneration, lumbar  Lumbar radiculopathy      Referring Physician:  Archana Britt MD MD Orders:  PT Eval and Treat   Return MD Appt:  not known   Date of Onset:  No data recorded 2016  Allergies:   Patient has no known allergies.  Restrictions/Precautions:   None      Interventions Planned (Treatment may consist of any combination of the following):  Current Treatment Recommendations: Strengthening; ROM; Functional mobility training; Balance training; Endurance training; Gait training; Stair training; Neuromuscular re-education; Manual; Modalities; Dry needling; Aquatics; Therapeutic activities    See Assessment Note    Subjective Comments:   Patient reports that he is doing ok lately, but did not get to the gym over last week after having the covid booster  Initial Pain Level::     5/10  Post Session Pain Level:       3/10  Medications Last

## 2024-01-14 ASSESSMENT — PAIN SCALES - GENERAL: PAINLEVEL_OUTOF10: 5

## 2024-01-26 ENCOUNTER — HOSPITAL ENCOUNTER (OUTPATIENT)
Dept: PHYSICAL THERAPY | Age: 79
Setting detail: RECURRING SERIES
Discharge: HOME | End: 2024-01-29
Attending: PHYSICAL MEDICINE & REHABILITATION
Payer: MEDICARE

## 2024-01-26 PROCEDURE — 97140 MANUAL THERAPY 1/> REGIONS: CPT

## 2024-01-26 PROCEDURE — 97110 THERAPEUTIC EXERCISES: CPT

## 2024-01-26 ASSESSMENT — PAIN SCALES - GENERAL: PAINLEVEL_OUTOF10: 4

## 2024-01-26 NOTE — PROGRESS NOTES
James Potocki  : 1945  Primary: Medicare Part A And B (Medicare)  Secondary: AETNA SENIOR MEDICARE SUPP [facility] [address] [place] [address]  Phone: [phone]  Fax: [phone] Plan Frequency: 1 time every other week for 12 weeks  Plan of Care/Certification Expiration Date: 24        Plan of Care/Certification Expiration Date:  Plan of Care/Certification Expiration Date: 24    Frequency/Duration: Plan Frequency: 1 time every other week for 12 weeks      Time In/Out:   Time In: 857  Time Out: 957      PT Visit Info:    Plan Frequency: 1 time every other week for 12 weeks      Visit Count:  2    OUTPATIENT PHYSICAL THERAPY:   Treatment Note 2024       Episode  (Lumbar stenosis/Radiculopathy)               Treatment Diagnosis:    Vertebrogenic low back pain  Difficulty in walking, not elsewhere classified  Pain in joint of right hip  Pain in joint of right knee  Medical/Referring Diagnosis:    Spinal stenosis of lumbar region with neurogenic claudication  Lumbar spondylosis  Disc degeneration, lumbar  Lumbar radiculopathy      Referring Physician:  Archana Britt MD MD Orders:  PT Eval and Treat   Return MD Appt:  not known   Date of Onset:  No data recorded 2016  Allergies:   Patient has no known allergies.  Restrictions/Precautions:   None      Interventions Planned (Treatment may consist of any combination of the following):  Current Treatment Recommendations: Strengthening; ROM; Functional mobility training; Balance training; Endurance training; Gait training; Stair training; Neuromuscular re-education; Manual; Modalities; Dry needling; Aquatics; Therapeutic activities      Subjective Comments:   Patient reports that he finally got over the cold or virus that he had and doing better with going to the gym  Initial Pain Level::     4/10  Post Session Pain Level:       2/10  Medications Last Reviewed:

## 2024-02-09 ENCOUNTER — HOSPITAL ENCOUNTER (OUTPATIENT)
Dept: PHYSICAL THERAPY | Age: 79
Setting detail: RECURRING SERIES
Discharge: HOME | End: 2024-02-12
Attending: PHYSICAL MEDICINE & REHABILITATION
Payer: MEDICARE

## 2024-02-09 PROCEDURE — 97110 THERAPEUTIC EXERCISES: CPT

## 2024-02-09 PROCEDURE — 97140 MANUAL THERAPY 1/> REGIONS: CPT

## 2024-02-09 NOTE — PROGRESS NOTES
James Potocki  : 1945  Primary: Medicare Part A And B (Medicare)  Secondary: AETNA SENIOR MEDICARE SUPP [facility] @ [facility] [address] [place] [address]  Phone: [phone]  Fax: [phone] Plan Frequency: 1 time every other week for 12 weeks  Plan of Care/Certification Expiration Date: 24        Plan of Care/Certification Expiration Date:  Plan of Care/Certification Expiration Date: 24    Frequency/Duration: Plan Frequency: 1 time every other week for 12 weeks      Time In/Out:   Time In: 1400  Time Out: 1454      PT Visit Info:    Plan Frequency: 1 time every other week for 12 weeks      Visit Count:  3    OUTPATIENT PHYSICAL THERAPY:   Treatment Note 2024       Episode  (Lumbar stenosis/Radiculopathy)               Treatment Diagnosis:    Vertebrogenic low back pain  Difficulty in walking, not elsewhere classified  Pain in joint of right hip  Pain in joint of right knee  Medical/Referring Diagnosis:    Spinal stenosis of lumbar region with neurogenic claudication  Lumbar spondylosis  Disc degeneration, lumbar  Lumbar radiculopathy      Referring Physician:  Archana Britt MD MD Orders:  PT Eval and Treat   Return MD Appt:  not known   Date of Onset:  No data recorded 2016  Allergies:   Patient has no known allergies.  Restrictions/Precautions:   None      Interventions Planned (Treatment may consist of any combination of the following):  Current Treatment Recommendations: Strengthening; ROM; Functional mobility training; Balance training; Endurance training; Gait training; Stair training; Neuromuscular re-education; Manual; Modalities; Dry needling; Aquatics; Therapeutic activities      Subjective Comments:   Patient reports that he still has his good days and bad. He feels tired a lot from the medication, but still gets to the gym when he can  Initial Pain Level::     5/10  Post Session Pain Level:       3/10  Medications Last

## 2024-02-21 ENCOUNTER — HOSPITAL ENCOUNTER (OUTPATIENT)
Dept: PHYSICAL THERAPY | Age: 79
Setting detail: RECURRING SERIES
Discharge: HOME | End: 2024-02-24
Attending: PHYSICAL MEDICINE & REHABILITATION
Payer: MEDICARE

## 2024-02-21 PROCEDURE — 97140 MANUAL THERAPY 1/> REGIONS: CPT

## 2024-02-21 PROCEDURE — 97110 THERAPEUTIC EXERCISES: CPT

## 2024-02-21 ASSESSMENT — PAIN SCALES - GENERAL: PAINLEVEL_OUTOF10: 6

## 2024-02-21 NOTE — PROGRESS NOTES
James Potocki  : 1945  Primary: Medicare Part A And B (Medicare)  Secondary: AETNA SENIOR MEDICARE SUPP [facility] @ [facility] [address] [place] [address]  Phone: [phone]  Fax: [phone] Plan Frequency: 1 time every other week for 12 weeks  Plan of Care/Certification Expiration Date: 24        Plan of Care/Certification Expiration Date:  Plan of Care/Certification Expiration Date: 24    Frequency/Duration: Plan Frequency: 1 time every other week for 12 weeks      Time In/Out:   Time In: 1357  Time Out: 1458      PT Visit Info:    Plan Frequency: 1 time every other week for 12 weeks      Visit Count:  4    OUTPATIENT PHYSICAL THERAPY:   Treatment Note 2024       Episode  (Lumbar stenosis/Radiculopathy)               Treatment Diagnosis:    Vertebrogenic low back pain  Difficulty in walking, not elsewhere classified  Pain in joint of right hip  Pain in joint of right knee  Medical/Referring Diagnosis:    Spinal stenosis of lumbar region with neurogenic claudication  Lumbar spondylosis  Disc degeneration, lumbar  Lumbar radiculopathy      Referring Physician:  Archana Britt MD MD Orders:  PT Eval and Treat   Return MD Appt:  not known   Date of Onset:  No data recorded 2016  Allergies:   Patient has no known allergies.  Restrictions/Precautions:   None      Interventions Planned (Treatment may consist of any combination of the following):  Current Treatment Recommendations: Strengthening; ROM; Functional mobility training; Balance training; Endurance training; Gait training; Stair training; Neuromuscular re-education; Manual; Modalities; Dry needling; Aquatics; Therapeutic activities      Subjective Comments:   Patient reports that he feels a little achy in his back today  Initial Pain Level::     6/10  Post Session Pain Level:       3/10  Medications Last Reviewed:  2024  Updated Objective Findings: Improved right hip

## 2024-03-05 ENCOUNTER — HOSPITAL ENCOUNTER (OUTPATIENT)
Dept: PHYSICAL THERAPY | Age: 79
Setting detail: RECURRING SERIES
Discharge: HOME | End: 2024-03-08
Attending: PHYSICAL MEDICINE & REHABILITATION
Payer: MEDICARE

## 2024-03-05 PROCEDURE — 97110 THERAPEUTIC EXERCISES: CPT

## 2024-03-05 PROCEDURE — 97140 MANUAL THERAPY 1/> REGIONS: CPT

## 2024-03-05 ASSESSMENT — PAIN SCALES - GENERAL: PAINLEVEL_OUTOF10: 6

## 2024-03-05 NOTE — PROGRESS NOTES
[facility]   4/16/2024  3:00 PM Luis Fernando Perales, PT [facility]   4/30/2024  2:00 PM [facility]

## 2024-03-19 ENCOUNTER — HOSPITAL ENCOUNTER (OUTPATIENT)
Dept: PHYSICAL THERAPY | Age: 79
Setting detail: RECURRING SERIES
Discharge: HOME | End: 2024-03-22
Attending: PHYSICAL MEDICINE & REHABILITATION
Payer: MEDICARE

## 2024-03-19 PROCEDURE — 97110 THERAPEUTIC EXERCISES: CPT

## 2024-03-19 PROCEDURE — 97140 MANUAL THERAPY 1/> REGIONS: CPT

## 2024-03-19 ASSESSMENT — PAIN SCALES - GENERAL: PAINLEVEL_OUTOF10: 6

## 2024-03-19 NOTE — PROGRESS NOTES
James Potocki  : 1945  Primary: Medicare Part A And B (Medicare)  Secondary: AETNA SENIOR MEDICARE SUPP [facility] @ [facility] [address] [place] [address]  Phone: [phone]  Fax: [phone] Plan Frequency: 1 time every other week for 12 weeks  Plan of Care/Certification Expiration Date: 24        Plan of Care/Certification Expiration Date:  Plan of Care/Certification Expiration Date: 24    Frequency/Duration: Plan Frequency: 1 time every other week for 12 weeks      Time In/Out:   Time In: 1402  Time Out: 1459      PT Visit Info:    Plan Frequency: 1 time every other week for 12 weeks      Visit Count:  6    OUTPATIENT PHYSICAL THERAPY:   Treatment Note 3/19/2024       Episode  (Lumbar stenosis/Radiculopathy)               Treatment Diagnosis:    Vertebrogenic low back pain  Difficulty in walking, not elsewhere classified  Pain in joint of right hip  Pain in joint of right knee  Medical/Referring Diagnosis:    Spinal stenosis of lumbar region with neurogenic claudication  Lumbar spondylosis  Disc degeneration, lumbar  Lumbar radiculopathy      Referring Physician:  Archana Britt MD MD Orders:  PT Eval and Treat   Return MD Appt:  not known   Date of Onset:  No data recorded 2016  Allergies:   Patient has no known allergies.  Restrictions/Precautions:   None      Interventions Planned (Treatment may consist of any combination of the following):  Current Treatment Recommendations: Strengthening; ROM; Functional mobility training; Balance training; Endurance training; Gait training; Stair training; Neuromuscular re-education; Manual; Modalities; Dry needling; Aquatics; Therapeutic activities      Subjective Comments:   Patient reports that he is feeling it in the lower back still and feels flared up the last few weeks  Initial Pain Level::     610  Post Session Pain Level:       310  Medications Last Reviewed:  3/19/2024  Updated

## 2024-03-21 ENCOUNTER — HOSPITAL ENCOUNTER (OUTPATIENT)
Dept: PHYSICAL THERAPY | Age: 79
Setting detail: RECURRING SERIES
End: 2024-03-21
Attending: PHYSICAL MEDICINE & REHABILITATION
Payer: MEDICARE

## 2024-04-02 ENCOUNTER — HOSPITAL ENCOUNTER (OUTPATIENT)
Dept: PHYSICAL THERAPY | Age: 79
Setting detail: RECURRING SERIES
Discharge: HOME | End: 2024-04-05
Attending: PHYSICAL MEDICINE & REHABILITATION
Payer: MEDICARE

## 2024-04-02 PROCEDURE — 97140 MANUAL THERAPY 1/> REGIONS: CPT

## 2024-04-02 ASSESSMENT — PAIN SCALES - GENERAL: PAINLEVEL_OUTOF10: 6

## 2024-04-02 NOTE — THERAPY RECERTIFICATION
James Potocki  : 1945  Primary: Medicare Part A And B (Medicare)  Secondary: AETNA SENIOR MEDICARE SUPP [facility] @ [facility] [address]  [place] [address]  Phone: [phone]  Fax: [phone] Plan Frequency: 1 time every other week for 12 weeks    Plan of Care/Certification Expiration Date: 24        Plan of Care/Certification Expiration Date:  Plan of Care/Certification Expiration Date: 24    Frequency/Duration: Plan Frequency: 1 time every other week for 12 weeks      Time In/Out:   Time In: 1505  Time Out: 1600      PT Visit Info:    Plan Frequency: 1 time every other week for 12 weeks  Total # of Visits to Date: 7      Visit Count:  7                OUTPATIENT PHYSICAL THERAPY:             Recertification 2024               Episode (Lumbar stenosis/Radiculopathy)         Treatment Diagnosis:     Vertebrogenic low back pain  Difficulty in walking, not elsewhere classified  Pain in joint of right hip  Pain in joint of right knee  Medical/Referring Diagnosis:    Spinal stenosis of lumbar region with neurogenic claudication  Lumbar spondylosis  Disc degeneration, lumbar  Lumbar radiculopathy      Referring Physician:  Archana Britt MD MD Orders:  PT Eval and Treat   Return MD Appt:  not scheduled  Date of Onset:    2016  Allergies:  Patient has no known allergies.  Restrictions/Precautions:    None      Medications Last Reviewed:  2024     SUBJECTIVE   History of Injury/Illness (Reason for Referral):  Patient reports that he had a hip replacement in  that helped fix some hip issues, but has since had difficulty with walking and increasing low back pain. He reports he has increased arthritis in the right knee with weakness with walking. He has had scans and seen a few different neurosurgeons about possible surgery. Scans show increased stenosis in L4-5 and some in L3-4 areas with disc degeneration. More issues on his right

## 2024-04-02 NOTE — PROGRESS NOTES
James Potocki  : 1945  Primary: Medicare Part A And B (Medicare)  Secondary: AETNA SENIOR MEDICARE SUPP [facility] @ [facility] [address] [place] [address]  Phone: [phone]  Fax: [phone] Plan Frequency: 1 time every other week for 12 weeks  Plan of Care/Certification Expiration Date: 24        Plan of Care/Certification Expiration Date:  Plan of Care/Certification Expiration Date: 24    Frequency/Duration: Plan Frequency: 1 time every other week for 12 weeks      Time In/Out:   Time In: 1505  Time Out: 1600      PT Visit Info:    Plan Frequency: 1 time every other week for 12 weeks  Total # of Visits to Date: 7      Visit Count:  7    OUTPATIENT PHYSICAL THERAPY:   Treatment Note 2024       Episode  (Lumbar stenosis/Radiculopathy)               Treatment Diagnosis:    Vertebrogenic low back pain  Difficulty in walking, not elsewhere classified  Pain in joint of right hip  Pain in joint of right knee  Medical/Referring Diagnosis:    Spinal stenosis of lumbar region with neurogenic claudication  Lumbar spondylosis  Disc degeneration, lumbar  Lumbar radiculopathy      Referring Physician:  Archana Britt MD MD Orders:  PT Eval and Treat   Return MD Appt:  not known   Date of Onset:  No data recorded 2016  Allergies:   Patient has no known allergies.  Restrictions/Precautions:   None      Interventions Planned (Treatment may consist of any combination of the following):  Current Treatment Recommendations: Strengthening; ROM; Functional mobility training; Balance training; Endurance training; Gait training; Stair training; Neuromuscular re-education; Manual; Modalities; Dry needling; Aquatics; Therapeutic activities      Subjective Comments:   Patient reports that the massage last week really threw him for a loop and he had trouble getting around the day after and it is starting to get a little better today  Initial Pain Level::

## 2024-04-10 ENCOUNTER — TELEPHONE (OUTPATIENT)
Dept: ORTHOPEDIC SURGERY | Age: 79
End: 2024-04-10

## 2024-04-11 ENCOUNTER — TELEPHONE (OUTPATIENT)
Dept: ORTHOPEDIC SURGERY | Age: 79
End: 2024-04-11

## 2024-04-11 DIAGNOSIS — M54.16 LUMBAR RADICULOPATHY: ICD-10-CM

## 2024-04-11 DIAGNOSIS — M47.816 LUMBAR SPONDYLOSIS: ICD-10-CM

## 2024-04-11 DIAGNOSIS — M51.36 DISC DEGENERATION, LUMBAR: ICD-10-CM

## 2024-04-11 DIAGNOSIS — M48.062 SPINAL STENOSIS OF LUMBAR REGION WITH NEUROGENIC CLAUDICATION: Primary | ICD-10-CM

## 2024-04-11 NOTE — TELEPHONE ENCOUNTER
Call was returned to patient and message was left in regards to getting patient scheduled for a injection with Dr. Britt.

## 2024-04-15 ENCOUNTER — OFFICE VISIT (OUTPATIENT)
Dept: ORTHOPEDIC SURGERY | Age: 79
End: 2024-04-15
Payer: MEDICARE

## 2024-04-15 DIAGNOSIS — M48.062 SPINAL STENOSIS OF LUMBAR REGION WITH NEUROGENIC CLAUDICATION: Primary | ICD-10-CM

## 2024-04-15 PROCEDURE — 62323 NJX INTERLAMINAR LMBR/SAC: CPT | Performed by: PHYSICAL MEDICINE & REHABILITATION

## 2024-04-15 RX ORDER — BETAMETHASONE SODIUM PHOSPHATE AND BETAMETHASONE ACETATE 3; 3 MG/ML; MG/ML
12 INJECTION, SUSPENSION INTRA-ARTICULAR; INTRALESIONAL; INTRAMUSCULAR; SOFT TISSUE ONCE
Status: COMPLETED | OUTPATIENT
Start: 2024-04-15 | End: 2024-04-15

## 2024-04-15 RX ADMIN — BETAMETHASONE SODIUM PHOSPHATE AND BETAMETHASONE ACETATE 12 MG: 3; 3 INJECTION, SUSPENSION INTRA-ARTICULAR; INTRALESIONAL; INTRAMUSCULAR; SOFT TISSUE at 16:09

## 2024-04-15 NOTE — PROGRESS NOTES
Name: James Potocki  YOB: 1945  Gender: male  MRN: [id number]        Interlaminar ADAM Procedure Note      Procedure: L3-L4 interlaminar epidural steroid injection    Precautions: James Potocki denies prior sensitivity to steroid, local anesthetic, iodine, or shellfish.       Consent:  Consent was obtained prior to the procedure. The procedure was discussed at length with James Potocki. He was given the opportunity to ask questions regarding the procedure and its associated risks.  In addition to the potential risks associated with the procedure itself, the patient was informed both verbally and in writing of potential side effects of the use glucocorticoids.  The patient appeared to comprehend the informed consent and desired to have the procedure performed, and informed consent was signed.     He was placed in a prone position on the fluoroscopy table and the skin was prepped and draped in a routine sterile fashion. The areas to be injected were each anesthetized with 1 ml of 1% Lidocaine. A 22 gauge 3.5 inch spinal needle was carefully advanced under fluoroscopic guidance to the L3-L4 interlaminar space (right paramedian). 0.5 ml of 70% of Omnipaque was injected to confirm proper needle placement and absence of subdural or vascular flow Once proper placement was confirmed, 2ml of sterile water and 12 mg of betamethasone were injected through the spinal needle.       Fluoroscopic guidance was used intermittently over a 10-minute period to insure proper needle placement and his safety. A hard copy of the fluoroscopic image has been placed in his chart and is saved on the C-arm hard drive. He was monitored for 30 minutes after the procedure and discharged home in a stable fashion with a routine follow up.    Procedural Diagnosis:     ICD-10-CM    1. Spinal stenosis of lumbar region with neurogenic claudication  M48.062 XR INJ SPINE THER SUBST LUM/SAC W IMG     betamethasone

## 2024-04-16 ENCOUNTER — HOSPITAL ENCOUNTER (OUTPATIENT)
Dept: PHYSICAL THERAPY | Age: 79
Setting detail: RECURRING SERIES
Discharge: HOME | End: 2024-04-19
Attending: PHYSICAL MEDICINE & REHABILITATION
Payer: MEDICARE

## 2024-04-16 PROCEDURE — 97110 THERAPEUTIC EXERCISES: CPT

## 2024-04-16 PROCEDURE — 97140 MANUAL THERAPY 1/> REGIONS: CPT

## 2024-04-16 ASSESSMENT — PAIN SCALES - GENERAL: PAINLEVEL_OUTOF10: 5

## 2024-04-16 NOTE — PROGRESS NOTES
James Potocki  : 1945  Primary: Medicare Part A And B (Medicare)  Secondary: AET[employer] SENIOR MEDICARE SUPP [facility] @ [facility] [address]  [place] [address]  Phone: [phone]  Fax: [phone] Plan Frequency: 1 time every other week for 12 weeks  Plan of Care/Certification Expiration Date: 24        Plan of Care/Certification Expiration Date:  Plan of Care/Certification Expiration Date: 24    Frequency/Duration: Plan Frequency: 1 time every other week for 12 weeks      Time In/Out:   Time In: 1500  Time Out: 1550      PT Visit Info:    Plan Frequency: 1 time every other week for 12 weeks  Total # of Visits to Date: 7  Progress Note Counter: 1      Visit Count:  8    OUTPATIENT PHYSICAL THERAPY:   Treatment Note 2024       Episode  (Lumbar stenosis/Radiculopathy)               Treatment Diagnosis:    Vertebrogenic low back pain  Difficulty in walking, not elsewhere classified  Pain in joint of right hip  Pain in joint of right knee  Medical/Referring Diagnosis:    Spinal stenosis of lumbar region with neurogenic claudication  Lumbar spondylosis  Disc degeneration, lumbar  Lumbar radiculopathy      Referring Physician:  Archana Britt MD MD Orders:  PT Eval and Treat   Return MD Appt:  not known   Date of Onset:  No data recorded 2016  Allergies:   Patient has no known allergies.  Restrictions/Precautions:   None      Interventions Planned (Treatment may consist of any combination of the following):  Current Treatment Recommendations: Strengthening; ROM; Functional mobility training; Balance training; Endurance training; Gait training; Stair training; Neuromuscular re-education; Manual; Modalities; Dry needling; Aquatics; Therapeutic activities      Subjective Comments:   Patient reports that he had an injection yesterday in the spine and might be getting a knee injection in May  Initial Pain Level::     5/10  Post Session Pain Level:

## 2024-04-30 ENCOUNTER — HOSPITAL ENCOUNTER (OUTPATIENT)
Dept: PHYSICAL THERAPY | Age: 79
Setting detail: RECURRING SERIES
Discharge: HOME | End: 2024-05-03
Attending: PHYSICAL MEDICINE & REHABILITATION
Payer: MEDICARE

## 2024-04-30 PROCEDURE — 97140 MANUAL THERAPY 1/> REGIONS: CPT

## 2024-04-30 PROCEDURE — 97110 THERAPEUTIC EXERCISES: CPT

## 2024-04-30 ASSESSMENT — PAIN SCALES - GENERAL: PAINLEVEL_OUTOF10: 6

## 2024-04-30 NOTE — PROGRESS NOTES
5/14/2024  2:00 PM Luis Fernando Perales, PT [facility]   5/30/2024 11:00 AM Luis Fernando Perales, PT [facility]   6/12/2024  2:00 PM Luis Fernando Perales, PT [facility]

## 2024-05-14 ENCOUNTER — OFFICE VISIT (OUTPATIENT)
Dept: ORTHOPEDIC SURGERY | Age: 79
End: 2024-05-14
Payer: MEDICARE

## 2024-05-14 ENCOUNTER — HOSPITAL ENCOUNTER (OUTPATIENT)
Dept: PHYSICAL THERAPY | Age: 79
Setting detail: RECURRING SERIES
Discharge: HOME | End: 2024-05-17
Attending: PHYSICAL MEDICINE & REHABILITATION
Payer: MEDICARE

## 2024-05-14 ENCOUNTER — TELEPHONE (OUTPATIENT)
Dept: ORTHOPEDIC SURGERY | Age: 79
End: 2024-05-14

## 2024-05-14 VITALS — WEIGHT: 240 LBS | BODY MASS INDEX: 32.51 KG/M2 | HEIGHT: 72 IN

## 2024-05-14 DIAGNOSIS — Z96.641 PRESENCE OF RIGHT HIP IMPLANT: ICD-10-CM

## 2024-05-14 DIAGNOSIS — M17.11 PRIMARY OSTEOARTHRITIS OF RIGHT KNEE: ICD-10-CM

## 2024-05-14 DIAGNOSIS — M25.561 RIGHT KNEE PAIN, UNSPECIFIED CHRONICITY: Primary | ICD-10-CM

## 2024-05-14 PROCEDURE — G8428 CUR MEDS NOT DOCUMENT: HCPCS | Performed by: ORTHOPAEDIC SURGERY

## 2024-05-14 PROCEDURE — G8417 CALC BMI ABV UP PARAM F/U: HCPCS | Performed by: ORTHOPAEDIC SURGERY

## 2024-05-14 PROCEDURE — 1123F ACP DISCUSS/DSCN MKR DOCD: CPT | Performed by: ORTHOPAEDIC SURGERY

## 2024-05-14 PROCEDURE — 99204 OFFICE O/P NEW MOD 45 MIN: CPT | Performed by: ORTHOPAEDIC SURGERY

## 2024-05-14 PROCEDURE — 1036F TOBACCO NON-USER: CPT | Performed by: ORTHOPAEDIC SURGERY

## 2024-05-14 PROCEDURE — 97140 MANUAL THERAPY 1/> REGIONS: CPT

## 2024-05-14 ASSESSMENT — PAIN SCALES - GENERAL: PAINLEVEL_OUTOF10: 5

## 2024-05-14 NOTE — PROGRESS NOTES
James Potocki  : 1945  Primary: Medicare Part A And B (Medicare)  Secondary: AETNA SENIOR MEDICARE SUPP [facility] @ [facility] [address]  [facility] [address]  Phone: [phone]  Fax: [phone] Plan Frequency: 1 time every other week for 12 weeks  Plan of Care/Certification Expiration Date: 24        Plan of Care/Certification Expiration Date:  Plan of Care/Certification Expiration Date: 24    Frequency/Duration: Plan Frequency: 1 time every other week for 12 weeks      Time In/Out:   Time In: 1405  Time Out: 1458      PT Visit Info:    Plan Frequency: 1 time every other week for 12 weeks  Total # of Visits to Date: 7  Progress Note Counter: 1      Visit Count:  10    OUTPATIENT PHYSICAL THERAPY:   Treatment Note 2024       Episode  (Lumbar stenosis/Radiculopathy)               Treatment Diagnosis:    Vertebrogenic low back pain  Difficulty in walking, not elsewhere classified  Pain in joint of right hip  Pain in joint of right knee  Medical/Referring Diagnosis:    Spinal stenosis of lumbar region with neurogenic claudication  Lumbar spondylosis  Disc degeneration, lumbar  Lumbar radiculopathy      Referring Physician:  Archana Britt MD MD Orders:  PT Eval and Treat   Return MD Appt:  not known   Date of Onset:  No data recorded 2016  Allergies:   Adhesive tape, Statins, Nifedipine, and Other  Restrictions/Precautions:   None      Interventions Planned (Treatment may consist of any combination of the following):  Current Treatment Recommendations: Strengthening; ROM; Functional mobility training; Balance training; Endurance training; Gait training; Stair training; Neuromuscular re-education; Manual; Modalities; Dry needling; Aquatics; Therapeutic activities      Subjective Comments:   Patient reports that he visited Dr. Ramirez about the knee and discussed what to do going forward. He might or might not get scheduled for a

## 2024-05-14 NOTE — PROGRESS NOTES
Thanks they are okay name: James Potocki  YOB: 1945  Gender: male  MRN: [id number]    CC:   Chief Complaint   Patient presents with    Knee Pain     Rt knee pain    Follow-up     Rt lidia         HPI: James Potocki is a 79 y.o. male with a PMHx of HTN, gout, s/p right LIDIA in 2015, and chronic lower back pain evaluated and treated by Dr. Britt and Dr. Luong, Dr. Garg, Dr. Mora  here for evaluation of right knee pain.  The pain has been present for years and is becoming worse. The pain is primarily on the lateral side.   he describes the pain as sharp, intermittent catching, and weakness  The pain is worse with going up and/or down stairs, standing, and walking  The pain does radiate down the leg.  He does complain of numbness in both legs secondary to neuropathy.  He also has some leg pain related to spinal stenosis.  Treatment so far has been activity modification, Tylenol, and celebrex with some relief.  The patient is severely limited in all activities of daily living.    Allergies   Allergen Reactions    Adhesive Tape      Other Reaction(s): Itching-Allergy    \"Some BP meds\" cause \"   SWELLING OF LEGS with Procardia and with current medicine    Other reaction(s): Itching-Allergy   \"Some BP meds\" cause \"   SWELLING OF LEGS with Procardia and with current medicine    Statins Other (See Comments)     Other Reaction(s): Other- (not listed) - Allergy, Other (see comments), weakness,pain    Extreme nerve pain    Extreme nerve pain   Extreme nerve pain    MYALGIAS    Other reaction(s): Other- (not listed) - Allergy, Other- (not listed) - Allergy   Extreme nerve pain   Extreme nerve pain   Extreme nerve pain   MYALGIAS    Nifedipine      Other Reaction(s): Abdominal pain-Intolerance    Other reaction(s): Abdominal pain-Intolerance    Other Other (See Comments)     Other Reaction(s): Other- (not listed) - Allergy    \"Some BP meds\" cause \"   SWELLING OF LEGS with Procardia and with current medicine

## 2024-05-30 ENCOUNTER — HOSPITAL ENCOUNTER (OUTPATIENT)
Dept: PHYSICAL THERAPY | Age: 79
Setting detail: RECURRING SERIES
End: 2024-05-30
Attending: PHYSICAL MEDICINE & REHABILITATION
Payer: MEDICARE

## 2024-05-30 PROCEDURE — 97140 MANUAL THERAPY 1/> REGIONS: CPT

## 2024-05-30 PROCEDURE — 97110 THERAPEUTIC EXERCISES: CPT

## 2024-05-30 ASSESSMENT — PAIN SCALES - GENERAL: PAINLEVEL_OUTOF10: 6

## 2024-05-30 NOTE — PROGRESS NOTES
James Potocki  : 1945  Primary: Medicare Part A And B (Medicare)  Secondary: AETNA SENIOR MEDICARE SUPP [facility] @ [facility] [address]  [facility] [address]  Phone: [phone]  Fax: [phone] Plan Frequency: 1 time every other week for 12 weeks  Plan of Care/Certification Expiration Date: 24        Plan of Care/Certification Expiration Date:  Plan of Care/Certification Expiration Date: 24    Frequency/Duration: Plan Frequency: 1 time every other week for 12 weeks      Time In/Out:   Time In: 1100  Time Out: 1154      PT Visit Info:    Plan Frequency: 1 time every other week for 12 weeks  Total # of Visits to Date: 7  Progress Note Counter: 1      Visit Count:  11    OUTPATIENT PHYSICAL THERAPY:   Treatment Note 2024       Episode  (Lumbar stenosis/Radiculopathy)               Treatment Diagnosis:    Vertebrogenic low back pain  Difficulty in walking, not elsewhere classified  Pain in joint of right hip  Pain in joint of right knee  Medical/Referring Diagnosis:    Spinal stenosis of lumbar region with neurogenic claudication  Lumbar spondylosis  Disc degeneration, lumbar  Lumbar radiculopathy      Referring Physician:  Archana Britt MD MD Orders:  PT Eval and Treat   Return MD Appt:  not known   Date of Onset:  No data recorded 2016  Allergies:   Adhesive tape, Statins, Nifedipine, and Other  Restrictions/Precautions:   None      Interventions Planned (Treatment may consist of any combination of the following):  Current Treatment Recommendations: Strengthening; ROM; Functional mobility training; Balance training; Endurance training; Gait training; Stair training; Neuromuscular re-education; Manual; Modalities; Dry needling; Aquatics; Therapeutic activities      Subjective Comments:   Patient reports that he still struggles with the walking and tightness in the hamstring and QL.  Initial Pain Level::     10  Post Session Pain Level:

## 2024-06-12 ENCOUNTER — HOSPITAL ENCOUNTER (OUTPATIENT)
Dept: PHYSICAL THERAPY | Age: 79
Setting detail: RECURRING SERIES
Discharge: HOME | End: 2024-06-15
Attending: PHYSICAL MEDICINE & REHABILITATION
Payer: MEDICARE

## 2024-06-12 PROCEDURE — 97110 THERAPEUTIC EXERCISES: CPT

## 2024-06-12 PROCEDURE — 97140 MANUAL THERAPY 1/> REGIONS: CPT

## 2024-06-12 ASSESSMENT — PAIN SCALES - GENERAL: PAINLEVEL_OUTOF10: 5

## 2024-06-12 NOTE — PROGRESS NOTES
James Potocki  : 1945  Primary: Medicare Part A And B (Medicare)  Secondary: AETNA SENIOR MEDICARE SUPP [facility] @ [facility] [address]  [facility] [address]  Phone: [phone]  Fax: [phone] Plan Frequency: 1 time every other week for 12 weeks  Plan of Care/Certification Expiration Date: 24        Plan of Care/Certification Expiration Date:  Plan of Care/Certification Expiration Date: 24    Frequency/Duration: Plan Frequency: 1 time every other week for 12 weeks      Time In/Out:   Time In: 1354  Time Out: 1454      PT Visit Info:    Plan Frequency: 1 time every other week for 12 weeks  Total # of Visits to Date: 7  Progress Note Counter: 1      Visit Count:  12    OUTPATIENT PHYSICAL THERAPY:   Treatment Note 2024       Episode  (Lumbar stenosis/Radiculopathy)               Treatment Diagnosis:    Vertebrogenic low back pain  Difficulty in walking, not elsewhere classified  Pain in joint of right hip  Pain in joint of right knee  Medical/Referring Diagnosis:    Spinal stenosis of lumbar region with neurogenic claudication  Lumbar spondylosis  Disc degeneration, lumbar  Lumbar radiculopathy      Referring Physician:  Archana Britt MD MD Orders:  PT Eval and Treat   Return MD Appt:  not known   Date of Onset:  No data recorded 2016  Allergies:   Adhesive tape, Statins, Nifedipine, and Other  Restrictions/Precautions:   None      Interventions Planned (Treatment may consist of any combination of the following):  Current Treatment Recommendations: Strengthening; ROM; Functional mobility training; Balance training; Endurance training; Gait training; Stair training; Neuromuscular re-education; Manual; Modalities; Dry needling; Aquatics; Therapeutic activities      Subjective Comments:   Patient reports that he felt better after last time, but the hamstring still feels tight.  Initial Pain Level::     5/10  Post Session Pain Level:

## 2024-06-25 ENCOUNTER — HOSPITAL ENCOUNTER (OUTPATIENT)
Dept: PHYSICAL THERAPY | Age: 79
Setting detail: RECURRING SERIES
Discharge: HOME | End: 2024-06-28
Attending: PHYSICAL MEDICINE & REHABILITATION
Payer: MEDICARE

## 2024-06-25 PROCEDURE — 97140 MANUAL THERAPY 1/> REGIONS: CPT

## 2024-06-25 ASSESSMENT — PAIN SCALES - GENERAL: PAINLEVEL_OUTOF10: 4

## 2024-06-25 NOTE — THERAPY RECERTIFICATION
disc degeneration. More issues on his right side with weakness.  He has difficulty walking long distances, getting up and down some days and general mobility. He is avid in going to the gym 5-6 days a week.  He wants to lose weight and stay active as long as he can  Patient Stated Goal(s):  \"continue to keep a healthy lifestyle\"  Initial Pain Level:      4/10   Post Session Pain Level:     2/10  Past Medical History/Comorbidities:   Mr. Potocki  has a past medical history of Anxiety, Edema of both legs, Enlarged prostate, Gout, Hip pain, History of elevated glucose, History of seasonal allergies, Hypercholesterolemia, Hypertension, Keratoacanthoma, S/P total hip arthroplasty, Skin neoplasm, and Spinal stenosis.  Mr. Potocki  has a past surgical history that includes [place] tooth extraction (as teenager) and Tonsillectomy (as child).  Social History/Living Environment:   PT/OT Social History/Living Environment: Patient lives alone  Type of Home: House: One Story    Prior Level of Function/Work/Activity:   PT/OT Level of Function/Work/Activity: Prior Level of Function: Independent   Current Level of Function: Independent      Learning:   Does the patient/guardian have any barriers to learning?: No barriers  Will there be a co-learner?: No  What is the preferred language of the patient/guardian?: English  Is an  required?: No  How does the patient/guardian prefer to learn new concepts?: Listening; Reading; Demonstration; Pictures/Videos    Fall Risk Scale:   Nix Total Score: 15    Dominant Side:  right handed  Assessment & Plan    OBJECTIVE   OBERSERVATION:  Patient is obese individual with increased right genu varus.  Side to side gait pattern  Increased lordosis   Right arm gap less than left in standing    PALPATION:  Increased tightness around right paraspinals  Tightness in right QL with smaller space between ribs and pelvis  Tightness in right hamstring, glutes and IT band  Good motion of right

## 2024-06-25 NOTE — PROGRESS NOTES
James Potocki  : 1945  Primary: Medicare Part A And B (Medicare)  Secondary: AETNA SENIOR MEDICARE SUPP [facility] @ [facility] [address]  [facility] [address]  Phone: [phone]  Fax: [phone] Plan Frequency: 1 time every other week for 12 weeks  Plan of Care/Certification Expiration Date: 24        Plan of Care/Certification Expiration Date:  Plan of Care/Certification Expiration Date: 24    Frequency/Duration: Plan Frequency: 1 time every other week for 12 weeks      Time In/Out:   Time In: 1400  Time Out: 1500      PT Visit Info:    Plan Frequency: 1 time every other week for 12 weeks  Total # of Visits to Date: 7  Progress Note Counter: 1      Visit Count:  13    OUTPATIENT PHYSICAL THERAPY:   Treatment Note 2024       Episode  (Lumbar stenosis/Radiculopathy)               Treatment Diagnosis:    Vertebrogenic low back pain  Difficulty in walking, not elsewhere classified  Pain in joint of right hip  Pain in joint of right knee  Medical/Referring Diagnosis:    Spinal stenosis of lumbar region with neurogenic claudication  Lumbar spondylosis  Disc degeneration, lumbar  Lumbar radiculopathy      Referring Physician:  Archana Britt MD MD Orders:  PT Eval and Treat   Return MD Appt:  not known   Date of Onset:  No data recorded 2016  Allergies:   Adhesive tape, Statins, Nifedipine, and Other  Restrictions/Precautions:   None      Interventions Planned (Treatment may consist of any combination of the following):  Current Treatment Recommendations: Strengthening; ROM; Functional mobility training; Balance training; Endurance training; Gait training; Stair training; Neuromuscular re-education; Manual; Modalities; Dry needling; Aquatics; Therapeutic activities      Subjective Comments:   Patient reports that he has been able to get into his neighborhood pool a few times a week  Initial Pain Level::     4/10  Post Session Pain Level:

## 2024-07-09 ENCOUNTER — HOSPITAL ENCOUNTER (OUTPATIENT)
Dept: PHYSICAL THERAPY | Age: 79
Setting detail: RECURRING SERIES
Discharge: HOME | End: 2024-07-12
Attending: PHYSICAL MEDICINE & REHABILITATION
Payer: MEDICARE

## 2024-07-09 PROCEDURE — 97140 MANUAL THERAPY 1/> REGIONS: CPT

## 2024-07-09 PROCEDURE — 97110 THERAPEUTIC EXERCISES: CPT

## 2024-07-09 ASSESSMENT — PAIN SCALES - GENERAL: PAINLEVEL_OUTOF10: 5

## 2024-07-09 NOTE — PROGRESS NOTES
James Potocki  : 1945  Primary: Medicare Part A And B (Medicare)  Secondary: AETNA SENIOR MEDICARE SUPP [facility] @ [facility] [address]  [place] [address]  Phone: [phone]  Fax: [phone] Plan Frequency: 1 time every other week for 12 weeks  Plan of Care/Certification Expiration Date: 24        Plan of Care/Certification Expiration Date:  Plan of Care/Certification Expiration Date: 24    Frequency/Duration: Plan Frequency: 1 time every other week for 12 weeks      Time In/Out:   Time In: 1400  Time Out: 1450      PT Visit Info:    Plan Frequency: 1 time every other week for 12 weeks  Total # of Visits to Date: 7  Progress Note Counter: 1      Visit Count:  14    OUTPATIENT PHYSICAL THERAPY:   Treatment Note 2024       Episode  (Lumbar stenosis/Radiculopathy)               Treatment Diagnosis:    Vertebrogenic low back pain  Difficulty in walking, not elsewhere classified  Pain in joint of right hip  Pain in joint of right knee  Medical/Referring Diagnosis:    Spinal stenosis of lumbar region with neurogenic claudication  Lumbar spondylosis  Disc degeneration, lumbar  Lumbar radiculopathy      Referring Physician:  Archana Britt MD MD Orders:  PT Eval and Treat   Return MD Appt:  not known   Date of Onset:  No data recorded 2016  Allergies:   Adhesive tape, Statins, Nifedipine, and Other  Restrictions/Precautions:   None      Interventions Planned (Treatment may consist of any combination of the following):  Current Treatment Recommendations: Strengthening; ROM; Functional mobility training; Balance training; Endurance training; Gait training; Stair training; Neuromuscular re-education; Manual; Modalities; Dry needling; Aquatics; Therapeutic activities      Subjective Comments:   Patient reports that he is doing ok, but still struggling with the knee.    Initial Pain Level::     5/10  Post Session Pain Level:

## 2024-07-23 ENCOUNTER — HOSPITAL ENCOUNTER (OUTPATIENT)
Dept: PHYSICAL THERAPY | Age: 79
Setting detail: RECURRING SERIES
Discharge: HOME | End: 2024-07-26
Attending: PHYSICAL MEDICINE & REHABILITATION
Payer: MEDICARE

## 2024-07-23 PROCEDURE — 97140 MANUAL THERAPY 1/> REGIONS: CPT

## 2024-07-23 NOTE — PROGRESS NOTES
James Potocki  : 1945  Primary: Medicare Part A And B (Medicare)  Secondary: AETNA SENIOR MEDICARE SUPP [facility] @ [facility] [address]  [place] [address]  Phone: [phone]  Fax: [phone] Plan Frequency: 1 time every other week for 12 weeks  Plan of Care/Certification Expiration Date: 24        Plan of Care/Certification Expiration Date:  Plan of Care/Certification Expiration Date: 24    Frequency/Duration: Plan Frequency: 1 time every other week for 12 weeks      Time In/Out:   Time In: 1400  Time Out: 1451      PT Visit Info:    Plan Frequency: 1 time every other week for 12 weeks  Total # of Visits to Date: 7  Progress Note Counter: 1      Visit Count:  15    OUTPATIENT PHYSICAL THERAPY:   Treatment Note 2024       Episode  (Lumbar stenosis/Radiculopathy)               Treatment Diagnosis:    Vertebrogenic low back pain  Difficulty in walking, not elsewhere classified  Pain in joint of right hip  Pain in joint of right knee  Medical/Referring Diagnosis:    Spinal stenosis of lumbar region with neurogenic claudication  Lumbar spondylosis  Disc degeneration, lumbar  Lumbar radiculopathy      Referring Physician:  Archana Britt MD MD Orders:  PT Eval and Treat   Return MD Appt:  not known   Date of Onset:  No data recorded 2016  Allergies:   Adhesive tape, Statins, Nifedipine, and Other  Restrictions/Precautions:   None      Interventions Planned (Treatment may consist of any combination of the following):  Current Treatment Recommendations: Strengthening; ROM; Functional mobility training; Balance training; Endurance training; Gait training; Stair training; Neuromuscular re-education; Manual; Modalities; Dry needling; Aquatics; Therapeutic activities      Subjective Comments:   Patient reports that he is doing ok, but still struggling with the knee.    Initial Pain Level::     6/10  Post Session Pain Level:       
Stable

## 2024-08-07 ENCOUNTER — HOSPITAL ENCOUNTER (OUTPATIENT)
Dept: PHYSICAL THERAPY | Age: 79
Setting detail: RECURRING SERIES
Discharge: HOME | End: 2024-08-10
Attending: PHYSICAL MEDICINE & REHABILITATION
Payer: MEDICARE

## 2024-08-07 PROCEDURE — 97140 MANUAL THERAPY 1/> REGIONS: CPT

## 2024-08-07 ASSESSMENT — PAIN SCALES - GENERAL: PAINLEVEL_OUTOF10: 6

## 2024-08-07 NOTE — PROGRESS NOTES
Reviewed:  8/7/2024  Updated Objective Findings: Iincreased hamstring tightness and right QL tightness today  Treatment   MANUAL THERAPY: (45 minutes):   Joint mobilization and Soft tissue mobilization was utilized and necessary because of the patient's restricted joint motion and restricted motion of soft tissue.   -Instrument assisted soft tissue mobilization with consent signed 3/5/24 (5 min not charged for)  Dry Needles Used: 5   Dry Needles Removed: 5     -Side lie mobilization to soft tissue around lumbar paraspinals, groove of spine and sacral sulcus  -Side lie mobilization to pelvis into posterior depression with facilitation  -Side lie hip extension  -Supine hamstring mobilization    THERAPEUTIC EXERCISE: (5 minutes):    Exercises per grid below to improve mobility, strength, balance, and coordination.  Required minimal visual, verbal, and manual cues to promote proper body alignment, promote proper body posture, promote proper body mechanics, and promote proper body breathing techniques.  Progressed resistance, range, and repetitions as indicated.   -Nu step-5 min L2 for mobility and endurance  -Clam shells x 20      Treatment/Session Summary:    Treatment Assessment:   Patient shows improvement in hip activation.  Communication/Consultation:  None today  Equipment provided today:  None  Recommendations/Intent for next treatment session: Next visit will focus on lumbar spine pain reduction and mobility.    >Total Treatment Billable Duration:  50 minutes   Time In: 1456  Time Out: 1550    Luis Fernando Perales PT         Charge Capture  [facility]  Appt Desk     Future Appointments   Date Time Provider Department Center   8/20/2024  2:00 PM Luis Fernando Perales, PT [facility]   9/3/2024  2:00 PM Luis Fernando Perales, PT [facility]   9/17/2024  2:00 PM Luis Fernando Perales, PT [facility]   11/26/2024 10:45 AM Guillermo Ramirez Jr., MD [facility]

## 2024-08-20 ENCOUNTER — HOSPITAL ENCOUNTER (OUTPATIENT)
Dept: PHYSICAL THERAPY | Age: 79
Setting detail: RECURRING SERIES
Discharge: HOME | End: 2024-08-23
Attending: PHYSICAL MEDICINE & REHABILITATION
Payer: MEDICARE

## 2024-08-20 PROCEDURE — 97110 THERAPEUTIC EXERCISES: CPT

## 2024-08-20 PROCEDURE — 97140 MANUAL THERAPY 1/> REGIONS: CPT

## 2024-08-20 ASSESSMENT — PAIN SCALES - GENERAL: PAINLEVEL_OUTOF10: 6

## 2024-08-20 NOTE — PROGRESS NOTES
James Potocki  : 1945  Primary: Medicare Part A And B (Medicare)  Secondary: AETNA SENIOR MEDICARE SUPP [facility] @ [facility] [address]  [facility] [address]  Phone: [phone]  Fax: [phone] Plan Frequency: 1 time every other week for 12 weeks  Plan of Care/Certification Expiration Date: 24        Plan of Care/Certification Expiration Date:  Plan of Care/Certification Expiration Date: 24    Frequency/Duration: Plan Frequency: 1 time every other week for 12 weeks      Time In/Out:   Time In: 1358  Time Out: 1454      PT Visit Info:    Plan Frequency: 1 time every other week for 12 weeks  Total # of Visits to Date: 7  Progress Note Counter: 1      Visit Count:  17    OUTPATIENT PHYSICAL THERAPY:   Treatment Note 2024       Episode  (Lumbar stenosis/Radiculopathy)               Treatment Diagnosis:    Vertebrogenic low back pain  Difficulty in walking, not elsewhere classified  Pain in joint of right hip  Pain in joint of right knee  Medical/Referring Diagnosis:    Spinal stenosis of lumbar region with neurogenic claudication  Lumbar spondylosis  Disc degeneration, lumbar  Lumbar radiculopathy      Referring Physician:  Archana Britt MD MD Orders:  PT Eval and Treat   Return MD Appt:  not known   Date of Onset:  No data recorded 2016  Allergies:   Adhesive tape, Statins, Nifedipine, and Other  Restrictions/Precautions:   None      Interventions Planned (Treatment may consist of any combination of the following):  Current Treatment Recommendations: Strengthening; ROM; Functional mobility training; Balance training; Endurance training; Gait training; Stair training; Neuromuscular re-education; Manual; Modalities; Dry needling; Aquatics; Therapeutic activities      Subjective Comments:   Patient reports that the leg is still giving him issues around the knee and thigh. He also reports some new pain in the left shoulder  Initial Pain

## 2024-09-03 ENCOUNTER — APPOINTMENT (OUTPATIENT)
Dept: PHYSICAL THERAPY | Age: 79
End: 2024-09-03
Attending: PHYSICAL MEDICINE & REHABILITATION
Payer: MEDICARE

## 2024-09-05 ENCOUNTER — TELEPHONE (OUTPATIENT)
Dept: ORTHOPEDIC SURGERY | Age: 79
End: 2024-09-05

## 2024-09-05 ENCOUNTER — HOSPITAL ENCOUNTER (OUTPATIENT)
Dept: PHYSICAL THERAPY | Age: 79
Setting detail: RECURRING SERIES
Discharge: HOME | End: 2024-09-08
Attending: PHYSICAL MEDICINE & REHABILITATION
Payer: MEDICARE

## 2024-09-05 PROCEDURE — 97110 THERAPEUTIC EXERCISES: CPT

## 2024-09-05 PROCEDURE — 97140 MANUAL THERAPY 1/> REGIONS: CPT

## 2024-09-05 ASSESSMENT — PAIN SCALES - GENERAL: PAINLEVEL_OUTOF10: 6

## 2024-09-05 NOTE — TELEPHONE ENCOUNTER
Spoke with patient and let him know that he is within 12 weeks of surgery and we do not like to inject the knee

## 2024-09-11 DIAGNOSIS — M17.11 PRIMARY OSTEOARTHRITIS OF RIGHT KNEE: Primary | ICD-10-CM

## 2024-09-17 ENCOUNTER — HOSPITAL ENCOUNTER (OUTPATIENT)
Dept: PHYSICAL THERAPY | Age: 79
Setting detail: RECURRING SERIES
Discharge: HOME | End: 2024-09-20
Attending: PHYSICAL MEDICINE & REHABILITATION
Payer: MEDICARE

## 2024-09-17 PROCEDURE — 97110 THERAPEUTIC EXERCISES: CPT

## 2024-09-17 PROCEDURE — 97140 MANUAL THERAPY 1/> REGIONS: CPT

## 2024-09-17 ASSESSMENT — PAIN SCALES - GENERAL: PAINLEVEL_OUTOF10: 7

## 2024-10-08 ENCOUNTER — PREP FOR PROCEDURE (OUTPATIENT)
Dept: ORTHOPEDIC SURGERY | Age: 79
End: 2024-10-08

## 2024-10-08 DIAGNOSIS — M17.11 PRIMARY OSTEOARTHRITIS OF RIGHT KNEE: Primary | ICD-10-CM

## 2024-10-08 RX ORDER — ACETAMINOPHEN 325 MG/1
1000 TABLET ORAL ONCE
Status: CANCELLED | OUTPATIENT
Start: 2024-10-08 | End: 2024-10-08

## 2024-10-08 RX ORDER — SODIUM CHLORIDE 0.9 % (FLUSH) 0.9 %
5-40 SYRINGE (ML) INJECTION PRN
Status: CANCELLED | OUTPATIENT
Start: 2024-10-08

## 2024-10-08 RX ORDER — SODIUM CHLORIDE 9 MG/ML
INJECTION, SOLUTION INTRAVENOUS PRN
Status: CANCELLED | OUTPATIENT
Start: 2024-10-08

## 2024-10-08 RX ORDER — SODIUM CHLORIDE 0.9 % (FLUSH) 0.9 %
5-40 SYRINGE (ML) INJECTION EVERY 12 HOURS SCHEDULED
Status: CANCELLED | OUTPATIENT
Start: 2024-10-08

## 2024-10-14 ENCOUNTER — TELEPHONE (OUTPATIENT)
Dept: ORTHOPEDIC SURGERY | Age: 79
End: 2024-10-14

## 2024-10-14 NOTE — TELEPHONE ENCOUNTER
Spoke with patient and answered his questions he is also having some family issues . Will let us know if he cannot do the surgery

## 2024-10-15 ENCOUNTER — HOSPITAL ENCOUNTER (OUTPATIENT)
Dept: REHABILITATION | Age: 79
Discharge: HOME | End: 2024-10-18
Payer: MEDICARE

## 2024-10-15 ENCOUNTER — HOSPITAL ENCOUNTER (OUTPATIENT)
Dept: SURGERY | Age: 79
Discharge: HOME | End: 2024-10-18
Payer: MEDICARE

## 2024-10-15 VITALS
OXYGEN SATURATION: 95 % | BODY MASS INDEX: 31.02 KG/M2 | SYSTOLIC BLOOD PRESSURE: 180 MMHG | HEART RATE: 69 BPM | HEIGHT: 72 IN | WEIGHT: 229 LBS | TEMPERATURE: 97.7 F | DIASTOLIC BLOOD PRESSURE: 89 MMHG

## 2024-10-15 DIAGNOSIS — M17.11 PRIMARY OSTEOARTHRITIS OF RIGHT KNEE: ICD-10-CM

## 2024-10-15 LAB
ALBUMIN SERPL-MCNC: 3.3 G/DL (ref 3.2–4.6)
ALBUMIN/GLOB SERPL: 1.3 (ref 1–1.9)
ALP SERPL-CCNC: 113 U/L (ref 40–129)
ALT SERPL-CCNC: 16 U/L (ref 8–55)
ANION GAP SERPL CALC-SCNC: 10 MMOL/L (ref 9–18)
AST SERPL-CCNC: 14 U/L (ref 15–37)
BASOPHILS # BLD: 0.1 K/UL (ref 0–0.2)
BASOPHILS NFR BLD: 1 % (ref 0–2)
BILIRUB SERPL-MCNC: 0.4 MG/DL (ref 0–1.2)
BUN SERPL-MCNC: 18 MG/DL (ref 8–23)
CALCIUM SERPL-MCNC: 9.6 MG/DL (ref 8.8–10.2)
CHLORIDE SERPL-SCNC: 96 MMOL/L (ref 98–107)
CO2 SERPL-SCNC: 27 MMOL/L (ref 20–28)
CREAT SERPL-MCNC: 1.08 MG/DL (ref 0.8–1.3)
DIFFERENTIAL METHOD BLD: ABNORMAL
EKG ATRIAL RATE: 72 BPM
EKG DIAGNOSIS: NORMAL
EKG P AXIS: 32 DEGREES
EKG P-R INTERVAL: 204 MS
EKG Q-T INTERVAL: 370 MS
EKG QRS DURATION: 90 MS
EKG QTC CALCULATION (BAZETT): 405 MS
EKG R AXIS: 68 DEGREES
EKG T AXIS: -38 DEGREES
EKG VENTRICULAR RATE: 72 BPM
EOSINOPHIL # BLD: 1 K/UL (ref 0–0.8)
EOSINOPHIL NFR BLD: 10 % (ref 0.5–7.8)
ERYTHROCYTE [DISTWIDTH] IN BLOOD BY AUTOMATED COUNT: 12.5 % (ref 11.9–14.6)
EST. AVERAGE GLUCOSE BLD GHB EST-MCNC: 117 MG/DL
GLOBULIN SER CALC-MCNC: 2.5 G/DL (ref 2.3–3.5)
GLUCOSE SERPL-MCNC: 93 MG/DL (ref 70–99)
HBA1C MFR BLD: 5.7 % (ref 0–5.6)
HCT VFR BLD AUTO: 35.8 % (ref 41.1–50.3)
HGB BLD-MCNC: 12 G/DL (ref 13.6–17.2)
IMM GRANULOCYTES # BLD AUTO: 0 K/UL (ref 0–0.5)
IMM GRANULOCYTES NFR BLD AUTO: 0 % (ref 0–5)
INR PPP: 1
LYMPHOCYTES # BLD: 1.9 K/UL (ref 0.5–4.6)
LYMPHOCYTES NFR BLD: 18 % (ref 13–44)
MCH RBC QN AUTO: 33.1 PG (ref 26.1–32.9)
MCHC RBC AUTO-ENTMCNC: 33.5 G/DL (ref 31.4–35)
MCV RBC AUTO: 98.9 FL (ref 82–102)
MONOCYTES # BLD: 1.1 K/UL (ref 0.1–1.3)
MONOCYTES NFR BLD: 10 % (ref 4–12)
MRSA DNA SPEC QL NAA+PROBE: DETECTED
NEUTS SEG # BLD: 6.4 K/UL (ref 1.7–8.2)
NEUTS SEG NFR BLD: 61 % (ref 43–78)
NRBC # BLD: 0 K/UL (ref 0–0.2)
PLATELET # BLD AUTO: 375 K/UL (ref 150–450)
PMV BLD AUTO: 10.7 FL (ref 9.4–12.3)
POTASSIUM SERPL-SCNC: 4.5 MMOL/L (ref 3.5–5.1)
PROT SERPL-MCNC: 5.8 G/DL (ref 6.3–8.2)
PROTHROMBIN TIME: 13.3 SEC (ref 11.3–14.9)
RBC # BLD AUTO: 3.62 M/UL (ref 4.23–5.6)
S AUREUS CPE NOSE QL NAA+PROBE: DETECTED
SODIUM SERPL-SCNC: 133 MMOL/L (ref 136–145)
WBC # BLD AUTO: 10.5 K/UL (ref 4.3–11.1)

## 2024-10-15 PROCEDURE — 85610 PROTHROMBIN TIME: CPT

## 2024-10-15 PROCEDURE — 85025 COMPLETE CBC W/AUTO DIFF WBC: CPT

## 2024-10-15 PROCEDURE — 80053 COMPREHEN METABOLIC PANEL: CPT

## 2024-10-15 PROCEDURE — 97161 PT EVAL LOW COMPLEX 20 MIN: CPT

## 2024-10-15 PROCEDURE — 87641 MR-STAPH DNA AMP PROBE: CPT

## 2024-10-15 PROCEDURE — 93010 ELECTROCARDIOGRAM REPORT: CPT | Performed by: INTERNAL MEDICINE

## 2024-10-15 PROCEDURE — 83036 HEMOGLOBIN GLYCOSYLATED A1C: CPT

## 2024-10-15 PROCEDURE — 94760 N-INVAS EAR/PLS OXIMETRY 1: CPT

## 2024-10-15 PROCEDURE — 93005 ELECTROCARDIOGRAM TRACING: CPT | Performed by: ANESTHESIOLOGY

## 2024-10-15 RX ORDER — CITALOPRAM HYDROBROMIDE 20 MG/1
20 TABLET ORAL DAILY
COMMUNITY

## 2024-10-15 RX ORDER — LORAZEPAM 1 MG/1
1 TABLET ORAL EVERY 6 HOURS PRN
COMMUNITY

## 2024-10-15 RX ORDER — AMLODIPINE BESYLATE 5 MG/1
5 TABLET ORAL 2 TIMES DAILY
COMMUNITY

## 2024-10-15 RX ORDER — ROSUVASTATIN CALCIUM 5 MG/1
5 TABLET, COATED ORAL DAILY
COMMUNITY

## 2024-10-15 ASSESSMENT — PROMIS GLOBAL HEALTH SCALE
IN THE PAST 7 DAYS, HOW WOULD YOU RATE YOUR FATIGUE ON AVERAGE [ON A SCALE FROM 1 (NONE) TO 5 (VERY SEVERE)]?: MODERATE
IN GENERAL, WOULD YOU SAY YOUR HEALTH IS...[ON A SCALE OF 1 (POOR) TO 5 (EXCELLENT)]: FAIR
IN GENERAL, PLEASE RATE HOW WELL YOU CARRY OUT YOUR USUAL SOCIAL ACTIVITIES (INCLUDES ACTIVITIES AT HOME, AT WORK, AND IN YOUR COMMUNITY, AND RESPONSIBILITIES AS A PARENT, CHILD, SPOUSE, EMPLOYEE, FRIEND, ETC) [ON A SCALE OF 1 (POOR) TO 5 (EXCELLENT)]?: FAIR
IN THE PAST 7 DAYS, HOW WOULD YOU RATE YOUR PAIN ON AVERAGE [ON A SCALE FROM 0 (NO PAIN) TO 10 (WORST IMAGINABLE PAIN)]?: 5
TO WHAT EXTENT ARE YOU ABLE TO CARRY OUT YOUR EVERYDAY PHYSICAL ACTIVITIES SUCH AS WALKING, CLIMBING STAIRS, CARRYING GROCERIES, OR MOVING A CHAIR [ON A SCALE OF 1 (NOT AT ALL) TO 5 (COMPLETELY)]?: MODERATELY
IN GENERAL, HOW WOULD YOU RATE YOUR SATISFACTION WITH YOUR SOCIAL ACTIVITIES AND RELATIONSHIPS [ON A SCALE OF 1 (POOR) TO 5 (EXCELLENT)]?: FAIR
IN THE PAST 7 DAYS, HOW OFTEN HAVE YOU BEEN BOTHERED BY EMOTIONAL PROBLEMS, SUCH AS FEELING ANXIOUS, DEPRESSED, OR IRRITABLE [ON A SCALE FROM 1 (NEVER) TO 5 (ALWAYS)]?: SOMETIMES
IN GENERAL, HOW WOULD YOU RATE YOUR PHYSICAL HEALTH [ON A SCALE OF 1 (POOR) TO 5 (EXCELLENT)]?: FAIR
IN GENERAL, WOULD YOU SAY YOUR QUALITY OF LIFE IS...[ON A SCALE OF 1 (POOR) TO 5 (EXCELLENT)]: FAIR
HOW IS THE PROMIS V1.1 BEING ADMINISTERED?: PAPER
WHO IS THE PERSON COMPLETING THE PROMIS V1.1 SURVEY?: SELF
IN GENERAL, HOW WOULD YOU RATE YOUR MENTAL HEALTH, INCLUDING YOUR MOOD AND YOUR ABILITY TO THINK [ON A SCALE OF 1 (POOR) TO 5 (EXCELLENT)]?: GOOD
SUM OF RESPONSES TO QUESTIONS 3, 6, 7, & 8: 13
SUM OF RESPONSES TO QUESTIONS 2, 4, 5, & 10: 10

## 2024-10-15 ASSESSMENT — KOOS JR
GOING UP OR DOWN STAIRS: MODERATE
TWISING OR PIVOTING ON KNEE: MODERATE
BENDING TO THE FLOOR TO PICK UP OBJECT: MODERATE
STANDING UPRIGHT: MILD
KOOS JR TOTAL INTERVAL SCORE: 65.994
RISING FROM SITTING: MILD

## 2024-10-15 ASSESSMENT — PAIN DESCRIPTION - LOCATION: LOCATION: KNEE

## 2024-10-15 NOTE — PROGRESS NOTES
James Edward Potocki  : 1945  Primary: Medicare Part A And B  Secondary: [employer] SENIOR MEDICARE SUPP [facility] [place] [address]  [phone]      Physical Therapy Prehab Evaluation Summary:10/15/2024   Time In/Out   PT Charge Capture  Episode     MEDICAL/REFERRING DIAGNOSIS: Unilateral primary osteoarthritis, right knee [M17.11]  REFERRING PHYSICIAN: Guillermo Ramirez Jr., *    Treatment Diagnosis:   Pain in Right Knee (M25.561)  Stiffness of Right Knee, Not elsewhere classified (M25.661)  Difficulty in walking, Not elsewhere classified (R26.2)    DATE OF SURGERY: 24  Assessment:   COMMENTS:  Mr. Potocki is present for a Prehab Physical Therapy Assessment for their upcoming right TKA. They are here alone. After discussing the surgical admission options and discharge plans, they are planning on discharging after one night in the hospital.    He presents using a cane and is quite unsteady on his feet.  He lives alone and says he has no one to help him.  He did mention his sister might be able to come up from [place] for a few days. I stressed to him to get his sister to come up as he will need help at home. He spoke with SW today.  His bedroom is upstairs but he has recliner on the main floor.  He had a right kendall 6 years ago.  He was going to check if he had a RW or BSC.    PROBLEM LIST:   (Impacting functional limitations):  Mr. Potocki presents with the following lower extremity(s) problems:  Strength  Range of Motion  Home Exercise Program  Pain INTERVENTIONS PLANNED:   (Benefits and precautions of physical therapy have been discussed with the patient.)  Home Exercise Program  Educational Discussion       GOALS: (Goals have been discussed and agreed upon with patient.)  Discharge Goals: Time Frame: 1 Day  Patient will demonstrate independence with a home exercise program designed to increase strength, range of motion, and pain control to

## 2024-10-15 NOTE — PROGRESS NOTES
SACS score:29  Stop Bang                                   CS HS  RESPIRATORY ASSESSMENT Q SHIFT   O2 PRN    ALBUTEROL  NEBULIZER Q6 PRN WHEEZING                                           Referrals:  declined  Pt. Phone Number:

## 2024-10-15 NOTE — PERIOP NOTE
How to Use Your Incentive Spirometer       About Your Incentive Spirometer  An incentive spirometer is a device that will expand your lungs by helping you to breathe more deeply and fully. The parts of your incentive spirometer are labeled in Figure 1.    Using your incentive spirometer  When you're using your incentive spirometer, make sure to breathe through your mouth. If you breathe through your nose, the incentive spirometer won't work properly. You can hold your nose if you have trouble. DO NOT BLOW INTO THE DEVICE. If you feel dizzy at any time, stop and rest. Try again at a later time.  Sit upright in a chair or in bed. Hold the incentive spirometer at eye level.   Put the mouthpiece in your mouth and close your lips tightly around it. Slowly breathe out (exhale) completely.  Breathe in (inhale) slowly through your mouth as deeply as you can. As you take the breath, you will see the piston rise inside the large column. While the piston rises, the indicator on the right should move upwards. It should stay in between the 2 arrows (see Figure 1).  Try to get the piston as high as you can, while keeping the indicator between the arrows. If the indicator doesn't stay between the arrows, you're breathing either too fast or too slow.  When you get it as high as you can, hold your breath for 10 seconds, or as long as possible. While you're holding your breath, the piston will slowly fall to the base of the spirometer.  Once the piston reaches the bottom of the spirometer, breathe out slowly through your mouth. Rest for a few seconds.  Repeat 10 times. Try to get the piston to the same level with each breath.  After each set of 10 breaths, try to cough as coughing will help loosen or clear any mucus in your lungs.  Put the marker at the level the piston reached on your incentive spirometer. This will be your goal next time.  Repeat these steps every hour that you're awake.  Cover the mouthpiece of the incentive 
  Patient verified name and .    Order for consent IS found in EHR and matches case posting; patient verified.     Type 3 surgery, [facility] assessment complete.    Labs per surgeon: CBC,CMP, PT/INR, A1C, MRSA swab ; results processing.  Labs per anesthesia protocol: no additional  EKG: done today, anesthesia to review.    MRSA/MSSA swab collected per policy. MD to consult pharmacy to dose Vanc if appropriate.     Hospital approved surgical skin cleanser and instructions to return bottle on DOS given per hospital policy.    Patient provided with handouts including Guide to Surgery, Pain Management, Preventing Surgical Site Infections, and [place] [facility] Brochure.    Patient answered medical/surgical history questions at their best of ability. All prior to admission medications documented in [facility]. Original medication prescription bottles were visualized during patient appointment.     Patient instructed to hold all vitamins 3 weeks prior to surgery and NSAIDS 5 days prior to surgery.     Patient teach back successful and patient demonstrates knowledge of instruction.      
CLEAR LIQUID DIET    Things included in a clear liquid diet:     Water  Black Coffee (may have sugar but no milk or cream)  Tea  Any type soft drink  Apple, Cranberry, or Grape juice  Chicken bouillon or broth  Beef bouillon or broth  Popsicles  Jell-O (any flavor), NO solid fruit mixed in  Hard candy that is clear, such as lifesavers or lemon drops    Things NOT included in clear liquid:     Milk and milk products  Milkshakes  Any solid food  Any solid soup with solid ingredients such as noodles  Any creamed soup  Gum or Mints  Orange juice (or any other juice containing pulp)         
PLEASE CONTINUE TAKING ALL PRESCRIPTION MEDICATIONS UP TO THE DAY OF SURGERY UNLESS OTHERWISE DIRECTED BELOW.    DISCONTINUE all vitamins, herbals and supplements 3 weeks prior to surgery. DISCONTINUE Non-Steroidal Anti-Inflammatory (NSAIDS) such as Advil, Ibuprofen, Motrin, Naproxen and Aleve 5 days prior to surgery.       Home Medications to take  the day of surgery    Celecoxib, Allopurinol, Amlodipine,    Citalopram, Lorazepam if needed        Home Medications to Hold- please continue all other medications except these.    Hold all NSAIDS for 5 days before surgery: Advil   HOLD Ozmepic for 7 days before surgery.  CLEAR LIQUIDS the entire day before surgery.     Comments   On the day before surgery please take Acetaminophen 1000mg in the morning and then again before bed. You may substitute for Tylenol 650 mg.      Bring Dynahex wash and Incentive Spirometer with you to hospital on the day of surgery.     Please drink 32 ounces of non-caffeinated clear liquids 2 hours prior to your arrival to avoid dehydration.          Please do not bring home medications with you on the day of surgery unless otherwise directed by your nurse.  If you are instructed to bring home medications, please give them to your nurse as they will be administered by the nursing staff.    If you have any questions, please call [facility] [phone].    A copy of this note was provided to the patient for reference.    
ischemia  Abnormal ECG  Confirmed by MATT CANNON (), BASILIO KIRKLAND ([id number]) on 10/15/2024 12:03:09 PM      P Axis degrees 32    P-R Interval ms 204    Q-T Interval ms 370    QRS Duration ms 90    QTc Calculation (Bazett) ms 405    R Axis degrees 68    T Axis degrees -38    Ventricular Rate BPM 72    MRSA/STAPH AUREUS DNA   Rpt (IP)   (L): Data is abnormally low  (H): Data is abnormally high  (IP): In Process  Rpt: View report in Results Review for more information
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
## 2024-10-25 ENCOUNTER — OFFICE VISIT (OUTPATIENT)
Dept: ORTHOPEDIC SURGERY | Age: 79
End: 2024-10-25

## 2024-10-25 DIAGNOSIS — Z01.818 ENCOUNTER FOR PRE-OPERATIVE EXAMINATION: ICD-10-CM

## 2024-10-25 DIAGNOSIS — M17.11 PRIMARY OSTEOARTHRITIS OF RIGHT KNEE: Primary | ICD-10-CM

## 2024-10-25 PROCEDURE — PREOPEXAM PRE-OP EXAM: Performed by: PHYSICIAN ASSISTANT

## 2024-10-25 RX ORDER — PROMETHAZINE HYDROCHLORIDE 12.5 MG/1
12.5 TABLET ORAL 4 TIMES DAILY PRN
Qty: 20 TABLET | Refills: 2 | Status: SHIPPED | OUTPATIENT
Start: 2024-10-30

## 2024-10-25 RX ORDER — METHOCARBAMOL 750 MG/1
750 TABLET, FILM COATED ORAL 3 TIMES DAILY PRN
Qty: 40 TABLET | Refills: 1 | Status: SHIPPED | OUTPATIENT
Start: 2024-10-30

## 2024-10-25 RX ORDER — ASPIRIN 81 MG/1
81 TABLET ORAL 2 TIMES DAILY
Qty: 70 TABLET | Refills: 0 | Status: SHIPPED | OUTPATIENT
Start: 2024-10-30 | End: 2024-12-04

## 2024-10-25 RX ORDER — OXYCODONE HYDROCHLORIDE 5 MG/1
5-10 TABLET ORAL
Qty: 60 TABLET | Refills: 0 | Status: CANCELLED | OUTPATIENT
Start: 2024-10-30 | End: 2024-11-04

## 2024-10-25 RX ORDER — CELECOXIB 200 MG/1
200 CAPSULE ORAL 2 TIMES DAILY
Qty: 60 CAPSULE | Refills: 0 | Status: SHIPPED | OUTPATIENT
Start: 2024-10-30 | End: 2024-11-29

## 2024-10-25 NOTE — H&P (VIEW-ONLY)
[facility]  Pre Operative History and Physical Exam    Patient ID:  James Edward Potocki  [id number]  79 y.o.  1945    Today: October 25, 2024           CC: Right knee pain    HPI:   The patient has end stage arthritis of the right knee. The patient was evaluated and examined during a consultation prior to this office visit.  There have been no changes to the patient's orthopedic condition since the initial consultation. The patient has failed previous conservative treatment for this condition including antiinflammatories , and lifestyle modifications. The necessity for joint replacement is present. The patient will be admitted the day of surgery for right knee replacement    Past Medical/Surgical History:  Past Medical History:   Diagnosis Date    Anxiety     takes Ativan po 5-7 days weekly    Edema of both legs     \"from BP meds\", ECHO 2015 on chart    Enlarged prostate     Gout     Hip pain     History of elevated glucose     hemoglobin A1C 5.7 12/11/2014    History of seasonal allergies     Hypercholesterolemia     Hypertension     managed with medication    Keratoacanthoma     S/P total hip arthroplasty 4/6/2015    Skin neoplasm     Spinal stenosis     injections - Dr. Brandt     Past Surgical History:   Procedure Laterality Date    HIP ARTHROPLASTY Right 2018    TONSILLECTOMY  as child    UMBILICAL HERNIA REPAIR      WISDOM TOOTH EXTRACTION  as teenager        Allergies:   Allergies   Allergen Reactions    Adhesive Tape      Other Reaction(s): Itching-Allergy    \"Some BP meds\" cause \"   SWELLING OF LEGS with Procardia and with current medicine    Other reaction(s): Itching-Allergy   \"Some BP meds\" cause \"   SWELLING OF LEGS with Procardia and with current medicine    Statins Other (See Comments)     Other Reaction(s): Other- (not listed) - Allergy, Other (see comments), weakness,pain    Extreme nerve pain    Extreme nerve pain   Extreme nerve pain    MYALGIAS    Other reaction(s):  Eosinophils Absolute 10/15/2024 1.0 (H)  0.0 - 0.8 K/UL Final    Basophils Absolute 10/15/2024 0.1  0.0 - 0.2 K/UL Final    Immature Granulocytes Absolute 10/15/2024 0.0  0.0 - 0.5 K/UL Final    MRSA by PCR 10/15/2024 Detected (A)  NOTD   Final    Comment: A positive test result does not necessarily indicate the presence of a viable organism. A positive result is indicative of the presence of SA or MRSA DNA.  The BD Max StaphSR assay is intended to aid in the prevention and control of MRSA and SA infections in healthcare settings.  It is not intended to diagnose MRSA or SA infections nor guide or monitor treatment for MRSA/SA infections. A negative result does not preclude nasal colonization.      SA by PCR 10/15/2024 Detected (A)  NOTD   Final    Ventricular Rate 10/15/2024 72  BPM Final    Atrial Rate 10/15/2024 72  BPM Final    P-R Interval 10/15/2024 204  ms Final    QRS Duration 10/15/2024 90  ms Final    Q-T Interval 10/15/2024 370  ms Final    QTc Calculation (Bazett) 10/15/2024 405  ms Final    P Axis 10/15/2024 32  degrees Final    R Axis 10/15/2024 68  degrees Final    T Axis 10/15/2024 -38  degrees Final    Diagnosis 10/15/2024    Final                    Value:Normal sinus rhythm  ST & T wave abnormality, consider inferior ischemia  Abnormal ECG  Confirmed by MATT CANNON (), BASILIO KIRKLAND ([id number]) on 10/15/2024 12:03:09 PM                   Patient Active Problem List   Diagnosis    HTN (hypertension)    HLD (hyperlipidemia)    BPH (benign prostatic hyperplasia)    S/P total hip arthroplasty    Paresthesia of lower extremity    Fall    Weakness of both legs    Burning feet syndrome    Postural imbalance    Gout    Diabetes mellitus (HCC)    Anxiety    Primary osteoarthritis of right knee         Assessment:   1. Arthritis of the right knee      Plan:    1. Proceed with scheduled right knee replacement      All material risks, benefits, and reasonable alternatives including postponing the procedure were

## 2024-10-25 NOTE — PROGRESS NOTES
discussed. The patient does wish to proceed with the procedure at this time.         Signed By: ALFONSO Turpin  October 25, 2024

## 2024-10-29 DIAGNOSIS — M17.11 PRIMARY OSTEOARTHRITIS OF RIGHT KNEE: ICD-10-CM

## 2024-10-29 RX ORDER — SALICYLIC ACID 40 %
ADHESIVE PATCH, MEDICATED TOPICAL
Qty: 70 TABLET | Refills: 0 | OUTPATIENT
Start: 2024-10-29

## 2024-10-29 RX ORDER — CELECOXIB 200 MG/1
200 CAPSULE ORAL 2 TIMES DAILY
Qty: 60 CAPSULE | Refills: 0 | OUTPATIENT
Start: 2024-10-29

## 2024-10-30 ENCOUNTER — TELEPHONE (OUTPATIENT)
Dept: ORTHOPEDIC SURGERY | Age: 79
End: 2024-10-30

## 2024-10-31 ENCOUNTER — ANESTHESIA EVENT (OUTPATIENT)
Dept: SURGERY | Age: 79
End: 2024-10-31
Payer: MEDICARE

## 2024-10-31 NOTE — DISCHARGE INSTRUCTIONS
[facility]   Patient Discharge Instructions    James Edward Potocki / [id number] : 1945    Admitted (Not on file) Discharged: 10/31/2024     IF YOU HAVE ANY PROBLEMS ONCE YOU ARE AT HOME CALL THE FOLLOWING NUMBERS:   Nurse's line: [phone]  Main office number: [phone]      Medications    The medications you are to continue on are listed on the medication reconciliation sheet.   Narcotic pain medications as well as supplemental iron can cause constipation. If this occurs, try over the counter stool softeners or try stopping the narcotic pain medication and/or the iron.   It is important that you take the medication exactly as they are prescribed.  Medications which increase your risk of blood clots are listed to stop for 5 weeks after surgery as well as medications or supplements which increase your risk of bleeding complications.   Keep your medication in the bottles provided by the pharmacist and keep a list of the medication names, dosages, and times to be taken in your wallet.   Do not take other medications without consulting your doctor.  If you need a refill on your pain medication, please note our office is closed over the weekend. It is our office policy that on-call providers cannot refill narcotic pain medications over the weekend. If you will need a refill over the weekend, please call our office before 12pm on Friday or first thing Monday morning.        Important Information    Do NOT smoke as this will greatly increase your risk of infection!    Resume your prehospital diet. If you have excessive nausea or vomitting call your doctor's office     Leg swelling and warmth is normal for 6 months after surgery. If you experience swelling in your leg, elevate your leg while laying down with your toes above your heart. If you have sudden onset severe swelling with leg pain call our office. Use New Hose stockings until we see you in the office for your follow up

## 2024-11-01 ENCOUNTER — ANESTHESIA (OUTPATIENT)
Dept: SURGERY | Age: 79
End: 2024-11-01
Payer: MEDICARE

## 2024-11-01 ENCOUNTER — HOSPITAL ENCOUNTER (INPATIENT)
Age: 79
LOS: 3 days | Discharge: NURSING, REHAB OR LONG-TERM CARE | DRG: 470 | End: 2024-11-05
Attending: ORTHOPAEDIC SURGERY | Admitting: ORTHOPAEDIC SURGERY
Payer: MEDICARE

## 2024-11-01 DIAGNOSIS — F41.9 ANXIETY: ICD-10-CM

## 2024-11-01 DIAGNOSIS — Z96.651 STATUS POST RIGHT KNEE REPLACEMENT: Primary | ICD-10-CM

## 2024-11-01 PROCEDURE — 2580000003 HC RX 258: Performed by: PHYSICIAN ASSISTANT

## 2024-11-01 PROCEDURE — 2580000003 HC RX 258: Performed by: NURSE ANESTHETIST, CERTIFIED REGISTERED

## 2024-11-01 PROCEDURE — 2500000003 HC RX 250 WO HCPCS: Performed by: NURSE ANESTHETIST, CERTIFIED REGISTERED

## 2024-11-01 PROCEDURE — 2700000000 HC OXYGEN THERAPY PER DAY

## 2024-11-01 PROCEDURE — 7100000001 HC PACU RECOVERY - ADDTL 15 MIN: Performed by: ORTHOPAEDIC SURGERY

## 2024-11-01 PROCEDURE — 6370000000 HC RX 637 (ALT 250 FOR IP): Performed by: SURGERY

## 2024-11-01 PROCEDURE — C1713 ANCHOR/SCREW BN/BN,TIS/BN: HCPCS | Performed by: ORTHOPAEDIC SURGERY

## 2024-11-01 PROCEDURE — 6360000002 HC RX W HCPCS: Performed by: SURGERY

## 2024-11-01 PROCEDURE — 97161 PT EVAL LOW COMPLEX 20 MIN: CPT

## 2024-11-01 PROCEDURE — 6370000000 HC RX 637 (ALT 250 FOR IP): Performed by: PHYSICIAN ASSISTANT

## 2024-11-01 PROCEDURE — 3700000001 HC ADD 15 MINUTES (ANESTHESIA): Performed by: ORTHOPAEDIC SURGERY

## 2024-11-01 PROCEDURE — 2709999900 HC NON-CHARGEABLE SUPPLY: Performed by: ORTHOPAEDIC SURGERY

## 2024-11-01 PROCEDURE — 94760 N-INVAS EAR/PLS OXIMETRY 1: CPT

## 2024-11-01 PROCEDURE — 97530 THERAPEUTIC ACTIVITIES: CPT

## 2024-11-01 PROCEDURE — 2720000010 HC SURG SUPPLY STERILE: Performed by: ORTHOPAEDIC SURGERY

## 2024-11-01 PROCEDURE — 27447 TOTAL KNEE ARTHROPLASTY: CPT | Performed by: ORTHOPAEDIC SURGERY

## 2024-11-01 PROCEDURE — C1776 JOINT DEVICE (IMPLANTABLE): HCPCS | Performed by: ORTHOPAEDIC SURGERY

## 2024-11-01 PROCEDURE — 8E0Y0CZ ROBOTIC ASSISTED PROCEDURE OF LOWER EXTREMITY, OPEN APPROACH: ICD-10-PCS | Performed by: ORTHOPAEDIC SURGERY

## 2024-11-01 PROCEDURE — 2580000003 HC RX 258: Performed by: ORTHOPAEDIC SURGERY

## 2024-11-01 PROCEDURE — 20985 CPTR-ASST DIR MS PX: CPT | Performed by: ORTHOPAEDIC SURGERY

## 2024-11-01 PROCEDURE — 7100000000 HC PACU RECOVERY - FIRST 15 MIN: Performed by: ORTHOPAEDIC SURGERY

## 2024-11-01 PROCEDURE — 94761 N-INVAS EAR/PLS OXIMETRY MLT: CPT

## 2024-11-01 PROCEDURE — 64447 NJX AA&/STRD FEMORAL NRV IMG: CPT | Performed by: ANESTHESIOLOGY

## 2024-11-01 PROCEDURE — 0SRC0J9 REPLACEMENT OF RIGHT KNEE JOINT WITH SYNTHETIC SUBSTITUTE, CEMENTED, OPEN APPROACH: ICD-10-PCS | Performed by: ORTHOPAEDIC SURGERY

## 2024-11-01 PROCEDURE — 2580000003 HC RX 258: Performed by: SURGERY

## 2024-11-01 PROCEDURE — 6360000002 HC RX W HCPCS: Performed by: NURSE ANESTHETIST, CERTIFIED REGISTERED

## 2024-11-01 PROCEDURE — 6360000002 HC RX W HCPCS: Performed by: PHYSICIAN ASSISTANT

## 2024-11-01 PROCEDURE — 6360000002 HC RX W HCPCS: Performed by: ORTHOPAEDIC SURGERY

## 2024-11-01 PROCEDURE — 3600000015 HC SURGERY LEVEL 5 ADDTL 15MIN: Performed by: ORTHOPAEDIC SURGERY

## 2024-11-01 PROCEDURE — 97165 OT EVAL LOW COMPLEX 30 MIN: CPT

## 2024-11-01 PROCEDURE — 51798 US URINE CAPACITY MEASURE: CPT

## 2024-11-01 PROCEDURE — 3700000000 HC ANESTHESIA ATTENDED CARE: Performed by: ORTHOPAEDIC SURGERY

## 2024-11-01 PROCEDURE — 3600000005 HC SURGERY LEVEL 5 BASE: Performed by: ORTHOPAEDIC SURGERY

## 2024-11-01 DEVICE — INSERT TIB CNDYL STBL 6 9 MM KNEE 5/PK X3 TRIATHLON: Type: IMPLANTABLE DEVICE | Site: KNEE | Status: FUNCTIONAL

## 2024-11-01 DEVICE — PALACOS® R+G IS A FAST SETTING POLYMER CONTAINING GENTAMICIN, FOR USE IN BONE SURGERY. MIXING OF THE TWO COMPONENT SYSTEM, CONSISTING OF A POWDER AND A LIQUID, INITIALLY PRODUCES A LIQUID AND THEN A PASTE, WHICH IS USED TO ANCHOR THE PROSTHESIS TO THE BONE. THE HARDENED BONE CEMENT ALLOWS STABLE FIXATION OF THE PROSTHESIS AND TRANSFERS ALL STRESSES PRODUCED IN A MOVEMENT TO THE BONE VIA THE LARGE INTERFACE. INSOLUBLE ZIRCONIUM DIOXIDE IS INCLUDED IN THE CEMENT POWDER AS AN X RAY CONTRAST MEDIUM. THE CHLOROPHYLL ADDITIVE SERVES AS OPTICAL MARKING OF THE BONE CEMENT AT THE SITE OF THE OPERATION.
Type: IMPLANTABLE DEVICE | Site: KNEE | Status: FUNCTIONAL
Brand: PALACOS®

## 2024-11-01 DEVICE — COMPONENT PART KNEE CAPPED K1 STRYKER: Type: IMPLANTABLE DEVICE | Status: FUNCTIONAL

## 2024-11-01 DEVICE — IMPLANTABLE DEVICE: Type: IMPLANTABLE DEVICE | Site: KNEE | Status: FUNCTIONAL

## 2024-11-01 DEVICE — IMPLANT PATELLAR DIA35MM THK10MM X3 ASYMMETRIC TRIATHLON: Type: IMPLANTABLE DEVICE | Site: KNEE | Status: FUNCTIONAL

## 2024-11-01 DEVICE — BASEPLATE TIB SZ 6 UNIV KNEE TRITANIUM TOT STBL CEM: Type: IMPLANTABLE DEVICE | Site: KNEE | Status: FUNCTIONAL

## 2024-11-01 RX ORDER — BISACODYL 5 MG/1
5 TABLET, DELAYED RELEASE ORAL DAILY PRN
Status: DISCONTINUED | OUTPATIENT
Start: 2024-11-01 | End: 2024-11-05 | Stop reason: HOSPADM

## 2024-11-01 RX ORDER — DIPHENHYDRAMINE HCL 25 MG
25 CAPSULE ORAL EVERY 6 HOURS PRN
Status: DISCONTINUED | OUTPATIENT
Start: 2024-11-01 | End: 2024-11-05 | Stop reason: HOSPADM

## 2024-11-01 RX ORDER — LORAZEPAM 1 MG/1
1 TABLET ORAL EVERY 6 HOURS PRN
Status: DISCONTINUED | OUTPATIENT
Start: 2024-11-01 | End: 2024-11-05 | Stop reason: HOSPADM

## 2024-11-01 RX ORDER — ACETAMINOPHEN 500 MG
1000 TABLET ORAL ONCE
Status: DISCONTINUED | OUTPATIENT
Start: 2024-11-01 | End: 2024-11-01 | Stop reason: SDUPTHER

## 2024-11-01 RX ORDER — VANCOMYCIN HYDROCHLORIDE 1 G/20ML
INJECTION, POWDER, LYOPHILIZED, FOR SOLUTION INTRAVENOUS PRN
Status: DISCONTINUED | OUTPATIENT
Start: 2024-11-01 | End: 2024-11-01 | Stop reason: HOSPADM

## 2024-11-01 RX ORDER — ACETAMINOPHEN 500 MG
1000 TABLET ORAL ONCE
Status: COMPLETED | OUTPATIENT
Start: 2024-11-01 | End: 2024-11-01

## 2024-11-01 RX ORDER — LIDOCAINE HYDROCHLORIDE 20 MG/ML
INJECTION, SOLUTION EPIDURAL; INFILTRATION; INTRACAUDAL; PERINEURAL
Status: DISCONTINUED | OUTPATIENT
Start: 2024-11-01 | End: 2024-11-01 | Stop reason: SDUPTHER

## 2024-11-01 RX ORDER — METHOCARBAMOL 750 MG/1
750 TABLET, FILM COATED ORAL EVERY 8 HOURS PRN
Status: ACTIVE | OUTPATIENT
Start: 2024-11-01 | End: 2024-11-03

## 2024-11-01 RX ORDER — FUROSEMIDE 20 MG/1
20 TABLET ORAL DAILY
Status: DISCONTINUED | OUTPATIENT
Start: 2024-11-02 | End: 2024-11-05 | Stop reason: HOSPADM

## 2024-11-01 RX ORDER — ONDANSETRON 4 MG/1
4 TABLET, ORALLY DISINTEGRATING ORAL EVERY 8 HOURS PRN
Status: DISCONTINUED | OUTPATIENT
Start: 2024-11-01 | End: 2024-11-05 | Stop reason: HOSPADM

## 2024-11-01 RX ORDER — ALLOPURINOL 300 MG/1
300 TABLET ORAL DAILY
Status: DISCONTINUED | OUTPATIENT
Start: 2024-11-02 | End: 2024-11-05 | Stop reason: HOSPADM

## 2024-11-01 RX ORDER — MIDAZOLAM HYDROCHLORIDE 2 MG/2ML
2 INJECTION, SOLUTION INTRAMUSCULAR; INTRAVENOUS
Status: DISCONTINUED | OUTPATIENT
Start: 2024-11-01 | End: 2024-11-01 | Stop reason: HOSPADM

## 2024-11-01 RX ORDER — LISINOPRIL 20 MG/1
20 TABLET ORAL NIGHTLY
Status: DISCONTINUED | OUTPATIENT
Start: 2024-11-01 | End: 2024-11-05 | Stop reason: HOSPADM

## 2024-11-01 RX ORDER — ROPIVACAINE HYDROCHLORIDE 2 MG/ML
INJECTION, SOLUTION EPIDURAL; INFILTRATION; PERINEURAL
Status: COMPLETED | OUTPATIENT
Start: 2024-11-01 | End: 2024-11-01

## 2024-11-01 RX ORDER — PANTOPRAZOLE SODIUM 40 MG/1
40 TABLET, DELAYED RELEASE ORAL DAILY PRN
Status: DISCONTINUED | OUTPATIENT
Start: 2024-11-01 | End: 2024-11-05 | Stop reason: HOSPADM

## 2024-11-01 RX ORDER — SODIUM CHLORIDE 0.9 % (FLUSH) 0.9 %
5-40 SYRINGE (ML) INJECTION PRN
Status: DISCONTINUED | OUTPATIENT
Start: 2024-11-01 | End: 2024-11-01 | Stop reason: SDUPTHER

## 2024-11-01 RX ORDER — AMLODIPINE BESYLATE 5 MG/1
5 TABLET ORAL 2 TIMES DAILY
Status: DISCONTINUED | OUTPATIENT
Start: 2024-11-01 | End: 2024-11-05 | Stop reason: HOSPADM

## 2024-11-01 RX ORDER — ROSUVASTATIN CALCIUM 5 MG/1
5 TABLET, COATED ORAL DAILY
Status: DISCONTINUED | OUTPATIENT
Start: 2024-11-02 | End: 2024-11-05 | Stop reason: HOSPADM

## 2024-11-01 RX ORDER — ACETAMINOPHEN 500 MG
1000 TABLET ORAL EVERY 6 HOURS
Status: DISCONTINUED | OUTPATIENT
Start: 2024-11-01 | End: 2024-11-05 | Stop reason: HOSPADM

## 2024-11-01 RX ORDER — FINASTERIDE 5 MG/1
5 TABLET, FILM COATED ORAL NIGHTLY
Status: DISCONTINUED | OUTPATIENT
Start: 2024-11-01 | End: 2024-11-05 | Stop reason: HOSPADM

## 2024-11-01 RX ORDER — GABAPENTIN 100 MG/1
100 CAPSULE ORAL 3 TIMES DAILY PRN
Status: DISCONTINUED | OUTPATIENT
Start: 2024-11-01 | End: 2024-11-05 | Stop reason: HOSPADM

## 2024-11-01 RX ORDER — SODIUM CHLORIDE 0.9 % (FLUSH) 0.9 %
5-40 SYRINGE (ML) INJECTION PRN
Status: DISCONTINUED | OUTPATIENT
Start: 2024-11-01 | End: 2024-11-01 | Stop reason: HOSPADM

## 2024-11-01 RX ORDER — PROPOFOL 10 MG/ML
INJECTION, EMULSION INTRAVENOUS
Status: DISCONTINUED | OUTPATIENT
Start: 2024-11-01 | End: 2024-11-01 | Stop reason: SDUPTHER

## 2024-11-01 RX ORDER — LIDOCAINE HYDROCHLORIDE 10 MG/ML
1 INJECTION, SOLUTION INFILTRATION; PERINEURAL
Status: DISCONTINUED | OUTPATIENT
Start: 2024-11-01 | End: 2024-11-01 | Stop reason: HOSPADM

## 2024-11-01 RX ORDER — PROMETHAZINE HYDROCHLORIDE 25 MG/ML
25 INJECTION, SOLUTION INTRAMUSCULAR; INTRAVENOUS EVERY 6 HOURS PRN
Status: DISCONTINUED | OUTPATIENT
Start: 2024-11-01 | End: 2024-11-05 | Stop reason: HOSPADM

## 2024-11-01 RX ORDER — SODIUM CHLORIDE 9 MG/ML
INJECTION, SOLUTION INTRAMUSCULAR; INTRAVENOUS; SUBCUTANEOUS PRN
Status: DISCONTINUED | OUTPATIENT
Start: 2024-11-01 | End: 2024-11-01 | Stop reason: HOSPADM

## 2024-11-01 RX ORDER — DIPHENHYDRAMINE HYDROCHLORIDE 50 MG/ML
25 INJECTION INTRAMUSCULAR; INTRAVENOUS EVERY 6 HOURS PRN
Status: DISCONTINUED | OUTPATIENT
Start: 2024-11-01 | End: 2024-11-05 | Stop reason: HOSPADM

## 2024-11-01 RX ORDER — ONDANSETRON 2 MG/ML
INJECTION INTRAMUSCULAR; INTRAVENOUS
Status: DISCONTINUED | OUTPATIENT
Start: 2024-11-01 | End: 2024-11-01 | Stop reason: SDUPTHER

## 2024-11-01 RX ORDER — SODIUM CHLORIDE 0.9 % (FLUSH) 0.9 %
5-40 SYRINGE (ML) INJECTION EVERY 12 HOURS SCHEDULED
Status: DISCONTINUED | OUTPATIENT
Start: 2024-11-01 | End: 2024-11-01 | Stop reason: SDUPTHER

## 2024-11-01 RX ORDER — KETOROLAC TROMETHAMINE 30 MG/ML
INJECTION, SOLUTION INTRAMUSCULAR; INTRAVENOUS PRN
Status: DISCONTINUED | OUTPATIENT
Start: 2024-11-01 | End: 2024-11-01 | Stop reason: HOSPADM

## 2024-11-01 RX ORDER — ROPIVACAINE HYDROCHLORIDE 2 MG/ML
INJECTION, SOLUTION EPIDURAL; INFILTRATION; PERINEURAL PRN
Status: DISCONTINUED | OUTPATIENT
Start: 2024-11-01 | End: 2024-11-01 | Stop reason: HOSPADM

## 2024-11-01 RX ORDER — LABETALOL HYDROCHLORIDE 5 MG/ML
10 INJECTION, SOLUTION INTRAVENOUS
Status: DISCONTINUED | OUTPATIENT
Start: 2024-11-01 | End: 2024-11-01 | Stop reason: HOSPADM

## 2024-11-01 RX ORDER — DEXAMETHASONE SODIUM PHOSPHATE 10 MG/ML
INJECTION, SOLUTION INTRAMUSCULAR; INTRAVENOUS
Status: COMPLETED | OUTPATIENT
Start: 2024-11-01 | End: 2024-11-01

## 2024-11-01 RX ORDER — OXYCODONE HYDROCHLORIDE 5 MG/1
5 TABLET ORAL
Status: DISCONTINUED | OUTPATIENT
Start: 2024-11-01 | End: 2024-11-01 | Stop reason: HOSPADM

## 2024-11-01 RX ORDER — SODIUM CHLORIDE 0.9 % (FLUSH) 0.9 %
5-40 SYRINGE (ML) INJECTION EVERY 12 HOURS SCHEDULED
Status: DISCONTINUED | OUTPATIENT
Start: 2024-11-01 | End: 2024-11-04

## 2024-11-01 RX ORDER — SODIUM CHLORIDE 0.9 % (FLUSH) 0.9 %
5-40 SYRINGE (ML) INJECTION EVERY 12 HOURS SCHEDULED
Status: DISCONTINUED | OUTPATIENT
Start: 2024-11-01 | End: 2024-11-01 | Stop reason: HOSPADM

## 2024-11-01 RX ORDER — OXYCODONE HYDROCHLORIDE 5 MG/1
10 TABLET ORAL EVERY 4 HOURS PRN
Status: DISCONTINUED | OUTPATIENT
Start: 2024-11-01 | End: 2024-11-05 | Stop reason: HOSPADM

## 2024-11-01 RX ORDER — OXYCODONE HYDROCHLORIDE 5 MG/1
5 TABLET ORAL EVERY 4 HOURS PRN
Status: DISCONTINUED | OUTPATIENT
Start: 2024-11-01 | End: 2024-11-05 | Stop reason: HOSPADM

## 2024-11-01 RX ORDER — SODIUM CHLORIDE 9 MG/ML
INJECTION, SOLUTION INTRAVENOUS PRN
Status: DISCONTINUED | OUTPATIENT
Start: 2024-11-01 | End: 2024-11-01 | Stop reason: SDUPTHER

## 2024-11-01 RX ORDER — SODIUM CHLORIDE, SODIUM LACTATE, POTASSIUM CHLORIDE, CALCIUM CHLORIDE 600; 310; 30; 20 MG/100ML; MG/100ML; MG/100ML; MG/100ML
INJECTION, SOLUTION INTRAVENOUS CONTINUOUS
Status: DISCONTINUED | OUTPATIENT
Start: 2024-11-01 | End: 2024-11-01 | Stop reason: HOSPADM

## 2024-11-01 RX ORDER — SODIUM CHLORIDE 9 MG/ML
INJECTION, SOLUTION INTRAVENOUS PRN
Status: DISCONTINUED | OUTPATIENT
Start: 2024-11-01 | End: 2024-11-05 | Stop reason: HOSPADM

## 2024-11-01 RX ORDER — SENNA AND DOCUSATE SODIUM 50; 8.6 MG/1; MG/1
1 TABLET, FILM COATED ORAL 2 TIMES DAILY
Status: DISCONTINUED | OUTPATIENT
Start: 2024-11-01 | End: 2024-11-05 | Stop reason: HOSPADM

## 2024-11-01 RX ORDER — HALOPERIDOL 5 MG/ML
1 INJECTION INTRAMUSCULAR
Status: DISCONTINUED | OUTPATIENT
Start: 2024-11-01 | End: 2024-11-01 | Stop reason: HOSPADM

## 2024-11-01 RX ORDER — FENTANYL CITRATE 50 UG/ML
INJECTION, SOLUTION INTRAMUSCULAR; INTRAVENOUS
Status: DISCONTINUED | OUTPATIENT
Start: 2024-11-01 | End: 2024-11-01 | Stop reason: SDUPTHER

## 2024-11-01 RX ORDER — ASPIRIN 81 MG/1
81 TABLET ORAL 2 TIMES DAILY
Status: DISCONTINUED | OUTPATIENT
Start: 2024-11-01 | End: 2024-11-05 | Stop reason: HOSPADM

## 2024-11-01 RX ORDER — SENNA AND DOCUSATE SODIUM 50; 8.6 MG/1; MG/1
2 TABLET, FILM COATED ORAL 2 TIMES DAILY PRN
Status: DISCONTINUED | OUTPATIENT
Start: 2024-11-01 | End: 2024-11-05 | Stop reason: HOSPADM

## 2024-11-01 RX ORDER — HYDROMORPHONE HYDROCHLORIDE 2 MG/ML
INJECTION, SOLUTION INTRAMUSCULAR; INTRAVENOUS; SUBCUTANEOUS
Status: DISCONTINUED | OUTPATIENT
Start: 2024-11-01 | End: 2024-11-01 | Stop reason: SDUPTHER

## 2024-11-01 RX ORDER — NALOXONE HYDROCHLORIDE 0.4 MG/ML
INJECTION, SOLUTION INTRAMUSCULAR; INTRAVENOUS; SUBCUTANEOUS PRN
Status: DISCONTINUED | OUTPATIENT
Start: 2024-11-01 | End: 2024-11-01 | Stop reason: HOSPADM

## 2024-11-01 RX ORDER — CITALOPRAM HYDROBROMIDE 10 MG/1
20 TABLET ORAL DAILY
Status: DISCONTINUED | OUTPATIENT
Start: 2024-11-02 | End: 2024-11-05 | Stop reason: HOSPADM

## 2024-11-01 RX ORDER — ONDANSETRON 2 MG/ML
4 INJECTION INTRAMUSCULAR; INTRAVENOUS EVERY 6 HOURS PRN
Status: DISCONTINUED | OUTPATIENT
Start: 2024-11-01 | End: 2024-11-05 | Stop reason: HOSPADM

## 2024-11-01 RX ORDER — TRANEXAMIC ACID 100 MG/ML
INJECTION, SOLUTION INTRAVENOUS
Status: DISCONTINUED | OUTPATIENT
Start: 2024-11-01 | End: 2024-11-01 | Stop reason: SDUPTHER

## 2024-11-01 RX ORDER — PRAZOSIN HYDROCHLORIDE 1 MG/1
1 CAPSULE ORAL 2 TIMES DAILY
Status: DISCONTINUED | OUTPATIENT
Start: 2024-11-01 | End: 2024-11-05 | Stop reason: HOSPADM

## 2024-11-01 RX ORDER — SODIUM CHLORIDE 0.9 % (FLUSH) 0.9 %
5-40 SYRINGE (ML) INJECTION PRN
Status: DISCONTINUED | OUTPATIENT
Start: 2024-11-01 | End: 2024-11-04

## 2024-11-01 RX ORDER — HYDROMORPHONE HYDROCHLORIDE 1 MG/ML
1 INJECTION, SOLUTION INTRAMUSCULAR; INTRAVENOUS; SUBCUTANEOUS
Status: DISCONTINUED | OUTPATIENT
Start: 2024-11-01 | End: 2024-11-05 | Stop reason: HOSPADM

## 2024-11-01 RX ORDER — FENTANYL CITRATE 50 UG/ML
100 INJECTION, SOLUTION INTRAMUSCULAR; INTRAVENOUS
Status: COMPLETED | OUTPATIENT
Start: 2024-11-01 | End: 2024-11-01

## 2024-11-01 RX ORDER — LANOLIN ALCOHOL/MO/W.PET/CERES
3 CREAM (GRAM) TOPICAL NIGHTLY PRN
Status: DISCONTINUED | OUTPATIENT
Start: 2024-11-01 | End: 2024-11-05 | Stop reason: HOSPADM

## 2024-11-01 RX ORDER — DEXAMETHASONE SODIUM PHOSPHATE 4 MG/ML
INJECTION, SOLUTION INTRA-ARTICULAR; INTRALESIONAL; INTRAMUSCULAR; INTRAVENOUS; SOFT TISSUE
Status: DISCONTINUED | OUTPATIENT
Start: 2024-11-01 | End: 2024-11-01 | Stop reason: SDUPTHER

## 2024-11-01 RX ORDER — SODIUM CHLORIDE 9 MG/ML
INJECTION, SOLUTION INTRAVENOUS PRN
Status: DISCONTINUED | OUTPATIENT
Start: 2024-11-01 | End: 2024-11-01 | Stop reason: HOSPADM

## 2024-11-01 RX ADMIN — HYDROMORPHONE HYDROCHLORIDE 0.5 MG: 1 INJECTION, SOLUTION INTRAMUSCULAR; INTRAVENOUS; SUBCUTANEOUS at 15:10

## 2024-11-01 RX ADMIN — HYDROMORPHONE HYDROCHLORIDE 0.5 MG: 1 INJECTION, SOLUTION INTRAMUSCULAR; INTRAVENOUS; SUBCUTANEOUS at 15:27

## 2024-11-01 RX ADMIN — FINASTERIDE 5 MG: 5 TABLET, FILM COATED ORAL at 21:16

## 2024-11-01 RX ADMIN — ROPIVACAINE HYDROCHLORIDE 20 ML: 2 INJECTION, SOLUTION EPIDURAL; INFILTRATION at 12:09

## 2024-11-01 RX ADMIN — TRANEXAMIC ACID 1000 MG: 100 INJECTION, SOLUTION INTRAVENOUS at 12:32

## 2024-11-01 RX ADMIN — PHENYLEPHRINE HYDROCHLORIDE 30 MCG/MIN: 10 INJECTION INTRAVENOUS at 12:52

## 2024-11-01 RX ADMIN — HYDROMORPHONE HYDROCHLORIDE 0.5 MG: 1 INJECTION, SOLUTION INTRAMUSCULAR; INTRAVENOUS; SUBCUTANEOUS at 15:22

## 2024-11-01 RX ADMIN — CEFAZOLIN 2000 MG: 2 INJECTION, POWDER, FOR SOLUTION INTRAMUSCULAR; INTRAVENOUS at 21:14

## 2024-11-01 RX ADMIN — PHENYLEPHRINE HYDROCHLORIDE 50 MCG: 10 INJECTION INTRAVENOUS at 12:53

## 2024-11-01 RX ADMIN — FENTANYL CITRATE 25 MCG: 50 INJECTION, SOLUTION INTRAMUSCULAR; INTRAVENOUS at 13:21

## 2024-11-01 RX ADMIN — LIDOCAINE HYDROCHLORIDE 40 MG: 20 INJECTION, SOLUTION EPIDURAL; INFILTRATION; INTRACAUDAL; PERINEURAL at 12:52

## 2024-11-01 RX ADMIN — CEFAZOLIN 2000 MG: 2 INJECTION, POWDER, FOR SOLUTION INTRAMUSCULAR; INTRAVENOUS at 12:45

## 2024-11-01 RX ADMIN — SODIUM CHLORIDE, SODIUM LACTATE, POTASSIUM CHLORIDE, AND CALCIUM CHLORIDE: 600; 310; 30; 20 INJECTION, SOLUTION INTRAVENOUS at 11:46

## 2024-11-01 RX ADMIN — ONDANSETRON 4 MG: 2 INJECTION INTRAMUSCULAR; INTRAVENOUS at 12:44

## 2024-11-01 RX ADMIN — VANCOMYCIN HYDROCHLORIDE 1500 MG: 1 INJECTION, POWDER, LYOPHILIZED, FOR SOLUTION INTRAVENOUS at 13:07

## 2024-11-01 RX ADMIN — FENTANYL CITRATE 25 MCG: 50 INJECTION, SOLUTION INTRAMUSCULAR; INTRAVENOUS at 13:05

## 2024-11-01 RX ADMIN — ACETAMINOPHEN 1000 MG: 500 TABLET, FILM COATED ORAL at 23:40

## 2024-11-01 RX ADMIN — HYDROMORPHONE HYDROCHLORIDE 0.5 MG: 2 INJECTION INTRAMUSCULAR; INTRAVENOUS; SUBCUTANEOUS at 13:45

## 2024-11-01 RX ADMIN — SENNOSIDES AND DOCUSATE SODIUM 1 TABLET: 50; 8.6 TABLET ORAL at 21:20

## 2024-11-01 RX ADMIN — ACETAMINOPHEN 1000 MG: 500 TABLET, FILM COATED ORAL at 09:36

## 2024-11-01 RX ADMIN — HYDROMORPHONE HYDROCHLORIDE 0.5 MG: 2 INJECTION INTRAMUSCULAR; INTRAVENOUS; SUBCUTANEOUS at 13:27

## 2024-11-01 RX ADMIN — SODIUM CHLORIDE, SODIUM LACTATE, POTASSIUM CHLORIDE, AND CALCIUM CHLORIDE: 600; 310; 30; 20 INJECTION, SOLUTION INTRAVENOUS at 13:49

## 2024-11-01 RX ADMIN — TRANEXAMIC ACID 1000 MG: 100 INJECTION, SOLUTION INTRAVENOUS at 14:20

## 2024-11-01 RX ADMIN — VANCOMYCIN HYDROCHLORIDE 1500 MG: 10 INJECTION, POWDER, LYOPHILIZED, FOR SOLUTION INTRAVENOUS at 10:14

## 2024-11-01 RX ADMIN — FENTANYL CITRATE 100 MCG: 50 INJECTION INTRAMUSCULAR; INTRAVENOUS at 12:05

## 2024-11-01 RX ADMIN — PHENYLEPHRINE HYDROCHLORIDE 50 MCG: 10 INJECTION INTRAVENOUS at 14:11

## 2024-11-01 RX ADMIN — HYDROMORPHONE HYDROCHLORIDE 0.5 MG: 2 INJECTION INTRAMUSCULAR; INTRAVENOUS; SUBCUTANEOUS at 14:51

## 2024-11-01 RX ADMIN — DEXAMETHASONE SODIUM PHOSPHATE 8 MG: 4 INJECTION INTRA-ARTICULAR; INTRALESIONAL; INTRAMUSCULAR; INTRAVENOUS; SOFT TISSUE at 12:44

## 2024-11-01 RX ADMIN — TRANEXAMIC ACID 1000 MG: 100 INJECTION, SOLUTION INTRAVENOUS at 13:31

## 2024-11-01 RX ADMIN — FENTANYL CITRATE 25 MCG: 50 INJECTION, SOLUTION INTRAMUSCULAR; INTRAVENOUS at 13:01

## 2024-11-01 RX ADMIN — PROPOFOL 150 MG: 10 INJECTION, EMULSION INTRAVENOUS at 12:53

## 2024-11-01 RX ADMIN — HYDROMORPHONE HYDROCHLORIDE 0.5 MG: 2 INJECTION INTRAMUSCULAR; INTRAVENOUS; SUBCUTANEOUS at 14:22

## 2024-11-01 RX ADMIN — FENTANYL CITRATE 25 MCG: 50 INJECTION, SOLUTION INTRAMUSCULAR; INTRAVENOUS at 13:15

## 2024-11-01 RX ADMIN — DEXAMETHASONE SODIUM PHOSPHATE 4 MG: 10 INJECTION, SOLUTION INTRAMUSCULAR; INTRAVENOUS at 12:09

## 2024-11-01 RX ADMIN — HYDROMORPHONE HYDROCHLORIDE 0.5 MG: 1 INJECTION, SOLUTION INTRAMUSCULAR; INTRAVENOUS; SUBCUTANEOUS at 15:17

## 2024-11-01 RX ADMIN — ASPIRIN 81 MG: 81 TABLET, COATED ORAL at 21:20

## 2024-11-01 RX ADMIN — SODIUM CHLORIDE, PRESERVATIVE FREE 10 ML: 5 INJECTION INTRAVENOUS at 21:20

## 2024-11-01 ASSESSMENT — PAIN SCALES - GENERAL
PAINLEVEL_OUTOF10: 7
PAINLEVEL_OUTOF10: 5
PAINLEVEL_OUTOF10: 5
PAINLEVEL_OUTOF10: 7
PAINLEVEL_OUTOF10: 7
PAINLEVEL_OUTOF10: 5
PAINLEVEL_OUTOF10: 7
PAINLEVEL_OUTOF10: 5
PAINLEVEL_OUTOF10: 7
PAINLEVEL_OUTOF10: 5
PAINLEVEL_OUTOF10: 5

## 2024-11-01 ASSESSMENT — KOOS JR
RISING FROM SITTING: MODERATE
TWISING OR PIVOTING ON KNEE: MODERATE
KOOS JR TOTAL INTERVAL SCORE: 52.465
GOING UP OR DOWN STAIRS: MODERATE
STRAIGHTENING KNEE FULLY: MODERATE
BENDING TO THE FLOOR TO PICK UP OBJECT: MODERATE
STANDING UPRIGHT: MODERATE
HOW SEVERE IS YOUR KNEE STIFFNESS AFTER FIRST WAKING IN MORNING: MODERATE

## 2024-11-01 ASSESSMENT — PAIN DESCRIPTION - ORIENTATION: ORIENTATION: RIGHT

## 2024-11-01 ASSESSMENT — PAIN DESCRIPTION - LOCATION: LOCATION: KNEE

## 2024-11-01 NOTE — PROGRESS NOTES
11/01/24 1823   Treatment   Treatment Type IS   Oxygen Therapy/Pulse Ox   O2 Therapy Oxygen   $Oxygen $Daily Charge   O2 Device Nasal cannula   O2 Flow Rate (L/min) 2 L/min   Pulse 88   Respirations 17   SpO2 93 %   $Pulse Oximeter $Spot check (single)   Incentive Spirometry Tx   Treatment Effort Assisted by RT

## 2024-11-01 NOTE — INTERVAL H&P NOTE
Update History & Physical    The patient's History and Physical of October 25, 2024 was reviewed with the patient and I examined the patient. There was no change. The surgical site was confirmed by the patient and me.     Plan: The risks, benefits, expected outcome, and alternative to the recommended procedure have been discussed with the patient. Patient understands and wants to proceed with the procedure.     Electronically signed by MARYCRUZ MORAES JR, MD on 11/1/2024 at 11:08 AM

## 2024-11-01 NOTE — CARE COORDINATION
Pt is s/p right TKA.  He lives alone and has limited support system. Patient asking for rehab and wants to go to [facility] where he has been previously. Referral initiated and called to [facility]. They will complete referral either later today or in am once initial PT/OT evals completed.      11/01/24 1631   Service Assessment   Patient Orientation Alert and Oriented   Cognition Alert   History Provided By Patient   Services At/After Discharge   Transition of Care Consult (CM Consult) Acute Rehab   Services At/After Discharge Inpatient rehab   Confirm Follow Up Transport Self   Condition of Participation: Discharge Planning   The Plan for Transition of Care is related to the following treatment goals: improve mobility   The Patient and/or Patient Representative was provided with a Choice of Provider? Patient   Freedom of Choice list was provided with basic dialogue that supports the patient's individualized plan of care/goals, treatment preferences, and shares the quality data associated with the providers?  Yes

## 2024-11-01 NOTE — PERIOP NOTE
1 belonging bags, 1 suitcase, cane all labeled and place in closet.     Please call Frank Cano, friend, after surgey. [phone]  
MD Marley  at bedside with patient. Pt VSS stable. Pain and Nausea controlled at this time. Verbal sign out per MD when pacu care is completed. Plan of care continues.     
TRANSFER - OUT REPORT:    Verbal report given to Tomeka BARRERA on James Edward Potocki  being transferred to [facility] for routine progression of patient care       Report consisted of patient's Situation, Background, Assessment and   Recommendations(SBAR).     Information from the following report(s) Nurse Handoff Report, Adult Overview, Surgery Report, MAR, and Cardiac Rhythm ST  was reviewed with the receiving nurse.           Lines:   Peripheral IV 11/01/24 Right Wrist (Active)   Site Assessment Clean, dry & intact 11/01/24 1509   Line Status Infusing 11/01/24 1509   Line Care Connections checked and tightened 11/01/24 1509   Phlebitis Assessment No symptoms 11/01/24 1509   Infiltration Assessment 0 11/01/24 1509   Alcohol Cap Used No 11/01/24 1509   Dressing Status Clean, dry & intact 11/01/24 1509   Dressing Type Transparent 11/01/24 1509        Opportunity for questions and clarification was provided.      Patient transported with:  O2 @ 2lpm        
Parent(s)

## 2024-11-01 NOTE — ANESTHESIA PROCEDURE NOTES
Airway  Date/Time: 11/1/2024 12:55 PM  Urgency: elective    Airway not difficult    General Information and Staff    Patient location during procedure: OR  Performed: resident/CRNA/CAA   Performed by: Norma Cortés APRN - CRNA  Authorized by: George Mendenhall MD      Indications and Patient Condition  Indications for airway management: anesthesia  Spontaneous Ventilation: absent  Sedation level: deep  Preoxygenated: yes  Patient position: sniffing  MILS not maintained throughout  Mask difficulty assessment: not attempted    Final Airway Details  Final airway type: supraglottic airway      Successful airway: oropharyngeal  Size 4     Number of attempts at approach: 1    no

## 2024-11-01 NOTE — ANESTHESIA PROCEDURE NOTES
Spinal Block    Patient location during procedure: OR  End time: 11/1/2024 12:52 PM  Reason for block: primary anesthetic  Staffing  Performed: anesthesiologist   Anesthesiologist: George Mendenhall MD  Performed by: George Mendenhall MD  Authorized by: Get Marley, DO    Spinal Block  Patient position: sitting  Prep: ChloraPrep  Patient monitoring: cardiac monitor, continuous pulse ox and frequent blood pressure checks  Approach: midline  Location: L3/L4  Provider prep: mask and sterile gloves  Assessment  Swirl obtained: No  Attempts: 3+  Hemodynamics: stable  Additional Notes  3 cc 1% lidocaine local injected at needle insertion site.    Spinal aborted due to inability to enter intrathecal space. Multiple attempts at 3 levels with 25G pencil-tip and 22G Quincke      Preanesthetic Checklist  Completed: patient identified, IV checked, risks and benefits discussed, equipment checked, pre-op evaluation, timeout performed, anesthesia consent given, oxygen available and monitors applied/VS acknowledged

## 2024-11-01 NOTE — ANESTHESIA PROCEDURE NOTES
Peripheral Block    Patient location during procedure: pre-op  Reason for block: post-op pain management and at surgeon's request  Start time: 11/1/2024 12:05 PM  End time: 11/1/2024 12:09 PM  Staffing  Performed: anesthesiologist   Anesthesiologist: George Mendenhall MD  Performed by: George Mendenhall MD  Authorized by: George Mendenhall MD    Preanesthetic Checklist  Completed: patient identified, IV checked, site marked, risks and benefits discussed, surgical/procedural consents, equipment checked, pre-op evaluation, timeout performed, anesthesia consent given, oxygen available and monitors applied/VS acknowledged  Peripheral Block   Patient position: supine  Prep: ChloraPrep  Provider prep: mask and sterile gloves  Patient monitoring: cardiac monitor, continuous pulse ox, frequent blood pressure checks, IV access and oxygen  Block type: Femoral  Adductor canal  Laterality: right  Injection technique: single-shot  Guidance: ultrasound guided  Local infiltration: ropivacaine  Local infiltration: ropivacaine    Needle   Needle type: insulated echogenic nerve stimulator needle   Needle gauge: 21 G  Needle localization: ultrasound guidance  Needle length: 10 cm  Assessment   Injection assessment: negative aspiration for heme, no paresthesia on injection, local visualized surrounding nerve on ultrasound and no intravascular symptoms  Paresthesia pain: none  Slow fractionated injection: yes  Hemodynamics: stable  Outcomes: patient tolerated procedure well and uncomplicated    Additional Notes  -Block placed for post op pain at surgeon's request.     -Ultrasound used to identify anatomy of nerve bundle.    -Needle placement and local injection at perineural area confirmed with real time ultrasound guidance.     -Local visualized with ultrasound surrounding nerve.    -Permanent Image taken and placed on chart.    Medications Administered  dexAMETHasone (DECADRON) (PF) 10 mg/mL injection - Other   4 mg - 11/1/2024

## 2024-11-01 NOTE — PROGRESS NOTES
TRANSFER - IN REPORT:    Verbal report received from HOLLY Kenney on James Edward Potocki  being received from PACU for routine post-op      Report consisted of patient's Situation, Background, Assessment and   Recommendations(SBAR).     Information from the following report(s) Nurse Handoff Report was reviewed with the receiving nurse.    Opportunity for questions and clarification was provided.      Assessment completed upon patient's arrival to unit and care assumed.

## 2024-11-01 NOTE — PROGRESS NOTES
ACUTE PHYSICAL THERAPY GOALS:   (Developed with and agreed upon by patient and/or caregiver.)  GOALS (1-4 days):  (1.)Mr. Potocki will move from supine to sit and sit to supine  in bed with STAND BY ASSIST.  (2.)Mr. Potocki will transfer from bed to chair and chair to bed with CONTACT GUARD ASSIST using the least restrictive device.  (3.)Mr. Potocki will ambulate with STAND BY ASSIST for 150 feet with the least restrictive device.  (4.)Mr. Potocki will ambulate up/down 14 steps with right railing with MINIMAL ASSIST.  (5.)Mr. Potocki will increase right knee ROM to 5-85°.  ________________________________________________________________________________________________           PHYSICAL THERAPY: TOTAL KNEE ARTHROPLASTY Initial Assessment and PM  (Link to Caseload Tracking: PT Visit Days : 1  Acknowledge Orders  Time In/Out  PT Charge Capture  Rehab Caseload Tracker  Episode   James Edward Potocki is a 79 y.o. male   PRIMARY DIAGNOSIS: Status post right knee replacement  Primary osteoarthritis of right knee [M17.11]  Procedure(s) (LRB):  rt KNEE TOTAL ARTHROPLASTY ROBOTIC (Right)  Day of Surgery  Reason for Referral: Pain in Right Knee (M25.561)  Stiffness of Right Knee, Not elsewhere classified (M25.661)  Difficulty in walking, Not elsewhere classified (R26.2)  Outpatient in a bed: Payor: MEDICARE / Plan: MEDICARE PART A AND B / Product Type: *No Product type* /     REHAB RECOMMENDATIONS:   Recommendation to date pending progress:  Setting:  Inpatient Rehab Facility    Equipment:    To Be Determined     RANGE OF MOTION:   Will assess at next session     GAIT: I Mod I S SBA CGA Min Mod Max Total  NT x2 Comments:   Level of Assistance [] [] [] [] [] [] [] [] [] [x] []            Weightbearing Status       Distance    feet    Gait Quality N/A    DME N/A     Stairs      Ramp     I=Independent, Mod I=Modified Independent, S=Supervision, SBA=Standby Assistance, CGA=Contact Guard Assistance,   Min=Minimal Assistance,    Strength [x] []     Range of Motion [x] []       UPPER EXTREMITY GROSS EVALUATION:     Within Functional Limits   Abnormal   Comments   Strength [x] []    Range of Motion [x] []      SENSATION  Sensation [x]       COGNITION/  PERCEPTION: Intact Impaired (Comments):   Orientation [x]     Vision [x]     Hearing [x]     Cognition  []   Lethargic with limited processing due to meds     MOBILITY: I Mod I S SBA CGA Min Mod Max Total  NT x2 Comments:   Bed Mobility    Rolling [] [] [] [] [x] [] [] [] [] [] []    Supine to Sit [] [] [] [] [x] [] [] [] [] [] []    Scooting [] [] [] [] [x] [] [] [] [] [] []    Sit to Supine [] [] [] [] [] [x] [] [] [] [] []    Transfers    Sit to Stand [] [] [] [] [] [x] [] [] [] [] [x]    Bed to Chair [] [] [] [] [] [] [] [] [] [] []    Stand to Sit [] [] [] [] [] [] [] [] [] [] []    Stand Pivot [] [] [] [] [] [] [] [] [] [] []    Toilet [] [] [] [] [] [] [] [] [] [] []     [] [] [] [] [] [] [] [] [] [] []    I=Independent, Mod I=Modified Independent, S=Supervision, SBA=Standby Assistance, CGA=Contact Guard Assistance,   Min=Minimal Assistance, Mod=Moderate Assistance, Max=Maximal Assistance, Total=Total Assistance, NT=Not Tested    BALANCE: Good Fair+ Fair Fair- Poor NT Comments   Sitting Static [x] [] [] [] [] []    Sitting Dynamic [x] [] [] [] [] []              Standing Static [] [] [x] [] [] []    Standing Dynamic [] [] [x] [] [] []      PLAN:   FREQUENCY AND DURATION: BID for duration of hospital stay or until stated goals are met, whichever comes first.    THERAPY PROGNOSIS: Good    PROBLEM LIST:   (Skilled intervention is medically necessary to address:)  Decreased ADL/Functional Activities, Decreased Activity Tolerance, Decreased AROM/PROM, Decreased Balance, Decreased Coordination, Decreased Gait Ability, Decreased Safety Awareness, Decreased Strength, Decreased Transfer Abilities, and Increased Pain   INTERVENTIONS PLANNED:   (Benefits and precautions of physical therapy have

## 2024-11-01 NOTE — PLAN OF CARE
Problem: Safety - Adult  Goal: Free from fall injury  Outcome: Progressing     Problem: Pain  Goal: Verbalizes/displays adequate comfort level or baseline comfort level  Outcome: Progressing     Problem: Chronic Conditions and Co-morbidities  Goal: Patient's chronic conditions and co-morbidity symptoms are monitored and maintained or improved  Outcome: Progressing     Problem: Discharge Planning  Goal: Discharge to home or other facility with appropriate resources  Outcome: Progressing

## 2024-11-01 NOTE — ANESTHESIA PRE PROCEDURE
Department of Anesthesiology  Preprocedure Note       Name:  James Edward Potocki   Age:  79 y.o.  :  1945                                          MRN:  [id number]         Date:  2024      Surgeon: Surgeon(s):  Guillermo Ramirez Jr., MD    Procedure: Procedure(s):  rt KNEE TOTAL ARTHROPLASTY ROBOTIC    Medications prior to admission:   Prior to Admission medications    Medication Sig Start Date End Date Taking? Authorizing Provider   celecoxib (CELEBREX) 200 MG capsule Take 1 capsule by mouth 2 times daily 10/30/24 11/29/24 Yes Supriya Campuzano PA   amLODIPine (NORVASC) 5 MG tablet Take 1 tablet by mouth in the morning and at bedtime   Yes Provider, MD Tommy   rosuvastatin (CRESTOR) 5 MG tablet Take 1 tablet by mouth daily   Yes ProviderTommy MD   citalopram (CELEXA) 20 MG tablet Take 1 tablet by mouth daily   Yes Provider, MD Tommy   LORazepam (ATIVAN) 1 MG tablet Take 1 tablet by mouth every 6 hours as needed for Anxiety.   Yes Provider, MD Tommy   Semaglutide (OZEMPIC, 1 MG/DOSE, SC) Inject into the skin Every Thursday   Yes Provider, MD Tommy   POTASSIUM CHLORIDE PO Take 10 mEq by mouth   Yes Automatic Reconciliation, Ar   allopurinol (ZYLOPRIM) 300 MG tablet Take 1 tablet by mouth daily   Yes Automatic Reconciliation, Ar   ergocalciferol (ERGOCALCIFEROL) 1.25 MG (58541 UT) capsule Take 1 capsule by mouth every 7 days   Yes Automatic Reconciliation, Ar   finasteride (PROSCAR) 5 MG tablet Take 1 tablet by mouth at bedtime   Yes Automatic Reconciliation, Ar   furosemide (LASIX) 20 MG tablet Take 1 tablet by mouth daily   Yes Automatic Reconciliation, Ar   lisinopril (PRINIVIL;ZESTRIL) 20 MG tablet Take 1 tablet by mouth at bedtime   Yes Automatic Reconciliation, Ar   prazosin (MINIPRESS) 1 MG capsule Take 1 capsule by mouth 2 times daily   Yes Automatic Reconciliation, Ar   aspirin (ASPIRIN 81) 81 MG EC tablet Take 1 tablet by mouth in the morning and at

## 2024-11-01 NOTE — OP NOTE
[facility]  Bala Robotic Assisted Total Knee Arthroplasty: Posterior Cruciate Retaining       Patient:James Edward Potocki   : 1945  Medical Record Number:[id number]  Pre-operative Diagnosis:  Primary osteoarthritis of right knee [M17.11]  Post-operative Diagnosis: Primary osteoarthritis of right knee [M17.11]  Location: [facility]  Surgeon: Guillermo Ramirez MD   Assistant: Tracey Tabares    Anesthesia: General and ACB    Indications: Patient has end stage arthritis. They have tried and failed conservative management.    Procedure:Procedure(s) (LRB):  rt KNEE TOTAL ARTHROPLASTY ROBOTIC (Right)            CPT- 74671- Total knee arthroplasty           84575- Other procedures on musculoskeletal system            The complexity of the total joint surgery requires the use of a first assistant for positioning, retraction and expertise in closure.     Tourniquet Time: 46 minutes  EBL: 250 cc  Findings: severe degenerative arthritis with loss of cartilage in lateral weight bearing compartment of the knee,  patellar osteophytes with loss of patella cartilage, posterior femoral osteophytes   BMI: Body mass index is 30.24 kg/m².    James Edward Potocki was brought to the operating room and positioned on the operating table.  He was anesthestized with anesthesia. IV antibiotics were administered. Prior to the incision being made a timeout was called identifying the patient, procedure ,operative side and surgeon The operative leg was prepped and draped in the usual sterile manner.  An anterior longitudinal incision was accomplished just medial to the tibial tubercle and extending approximal 6 centimeters proximal to the superior pole of the patella.  A medial parapatellar capsular incision was performed. The medial capsular flap was elevated around to the insertion of the semimembranous tendon.  The patella was everted and the knee flexed and externally rotated.  The medial and lateral  menisci were excised.  The lateral half of the fat pad excised and the patella femoral ligament was released.  The anterior cruciate ligament was resect and the posterior cruciate ligament was retained.      The femoral and tibial arrays were pinned in place and registered with the [employer] robot. The patient landmarks were collected and the tibial and femoral checkpoints were registered and verified without images.     The preresection balancing was performed.     The distal femur was addressed first. Utilizing the benito robotic arm the distal femoral cut was made. The anterior and posterior cuts were then made.  The osteophytes were removed from the tibial and femoral surfaces.      The tibia was then addressed. The [employer] robotic arm was then used to make the measured resection of the tibia.   The tibia was sized. The tibial base plate was pinned into place with the appropriate external rotation and stem site prepared. A trial femoral component and poly was placed.       A preliminary range of motion was accomplished with the trial components. The patient was found to obtain full extension as well as appropriate flexion. The patient's ligaments were stable in flexion and extension to medial and lateral stressing and the alignment was through the appropriate mechanical axis. Additional surgical procedures included: none.      The patella was then everted. The bone was resect to accommodate the three peg patellar button. A trial reduction of the patella revealed appropriate tracking through the patellofemoral groove with no lateral retinacular release being accomplished.    All trial components were removed. The real implants were opened: Sizes listed below.  The knee was irrigated.  There were no femoral deficiencies. There were no tibial deficiencies. No augmentation was utilized. The tibial component was cemented into place.  The femoral component was cemented into place. The patella component was cemented into

## 2024-11-01 NOTE — PROGRESS NOTES
4 Eyes Skin Assessment     NAME:  James Edward Potocki  YOB: 1945  MEDICAL RECORD NUMBER:  [id number]    The patient is being assessed for  Post-Op Surgical    I agree that at least one RN has performed a thorough Head to Toe Skin Assessment on the patient. ALL assessment sites listed below have been assessed.      Areas assessed by both nurses:    Head, Face, Ears, Shoulders, Back, Chest, Arms, Elbows, Hands, Sacrum. Buttock, Coccyx, Ischium, Legs. Feet and Heels, and Under Medical Devices         Does the Patient have a Wound? No noted wound(s)       Gunnar Prevention initiated by RN: Yes  Wound Care Orders initiated by RN: No    Pressure Injury (Stage 3,4, Unstageable, DTI, NWPT, and Complex wounds) if present, place Wound referral order by RN under : No    New Ostomies, if present place, Ostomy referral order under : No     Nurse 1 eSignature: Electronically signed by Tomeka Gutierrez RN on 11/1/24 at 4:16 PM EDT    **SHARE this note so that the co-signing nurse can place an eSignature**    Nurse 2 eSignature: {Esignature:[id number]}    89

## 2024-11-01 NOTE — PROGRESS NOTES
OCCUPATIONAL THERAPY Initial Assessment, Daily Note, and PM      (Link to Caseload Tracking: OT Visit Days: 1  OT Orders   Time  OT Charge Capture  Rehab Caseload Tracker  Episode     James Edward Potocki is a 79 y.o. male   PRIMARY DIAGNOSIS: Status post right knee replacement  Primary osteoarthritis of right knee [M17.11]  Procedure(s) (LRB):  rt KNEE TOTAL ARTHROPLASTY ROBOTIC (Right)  Day of Surgery  Reason for Referral: Pain in Right Knee (M25.561)  Stiffness of Right Knee, Not elsewhere classified (M25.661)  Outpatient in a bed: Payor: MEDICARE / Plan: MEDICARE PART A AND B / Product Type: *No Product type* /     ASSESSMENT:     REHAB RECOMMENDATIONS:   Recommendation to date pending progress:  Setting:  Pt is planning to go to [facility] as he lives alone without support    Equipment:    Rolling Walker     ASSESSMENT:  Mr. Potocki is s/p right TKA and presents with decreased independence with functional mobility and activities of daily living as compared to baseline level of function and safety. Patient would benefit from skilled Occupational Therapy to maximize independence and safety with self-care task and functional mobility.   Patient is seen in room. RN had noted that pt was drowsy but was appropriate for therapy. Pt is noted to be very drowsy, he is able to participate in bed mobility, edge of bed sitting and sit to stand several times but is unable to process well enough to take any steps. Pt is returned to supine with all needs and Rn aware of current condition. Pt should progress better tomorrow and is planning on post acute rehab as he lives alone without support. OT will follow         [facility] AM-PAC™ “6 Clicks” Daily Activity Inpatient Short Form:           SUBJECTIVE:     Mr. Potocki states, \"I feel dizzy\"      Social/Functional   Lives With: Alone  Type of Home: House  Home Layout: Two level (14)  Home Access: Stairs to enter with rails  Entrance Stairs - Number of Steps:

## 2024-11-01 NOTE — ANESTHESIA POSTPROCEDURE EVALUATION
Department of Anesthesiology  Postprocedure Note    Patient: James Edward Potocki  MRN: [id number]  YOB: 1945  Date of evaluation: 11/1/2024    Procedure Summary       Date: 11/01/24 Room / Location: [facility]  / [facility]    Anesthesia Start: 1216 Anesthesia Stop: 1511    Procedure: rt KNEE TOTAL ARTHROPLASTY ROBOTIC (Right: Knee) Diagnosis:       Primary osteoarthritis of right knee      (Primary osteoarthritis of right knee [M17.11])    Surgeons: Guillermo Ramirez Jr., MD Responsible Provider: Get Marley DO    Anesthesia Type: General ASA Status: 3            Anesthesia Type: General    Shruthi Phase I: Shruthi Score: 9    Shruthi Phase II:      Anesthesia Post Evaluation    Patient location during evaluation: PACU  Level of consciousness: awake and alert  Airway patency: patent  Nausea & Vomiting: no nausea  Cardiovascular status: hemodynamically stable  Respiratory status: acceptable  Hydration status: euvolemic  Pain management: satisfactory to patient    No notable events documented.

## 2024-11-02 PROBLEM — M17.12 OSTEOARTHRITIS OF LEFT KNEE, UNSPECIFIED OSTEOARTHRITIS TYPE: Status: ACTIVE | Noted: 2024-11-02

## 2024-11-02 LAB
HCT VFR BLD AUTO: 32.4 % (ref 41.1–50.3)
HGB BLD-MCNC: 10.6 G/DL (ref 13.6–17.2)

## 2024-11-02 PROCEDURE — 97535 SELF CARE MNGMENT TRAINING: CPT

## 2024-11-02 PROCEDURE — 6370000000 HC RX 637 (ALT 250 FOR IP): Performed by: PHYSICIAN ASSISTANT

## 2024-11-02 PROCEDURE — 97530 THERAPEUTIC ACTIVITIES: CPT

## 2024-11-02 PROCEDURE — 94761 N-INVAS EAR/PLS OXIMETRY MLT: CPT

## 2024-11-02 PROCEDURE — 36415 COLL VENOUS BLD VENIPUNCTURE: CPT

## 2024-11-02 PROCEDURE — 2580000003 HC RX 258: Performed by: PHYSICIAN ASSISTANT

## 2024-11-02 PROCEDURE — 94760 N-INVAS EAR/PLS OXIMETRY 1: CPT

## 2024-11-02 PROCEDURE — 6360000002 HC RX W HCPCS: Performed by: PHYSICIAN ASSISTANT

## 2024-11-02 PROCEDURE — 2700000000 HC OXYGEN THERAPY PER DAY

## 2024-11-02 PROCEDURE — 99024 POSTOP FOLLOW-UP VISIT: CPT | Performed by: ORTHOPAEDIC SURGERY

## 2024-11-02 PROCEDURE — 85018 HEMOGLOBIN: CPT

## 2024-11-02 PROCEDURE — 85014 HEMATOCRIT: CPT

## 2024-11-02 PROCEDURE — 1100000000 HC RM PRIVATE

## 2024-11-02 RX ADMIN — PRAZOSIN HYDROCHLORIDE 1 MG: 1 CAPSULE ORAL at 20:47

## 2024-11-02 RX ADMIN — CEFAZOLIN 2000 MG: 2 INJECTION, POWDER, FOR SOLUTION INTRAMUSCULAR; INTRAVENOUS at 05:42

## 2024-11-02 RX ADMIN — OXYCODONE 10 MG: 5 TABLET ORAL at 12:14

## 2024-11-02 RX ADMIN — SENNOSIDES AND DOCUSATE SODIUM 1 TABLET: 50; 8.6 TABLET ORAL at 20:47

## 2024-11-02 RX ADMIN — ASPIRIN 81 MG: 81 TABLET, COATED ORAL at 08:20

## 2024-11-02 RX ADMIN — ASPIRIN 81 MG: 81 TABLET, COATED ORAL at 20:47

## 2024-11-02 RX ADMIN — LORAZEPAM 1 MG: 1 TABLET ORAL at 08:35

## 2024-11-02 RX ADMIN — ACETAMINOPHEN 1000 MG: 500 TABLET, FILM COATED ORAL at 05:40

## 2024-11-02 RX ADMIN — FUROSEMIDE 20 MG: 20 TABLET ORAL at 08:20

## 2024-11-02 RX ADMIN — LISINOPRIL 20 MG: 20 TABLET ORAL at 20:48

## 2024-11-02 RX ADMIN — SENNOSIDES AND DOCUSATE SODIUM 1 TABLET: 50; 8.6 TABLET ORAL at 08:20

## 2024-11-02 RX ADMIN — PRAZOSIN HYDROCHLORIDE 1 MG: 1 CAPSULE ORAL at 08:20

## 2024-11-02 RX ADMIN — AMLODIPINE BESYLATE 5 MG: 5 TABLET ORAL at 20:48

## 2024-11-02 RX ADMIN — SODIUM CHLORIDE, PRESERVATIVE FREE 10 ML: 5 INJECTION INTRAVENOUS at 20:50

## 2024-11-02 RX ADMIN — ROSUVASTATIN CALCIUM 5 MG: 5 TABLET, COATED ORAL at 08:20

## 2024-11-02 RX ADMIN — Medication 3 MG: at 20:47

## 2024-11-02 RX ADMIN — FINASTERIDE 5 MG: 5 TABLET, FILM COATED ORAL at 20:47

## 2024-11-02 RX ADMIN — ACETAMINOPHEN 1000 MG: 500 TABLET, FILM COATED ORAL at 12:14

## 2024-11-02 RX ADMIN — ACETAMINOPHEN 1000 MG: 500 TABLET, FILM COATED ORAL at 17:31

## 2024-11-02 RX ADMIN — ALLOPURINOL 300 MG: 300 TABLET ORAL at 08:20

## 2024-11-02 RX ADMIN — AMLODIPINE BESYLATE 5 MG: 5 TABLET ORAL at 08:20

## 2024-11-02 RX ADMIN — CITALOPRAM 20 MG: 10 TABLET, FILM COATED ORAL at 08:20

## 2024-11-02 ASSESSMENT — PAIN SCALES - GENERAL
PAINLEVEL_OUTOF10: 7
PAINLEVEL_OUTOF10: 4
PAINLEVEL_OUTOF10: 5

## 2024-11-02 ASSESSMENT — PAIN DESCRIPTION - LOCATION
LOCATION: KNEE
LOCATION: KNEE

## 2024-11-02 ASSESSMENT — PAIN DESCRIPTION - DESCRIPTORS: DESCRIPTORS: ACHING

## 2024-11-02 ASSESSMENT — PAIN DESCRIPTION - ORIENTATION
ORIENTATION: RIGHT
ORIENTATION: RIGHT

## 2024-11-02 ASSESSMENT — PAIN - FUNCTIONAL ASSESSMENT: PAIN_FUNCTIONAL_ASSESSMENT: PREVENTS OR INTERFERES SOME ACTIVE ACTIVITIES AND ADLS

## 2024-11-02 NOTE — PROGRESS NOTES
Orthopedic Joint Progress Note    2024  Admit Date: 2024  Admit Diagnosis: Primary osteoarthritis of right knee [M17.11]    1 Day Post-Op    Subjective:     James Edward Potocki is doing well. Pain well-controlled. Unable to void and miller placed    Objective:     PT/OT:     PATIENT MOBILITY    Good    Vital Signs:    Blood pressure (!) 171/74, pulse 77, temperature 98.2 °F (36.8 °C), temperature source Oral, resp. rate 18, height 1.829 m (6'), weight 101.2 kg (223 lb), SpO2 97%.  Temp (24hrs), Av.9 °F (36.6 °C), Min:97.5 °F (36.4 °C), Max:98.4 °F (36.9 °C)      Pain Control:        Meds:  Current Facility-Administered Medications   Medication Dose Route Frequency    sodium chloride flush 0.9 % injection 5-40 mL  5-40 mL IntraVENous 2 times per day    sodium chloride flush 0.9 % injection 5-40 mL  5-40 mL IntraVENous PRN    0.9 % sodium chloride infusion   IntraVENous PRN    acetaminophen (TYLENOL) tablet 1,000 mg  1,000 mg Oral Q6H    oxyCODONE (ROXICODONE) immediate release tablet 5 mg  5 mg Oral Q4H PRN    Or    oxyCODONE (ROXICODONE) immediate release tablet 10 mg  10 mg Oral Q4H PRN    HYDROmorphone HCl PF (DILAUDID) injection 1 mg  1 mg IntraVENous Q3H PRN    methocarbamol (ROBAXIN) tablet 750 mg  750 mg Oral Q8H PRN    ondansetron (ZOFRAN-ODT) disintegrating tablet 4 mg  4 mg Oral Q8H PRN    Or    ondansetron (ZOFRAN) injection 4 mg  4 mg IntraVENous Q6H PRN    sennosides-docusate sodium (SENOKOT-S) 8.6-50 MG tablet 1 tablet  1 tablet Oral BID    aspirin EC tablet 81 mg  81 mg Oral BID    diphenhydrAMINE (BENADRYL) capsule 25 mg  25 mg Oral Q6H PRN    Or    diphenhydrAMINE (BENADRYL) injection 25 mg  25 mg IntraVENous Q6H PRN    allopurinol (ZYLOPRIM) tablet 300 mg  300 mg Oral Daily    amLODIPine (NORVASC) tablet 5 mg  5 mg Oral BID    citalopram (CELEXA) tablet 20 mg  20 mg Oral Daily    finasteride (PROSCAR) tablet 5 mg  5 mg Oral Nightly    furosemide (LASIX) tablet 20 mg  20 mg Oral  Daily    lisinopril (PRINIVIL;ZESTRIL) tablet 20 mg  20 mg Oral Nightly    LORazepam (ATIVAN) tablet 1 mg  1 mg Oral Q6H PRN    prazosin (MINIPRESS) capsule 1 mg  1 mg Oral BID    rosuvastatin (CRESTOR) tablet 5 mg  5 mg Oral Daily    pantoprazole (PROTONIX) tablet 40 mg  40 mg Oral Daily PRN    phenol 1.4 % mouth spray 1 spray  1 spray Mouth/Throat Q2H PRN    melatonin tablet 3 mg  3 mg Oral Nightly PRN    Benzocaine-Menthol (CEPACOL SORE THROAT) 15-2.6 MG lozenge 1 lozenge  1 lozenge Oral Q2H PRN    bisacodyl (DULCOLAX) EC tablet 5 mg  5 mg Oral Daily PRN    promethazine (PHENERGAN) injection 25 mg  25 mg IntraMUSCular Q6H PRN    sennosides-docusate sodium (SENOKOT-S) 8.6-50 MG tablet 2 tablet  2 tablet Oral BID PRN    gabapentin (NEURONTIN) capsule 100 mg  100 mg Oral TID PRN        LAB:    Lab Results   Component Value Date/Time    INR 1.0 10/15/2024 09:59 AM     Lab Results   Component Value Date/Time    HGB 10.6 11/02/2024 03:47 AM    HGB 12.0 10/15/2024 09:59 AM              Physical Exam:  Vital Signs are Stable, No Acute Distress, Alert,  Dressing is Dry,  Neurovascular exam is normal.      Assessment:      Principal Problem:    Status post right knee replacement  Active Problems:    Primary osteoarthritis of right knee  Resolved Problems:    * No resolved hospital problems. *       Plan:     Patient looks very good.   Continue PT/OT/Reha  Prescriptions e-prescribed to pharmacy.    I have reviewed the patient’s controlled substance prescription history, as maintained in the [place] prescription monitoring program, so that the prescription(s) for a  controlled substance can be given.

## 2024-11-02 NOTE — ICUWATCH
Patient with difficult James placement overnight. Primary RN had originally attempted to in and out cath patient but only a small amount of urine went in bag then stopped. I took over and attempted to manipulate catheter with no success. There was blood in bag so I believe there was a blood clot obstructing the catheter. We messaged the physician and got permission to use a Coude catheter and place a James. When I placed the Coude catheter, urine immediately began pouring into the bag. I realized the bag was filling by the time we placed the Stat Lock on the patient so we clamped the catheter and emptied the bag which already contained 2500 ml of clear yellow urine. We monitored the patient and waited 30 minutes before unclamping the catheter again and getting another immediate 1000 ml. The urine was still draining so the catheter was clamped again and after 30 minutes he put out another 400 ml and the urine was no longer continuously flowing. Bladder scan showed no urine left in the bladder at that point but urine is still draining into bag with a normal flow. Patient with no complaints of bladder spasms or discomfort. Vital signs stable throughout this time.

## 2024-11-02 NOTE — PROGRESS NOTES
OCCUPATIONAL THERAPY Daily Note and AM      (Link to Caseload Tracking: OT Visit Days: 2  OT Orders   Time  OT Charge Capture  Rehab Caseload Tracker  Episode     James Edward Potocki is a 79 y.o. male   PRIMARY DIAGNOSIS: Status post right knee replacement  Primary osteoarthritis of right knee [M17.11]  Procedure(s) (LRB):  rt KNEE TOTAL ARTHROPLASTY ROBOTIC (Right)  1 Day Post-Op  Reason for Referral: Pain in Right Knee (M25.561)  Stiffness of Right Knee, Not elsewhere classified (M25.661)  Observation: Payor: MEDICARE / Plan: MEDICARE PART A AND B / Product Type: *No Product type* /     ASSESSMENT:     REHAB RECOMMENDATIONS:   Recommendation to date pending progress:  Setting:  Pt is planning to go to [facility] as he lives alone without support    Equipment:    Rolling Walker     ASSESSMENT:  Mr. Potocki is s/p right TKA and presents with decreased independence with functional mobility and activities of daily living as compared to baseline level of function and safety. Patient would benefit from skilled Occupational Therapy to maximize independence and safety with self-care task and functional mobility.   Patient is seen in room. RN had noted that pt was drowsy but was appropriate for therapy. Pt is noted to be very drowsy, he is able to participate in bed mobility, edge of bed sitting and sit to stand several times but is unable to process well enough to take any steps. Pt is returned to supine with all needs and Rn aware of current condition. Pt should progress better tomorrow and is planning on post acute rehab as he lives alone without support. OT will follow       11/2/24:  Mr. Potocki is s/p R TKA.  Patient completed shower and dressing as charted below in ADL grid and is ambulating with rolling walker and minimal assist. Pt had a miller catheter placed last night due to urine retention. During session, patient demonstrated increased bouts of light headness/dizziness during session with varied BP  Safety  [x] Walker Management/Safety  [x] Adaptive Equipment as Needed  [x] Therapeutic Resting Position of Joint     TOTAL TREATMENT DURATION AND TIME:  Time In: 0950  Time Out: 1048  Minutes: 58    AMPARO Gao, OTR/L  11/2/24

## 2024-11-02 NOTE — PROGRESS NOTES
ACUTE PHYSICAL THERAPY GOALS:   (Developed with and agreed upon by patient and/or caregiver.)  GOALS (1-4 days):  (1.)Mr. Potocki will move from supine to sit and sit to supine  in bed with STAND BY ASSIST.  (2.)Mr. Potocki will transfer from bed to chair and chair to bed with CONTACT GUARD ASSIST using the least restrictive device.-GOAL MET 11/2/24     (3.)Mr. Potocki will ambulate with STAND BY ASSIST for 150 feet with the least restrictive device.  (4.)Mr. Potocki will ambulate up/down 14 steps with right railing with MINIMAL ASSIST.  (5.)Mr. Potocki will increase right knee ROM to 5-85°.  ________________________________________________________________________________________________           PHYSICAL THERAPY: TOTAL KNEE ARTHROPLASTY Daily Note and PM  (Link to Caseload Tracking: PT Visit Days : 2  Acknowledge Orders  Time In/Out  PT Charge Capture  Rehab Caseload Tracker  Episode   James Edward Potocki is a 79 y.o. male   PRIMARY DIAGNOSIS: Status post right knee replacement  Primary osteoarthritis of right knee [M17.11]  Osteoarthritis of left knee, unspecified osteoarthritis type [M17.12]  Procedure(s) (LRB):  rt KNEE TOTAL ARTHROPLASTY ROBOTIC (Right)  1 Day Post-Op  Reason for Referral: Pain in Right Knee (M25.561)  Stiffness of Right Knee, Not elsewhere classified (M25.661)  Difficulty in walking, Not elsewhere classified (R26.2)  Inpatient: Payor: MEDICARE / Plan: MEDICARE PART A AND B / Product Type: *No Product type* /     REHAB RECOMMENDATIONS:   Recommendation to date pending progress:  Setting:  Short-term Rehab    Equipment:    To Be Determined     RANGE OF MOTION:   Will assess at next session     GAIT: I Mod I S SBA CGA Min Mod Max Total  NT x2 Comments:   Level of Assistance [] [] [] [] [] [x] [] [] [] [] []            Weightbearing Status  Right Lower Extremity Weight Bearing: Weight Bearing As Tolerated    Distance  60 feet    Gait Quality Antalgic, Decreased lance , Decreased step

## 2024-11-02 NOTE — PROGRESS NOTES
11/01/24 2052   Oxygen Therapy/Pulse Ox   O2 Therapy Oxygen   O2 Device Nasal cannula   O2 Flow Rate (L/min) 3 L/min   Pulse 80   Respirations 17   SpO2 95 %   Pulse Oximeter Device Mode Continuous   $Pulse Oximeter $Spot check (multiple/continuous)     Patient placed on continuous sat monitor. Data cleared and alarms set. No distress noted at this time.

## 2024-11-02 NOTE — CARE COORDINATION
CM note:    Chart reviewed for updates. Makayla from [facility] reported that their MD declined pt for rehab due to limited medical necessity for IPR criteria. CM contacted pt's son Christopher Potocki [phone] to discuss d/c planning. He was notified that pt was declined at [facility]. He requested to know what other options were there for the patient. CM discussed STR vs. HH with him. He would like pt to go to STR as he does not think pt would be safe to go home with Home Health. STR choice list sent to him via e-mail: [email or web address]. CM has requested for at least 3 choices. Pt needs a 3 night midnight stay prior to going to STR. Pt has been changed from outpatient to inpatient status. CM will continue to follow up with d/c planning.    JAMILA Barroso

## 2024-11-02 NOTE — PROGRESS NOTES
ACUTE PHYSICAL THERAPY GOALS:   (Developed with and agreed upon by patient and/or caregiver.)  GOALS (1-4 days):  (1.)Mr. Potocki will move from supine to sit and sit to supine  in bed with STAND BY ASSIST.  (2.)Mr. Potocki will transfer from bed to chair and chair to bed with CONTACT GUARD ASSIST using the least restrictive device.  (3.)Mr. Potocki will ambulate with STAND BY ASSIST for 150 feet with the least restrictive device.  (4.)Mr. Potocki will ambulate up/down 14 steps with right railing with MINIMAL ASSIST.  (5.)Mr. Potocki will increase right knee ROM to 5-85°.  ________________________________________________________________________________________________           PHYSICAL THERAPY: TOTAL KNEE ARTHROPLASTY Daily Note and AM  (Link to Caseload Tracking: PT Visit Days : 2  Acknowledge Orders  Time In/Out  PT Charge Capture  Rehab Caseload Tracker  Episode   James Edward Potocki is a 79 y.o. male   PRIMARY DIAGNOSIS: Status post right knee replacement  Primary osteoarthritis of right knee [M17.11]  Procedure(s) (LRB):  rt KNEE TOTAL ARTHROPLASTY ROBOTIC (Right)  1 Day Post-Op  Reason for Referral: Pain in Right Knee (M25.561)  Stiffness of Right Knee, Not elsewhere classified (M25.661)  Difficulty in walking, Not elsewhere classified (R26.2)  Observation: Payor: MEDICARE / Plan: MEDICARE PART A AND B / Product Type: *No Product type* /     REHAB RECOMMENDATIONS:   Recommendation to date pending progress:  Setting:  Short-term Rehab    Equipment:    To Be Determined     RANGE OF MOTION:   Will assess at next session     GAIT: I Mod I S SBA CGA Min Mod Max Total  NT x2 Comments:   Level of Assistance [] [] [] [] [] [x] [] [] [] [] []            Weightbearing Status  Right Lower Extremity Weight Bearing: Weight Bearing As Tolerated    Distance  15;5 feet    Gait Quality Antalgic, Decreased lance , Decreased step clearance, Decreased step length, Decreased stance, Shuffling , Step-to, and Trunk flexion   Precautions  [x] No Pillow Under Knee  [] D/C Instruction Review [x] Cryocuff  [x] Walker Management/Safety  [] Adaptive Equipment as Needed     Interdisciplinary Patient Education   (Reference education tab)    AFTER TREATMENT PRECAUTIONS: Bed/Chair Locked, Call light within reach, Chair, Needs within reach, and RN notified    INTERDISCIPLINARY COLLABORATION:  RN/ PCT, PT/ PTA, and OT/ SLATER    COMPLIANCE WITH PROGRAM/EXERCISE: compliant all of the time.    RECOMMENDATIONS/INTENT FOR NEXT TREATMENT SESSION: Treatment next visit will focus on increasing Mr. Potocki's independence with bed mobility, transfers, gait training, strength/ROM exercises, modalities for pain, and patient education.     TIME IN/OUT:  Time In: 1030  Time Out: 1110  Minutes: 40    DONY DOMINGUEZ, PT

## 2024-11-02 NOTE — CONSULTS
Urology Consult    Requesting MD:     Patient: James Edward Potocki MRN: [id number]  SSN: [id number]    YOB: 1945  Age: 79 y.o.  Sex: male      Subjective:      James Edward Potocki is a 79 y.o. male who has end stage arthritis of the right knee. The patient was evaluated and examined during a consultation prior to this office visit. There have been no changes to the patient's orthopedic condition since the initial consultation. The patient has failed previous conservative treatment for this condition including antiinflammatories , and lifestyle modifications. The necessity for joint replacement is present. The patient will be admitted the day of surgery for right knee replacement .     Urology consult for urinary retention.    Patient is at the [facility]. Chart reviewed. Pt, is followed by [facility] for prostatomegaly and elevated PSA. Negative prostate biopsy in 2012. Last seen in there office was 12/2023.    Nursing placed miller after post-op urinary retention, likely due to anesthesia in the setting of prostatomegaly.    Past Medical History:   Diagnosis Date    Anxiety     takes Ativan po 5-7 days weekly    Edema of both legs     \"from BP meds\", ECHO 2015 on chart    Enlarged prostate     Gout     Hip pain     History of elevated glucose     hemoglobin A1C 5.7 12/11/2014    History of seasonal allergies     Hypercholesterolemia     Hypertension     managed with medication    Keratoacanthoma     S/P total hip arthroplasty 4/6/2015    Skin neoplasm     Spinal stenosis     injections - Dr. Brandt     Past Surgical History:   Procedure Laterality Date    HIP ARTHROPLASTY Right 2018    TONSILLECTOMY  as child    UMBILICAL HERNIA REPAIR      WISDOM TOOTH EXTRACTION  as teenager      Family History   Problem Relation Age of Onset    Stroke Father 30        bleed    Stroke Mother     Heart Attack Mother      Social History     Tobacco Use    Smoking status: Never    Smokeless tobacco: Not on  file   Substance Use Topics    Alcohol use: No      Prior to Admission medications    Medication Sig Start Date End Date Taking? Authorizing Provider   celecoxib (CELEBREX) 200 MG capsule Take 1 capsule by mouth 2 times daily 10/30/24 11/29/24 Yes Supriya Campuzano PA   amLODIPine (NORVASC) 5 MG tablet Take 1 tablet by mouth in the morning and at bedtime   Yes Tommy Sin MD   rosuvastatin (CRESTOR) 5 MG tablet Take 1 tablet by mouth daily   Yes Tommy Sin MD   citalopram (CELEXA) 20 MG tablet Take 1 tablet by mouth daily   Yes Tommy Sin MD   LORazepam (ATIVAN) 1 MG tablet Take 1 tablet by mouth every 6 hours as needed for Anxiety.   Yes ProviderTommy MD   Semaglutide (OZEMPIC, 1 MG/DOSE, SC) Inject into the skin Every Thursday   Yes ProviderTommy MD   POTASSIUM CHLORIDE PO Take 10 mEq by mouth   Yes Automatic Reconciliation, Ar   allopurinol (ZYLOPRIM) 300 MG tablet Take 1 tablet by mouth daily   Yes Automatic Reconciliation, Ar   ergocalciferol (ERGOCALCIFEROL) 1.25 MG (13576 UT) capsule Take 1 capsule by mouth every 7 days   Yes Automatic Reconciliation, Ar   finasteride (PROSCAR) 5 MG tablet Take 1 tablet by mouth at bedtime   Yes Automatic Reconciliation, Ar   furosemide (LASIX) 20 MG tablet Take 1 tablet by mouth daily   Yes Automatic Reconciliation, Ar   lisinopril (PRINIVIL;ZESTRIL) 20 MG tablet Take 1 tablet by mouth at bedtime   Yes Automatic Reconciliation, Ar   prazosin (MINIPRESS) 1 MG capsule Take 1 capsule by mouth 2 times daily   Yes Automatic Reconciliation, Ar   aspirin (ASPIRIN 81) 81 MG EC tablet Take 1 tablet by mouth in the morning and at bedtime 10/30/24 12/4/24  Supriya Campuzano PA   promethazine (PHENERGAN) 12.5 MG tablet Take 1 tablet by mouth 4 times daily as needed for Nausea 10/30/24   Supriya Campuzano PA   methocarbamol (ROBAXIN-750) 750 MG tablet Take 1 tablet by mouth 3 times daily as needed (muscle spasm or cramps) 10/30/24

## 2024-11-03 LAB
HCT VFR BLD AUTO: 22.9 % (ref 41.1–50.3)
HGB BLD-MCNC: 7.9 G/DL (ref 13.6–17.2)

## 2024-11-03 PROCEDURE — 6370000000 HC RX 637 (ALT 250 FOR IP): Performed by: ORTHOPAEDIC SURGERY

## 2024-11-03 PROCEDURE — 97535 SELF CARE MNGMENT TRAINING: CPT

## 2024-11-03 PROCEDURE — 97530 THERAPEUTIC ACTIVITIES: CPT

## 2024-11-03 PROCEDURE — 85018 HEMOGLOBIN: CPT

## 2024-11-03 PROCEDURE — 99024 POSTOP FOLLOW-UP VISIT: CPT | Performed by: ORTHOPAEDIC SURGERY

## 2024-11-03 PROCEDURE — 36415 COLL VENOUS BLD VENIPUNCTURE: CPT

## 2024-11-03 PROCEDURE — 85014 HEMATOCRIT: CPT

## 2024-11-03 PROCEDURE — 1100000000 HC RM PRIVATE

## 2024-11-03 PROCEDURE — 2580000003 HC RX 258: Performed by: PHYSICIAN ASSISTANT

## 2024-11-03 PROCEDURE — 6370000000 HC RX 637 (ALT 250 FOR IP): Performed by: PHYSICIAN ASSISTANT

## 2024-11-03 RX ORDER — CALCIUM CARBONATE 500 MG/1
500 TABLET, CHEWABLE ORAL ONCE
Status: COMPLETED | OUTPATIENT
Start: 2024-11-03 | End: 2024-11-03

## 2024-11-03 RX ORDER — FERROUS SULFATE 325(65) MG
325 TABLET ORAL 2 TIMES DAILY WITH MEALS
Status: DISCONTINUED | OUTPATIENT
Start: 2024-11-03 | End: 2024-11-05 | Stop reason: HOSPADM

## 2024-11-03 RX ADMIN — FUROSEMIDE 20 MG: 20 TABLET ORAL at 08:00

## 2024-11-03 RX ADMIN — ALLOPURINOL 300 MG: 300 TABLET ORAL at 08:00

## 2024-11-03 RX ADMIN — ROSUVASTATIN CALCIUM 5 MG: 5 TABLET, COATED ORAL at 08:00

## 2024-11-03 RX ADMIN — SENNOSIDES AND DOCUSATE SODIUM 1 TABLET: 50; 8.6 TABLET ORAL at 20:42

## 2024-11-03 RX ADMIN — SENNOSIDES AND DOCUSATE SODIUM 1 TABLET: 50; 8.6 TABLET ORAL at 08:01

## 2024-11-03 RX ADMIN — AMLODIPINE BESYLATE 5 MG: 5 TABLET ORAL at 08:00

## 2024-11-03 RX ADMIN — ASPIRIN 81 MG: 81 TABLET, COATED ORAL at 08:00

## 2024-11-03 RX ADMIN — CITALOPRAM 20 MG: 10 TABLET, FILM COATED ORAL at 08:00

## 2024-11-03 RX ADMIN — LORAZEPAM 1 MG: 1 TABLET ORAL at 21:04

## 2024-11-03 RX ADMIN — PRAZOSIN HYDROCHLORIDE 1 MG: 1 CAPSULE ORAL at 08:00

## 2024-11-03 RX ADMIN — SODIUM CHLORIDE, PRESERVATIVE FREE 10 ML: 5 INJECTION INTRAVENOUS at 20:46

## 2024-11-03 RX ADMIN — AMLODIPINE BESYLATE 5 MG: 5 TABLET ORAL at 20:42

## 2024-11-03 RX ADMIN — ACETAMINOPHEN 1000 MG: 500 TABLET, FILM COATED ORAL at 05:51

## 2024-11-03 RX ADMIN — FERROUS SULFATE TAB 325 MG (65 MG ELEMENTAL FE) 325 MG: 325 (65 FE) TAB at 09:00

## 2024-11-03 RX ADMIN — Medication 3 MG: at 20:42

## 2024-11-03 RX ADMIN — PANTOPRAZOLE SODIUM 40 MG: 40 TABLET, DELAYED RELEASE ORAL at 20:42

## 2024-11-03 RX ADMIN — ACETAMINOPHEN 1000 MG: 500 TABLET, FILM COATED ORAL at 13:23

## 2024-11-03 RX ADMIN — ANTACID TABLETS 500 MG: 500 TABLET, CHEWABLE ORAL at 14:47

## 2024-11-03 RX ADMIN — LISINOPRIL 20 MG: 20 TABLET ORAL at 20:42

## 2024-11-03 RX ADMIN — FINASTERIDE 5 MG: 5 TABLET, FILM COATED ORAL at 20:42

## 2024-11-03 RX ADMIN — FERROUS SULFATE TAB 325 MG (65 MG ELEMENTAL FE) 325 MG: 325 (65 FE) TAB at 17:11

## 2024-11-03 RX ADMIN — PRAZOSIN HYDROCHLORIDE 1 MG: 1 CAPSULE ORAL at 20:42

## 2024-11-03 RX ADMIN — ASPIRIN 81 MG: 81 TABLET, COATED ORAL at 20:42

## 2024-11-03 RX ADMIN — ACETAMINOPHEN 1000 MG: 500 TABLET, FILM COATED ORAL at 17:11

## 2024-11-03 NOTE — PROGRESS NOTES
ACUTE PHYSICAL THERAPY GOALS:   (Developed with and agreed upon by patient and/or caregiver.)  GOALS (1-4 days):  (1.)Mr. Potocki will move from supine to sit and sit to supine  in bed with STAND BY ASSIST.  (2.)Mr. Potocki will transfer from bed to chair and chair to bed with CONTACT GUARD ASSIST using the least restrictive device.  (3.)Mr. Potocki will ambulate with STAND BY ASSIST for 150 feet with the least restrictive device.  (4.)Mr. Potocki will ambulate up/down 14 steps with right railing with MINIMAL ASSIST.  (5.)Mr. Potocki will increase right knee ROM to 5-85°.  ________________________________________________________________________________________________      PHYSICAL THERAPY: TOTAL KNEE ARTHROPLASTY Daily Note and AM  (Link to Caseload Tracking: PT Visit Days : 3  Acknowledge Orders  Time In/Out  PT Charge Capture  Rehab Caseload Tracker  Episode     James Edward Potocki is a 79 y.o. male   PRIMARY DIAGNOSIS: Status post right knee replacement  Primary osteoarthritis of right knee [M17.11]  Osteoarthritis of left knee, unspecified osteoarthritis type [M17.12]  Procedure(s) (LRB):  rt KNEE TOTAL ARTHROPLASTY ROBOTIC (Right)  2 Days Post-Op  Reason for Referral: Pain in Right Knee (M25.561)  Stiffness of Right Knee, Not elsewhere classified (M25.661)  Difficulty in walking, Not elsewhere classified (R26.2)  Inpatient: Payor: MEDICARE / Plan: MEDICARE PART A AND B / Product Type: *No Product type* /     REHAB RECOMMENDATIONS:   Recommendation to date pending progress:  Setting:  Short-term Rehab    Equipment:    To Be Determined     RANGE OF MOTION:   Will assess at next session     GAIT: I Mod I S SBA CGA Min Mod Max Total  NT x2 Comments:   Level of Assistance [] [] [] [] [] [x] [] [] [] [] []            Weightbearing Status  Right Lower Extremity Weight Bearing: Weight Bearing As Tolerated    Distance  80 feet    Gait Quality Antalgic, Decreased lance , Decreased step clearance, Decreased step  Activity Tolerance, Decreased AROM/PROM, Decreased Balance, Decreased Coordination, Decreased Gait Ability, Decreased Safety Awareness, Decreased Strength, Decreased Transfer Abilities, and Increased Pain   INTERVENTIONS PLANNED:   (Benefits and precautions of physical therapy have been discussed with the patient.)  Therapeutic Activity, Therapeutic Exercise/HEP, Gait Training, Modalities, and Education       TREATMENT:   TREATMENT:   Therapeutic Activity (40 Minutes): Therapeutic activity included Rolling, Supine to Sit, Scooting, Transfer Training, Ambulation on level ground, Sitting balance , Standing balance, and LE therex to improve functional Activity tolerance, Balance, Coordination, Mobility, Strength, and ROM.    TREATMENT GRID:  THERAPEUTIC  EXERCISES: DATE:  11/2 DATE:  11/3 DATE:      AM PM AM PM AM PM    [] [] [x] [] [] []   Ankle Pumps 10 15 15      Quad Sets 10 15 15      Gluteal Sets 10 15 15      Hip Abd/ADduction 10 15 15      Straight Leg Raises 10aa 15aa 15 AA      Knee Slides 10aa 15aa 15 AA      Short Arc Quads 10 15 15      Chair Slides   15                        B = bilateral; AA = active assistive; A = active; P = passive      EDUCATION:  [x] Home Exercises  [x] Fall Precautions  [x] No Pillow Under Knee  [] D/C Instruction Review [x] Cryocuff  [x] Walker Management/Safety  [] Adaptive Equipment as Needed     Interdisciplinary Patient Education   (Reference education tab)    AFTER TREATMENT PRECAUTIONS: Alarm Activated, Bed/Chair Locked, Call light within reach, Chair, Needs within reach, and RN notified    INTERDISCIPLINARY COLLABORATION:  RN/ PCT and OT/ SLATER    COMPLIANCE WITH PROGRAM/EXERCISE: compliant all of the time.    RECOMMENDATIONS/INTENT FOR NEXT TREATMENT SESSION: Treatment next visit will focus on increasing Mr. Potocki's independence with bed mobility, transfers, gait training, strength/ROM exercises, modalities for pain, and patient education.     TIME IN/OUT:  Time In:

## 2024-11-03 NOTE — PROGRESS NOTES
Progress Note  Date:11/3/2024       Room:[facility]  Patient Name:James Edward Potocki     YOB: 1945     Age:79 y.o.        Subjective    Subjective minimal knee pain    Vitals Last 24 Hours:  TEMPERATURE:  Temp  Av.4 °F (36.9 °C)  Min: 97.7 °F (36.5 °C)  Max: 99 °F (37.2 °C)  RESPIRATIONS RANGE: Resp  Av.8  Min: 16  Max: 18  PULSE OXIMETRY RANGE: SpO2  Av.7 %  Min: 94 %  Max: 95 %  PULSE RANGE: Pulse  Av.3  Min: 76  Max: 82  BLOOD PRESSURE RANGE: Systolic (24hrs), Av , Min:127 , Max:148   ; Diastolic (24hrs), Av, Min:62, Max:66    I/O (24Hr):    Intake/Output Summary (Last 24 hours) at 11/3/2024 0733  Last data filed at 11/3/2024 0647  Gross per 24 hour   Intake --   Output 500 ml   Net -500 ml     Objective dressing dry, Good knee extension, NV intact  Labs/Imaging/Diagnostics    Labs:  CBC:  Recent Labs     24  0347 24  0527   HGB 10.6* 7.9*   HCT 32.4* 22.9*     CHEMISTRIES:No results for input(s): \"NA\", \"K\", \"CL\", \"CO2\", \"BUN\", \"CREATININE\", \"GLUCOSE\", \"PHOS\", \"MG\" in the last 72 hours.    Invalid input(s): \"CA\"  PT/INR:No results for input(s): \"PROTIME\", \"INR\" in the last 72 hours.  APTT:No results for input(s): \"APTT\" in the last 72 hours.  LIVER PROFILE:No results for input(s): \"AST\", \"ALT\", \"BILIDIR\", \"BILITOT\", \"ALKPHOS\" in the last 72 hours.    Imaging Last 24 Hours:  No results found.  Assessment//Plan           Hospital Problems             Last Modified POA    * (Principal) Status post right knee replacement 2024 Yes    Primary osteoarthritis of right knee 2024 Unknown    Osteoarthritis of left knee, unspecified osteoarthritis type 2024 Yes     Assessment & Plan  Continue PT,  Await rehab decision  Anemia, start Fe  Electronically signed by MARYCRUZ MORAES JR, MD on 11/3/24 at 7:33 AM EST

## 2024-11-03 NOTE — PROGRESS NOTES
OCCUPATIONAL THERAPY Daily Note and AM      (Link to Caseload Tracking: OT Visit Days: 3  OT Orders   Time  OT Charge Capture  Rehab Caseload Tracker  Episode     James Edward Potocki is a 79 y.o. male   PRIMARY DIAGNOSIS: Status post right knee replacement  Primary osteoarthritis of right knee [M17.11]  Osteoarthritis of left knee, unspecified osteoarthritis type [M17.12]  Procedure(s) (LRB):  rt KNEE TOTAL ARTHROPLASTY ROBOTIC (Right)  2 Days Post-Op  Reason for Referral: Pain in Right Knee (M25.561)  Stiffness of Right Knee, Not elsewhere classified (M25.661)  Inpatient: Payor: MEDICARE / Plan: MEDICARE PART A AND B / Product Type: *No Product type* /     ASSESSMENT:     REHAB RECOMMENDATIONS:   Recommendation to date pending progress:  Setting:  Pt is planning to go to [facility] as he lives alone without support    Equipment:    Rolling Walker     ASSESSMENT:  Mr. Potocki is s/p right TKA and presents with decreased independence with functional mobility and activities of daily living as compared to baseline level of function and safety. Patient would benefit from skilled Occupational Therapy to maximize independence and safety with self-care task and functional mobility.   Patient is seen in room. RN had noted that pt was drowsy but was appropriate for therapy. Pt is noted to be very drowsy, he is able to participate in bed mobility, edge of bed sitting and sit to stand several times but is unable to process well enough to take any steps. Pt is returned to supine with all needs and Rn aware of current condition. Pt should progress better tomorrow and is planning on post acute rehab as he lives alone without support. OT will follow       11/2/24:  Mr. Potocki is s/p R TKA.  Patient completed shower and dressing as charted below in ADL grid and is ambulating with rolling walker and minimal assist. Pt had a miller catheter placed last night due to urine retention. During session, patient demonstrated increased  46.65  ADL Inpatient CMS G-Code Modifier : CK   SUBJECTIVE:     Mr. Potocki stated he wanted some water    Social/Functional   Lives With: Alone  Type of Home: House  Home Layout: Two level (14)  Home Access: Stairs to enter with rails  Entrance Stairs - Number of Steps: 8  Entrance Stairs - Rails: Left  Bathroom Shower/Tub: Tub/Shower unit  Bathroom Equipment: Shower chair  Home Equipment: Cane    OBJECTIVE:     LINES / DRAINS / AIRWAY: None    RESTRICTIONS/PRECAUTIONS:  Restrictions/Precautions: Weight Bearing  Right Lower Extremity Weight Bearing: Weight Bearing As Tolerated    PAIN: VITALS / O2:   Pre Treatment:      2/10 at rest    Post Treatment: 2/10 at rest Vitals          Oxygen        GROSS EVALUATION: INTACT IMPAIRED   (See Comments)   UE AROM [x] []   UE PROM [x] []   Strength []  Weak overall      Posture / Balance []  Decreased standing balance    Sensation [x]     Coordination [x]       Tone [x]       Edema []    Activity Tolerance []  With mobility and ADLs     Hand Dominance R [] L []      COGNITION/  PERCEPTION: INTACT IMPAIRED   (See Comments)   Orientation []     Vision []     Hearing []     Cognition  []  Follows simple directions, slow to process and cues for safety   Perception []  impaired     MOBILITY: I Mod I S SBA CGA Min Mod Max Total  NT x2 Comments:   Bed Mobility    Rolling [] [] [] [] [] [] [] [] [] [] []    Supine to Sit [] [] [] [] [x] [] [] [] [] [] []    Scooting [] [] [] [] [x] [] [] [] [] [] []    Sit to Supine [] [] [] [] [] [] [] [] [] [] []    Transfers    Sit to Stand [] [] [] [] [] [x] [] [] [] [] [] rq   Bed to Chair [] [] [] [] [] [x] [] [] [] [] []    Stand to Sit [] [] [] [] [] [x] [] [] [] [] []    Tub/Shower [] [] [] [] [] [] [] [] [] [] []       Toilet [] [] [] [] [] [] [] [] [] [] []        [] [] [] [] [] [] [] [] [] [] []    I=Independent, Mod I=Modified Independent, S=Supervision/Setup, SBA=Standby Assistance, CGA=Contact Guard Assistance, Min=Minimal Assistance,

## 2024-11-03 NOTE — PROGRESS NOTES
ACUTE PHYSICAL THERAPY GOALS:   (Developed with and agreed upon by patient and/or caregiver.)  GOALS (1-4 days):  (1.)Mr. Potocki will move from supine to sit and sit to supine  in bed with STAND BY ASSIST.  (2.)Mr. Potocki will transfer from bed to chair and chair to bed with CONTACT GUARD ASSIST using the least restrictive device.  (3.)Mr. Potocki will ambulate with STAND BY ASSIST for 150 feet with the least restrictive device.  (4.)Mr. Potocki will ambulate up/down 14 steps with right railing with MINIMAL ASSIST.  (5.)Mr. Potocki will increase right knee ROM to 5-85°.  ________________________________________________________________________________________________      PHYSICAL THERAPY: TOTAL KNEE ARTHROPLASTY Daily Note and AM  (Link to Caseload Tracking: PT Visit Days : 3  Acknowledge Orders  Time In/Out  PT Charge Capture  Rehab Caseload Tracker  Episode     James Edward Potocki is a 79 y.o. male   PRIMARY DIAGNOSIS: Status post right knee replacement  Primary osteoarthritis of right knee [M17.11]  Osteoarthritis of left knee, unspecified osteoarthritis type [M17.12]  Procedure(s) (LRB):  rt KNEE TOTAL ARTHROPLASTY ROBOTIC (Right)  2 Days Post-Op  Reason for Referral: Pain in Right Knee (M25.561)  Stiffness of Right Knee, Not elsewhere classified (M25.661)  Difficulty in walking, Not elsewhere classified (R26.2)  Inpatient: Payor: MEDICARE / Plan: MEDICARE PART A AND B / Product Type: *No Product type* /     REHAB RECOMMENDATIONS:   Recommendation to date pending progress:  Setting:  Short-term Rehab    Equipment:    To Be Determined     RANGE OF MOTION:   Will assess at next session     GAIT: I Mod I S SBA CGA Min Mod Max Total  NT x2 Comments:   Level of Assistance [] [] [] [] [] [x] [] [] [] [] []            Weightbearing Status  Right Lower Extremity Weight Bearing: Weight Bearing As Tolerated    Distance  40 feet    Gait Quality Antalgic, Decreased lance , Decreased step clearance, Decreased step

## 2024-11-03 NOTE — CARE COORDINATION
CM note:    Chart reviewed for updates. CM contacted sandy's son Christopher Potocki [phone] to follow up on STR choices. He chose:    1.[facility]  2. [facility]  3. [facility]  4. [facility]    Referral sent to these facilities pending review. Pt does not need insurance auth. His 3 night inpatient stay ends on Tuesday at 1256 pm.CM will continue to follow up with d/c planning.    JAMILA Barroso

## 2024-11-03 NOTE — PLAN OF CARE
Problem: Safety - Adult  Goal: Free from fall injury  11/3/2024 0719 by Tomeka Gutierrez RN  Outcome: Progressing  11/2/2024 1937 by Marylin Christianson RN  Outcome: Progressing     Problem: Pain  Goal: Verbalizes/displays adequate comfort level or baseline comfort level  11/3/2024 0719 by Tomeka Gutierrez RN  Outcome: Progressing  11/2/2024 1937 by Marylin Christianson RN  Outcome: Progressing     Problem: Chronic Conditions and Co-morbidities  Goal: Patient's chronic conditions and co-morbidity symptoms are monitored and maintained or improved  11/3/2024 0719 by Tomeka Gutierrez RN  Outcome: Progressing  11/2/2024 1937 by Marylin Christianson RN  Outcome: Progressing     Problem: Discharge Planning  Goal: Discharge to home or other facility with appropriate resources  11/3/2024 0719 by Tomeka Gutierrez RN  Outcome: Progressing  11/2/2024 1937 by Marylin Christianson RN  Outcome: Progressing

## 2024-11-04 LAB
HCT VFR BLD AUTO: 21.9 % (ref 41.1–50.3)
HGB BLD-MCNC: 7.6 G/DL (ref 13.6–17.2)
SARS-COV-2 RDRP RESP QL NAA+PROBE: NOT DETECTED
SOURCE: NORMAL

## 2024-11-04 PROCEDURE — 1100000000 HC RM PRIVATE

## 2024-11-04 PROCEDURE — 36415 COLL VENOUS BLD VENIPUNCTURE: CPT

## 2024-11-04 PROCEDURE — 6370000000 HC RX 637 (ALT 250 FOR IP): Performed by: ORTHOPAEDIC SURGERY

## 2024-11-04 PROCEDURE — 87635 SARS-COV-2 COVID-19 AMP PRB: CPT

## 2024-11-04 PROCEDURE — 85018 HEMOGLOBIN: CPT

## 2024-11-04 PROCEDURE — 6370000000 HC RX 637 (ALT 250 FOR IP): Performed by: PHYSICIAN ASSISTANT

## 2024-11-04 PROCEDURE — 97530 THERAPEUTIC ACTIVITIES: CPT

## 2024-11-04 PROCEDURE — 85014 HEMATOCRIT: CPT

## 2024-11-04 RX ADMIN — CITALOPRAM 20 MG: 10 TABLET, FILM COATED ORAL at 08:20

## 2024-11-04 RX ADMIN — FERROUS SULFATE TAB 325 MG (65 MG ELEMENTAL FE) 325 MG: 325 (65 FE) TAB at 16:59

## 2024-11-04 RX ADMIN — AMLODIPINE BESYLATE 5 MG: 5 TABLET ORAL at 08:20

## 2024-11-04 RX ADMIN — ROSUVASTATIN CALCIUM 5 MG: 5 TABLET, COATED ORAL at 08:20

## 2024-11-04 RX ADMIN — FUROSEMIDE 20 MG: 20 TABLET ORAL at 08:20

## 2024-11-04 RX ADMIN — ASPIRIN 81 MG: 81 TABLET, COATED ORAL at 19:22

## 2024-11-04 RX ADMIN — OXYCODONE 10 MG: 5 TABLET ORAL at 13:32

## 2024-11-04 RX ADMIN — ALLOPURINOL 300 MG: 300 TABLET ORAL at 08:20

## 2024-11-04 RX ADMIN — PRAZOSIN HYDROCHLORIDE 1 MG: 1 CAPSULE ORAL at 19:22

## 2024-11-04 RX ADMIN — FERROUS SULFATE TAB 325 MG (65 MG ELEMENTAL FE) 325 MG: 325 (65 FE) TAB at 08:20

## 2024-11-04 RX ADMIN — OXYCODONE 10 MG: 5 TABLET ORAL at 08:24

## 2024-11-04 RX ADMIN — ACETAMINOPHEN 1000 MG: 500 TABLET, FILM COATED ORAL at 13:32

## 2024-11-04 RX ADMIN — PRAZOSIN HYDROCHLORIDE 1 MG: 1 CAPSULE ORAL at 08:20

## 2024-11-04 RX ADMIN — SENNOSIDES AND DOCUSATE SODIUM 1 TABLET: 50; 8.6 TABLET ORAL at 08:20

## 2024-11-04 RX ADMIN — LISINOPRIL 20 MG: 20 TABLET ORAL at 19:23

## 2024-11-04 RX ADMIN — ACETAMINOPHEN 1000 MG: 500 TABLET, FILM COATED ORAL at 19:20

## 2024-11-04 RX ADMIN — AMLODIPINE BESYLATE 5 MG: 5 TABLET ORAL at 19:22

## 2024-11-04 RX ADMIN — ASPIRIN 81 MG: 81 TABLET, COATED ORAL at 08:20

## 2024-11-04 RX ADMIN — FINASTERIDE 5 MG: 5 TABLET, FILM COATED ORAL at 19:22

## 2024-11-04 RX ADMIN — ACETAMINOPHEN 1000 MG: 500 TABLET, FILM COATED ORAL at 23:57

## 2024-11-04 ASSESSMENT — PAIN SCALES - GENERAL
PAINLEVEL_OUTOF10: 6
PAINLEVEL_OUTOF10: 8
PAINLEVEL_OUTOF10: 7
PAINLEVEL_OUTOF10: 3

## 2024-11-04 ASSESSMENT — PAIN DESCRIPTION - DESCRIPTORS
DESCRIPTORS: SORE
DESCRIPTORS: THROBBING
DESCRIPTORS: ACHING

## 2024-11-04 ASSESSMENT — PAIN DESCRIPTION - LOCATION
LOCATION: KNEE

## 2024-11-04 ASSESSMENT — PAIN DESCRIPTION - ORIENTATION
ORIENTATION: RIGHT

## 2024-11-04 NOTE — PLAN OF CARE
Problem: Safety - Adult  Goal: Free from fall injury  11/4/2024 0855 by Jossy Barker RN  Outcome: Progressing  11/3/2024 2136 by Marylin Christianson RN  Outcome: Progressing     Problem: Pain  Goal: Verbalizes/displays adequate comfort level or baseline comfort level  11/4/2024 0855 by Jossy Barker RN  Outcome: Progressing  11/3/2024 2136 by Marylin Christianson RN  Outcome: Progressing     Problem: Chronic Conditions and Co-morbidities  Goal: Patient's chronic conditions and co-morbidity symptoms are monitored and maintained or improved  Recent Flowsheet Documentation  Taken 11/4/2024 0852 by Jossy Barker RN  Care Plan - Patient's Chronic Conditions and Co-Morbidity Symptoms are Monitored and Maintained or Improved: Monitor and assess patient's chronic conditions and comorbid symptoms for stability, deterioration, or improvement  11/3/2024 2136 by Marylin Christianson RN  Outcome: Progressing     Problem: Discharge Planning  Goal: Discharge to home or other facility with appropriate resources  Recent Flowsheet Documentation  Taken 11/4/2024 0852 by Jossy Barker RN  Discharge to home or other facility with appropriate resources: Identify barriers to discharge with patient and caregiver  11/3/2024 2136 by Marylin Christianson RN  Outcome: Progressing

## 2024-11-04 NOTE — CARE COORDINATION
CM note:    Update 1115 am: Pt has been accepted at [facility]. CM spoke with pt and son and they both would like pt to go to [facility]. Pt will discharge tomorrow. CM will continue to follow up with d/c planning.    Initial note: Chart reviewed for updates. Pt does not have any accepting facilities as of yet. CM has reached out to the liaisons to follow up on bed availability. CM awaiting to hear back from them. Pt's 3 inpatient midnight stay ends tomorrow at 1256 pm. CM will continue to follow up with d/c planning.    JAMILA Barroso

## 2024-11-04 NOTE — PROGRESS NOTES
Orthopedic Joint Progress Note    2024  Admit Date: 2024  Admit Diagnosis: Primary osteoarthritis of right knee [M17.11]  Osteoarthritis of left knee, unspecified osteoarthritis type [M17.12]    3 Days Post-Op    Subjective:     James Edward Potocki  doing well. Pain well-controlled.      Review of Systems: Musculoskeletal and general review of systems are unchanged    Objective:     PT/OT:     PATIENT MOBILITY                           Vital Signs:    Blood pressure (!) 175/76, pulse 79, temperature 97.5 °F (36.4 °C), temperature source Oral, resp. rate 17, height 1.829 m (6' 0.01\"), weight 101.2 kg (223 lb 1.7 oz), SpO2 94%.  Temp (24hrs), Av.5 °F (36.9 °C), Min:97.5 °F (36.4 °C), Max:99.5 °F (37.5 °C)      Pain Control:        Meds:  Current Facility-Administered Medications   Medication Dose Route Frequency    ferrous sulfate (IRON 325) tablet 325 mg  325 mg Oral BID WC    sodium chloride flush 0.9 % injection 5-40 mL  5-40 mL IntraVENous 2 times per day    sodium chloride flush 0.9 % injection 5-40 mL  5-40 mL IntraVENous PRN    0.9 % sodium chloride infusion   IntraVENous PRN    acetaminophen (TYLENOL) tablet 1,000 mg  1,000 mg Oral Q6H    oxyCODONE (ROXICODONE) immediate release tablet 5 mg  5 mg Oral Q4H PRN    Or    oxyCODONE (ROXICODONE) immediate release tablet 10 mg  10 mg Oral Q4H PRN    HYDROmorphone HCl PF (DILAUDID) injection 1 mg  1 mg IntraVENous Q3H PRN    ondansetron (ZOFRAN-ODT) disintegrating tablet 4 mg  4 mg Oral Q8H PRN    Or    ondansetron (ZOFRAN) injection 4 mg  4 mg IntraVENous Q6H PRN    sennosides-docusate sodium (SENOKOT-S) 8.6-50 MG tablet 1 tablet  1 tablet Oral BID    aspirin EC tablet 81 mg  81 mg Oral BID    diphenhydrAMINE (BENADRYL) capsule 25 mg  25 mg Oral Q6H PRN    Or    diphenhydrAMINE (BENADRYL) injection 25 mg  25 mg IntraVENous Q6H PRN    allopurinol (ZYLOPRIM) tablet 300 mg  300 mg Oral Daily    amLODIPine (NORVASC) tablet 5 mg  5 mg Oral BID

## 2024-11-04 NOTE — RT PROTOCOL NOTE
Respiratory Care Services     Policy Number: [id number]    Title: Oxygen Protocol    Effective Date: 01/1996    Revised Date: 06/2013, 02/29/2016, 4/2018, 7/2019    Reviewed Date: 05/2014, 03/2015, 06/2017, 11/2020        I.  Policy: The Oxygen Protocol will be initiated for all patients upon written order from physician for administration of oxygen therapy or if a patient is found to have an oxygen saturation of 88% or less. Special consideration: the goal of oxygen therapy for COPD patients is to maintain oxygen saturation between 88% - 92% to comply with GOLD Guidelines.    II. Purpose: To provide protocol driven respiratory therapy for the administration of oxygen at concentrations greater than that in ambient air with the intent of treating or preventing the symptoms and manifestations of hypoxia.    III. Responsibility: Director Respiratory Care Services, all Respiratory Care Practitioners     IV. Indications:   Implement this protocol for patients when physician orders oxygen to be administered or when patient is found to have an oxygen saturation of 88% or less.  To assure routine monitoring of patient's oxygen saturation b.i.d. and to make appropriate adjustments in accordance with ordered oxygen saturation parameters.  To assure continuity of respiratory care that meets [facility] Clinical Practice Guidelines and GOLD Guidelines.  Hb < 8  Sickle Cell anemia crisis    V. Assessment:  Assess the following parameters to determine the need to adjust oxygen:  Measurement of patient's oxygen saturation via pulse oximetry.    Observation of patient's color, respiratory effort, and responsiveness.  Measurement of heart rate and respiratory rate.  Complete a three-step ambulatory oxygen saturation when ordered.    VI. Initiation:  Upon receipt of an order for oxygen, the RCP will:   Verify order in the patient's EMR, which should include the desired oxygen saturation to be maintained.  The patient shall be placed on

## 2024-11-04 NOTE — PROGRESS NOTES
ACUTE PHYSICAL THERAPY GOALS:   (Developed with and agreed upon by patient and/or caregiver.)  GOALS (1-4 days):  (1.)Mr. Potocki will move from supine to sit and sit to supine  in bed with STAND BY ASSIST.  (2.)Mr. Potocki will transfer from bed to chair and chair to bed with CONTACT GUARD ASSIST using the least restrictive device.  (3.)Mr. Potocki will ambulate with STAND BY ASSIST for 150 feet with the least restrictive device.  (4.)Mr. Potocki will ambulate up/down 14 steps with right railing with MINIMAL ASSIST.  (5.)Mr. Potocki will increase right knee ROM to 5-85°.  ________________________________________________________________________________________________      PHYSICAL THERAPY: TOTAL KNEE ARTHROPLASTY Daily Note and AM  (Link to Caseload Tracking: PT Visit Days : 4  Acknowledge Orders  Time In/Out  PT Charge Capture  Rehab Caseload Tracker  Episode     James Edward Potocki is a 79 y.o. male   PRIMARY DIAGNOSIS: Status post right knee replacement  Primary osteoarthritis of right knee [M17.11]  Osteoarthritis of left knee, unspecified osteoarthritis type [M17.12]  Procedure(s) (LRB):  rt KNEE TOTAL ARTHROPLASTY ROBOTIC (Right)  3 Days Post-Op  Reason for Referral: Pain in Right Knee (M25.561)  Stiffness of Right Knee, Not elsewhere classified (M25.661)  Difficulty in walking, Not elsewhere classified (R26.2)  Inpatient: Payor: MEDICARE / Plan: MEDICARE PART A AND B / Product Type: *No Product type* /     REHAB RECOMMENDATIONS:   Recommendation to date pending progress:  Setting:  Short-term Rehab    Equipment:    To Be Determined     RANGE OF MOTION:   Will assess at next session     GAIT: I Mod I S SBA CGA Min Mod Max Total  NT x2 Comments:   Level of Assistance [] [] [] [] [] [x] [] [] [] [] []            Weightbearing Status       Distance  40 feet    Gait Quality Antalgic, Decreased lance , Decreased step clearance, Decreased step length, Decreased stance, Shuffling , Step-to, and Trunk flexion   Score: 14    SUBJECTIVE:   Mr. Potocki agreeable to therapy    Home Environment/Prior Level of Function Lives With: Alone  Type of Home: House  Home Layout: Two level (14)  Home Access: Stairs to enter with rails  Entrance Stairs - Number of Steps: 8  Entrance Stairs - Rails: Left  Bathroom Shower/Tub: Tub/Shower unit  Bathroom Equipment: Shower chair  Home Equipment: Cane    OBJECTIVE:     PAIN: VITAL SIGNS: LINES/DRAINS:   Pre Treatment:  Pain Assessment: None - Denies Pain  Patient's Stated Pain Goal:  (mostly stiff)      Post Treatment: just stiff , ice to R knee Vitals        Oxygen    None    RESTRICTIONS/PRECAUTIONS:  Restrictions/Precautions: Weight Bearing  Right Lower Extremity Weight Bearing: Weight Bearing As Tolerated        Restrictions/Precautions: Weight Bearing        LOWER EXTREMITY GROSS EVALUATION:   RIGHT LE   Within Functional Limits   Abnormal   Comments   Strength [] [x]     Range of Motion [] [x]        LEFT LE Within Functional Limits   Abnormal   Comments   Strength [x] []     Range of Motion [x] []       UPPER EXTREMITY GROSS EVALUATION:     Within Functional Limits   Abnormal   Comments   Strength [x] []    Range of Motion [x] []      SENSATION  Sensation [x]       COGNITION/  PERCEPTION: Intact Impaired (Comments):   Orientation [x]     Vision [x]     Hearing [x]     Cognition  []   More alert and following commands but still a little slow with processing.     MOBILITY: I Mod I S SBA CGA Min Mod Max Total  NT x2 Comments:   Bed Mobility    Rolling [] [] [] [] [] [x] [] [] [] [] []    Supine to Sit [] [] [] [] [] [x] [] [] [] [] []    Scooting [] [] [] [] [] [x] [] [] [] [] []    Sit to Supine [] [] [] [] [] [] [] [] [] [] []    Transfers    Sit to Stand [] [] [] [] [] [x] [] [] [] [] []    Bed to Chair [] [] [] [] [] [x] [] [] [] [] []    Stand to Sit [] [] [] [] [] [x] [] [] [] [] []    Stand Pivot [] [] [] [] [] [x] [] [] [] [] []    Toilet [] [] [] [] [] [] [] [] [] [] []     [] []

## 2024-11-04 NOTE — PROGRESS NOTES
ACUTE PHYSICAL THERAPY GOALS:   (Developed with and agreed upon by patient and/or caregiver.)  GOALS (1-4 days):  (1.)Mr. Potocki will move from supine to sit and sit to supine  in bed with STAND BY ASSIST.  (2.)Mr. Potocki will transfer from bed to chair and chair to bed with CONTACT GUARD ASSIST using the least restrictive device.  (3.)Mr. Potocki will ambulate with STAND BY ASSIST for 150 feet with the least restrictive device.  (4.)Mr. Potocki will ambulate up/down 14 steps with right railing with MINIMAL ASSIST.  (5.)Mr. Potocki will increase right knee ROM to 5-85°.  ________________________________________________________________________________________________      PHYSICAL THERAPY: TOTAL KNEE ARTHROPLASTY Daily Note and PM  (Link to Caseload Tracking: PT Visit Days : 4  Acknowledge Orders  Time In/Out  PT Charge Capture  Rehab Caseload Tracker  Episode     James Edward Potocki is a 79 y.o. male   PRIMARY DIAGNOSIS: Status post right knee replacement  Primary osteoarthritis of right knee [M17.11]  Osteoarthritis of left knee, unspecified osteoarthritis type [M17.12]  Procedure(s) (LRB):  rt KNEE TOTAL ARTHROPLASTY ROBOTIC (Right)  3 Days Post-Op  Reason for Referral: Pain in Right Knee (M25.561)  Stiffness of Right Knee, Not elsewhere classified (M25.661)  Difficulty in walking, Not elsewhere classified (R26.2)  Inpatient: Payor: MEDICARE / Plan: MEDICARE PART A AND B / Product Type: *No Product type* /     REHAB RECOMMENDATIONS:   Recommendation to date pending progress:  Setting:  Short-term Rehab    Equipment:    To Be Determined     RANGE OF MOTION:   Will assess at next session     GAIT: I Mod I S SBA CGA Min Mod Max Total  NT x2 Comments:   Level of Assistance [] [] [] [] [] [x] [] [] [] [] []            Weightbearing Status       Distance  46 feet    Gait Quality Antalgic, Decreased lance , Decreased step clearance, Decreased step length, Decreased stance, Shuffling , Step-to, and Trunk flexion     Supine to Sit [] [] [] [] [] [] [] [] [] [] []    Scooting [] [] [] [] [] [] [] [] [] [] []    Sit to Supine [] [] [] [] [] [] [] [] [] [] []    Transfers    Sit to Stand [] [] [] [] [] [x] [] [] [] [] []    Bed to Chair [] [] [] [] [] [x] [] [] [] [] []    Stand to Sit [] [] [] [] [] [x] [] [] [] [] []    Stand Pivot [] [] [] [] [] [x] [] [] [] [] []    Toilet [] [] [] [] [] [] [] [] [] [] []     [] [] [] [] [] [] [] [] [] [] []    I=Independent, Mod I=Modified Independent, S=Supervision, SBA=Standby Assistance, CGA=Contact Guard Assistance,   Min=Minimal Assistance, Mod=Moderate Assistance, Max=Maximal Assistance, Total=Total Assistance, NT=Not Tested    BALANCE: Good Fair+ Fair Fair- Poor NT Comments   Sitting Static [x] [] [] [] [] []    Sitting Dynamic [x] [] [] [] [] []              Standing Static [] [x] [] [] [] []    Standing Dynamic [] [x] [] [] [] []      PLAN:   FREQUENCY AND DURATION: BID for duration of hospital stay or until stated goals are met, whichever comes first.    THERAPY PROGNOSIS: Good    PROBLEM LIST:   (Skilled intervention is medically necessary to address:)  Decreased ADL/Functional Activities, Decreased Activity Tolerance, Decreased AROM/PROM, Decreased Balance, Decreased Coordination, Decreased Gait Ability, Decreased Safety Awareness, Decreased Strength, Decreased Transfer Abilities, and Increased Pain   INTERVENTIONS PLANNED:   (Benefits and precautions of physical therapy have been discussed with the patient.)  Therapeutic Activity, Therapeutic Exercise/HEP, Gait Training, Modalities, and Education       TREATMENT:   TREATMENT:   Therapeutic Activity (30 Minutes): Therapeutic activity included Scooting, Transfer Training, Ambulation on level ground, Sitting balance , and Standing balance to improve functional Activity tolerance, Balance, Coordination, Mobility, Strength, and ROM.    TREATMENT GRID:  THERAPEUTIC  EXERCISES: DATE:  11/2 DATE:  11/3 DATE:  11/4      AM PM AM PM AM PM

## 2024-11-05 VITALS
BODY MASS INDEX: 30.22 KG/M2 | WEIGHT: 223.11 LBS | HEIGHT: 72 IN | SYSTOLIC BLOOD PRESSURE: 156 MMHG | TEMPERATURE: 98.2 F | HEART RATE: 70 BPM | DIASTOLIC BLOOD PRESSURE: 72 MMHG | OXYGEN SATURATION: 97 % | RESPIRATION RATE: 17 BRPM

## 2024-11-05 PROCEDURE — 97530 THERAPEUTIC ACTIVITIES: CPT

## 2024-11-05 PROCEDURE — 6370000000 HC RX 637 (ALT 250 FOR IP): Performed by: PHYSICIAN ASSISTANT

## 2024-11-05 PROCEDURE — 6370000000 HC RX 637 (ALT 250 FOR IP): Performed by: ORTHOPAEDIC SURGERY

## 2024-11-05 PROCEDURE — 97535 SELF CARE MNGMENT TRAINING: CPT

## 2024-11-05 RX ORDER — OXYCODONE HYDROCHLORIDE 5 MG/1
5-10 TABLET ORAL
Qty: 60 TABLET | Refills: 0 | Status: SHIPPED | OUTPATIENT
Start: 2024-11-05 | End: 2024-11-10

## 2024-11-05 RX ORDER — LORAZEPAM 1 MG/1
1 TABLET ORAL EVERY 6 HOURS PRN
Qty: 20 TABLET | Refills: 0 | Status: SHIPPED | OUTPATIENT
Start: 2024-11-05 | End: 2024-11-10

## 2024-11-05 RX ADMIN — FERROUS SULFATE TAB 325 MG (65 MG ELEMENTAL FE) 325 MG: 325 (65 FE) TAB at 08:34

## 2024-11-05 RX ADMIN — AMLODIPINE BESYLATE 5 MG: 5 TABLET ORAL at 08:34

## 2024-11-05 RX ADMIN — ALLOPURINOL 300 MG: 300 TABLET ORAL at 08:34

## 2024-11-05 RX ADMIN — ROSUVASTATIN CALCIUM 5 MG: 5 TABLET, COATED ORAL at 08:34

## 2024-11-05 RX ADMIN — CITALOPRAM 20 MG: 10 TABLET, FILM COATED ORAL at 08:34

## 2024-11-05 RX ADMIN — FUROSEMIDE 20 MG: 20 TABLET ORAL at 08:34

## 2024-11-05 RX ADMIN — SENNOSIDES AND DOCUSATE SODIUM 1 TABLET: 50; 8.6 TABLET ORAL at 08:34

## 2024-11-05 RX ADMIN — PRAZOSIN HYDROCHLORIDE 1 MG: 1 CAPSULE ORAL at 08:34

## 2024-11-05 RX ADMIN — ASPIRIN 81 MG: 81 TABLET, COATED ORAL at 08:34

## 2024-11-05 RX ADMIN — ACETAMINOPHEN 1000 MG: 500 TABLET, FILM COATED ORAL at 06:00

## 2024-11-05 NOTE — PROGRESS NOTES
mg Oral Daily    lisinopril (PRINIVIL;ZESTRIL) tablet 20 mg  20 mg Oral Nightly    LORazepam (ATIVAN) tablet 1 mg  1 mg Oral Q6H PRN    prazosin (MINIPRESS) capsule 1 mg  1 mg Oral BID    rosuvastatin (CRESTOR) tablet 5 mg  5 mg Oral Daily    pantoprazole (PROTONIX) tablet 40 mg  40 mg Oral Daily PRN    phenol 1.4 % mouth spray 1 spray  1 spray Mouth/Throat Q2H PRN    melatonin tablet 3 mg  3 mg Oral Nightly PRN    Benzocaine-Menthol (CEPACOL SORE THROAT) 15-2.6 MG lozenge 1 lozenge  1 lozenge Oral Q2H PRN    bisacodyl (DULCOLAX) EC tablet 5 mg  5 mg Oral Daily PRN    promethazine (PHENERGAN) injection 25 mg  25 mg IntraMUSCular Q6H PRN    sennosides-docusate sodium (SENOKOT-S) 8.6-50 MG tablet 2 tablet  2 tablet Oral BID PRN    gabapentin (NEURONTIN) capsule 100 mg  100 mg Oral TID PRN        LAB:    Lab Results   Component Value Date/Time    INR 1.0 10/15/2024 09:59 AM     Lab Results   Component Value Date/Time    HGB 7.6 11/04/2024 04:05 AM    HGB 7.9 11/03/2024 05:27 AM    HGB 10.6 11/02/2024 03:47 AM              Physical Exam:  Vital Signs are Stable, No Acute Distress, Alert,  Dressing is Dry,  Neurovascular exam is normal.      Assessment:      Principal Problem:    Status post right knee replacement  Active Problems:    Primary osteoarthritis of right knee    Osteoarthritis of left knee, unspecified osteoarthritis type  Resolved Problems:    * No resolved hospital problems. *       Plan:     Patient looks very good.   Continue PT/OT/Rehab  Anticipated discharge to rehab  Prescriptions printed and left at nurse's station

## 2024-11-05 NOTE — DISCHARGE SUMMARY
[facility]   Discharge Summary         Patient ID:  James Edward Potocki  [id number]  79 y.o.  1945    Admit date: 11/1/2024  Discharge date and time:    Admitting Physician: Guillermo Ramirez Jr., MD  Surgeon: Same    Hospital Course    Admission Diagnoses: Pre op diagnosis: Primary osteoarthritis of right knee [M17.11]  Prior to surgery the patient was seen for consultation in the office or hospital and a complete history and physical was taken as it pertained to their condition.   Discharge Diagnoses: Status post right knee replacement. Chronic and Acute medical problems addressed during this hospital stay by consulting physicians include:   They underwent Procedure(s) (LRB):  rt KNEE TOTAL ARTHROPLASTY ROBOTIC (Right) for this.       Principle Problem: Status post right knee replacement.     Other Chronic and Acute Medical Issues: managed by the hospitalist during admission included: Principal Problem:    Status post right knee replacement  Active Problems:    Primary osteoarthritis of right knee    Osteoarthritis of left knee, unspecified osteoarthritis type  Resolved Problems:    * No resolved hospital problems. *                               Perioperative Antibiotics:  Prior to surgery Ancef 1 to 2 mg was given depending on patient's weight and allergies.  If the patient was allergic to Ancef or MRSA positive  the patient was given Vancomycin and [facility] Medications:   Current Facility-Administered Medications   Medication Dose Route Frequency    ferrous sulfate (IRON 325) tablet 325 mg  325 mg Oral BID WC    0.9 % sodium chloride infusion   IntraVENous PRN    acetaminophen (TYLENOL) tablet 1,000 mg  1,000 mg Oral Q6H    oxyCODONE (ROXICODONE) immediate release tablet 5 mg  5 mg Oral Q4H PRN    Or    oxyCODONE (ROXICODONE) immediate release tablet 10 mg  10 mg Oral Q4H PRN    HYDROmorphone HCl PF (DILAUDID) injection 1 mg  1 mg IntraVENous Q3H PRN    ondansetron  (ZOFRAN-ODT) disintegrating tablet 4 mg  4 mg Oral Q8H PRN    Or    ondansetron (ZOFRAN) injection 4 mg  4 mg IntraVENous Q6H PRN    sennosides-docusate sodium (SENOKOT-S) 8.6-50 MG tablet 1 tablet  1 tablet Oral BID    aspirin EC tablet 81 mg  81 mg Oral BID    diphenhydrAMINE (BENADRYL) capsule 25 mg  25 mg Oral Q6H PRN    Or    diphenhydrAMINE (BENADRYL) injection 25 mg  25 mg IntraVENous Q6H PRN    allopurinol (ZYLOPRIM) tablet 300 mg  300 mg Oral Daily    amLODIPine (NORVASC) tablet 5 mg  5 mg Oral BID    citalopram (CELEXA) tablet 20 mg  20 mg Oral Daily    finasteride (PROSCAR) tablet 5 mg  5 mg Oral Nightly    furosemide (LASIX) tablet 20 mg  20 mg Oral Daily    lisinopril (PRINIVIL;ZESTRIL) tablet 20 mg  20 mg Oral Nightly    LORazepam (ATIVAN) tablet 1 mg  1 mg Oral Q6H PRN    prazosin (MINIPRESS) capsule 1 mg  1 mg Oral BID    rosuvastatin (CRESTOR) tablet 5 mg  5 mg Oral Daily    pantoprazole (PROTONIX) tablet 40 mg  40 mg Oral Daily PRN    phenol 1.4 % mouth spray 1 spray  1 spray Mouth/Throat Q2H PRN    melatonin tablet 3 mg  3 mg Oral Nightly PRN    Benzocaine-Menthol (CEPACOL SORE THROAT) 15-2.6 MG lozenge 1 lozenge  1 lozenge Oral Q2H PRN    bisacodyl (DULCOLAX) EC tablet 5 mg  5 mg Oral Daily PRN    promethazine (PHENERGAN) injection 25 mg  25 mg IntraMUSCular Q6H PRN    sennosides-docusate sodium (SENOKOT-S) 8.6-50 MG tablet 2 tablet  2 tablet Oral BID PRN    gabapentin (NEURONTIN) capsule 100 mg  100 mg Oral TID PRN         Additional DVT Prophylaxis:  GAYE Hose, SCDs     Postoperative Blood Report: If the patient received blood products during their admission they are listed below:     No components found for: \"PCTEXX\"  No components found for: \"PCTABR\"  Lab Results   Component Value Date/Time    ABORH O POSITIVE 04/06/2015 06:00 AM     No components found for: \"PCTUN\"  No components found for: \"PCTCT\"  No components found for: \"PCTUDIV\"  No components found for: \"PCTXM\"    Post Op complications:

## 2024-11-05 NOTE — PROGRESS NOTES
OCCUPATIONAL THERAPY Daily Note and AM      (Link to Caseload Tracking: OT Visit Days: 3  OT Orders   Time  OT Charge Capture  Rehab Caseload Tracker  Episode     James Edward Potocki is a 79 y.o. male   PRIMARY DIAGNOSIS: Status post right knee replacement  Primary osteoarthritis of right knee [M17.11]  Osteoarthritis of left knee, unspecified osteoarthritis type [M17.12]  Procedure(s) (LRB):  rt KNEE TOTAL ARTHROPLASTY ROBOTIC (Right)  4 Days Post-Op  Reason for Referral: Pain in Right Knee (M25.561)  Stiffness of Right Knee, Not elsewhere classified (M25.661)  Inpatient: Payor: MEDICARE / Plan: MEDICARE PART A AND B / Product Type: *No Product type* /     ASSESSMENT:     REHAB RECOMMENDATIONS:   Recommendation to date pending progress:  Setting:  Pt is planning to go to [facility] as he lives alone without support    Equipment:    Rolling Walker     ASSESSMENT:  Mr. Potocki is s/p right TKA and presents with decreased independence with functional mobility and activities of daily living as compared to baseline level of function and safety. Patient would benefit from skilled Occupational Therapy to maximize independence and safety with self-care task and functional mobility.   Patient is seen in room. RN had noted that pt was drowsy but was appropriate for therapy. Pt is noted to be very drowsy, he is able to participate in bed mobility, edge of bed sitting and sit to stand several times but is unable to process well enough to take any steps. Pt is returned to supine with all needs and Rn aware of current condition. Pt should progress better tomorrow and is planning on post acute rehab as he lives alone without support. OT will follow       11/2/24:  Mr. Potocki is s/p R TKA.  Patient completed shower and dressing as charted below in ADL grid and is ambulating with rolling walker and minimal assist. Pt had a miller catheter placed last night due to urine retention. During session, patient demonstrated increased  Lot  How much help is needed for toileting (which includes using toilet, bedpan, or urinal)?: A Lot  How much help is needed for putting on and taking off regular upper body clothing?: None  How much help is needed for taking care of personal grooming?: None  How much help for eating meals?: None  AM-PAC Inpatient Daily Activity Raw Score: 18  AM-PAC Inpatient ADL T-Scale Score : 38.66  ADL Inpatient CMS 0-100% Score: 46.65  ADL Inpatient CMS G-Code Modifier : CK   SUBJECTIVE:     Mr. Potocki stated he wanted to put some pants on    Social/Functional   Lives With: Alone  Type of Home: House  Home Layout: Two level (14)  Home Access: Stairs to enter with rails  Entrance Stairs - Number of Steps: 8  Entrance Stairs - Rails: Left  Bathroom Shower/Tub: Tub/Shower unit  Bathroom Equipment: Shower chair  Home Equipment: Cane    OBJECTIVE:     LINES / DRAINS / AIRWAY: James Catheter    RESTRICTIONS/PRECAUTIONS:  Restrictions/Precautions: Weight Bearing  Right Lower Extremity Weight Bearing: Weight Bearing As Tolerated    PAIN: VITALS / O2:   Pre Treatment:      0/10 at rest    Post Treatment: 0/10 at rest Vitals          Oxygen        GROSS EVALUATION: INTACT IMPAIRED   (See Comments)   UE AROM [x] []   UE PROM [x] []   Strength []  Weak overall      Posture / Balance []  Decreased standing balance    Sensation [x]     Coordination [x]       Tone [x]       Edema []    Activity Tolerance []  Fair With mobility and ADLs     Hand Dominance R [] L []      COGNITION/  PERCEPTION: INTACT IMPAIRED   (See Comments)   Orientation []     Vision []     Hearing []     Cognition  []  Follows simple directions, slow to process and cues for safety   Perception []  impaired     MOBILITY: I Mod I S SBA CGA Min Mod Max Total  NT x2 Comments:   Bed Mobility    Rolling [] [] [] [] [] [] [] [] [] [] []    Supine to Sit [] [] [] [] [] [] [] [] [] [x] []    Scooting [] [] [] [] [] [] [] [] [] [x] []    Sit to Supine [] [] [] [] [] [] [] [] []

## 2024-11-05 NOTE — PROGRESS NOTES
ACUTE PHYSICAL THERAPY GOALS:   (Developed with and agreed upon by patient and/or caregiver.)  GOALS (1-4 days):  (1.)Mr. Potocki will move from supine to sit and sit to supine  in bed with STAND BY ASSIST.  (2.)Mr. Potocki will transfer from bed to chair and chair to bed with CONTACT GUARD ASSIST using the least restrictive device.  (3.)Mr. Potocki will ambulate with STAND BY ASSIST for 150 feet with the least restrictive device.  (4.)Mr. Potocki will ambulate up/down 14 steps with right railing with MINIMAL ASSIST. Going to rehab  (5.)Mr. Potocki will increase right knee ROM to 5-85°.  ________________________________________________________________________________________________      PHYSICAL THERAPY: TOTAL KNEE ARTHROPLASTY Daily Note and AM  (Link to Caseload Tracking: PT Visit Days : 5  Acknowledge Orders  Time In/Out  PT Charge Capture  Rehab Caseload Tracker  Episode     James Edward Potocki is a 79 y.o. male   PRIMARY DIAGNOSIS: Status post right knee replacement  Primary osteoarthritis of right knee [M17.11]  Osteoarthritis of left knee, unspecified osteoarthritis type [M17.12]  Procedure(s) (LRB):  rt KNEE TOTAL ARTHROPLASTY ROBOTIC (Right)  4 Days Post-Op  Reason for Referral: Pain in Right Knee (M25.561)  Stiffness of Right Knee, Not elsewhere classified (M25.661)  Difficulty in walking, Not elsewhere classified (R26.2)  Inpatient: Payor: MEDICARE / Plan: MEDICARE PART A AND B / Product Type: *No Product type* /     REHAB RECOMMENDATIONS:   Recommendation to date pending progress:  Setting:  Short-term Rehab    Equipment:    To Be Determined     RANGE OF MOTION:   Will assess at next session     GAIT: I Mod I S SBA CGA Min Mod Max Total  NT x2 Comments:   Level of Assistance [] [] [] [] [] [x] [] [] [] [] []            Weightbearing Status       Distance  46 feet    Gait Quality Antalgic, Decreased lance , Decreased step clearance, Decreased step length, Decreased stance, Shuffling , Step-to, and    RIGHT LE   Within Functional Limits   Abnormal   Comments   Strength [] [x]     Range of Motion [] [x]        LEFT LE Within Functional Limits   Abnormal   Comments   Strength [x] []     Range of Motion [x] []       UPPER EXTREMITY GROSS EVALUATION:     Within Functional Limits   Abnormal   Comments   Strength [x] []    Range of Motion [x] []      SENSATION  Sensation [x]       COGNITION/  PERCEPTION: Intact Impaired (Comments):   Orientation [x]     Vision [x]     Hearing [x]     Cognition  []   More alert and following commands but still a little slow with processing.     MOBILITY: I Mod I S SBA CGA Min Mod Max Total  NT x2 Comments:   Bed Mobility    Rolling [] [] [] [] [] [] [] [] [] [] []    Supine to Sit [] [] [] [] [] [] [] [] [] [] []    Scooting [] [] [] [] [] [] [] [] [] [] []    Sit to Supine [] [] [] [] [] [] [] [] [] [] []    Transfers    Sit to Stand [] [] [] [] [] [x] [] [] [] [] []    Bed to Chair [] [] [] [] [] [x] [] [] [] [] []    Stand to Sit [] [] [] [] [] [x] [] [] [] [] []    Stand Pivot [] [] [] [] [] [x] [] [] [] [] []    Toilet [] [] [] [] [] [] [] [] [] [] []     [] [] [] [] [] [] [] [] [] [] []    I=Independent, Mod I=Modified Independent, S=Supervision, SBA=Standby Assistance, CGA=Contact Guard Assistance,   Min=Minimal Assistance, Mod=Moderate Assistance, Max=Maximal Assistance, Total=Total Assistance, NT=Not Tested    BALANCE: Good Fair+ Fair Fair- Poor NT Comments   Sitting Static [x] [] [] [] [] []    Sitting Dynamic [x] [] [] [] [] []              Standing Static [] [x] [] [] [] []    Standing Dynamic [] [x] [] [] [] []      PLAN:   FREQUENCY AND DURATION: BID for duration of hospital stay or until stated goals are met, whichever comes first.    THERAPY PROGNOSIS: Good    PROBLEM LIST:   (Skilled intervention is medically necessary to address:)  Decreased ADL/Functional Activities, Decreased Activity Tolerance, Decreased AROM/PROM, Decreased Balance, Decreased Coordination,

## 2024-11-05 NOTE — PLAN OF CARE
Problem: Safety - Adult  Goal: Free from fall injury  11/4/2024 1929 by Linda Guillaume, RN  Outcome: Progressing  11/4/2024 0855 by Jossy Barker RN  Outcome: Progressing     Problem: Pain  Goal: Verbalizes/displays adequate comfort level or baseline comfort level  11/4/2024 1929 by Linda Guillaume, RN  Outcome: Progressing  11/4/2024 0855 by Jossy Barker RN  Outcome: Progressing     Problem: Chronic Conditions and Co-morbidities  Goal: Patient's chronic conditions and co-morbidity symptoms are monitored and maintained or improved  Outcome: Progressing  Flowsheets (Taken 11/4/2024 0852 by Jossy Barker, RN)  Care Plan - Patient's Chronic Conditions and Co-Morbidity Symptoms are Monitored and Maintained or Improved: Monitor and assess patient's chronic conditions and comorbid symptoms for stability, deterioration, or improvement     Problem: Discharge Planning  Goal: Discharge to home or other facility with appropriate resources  Outcome: Progressing  Flowsheets (Taken 11/4/2024 0852 by Jossy Barker, RN)  Discharge to home or other facility with appropriate resources: Identify barriers to discharge with patient and caregiver

## 2024-11-05 NOTE — CARE COORDINATION
CM note:    Chart reviewed for updates. Pt is discharging to [facility]. [facility] has confirmed that pt has a bed today. Pt is going to bed 303. Number for report is [phone]. [facility] transport scheduled for 12 noon. Assigned RN notified to call for report. Discharge packed completed. CM met with pt at bedside to discuss d/c planning. Pt is agreeable with d/c planning. He is aware that [facility] has a bed available for him today and will be leaving at noon today. IMM letter given; signed copy scanned to medical records. CM has notified pt's son Chris Potocki [phone] about pt's discharge plan. He is agreeable with discharge. No further CM needs identified. CM will remain accessible for consult incase additional CM needs arise prior d/c.     11/05/24 1004   Services At/After Discharge   Transition of Care Consult (CM Consult) SNF   Services At/After Discharge Skilled Nursing Facility (SNF)    Resource Information Provided? No   Mode of Transport at Discharge S   Hospital Transport Time of Discharge 1200   Confirm Follow Up Transport Other (see comment)  ([facility] transport)   Condition of Participation: Discharge Planning   The Plan for Transition of Care is related to the following treatment goals: Pt is discharging to [facility]   The Patient and/or Patient Representative was provided with a Choice of Provider? Patient   The Patient and/Or Patient Representative agree with the Discharge Plan? Yes   Freedom of Choice list was provided with basic dialogue that supports the patient's individualized plan of care/goals, treatment preferences, and shares the quality data associated with the providers?  Yes     JAMILA Barroso

## 2024-11-05 NOTE — PROGRESS NOTES
TRANSFER - OUT REPORT:    Verbal report given to Kailyn BARRREA on James Edward Potocki  being transferred to [facility], Room 303 for routine progression of patient care       Report consisted of patient's Situation, Background, Assessment and   Recommendations(SBAR).     Information from the following report(s) Nurse Handoff Report, Adult Overview, Intake/Output, and MAR was reviewed with the receiving nurse.           Lines:       Opportunity for questions and clarification was provided.

## 2024-11-07 NOTE — PROGRESS NOTES
Physician Progress Note      PATIENT:               POTOCKI, JAMES  CSN #:                  [id number]  :                       1945  ADMIT DATE:       2024 8:34 AM  DISCH DATE:        2024 12:08 PM  RESPONDING  PROVIDER #:        Supriya HAMILTON          QUERY TEXT:    Pt admitted with osteoarthritis  and has anemia documented. If possible,   please document in progress notes and discharge summary further specificity   regarding the acuity and type of anemia:    The medical record reflects the following:  Risk Factors: post op  Clinical Indicators:  24 03:47  Hemoglobin Quant: 10.6 (L)  Hematocrit: 32.4 (L)    24 05:27  Hemoglobin Quant: 7.9 (L)  Hematocrit: 22.9 (L)    24 04:05  Hemoglobin Quant: 7.6 (L)  Hematocrit: 21.9 (L)     cc      Treatment: iron po, H/H monitoring    Mili Hernandez RN, CRCR, CDIS. [facility], [facility]   O: [phone]  [email or web address]  Options provided:  -- Anemia due to acute blood loss superimposed upon iron deficiency anemia  -- Anemia due to iron deficiency only  -- Other - I will add my own diagnosis  -- Disagree - Not applicable / Not valid  -- Disagree - Clinically unable to determine / Unknown  -- Refer to Clinical Documentation Reviewer    PROVIDER RESPONSE TEXT:    This patient has acute blood loss anemia superimposed upon iron deficiency   anemia.    Query created by: Mili Hernandez on 2024 2:19 PM      Electronically signed by:  Supriya HAMILTON 2024 11:51 AM

## 2024-11-20 DIAGNOSIS — M17.11 PRIMARY OSTEOARTHRITIS OF RIGHT KNEE: ICD-10-CM

## 2024-11-20 RX ORDER — CELECOXIB 200 MG/1
200 CAPSULE ORAL 2 TIMES DAILY
Qty: 60 CAPSULE | Refills: 0 | OUTPATIENT
Start: 2024-11-20

## 2024-11-21 ENCOUNTER — TELEPHONE (OUTPATIENT)
Dept: ORTHOPEDIC SURGERY | Age: 79
End: 2024-11-21

## 2024-11-21 NOTE — TELEPHONE ENCOUNTER
They left a voicemail message asking for a call. They need staple removal instructions on this patient.

## 2024-11-25 ENCOUNTER — TELEPHONE (OUTPATIENT)
Dept: ORTHOPEDIC SURGERY | Age: 79
End: 2024-11-25

## 2024-11-25 NOTE — TELEPHONE ENCOUNTER
Please  call this pt  asap    He  is  scheduled to  leave the [facility] on wed 27th   They are telling him he  needs to  leave tomorrow on the 26th  He is not  ready  he  has  no one  at  home     He  needs to  speak  with  you

## 2024-11-25 NOTE — TELEPHONE ENCOUNTER
Spoke with nurse at [facility] and she is going to send the  to assess the patient's needs. I asked for a return call for an update.

## 2024-11-25 NOTE — TELEPHONE ENCOUNTER
Spoke with [facility], , and he says that the patient has met all of his PT goals and is ready to be discharged. Home health will assess him and decide on a plan of care.    I spoke with the patient and told him that according to the , he is doing well enough to return home and have home health care support him postop. He is living alone and is reticent to return by himself. I recommended that he call a family member or friend to see if he can get someone to come help him if needed. He agreed to leave tomorrow and call us if he needs anything further. He has a postop visit with Dr. Ramirez on 12/10/24.

## 2024-11-29 ENCOUNTER — TELEPHONE (OUTPATIENT)
Dept: ORTHOPEDIC SURGERY | Age: 79
End: 2024-11-29

## 2024-11-29 NOTE — TELEPHONE ENCOUNTER
Bina needs verbal order to see patient once a week for one week, three times a week for one week, two times a week for three weeks, and once a week for four weeks.

## 2024-12-04 ENCOUNTER — TELEPHONE (OUTPATIENT)
Dept: ORTHOPEDIC SURGERY | Age: 79
End: 2024-12-04

## 2024-12-04 NOTE — TELEPHONE ENCOUNTER
Samer is calling to get a verbal to see the patient for eval for  and then some follow up visits for meals on wheels since the patient lives alone. I have given him that verbal okay.

## 2024-12-10 ENCOUNTER — OFFICE VISIT (OUTPATIENT)
Dept: ORTHOPEDIC SURGERY | Age: 79
End: 2024-12-10

## 2024-12-10 DIAGNOSIS — Z96.651 HISTORY OF TOTAL KNEE ARTHROPLASTY, RIGHT: Primary | ICD-10-CM

## 2024-12-10 PROCEDURE — 99024 POSTOP FOLLOW-UP VISIT: CPT | Performed by: ORTHOPAEDIC SURGERY

## 2025-01-13 ENCOUNTER — TELEPHONE (OUTPATIENT)
Dept: ORTHOPEDIC SURGERY | Age: 80
End: 2025-01-13

## 2025-01-17 ENCOUNTER — TELEPHONE (OUTPATIENT)
Dept: ORTHOPEDIC SURGERY | Age: 80
End: 2025-01-17

## 2025-01-21 ENCOUNTER — OFFICE VISIT (OUTPATIENT)
Dept: ORTHOPEDIC SURGERY | Age: 80
End: 2025-01-21

## 2025-01-21 DIAGNOSIS — Z96.651 STATUS POST TOTAL RIGHT KNEE REPLACEMENT: Primary | ICD-10-CM

## 2025-01-21 NOTE — PROGRESS NOTES
Post-op TKA Visit      01/21/25     Allergies   Allergen Reactions    Adhesive Tape      Other Reaction(s): Itching-Allergy    \"Some BP meds\" cause \"   SWELLING OF LEGS with Procardia and with current medicine    Other reaction(s): Itching-Allergy   \"Some BP meds\" cause \"   SWELLING OF LEGS with Procardia and with current medicine    Statins Other (See Comments)     Other Reaction(s): Other- (not listed) - Allergy, Other (see comments), weakness,pain    Extreme nerve pain    Extreme nerve pain   Extreme nerve pain    MYALGIAS    Other reaction(s): Other- (not listed) - Allergy, Other- (not listed) - Allergy   Extreme nerve pain   Extreme nerve pain   Extreme nerve pain   MYALGIAS    Nifedipine      Other Reaction(s): Abdominal pain-Intolerance    Other reaction(s): Abdominal pain-Intolerance    Other Other (See Comments)     Other Reaction(s): Other- (not listed) - Allergy    \"Some BP meds\" cause \"   SWELLING OF LEGS with Procardia and with current medicine   Other reaction(s): weakness,pain    \"Some BP meds\" cause \"   SWELLING OF LEGS with Procardia and with current medicine     Current Outpatient Medications on File Prior to Visit   Medication Sig Dispense Refill    aspirin (ASPIRIN 81) 81 MG EC tablet Take 1 tablet by mouth in the morning and at bedtime 70 tablet 0    celecoxib (CELEBREX) 200 MG capsule Take 1 capsule by mouth 2 times daily 60 capsule 0    promethazine (PHENERGAN) 12.5 MG tablet Take 1 tablet by mouth 4 times daily as needed for Nausea 20 tablet 2    methocarbamol (ROBAXIN-750) 750 MG tablet Take 1 tablet by mouth 3 times daily as needed (muscle spasm or cramps) 40 tablet 1    amLODIPine (NORVASC) 5 MG tablet Take 1 tablet by mouth in the morning and at bedtime      rosuvastatin (CRESTOR) 5 MG tablet Take 1 tablet by mouth daily      citalopram (CELEXA) 20 MG tablet Take 1 tablet by mouth daily      Semaglutide (OZEMPIC, 1 MG/DOSE, SC) Inject into the skin Every Thursday      POTASSIUM CHLORIDE

## 2025-01-23 ENCOUNTER — TELEPHONE (OUTPATIENT)
Dept: ORTHOPEDIC SURGERY | Age: 80
End: 2025-01-23

## 2025-01-23 NOTE — TELEPHONE ENCOUNTER
Bina is calling to report he had a fall on the 21st when he was going upstairs in his home and he fell on both of his knees and has a scab on his right hand and his right hip has been sore since the fall. They had planned on discharging him today but he isn't moving well due to the fall. They want to see him twice per week for 2 weeks and then one time per week for 5 weeks and then one time every other week for 2 weeks for PT. I have given her verbal orders to continue.

## 2025-03-05 ENCOUNTER — TELEPHONE (OUTPATIENT)
Dept: ORTHOPEDIC SURGERY | Age: 80
End: 2025-03-05

## 2025-03-05 NOTE — TELEPHONE ENCOUNTER
Bina is calling to report that they are scheduled for 2 more visits in the next few weeks. She is wanting to increase it to one time per week for the next 3 weeks. Today he had an open sore on his left lower leg and she wants to request orders for a nurse to evaluate. I have given her the verbal orders for both of these and this is an FYI

## 2025-03-07 ENCOUNTER — TELEPHONE (OUTPATIENT)
Dept: ORTHOPEDIC SURGERY | Age: 80
End: 2025-03-07

## 2025-03-07 NOTE — TELEPHONE ENCOUNTER
She wants to treat his wound on his left ankle with hydrofiber and prosimel. She needs a verbal order to see 2 x week until it heals.

## 2025-03-26 ENCOUNTER — TELEPHONE (OUTPATIENT)
Dept: ORTHOPEDIC SURGERY | Age: 80
End: 2025-03-26

## 2025-04-22 ENCOUNTER — OFFICE VISIT (OUTPATIENT)
Dept: ORTHOPEDIC SURGERY | Age: 80
End: 2025-04-22
Payer: MEDICARE

## 2025-04-22 DIAGNOSIS — I87.2 VENOUS (PERIPHERAL) INSUFFICIENCY: ICD-10-CM

## 2025-04-22 DIAGNOSIS — Z96.651 STATUS POST TOTAL RIGHT KNEE REPLACEMENT: Primary | ICD-10-CM

## 2025-04-22 PROCEDURE — 1036F TOBACCO NON-USER: CPT | Performed by: ORTHOPAEDIC SURGERY

## 2025-04-22 PROCEDURE — 99214 OFFICE O/P EST MOD 30 MIN: CPT | Performed by: ORTHOPAEDIC SURGERY

## 2025-04-22 PROCEDURE — 1123F ACP DISCUSS/DSCN MKR DOCD: CPT | Performed by: ORTHOPAEDIC SURGERY

## 2025-04-22 PROCEDURE — G8417 CALC BMI ABV UP PARAM F/U: HCPCS | Performed by: ORTHOPAEDIC SURGERY

## 2025-04-22 PROCEDURE — G8428 CUR MEDS NOT DOCUMENT: HCPCS | Performed by: ORTHOPAEDIC SURGERY

## 2025-04-22 NOTE — PROGRESS NOTES
Post-op TKA Visit      04/22/25     Allergies   Allergen Reactions    Adhesive Tape      Other Reaction(s): Itching-Allergy    \"Some BP meds\" cause \"   SWELLING OF LEGS with Procardia and with current medicine    Other reaction(s): Itching-Allergy   \"Some BP meds\" cause \"   SWELLING OF LEGS with Procardia and with current medicine    Statins Other (See Comments)     Other Reaction(s): Other- (not listed) - Allergy, Other (see comments), weakness,pain    Extreme nerve pain    Extreme nerve pain   Extreme nerve pain    MYALGIAS    Other reaction(s): Other- (not listed) - Allergy, Other- (not listed) - Allergy   Extreme nerve pain   Extreme nerve pain   Extreme nerve pain   MYALGIAS    Nifedipine      Other Reaction(s): Abdominal pain-Intolerance    Other reaction(s): Abdominal pain-Intolerance    Other Other (See Comments)     Other Reaction(s): Other- (not listed) - Allergy    \"Some BP meds\" cause \"   SWELLING OF LEGS with Procardia and with current medicine   Other reaction(s): weakness,pain    \"Some BP meds\" cause \"   SWELLING OF LEGS with Procardia and with current medicine     Current Outpatient Medications on File Prior to Visit   Medication Sig Dispense Refill    aspirin (ASPIRIN 81) 81 MG EC tablet Take 1 tablet by mouth in the morning and at bedtime 70 tablet 0    celecoxib (CELEBREX) 200 MG capsule Take 1 capsule by mouth 2 times daily 60 capsule 0    promethazine (PHENERGAN) 12.5 MG tablet Take 1 tablet by mouth 4 times daily as needed for Nausea 20 tablet 2    methocarbamol (ROBAXIN-750) 750 MG tablet Take 1 tablet by mouth 3 times daily as needed (muscle spasm or cramps) 40 tablet 1    amLODIPine (NORVASC) 5 MG tablet Take 1 tablet by mouth in the morning and at bedtime      rosuvastatin (CRESTOR) 5 MG tablet Take 1 tablet by mouth daily      citalopram (CELEXA) 20 MG tablet Take 1 tablet by mouth daily      Semaglutide (OZEMPIC, 1 MG/DOSE, SC) Inject into the skin Every Thursday      POTASSIUM CHLORIDE

## 2025-05-07 ENCOUNTER — HOSPITAL ENCOUNTER (OUTPATIENT)
Dept: WOUND CARE | Age: 80
Discharge: HOME | End: 2025-05-07
Payer: MEDICARE

## 2025-05-07 VITALS
HEART RATE: 80 BPM | TEMPERATURE: 97.9 F | DIASTOLIC BLOOD PRESSURE: 74 MMHG | SYSTOLIC BLOOD PRESSURE: 162 MMHG | HEIGHT: 72 IN | RESPIRATION RATE: 16 BRPM | BODY MASS INDEX: 33.86 KG/M2 | WEIGHT: 250 LBS

## 2025-05-07 DIAGNOSIS — R60.0 LOWER EXTREMITY EDEMA: ICD-10-CM

## 2025-05-07 DIAGNOSIS — I87.313 CHRONIC VENOUS HYPERTENSION (IDIOPATHIC) WITH ULCER OF BILATERAL LOWER EXTREMITY (HCC): Primary | ICD-10-CM

## 2025-05-07 DIAGNOSIS — L97.929 CHRONIC VENOUS HYPERTENSION (IDIOPATHIC) WITH ULCER OF BILATERAL LOWER EXTREMITY (HCC): Primary | ICD-10-CM

## 2025-05-07 DIAGNOSIS — L97.919 CHRONIC VENOUS HYPERTENSION (IDIOPATHIC) WITH ULCER OF BILATERAL LOWER EXTREMITY (HCC): Primary | ICD-10-CM

## 2025-05-07 DIAGNOSIS — L97.922 NON-PRESSURE CHRONIC ULCER OF LEFT LOWER LEG WITH FAT LAYER EXPOSED (HCC): ICD-10-CM

## 2025-05-07 DIAGNOSIS — L97.912 NON-PRESSURE CHRONIC ULCER OF LOWER LEG WITH FAT LAYER EXPOSED, RIGHT (HCC): ICD-10-CM

## 2025-05-07 PROCEDURE — 11042 DBRDMT SUBQ TIS 1ST 20SQCM/<: CPT

## 2025-05-07 PROCEDURE — 99213 OFFICE O/P EST LOW 20 MIN: CPT

## 2025-05-07 RX ORDER — TRIAMCINOLONE ACETONIDE 1 MG/G
OINTMENT TOPICAL
Qty: 30 G | Refills: 2 | Status: SHIPPED | OUTPATIENT
Start: 2025-05-07 | End: 2025-05-07

## 2025-05-07 RX ORDER — TRIAMCINOLONE ACETONIDE 1 MG/G
OINTMENT TOPICAL
Qty: 30 G | Refills: 2 | Status: SHIPPED | OUTPATIENT
Start: 2025-05-07

## 2025-05-07 ASSESSMENT — ENCOUNTER SYMPTOMS
VOMITING: 0
NAUSEA: 0

## 2025-05-07 NOTE — PROGRESS NOTES
[facility]  History and Physical Note   Referring Provider: MD Guillermo Cantu Jr. Jaya Hayneshubert  MEDICAL RECORD NUMBER: [id number]  AGE: 80 y.o.     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, right (HCC) (Chronic)    Assessment  RLE is edematous with small, scattered open wounds, draining clear fluid; patient reports itching, but there is no evidence of infection  Patient is SP right knee replacement, and has been sleeping in a recliner because his bedroom is on the 2nd floor; he reports swelling before surgery, but not to this extent  He does state he now has a bed on the first level of his home and he can start sleeping there  I see no evidence of DVT  DPs are 1+; ABIs in clinic are 0.81 on right and 0.88 on left  He gets meals on wheels, and it doesn't sound like he gets much protein  He is established with HH  Plan  Excisional debridement to remove devitalized tissue and promote wound healing   Wound care: Vashe cleanse, Triamcinolone, Xerform gauze, multilayer compression; HH to change 3x weekly  Will also request  SW consult to see if they can help get patient some protein shakes  Patient encouraged to elevate leg at/above level of the heart when possible   RTC 1 week         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

## 2025-05-07 NOTE — ASSESSMENT & PLAN NOTE
Assessment  RLE is edematous with small, scattered open wounds, draining clear fluid; patient reports itching, but there is no evidence of infection  Patient is SP right knee replacement, and has been sleeping in a recliner because his bedroom is on the 2nd floor; he reports swelling before surgery, but not to this extent  He does state he now has a bed on the first level of his home and he can start sleeping there  I see no evidence of DVT  DPs are 1+; ABIs in clinic are 0.81 on right and 0.88 on left  He gets meals on wheels, and it doesn't sound like he gets much protein  He is established with HH  Plan  Excisional debridement to remove devitalized tissue and promote wound healing   Wound care: Vashe cleanse, Triamcinolone, Xerform gauze, multilayer compression; HH to change 3x weekly  Will also request HH SW consult to see if they can help get patient some protein shakes  Patient encouraged to elevate leg at/above level of the heart when possible   RTC 1 week

## 2025-05-07 NOTE — FLOWSHEET NOTE
05/07/25 0813   Right Leg Edema Point of Measurement   Leg circumference 45 cm   Ankle circumference 24 cm   Foot circumference 24 cm   Compression Therapy 2 layer compression wrap   Left Leg Edema Point of Measurement   Leg circumference 45.5 cm   Ankle circumference 24 cm   Foot circumference 25.5 cm   Compression Therapy 2 layer compression wrap   Wound 05/07/25 Pretibial Proximal;Right #1   Date First Assessed/Time First Assessed: 05/07/25 0824   Present on Original Admission: Yes  Wound Approximate Age at First Assessment (Weeks): 2 weeks  Primary Wound Type: (c) Other (Comments)  Location: Pretibial  Wound Location Orientation: Proxima...   Wound Image    Wound Etiology Venous   Dressing Status Intact   Wound Cleansed Soap and water   Dressing/Treatment Xeroform   Wound Length (cm) 2 cm   Wound Width (cm) 7 cm   Wound Depth (cm) 0.1 cm   Wound Surface Area (cm^2) 14 cm^2   Wound Volume (cm^3) 1.4 cm^3   Wound Assessment Pink/red   Drainage Amount Moderate (25-50%)   Drainage Description Serous   Odor None   Che-wound Assessment Fragile;Edematous   Wound Thickness Description not for Pressure Injury Full thickness   Wound 05/07/25 Foot Left #2   Date First Assessed/Time First Assessed: 05/07/25 0825   Present on Original Admission: Yes  Wound Approximate Age at First Assessment (Weeks): 2 weeks  Primary Wound Type: (c) Other (Comments)  Location: Foot  Wound Location Orientation: Left  Wound ...   Wound Image     Wound Etiology Venous   Dressing Status Intact   Wound Cleansed Soap and water   Dressing/Treatment Xeroform   Wound Length (cm) 3.5 cm   Wound Width (cm) 1 cm   Wound Depth (cm) 0.1 cm   Wound Surface Area (cm^2) 3.5 cm^2   Wound Volume (cm^3) 0.35 cm^3   Post-Procedure Length (cm) 3.5 cm   Post-Procedure Width (cm) 1 cm   Post-Procedure Depth (cm) 0.2 cm   Post-Procedure Surface Area (cm^2) 3.5 cm^2   Post-Procedure Volume (cm^3) 0.7 cm^3   Wound Assessment Slough   Drainage Amount Moderate

## 2025-05-07 NOTE — WOUND CARE
Discharge Instructions for  [facility]  [address]  [address]  [place] [address]  Phone [phone]   Fax [phone]      NAME:  James Edward Potocki  YOB: 1945  MEDICAL RECORD NUMBER:  [id number]  DATE:  5/7/2025    Return Appointment:   1 week with Laura Solo NP      Instructions: Right and left lower legs:  Wash legs with mild soap and water.  Apply triamcinalone cream to bilateral lower legs (Rx sent to pharmacy for patient to obtain).  Xeroform- apply to wound bed.  Cover with ABD pad.  Wrap with 2 layer wrap system from toes to knees.  Change dressings 3 times weekly.    [facility] (ph:[phone]) for dressing change and wound assessment 3 TIMES/WEEK. *Home health- please order 30g protein shakes. Patient to drink 1 30g protein shake/day.      Do not cut compression wraps off. If wraps become too loose or slide down, call wound center to make an appointment for dressing change. If wraps become too tight, try elevating your legs to assist fluid drainage.  Elevate legs when sitting.  Avoid prolonged standing or sitting with legs in dependent position.  Be cautious of increased salt intake as this promotes fluid retention and swelling.  Increase protein intake to promote wound healing. Hasmukh and Ensure are examples of protein supplements.  No sleeping in the recliner.    Do not get wound wet in shower, pool or tub. May purchase cast cover at local pharmacy to keep dry in shower.      Should you experience increased redness, swelling, pain, foul odor, size of wound(s), or have a temperature over 101 degrees please contact the Wound Healing Center at [phone] or if after hours contact your primary care physician or go to the hospital emergency department.    PLEASE NOTE: IF YOU ARE UNABLE TO OBTAIN WOUND SUPPLIES, CONTINUE TO USE THE SUPPLIES YOU HAVE AVAILABLE UNTIL YOU ARE ABLE TO REACH US. IT IS MOST IMPORTANT TO KEEP THE WOUND COVERED AT

## 2025-05-07 NOTE — WOUND CARE
Multilayer Compression Wrap   (Not Unna) Below the Knee    NAME:  James Edward Potocki  YOB: 1945  MEDICAL RECORD NUMBER:  [id number]  DATE:  5/7/2025    Multilayer compression wrap: Removed old Multilayer wrap if indicated and wash leg with mild soap/water.  Applied moisturizing agent to dry skin as needed.   Applied primary and secondary dressing as ordered.  Applied multilayered dressing below the knee to right lower leg.  Applied multilayered dressing below the knee to left lower leg.  Instructed patient/caregiver not to remove dressing and to keep it clean and dry.   Instructed patient/caregiver on complications to report to provider, such as pain, numbness in toes, heavy drainage, and slippage of dressing.  Instructed patient on purpose of compression dressing and on activity and exercise recommendations.      Electronically signed by Teresa Nelson RN on 5/7/2025 at 9:23 AM

## 2025-05-14 ENCOUNTER — HOSPITAL ENCOUNTER (OUTPATIENT)
Dept: WOUND CARE | Age: 80
Discharge: HOME | End: 2025-05-14
Payer: MEDICARE

## 2025-05-14 VITALS
HEIGHT: 72 IN | SYSTOLIC BLOOD PRESSURE: 119 MMHG | WEIGHT: 245 LBS | DIASTOLIC BLOOD PRESSURE: 95 MMHG | HEART RATE: 79 BPM | BODY MASS INDEX: 33.18 KG/M2 | RESPIRATION RATE: 16 BRPM | TEMPERATURE: 97.5 F

## 2025-05-14 PROCEDURE — 11042 DBRDMT SUBQ TIS 1ST 20SQCM/<: CPT

## 2025-05-14 NOTE — WOUND CARE
Multilayer Compression Wrap   (Not Unna) Below the Knee    NAME:  James Edward Potocki  YOB: 1945  MEDICAL RECORD NUMBER:  [id number]  DATE:  5/14/2025    Multilayer compression wrap: Removed old Multilayer wrap if indicated and wash leg with mild soap/water.  Applied moisturizing agent to dry skin as needed.   Applied primary and secondary dressing as ordered.  Applied multilayered dressing below the knee to right lower leg.  Applied multilayered dressing below the knee to left lower leg.  Instructed patient/caregiver not to remove dressing and to keep it clean and dry.   Instructed patient/caregiver on complications to report to provider, such as pain, numbness in toes, heavy drainage, and slippage of dressing.  Instructed patient on purpose of compression dressing and on activity and exercise recommendations.      Electronically signed by Teresa Levin RN on 5/14/2025 at 12:59 PM

## 2025-05-14 NOTE — FLOWSHEET NOTE
05/14/25 1109   Right Leg Edema Point of Measurement   Leg circumference 36 cm   Ankle circumference 27 cm   Foot circumference 27 cm   Compression Therapy 2 layer compression wrap   Left Leg Edema Point of Measurement   Leg circumference 36 cm   Ankle circumference 25 cm   Foot circumference 26 cm   Compression Therapy 2 layer compression wrap   Wound 05/07/25 Foot Left #2   Date First Assessed/Time First Assessed: 05/07/25 0825   Present on Original Admission: Yes  Wound Approximate Age at First Assessment (Weeks): 2 weeks  Primary Wound Type: (c) Other (Comments)  Location: Foot  Wound Location Orientation: Left  Wound ...   Wound Image     Wound Etiology Venous   Dressing Status Old drainage noted   Wound Cleansed Soap and water   Dressing/Treatment Xeroform   Wound Length (cm) 3.5 cm   Wound Width (cm) 1 cm   Wound Depth (cm) 0.1 cm   Wound Surface Area (cm^2) 3.5 cm^2   Change in Wound Size % (l*w) 0   Wound Volume (cm^3) 0.35 cm^3   Wound Healing % 0   Post-Procedure Length (cm) 3.5 cm   Post-Procedure Width (cm) 1 cm   Post-Procedure Depth (cm) 0.1 cm   Post-Procedure Surface Area (cm^2) 3.5 cm^2   Post-Procedure Volume (cm^3) 0.35 cm^3   Wound Assessment Slough;Pink/red   Drainage Amount Small (< 25%)   Drainage Description Serous   Odor None   Che-wound Assessment Fragile   Wound Thickness Description not for Pressure Injury Full thickness   Wound 05/07/25 Pretibial Proximal;Right #1   Date First Assessed/Time First Assessed: 05/07/25 0824   Present on Original Admission: Yes  Wound Approximate Age at First Assessment (Weeks): 2 weeks  Primary Wound Type: (c) Other (Comments)  Location: Pretibial  Wound Location Orientation: Proxima...   Wound Image     Wound Etiology Venous   Dressing Status Old drainage noted   Dressing/Treatment Xeroform   Wound Length (cm) 0.3 cm   Wound Width (cm) 2 cm   Wound Depth (cm) 0.1 cm   Wound Surface Area (cm^2) 0.6 cm^2   Change in Wound Size % (l*w) 95.71   Wound

## 2025-05-14 NOTE — WOUND CARE
Discharge Instructions for  [facility]  [address]  [address]  [place] [address]  Phone [phone]   Fax [phone]      NAME:  James Edward Potocki  YOB: 1945  MEDICAL RECORD NUMBER:  [id number]  DATE:  5/14/2025    Return Appointment:   2 weeks with Laura Solo NP      Instructions: Right and left lower legs:  Wash legs with mild soap and water.  Apply triamcinalone cream to bilateral lower legs (Rx sent to pharmacy for patient to obtain).  Xeroform- apply to wound bed.  Cover with ABD pad.  Wrap with 2 layer wrap system from toes to knees.  Change dressings 3 times weekly.    [facility] (ph:[phone]) for dressing change and wound assessment 3 TIMES/WEEK. *Home health- please order 30g protein shakes. Patient to drink 1 30g protein shake/day.    Do not cut compression wraps off. If wraps become too loose or slide down, call wound center to make an appointment for dressing change. If wraps become too tight, try elevating your legs to assist fluid drainage.  Elevate legs when sitting.  Avoid prolonged standing or sitting with legs in dependent position.  Be cautious of increased salt intake as this promotes fluid retention and swelling.  Increase protein intake to promote wound healing. Hasmukh and Ensure are examples of protein supplements.  No sleeping in the recliner.    Bring compression socks to next visit.    Do not get wound wet in shower, pool or tub. May purchase cast cover at local pharmacy to keep dry in shower.    Should you experience increased redness, swelling, pain, foul odor, size of wound(s), or have a temperature over 101 degrees please contact the Wound Healing Center at [phone] or if after hours contact your primary care physician or go to the hospital emergency department.    PLEASE NOTE: IF YOU ARE UNABLE TO OBTAIN WOUND SUPPLIES, CONTINUE TO USE THE SUPPLIES YOU HAVE AVAILABLE UNTIL YOU ARE ABLE TO REACH US. IT IS MOST

## 2025-05-29 ENCOUNTER — HOSPITAL ENCOUNTER (OUTPATIENT)
Dept: WOUND CARE | Age: 80
Discharge: HOME | End: 2025-05-29
Payer: MEDICARE

## 2025-05-29 VITALS
BODY MASS INDEX: 32.51 KG/M2 | RESPIRATION RATE: 18 BRPM | HEART RATE: 78 BPM | OXYGEN SATURATION: 96 % | TEMPERATURE: 97.9 F | DIASTOLIC BLOOD PRESSURE: 70 MMHG | SYSTOLIC BLOOD PRESSURE: 158 MMHG | HEIGHT: 72 IN | WEIGHT: 240 LBS

## 2025-05-29 PROCEDURE — 99213 OFFICE O/P EST LOW 20 MIN: CPT | Performed by: FAMILY MEDICINE

## 2025-05-29 PROCEDURE — 29581 APPL MULTLAYER CMPRN SYS LEG: CPT

## 2025-05-29 ASSESSMENT — PAIN SCALES - GENERAL: PAINLEVEL_OUTOF10: 1

## 2025-05-29 NOTE — WOUND CARE
Multilayer Compression Wrap   (Not Unna) Below the Knee    NAME:  James Edward Potocki  YOB: 1945  MEDICAL RECORD NUMBER:  [id number]  DATE:  5/29/2025    Multilayer compression wrap: Removed old Multilayer wrap if indicated and wash leg with mild soap/water.  Applied moisturizing agent to dry skin as needed.   Applied primary and secondary dressing as ordered.  Applied multilayered dressing below the knee to right lower leg.  Applied multilayered dressing below the knee to left lower leg.  Instructed patient/caregiver not to remove dressing and to keep it clean and dry.   Instructed patient/caregiver on complications to report to provider, such as pain, numbness in toes, heavy drainage, and slippage of dressing.  Instructed patient on purpose of compression dressing and on activity and exercise recommendations.      Electronically signed by Sixto Martínez RN on 5/29/2025 at 11:42 AM  
IMPORTANT TO KEEP THE WOUND COVERED AT ALL TIMES.    Electronically signed Sixto Martínez RN on 5/29/2025 at 11:16 AM

## 2025-05-29 NOTE — FLOWSHEET NOTE
05/29/25 1044   Right Leg Edema Point of Measurement   Leg circumference 33.5 cm   Ankle circumference 25.5 cm   Foot circumference 24.5 cm   Compression Therapy 2 layer compression wrap   Left Leg Edema Point of Measurement   Leg circumference 40 cm   Ankle circumference 24.5 cm   Foot circumference 25.5 cm   Compression Therapy 2 layer compression wrap   Wound 05/07/25 Pretibial Proximal;Right #1   Date First Assessed/Time First Assessed: 05/07/25 0824   Present on Original Admission: Yes  Wound Approximate Age at First Assessment (Weeks): 2 weeks  Primary Wound Type: (c) Other (Comments)  Location: Pretibial  Wound Location Orientation: Proxima...   Wound Image    Wound Etiology Venous   Dressing Status Dry   Wound Cleansed Soap and water   Dressing/Treatment Other (comment)  (vaseline)   Wound Length (cm) 0 cm   Wound Width (cm) 0 cm   Wound Depth (cm) 0 cm   Wound Surface Area (cm^2) 0 cm^2   Change in Wound Size % (l*w) 100   Wound Volume (cm^3) 0 cm^3   Wound Healing % 100   Wound Assessment Epithelialization   Drainage Amount None (dry)   Odor None   Che-wound Assessment Fragile;Edematous   Margins Attached edges   Wound Thickness Description not for Pressure Injury Full thickness   Wound 05/07/25 Foot Left #2   Date First Assessed/Time First Assessed: 05/07/25 0825   Present on Original Admission: Yes  Wound Approximate Age at First Assessment (Weeks): 2 weeks  Primary Wound Type: (c) Other (Comments)  Location: Foot  Wound Location Orientation: Left  Wound ...   Wound Image    Wound Etiology Venous   Dressing Status Intact   Wound Cleansed Soap and water   Dressing/Treatment Xeroform;Foam   Wound Length (cm) 0.3 cm   Wound Width (cm) 0.3 cm   Wound Depth (cm) 0.1 cm   Wound Surface Area (cm^2) 0.09 cm^2   Change in Wound Size % (l*w) 97.43   Wound Volume (cm^3) 0.009 cm^3   Wound Healing % 97   Wound Assessment Slough;Pink/red   Drainage Amount Scant (moist but unmeasurable)   Drainage Description

## 2025-05-29 NOTE — DISCHARGE INSTRUCTIONS
Instructions: Right and left lower legs:  Wash legs with mild soap and water.  Apply triamcinalone cream to bilateral lower legs (Rx sent to pharmacy for patient to obtain).  Xeroform- apply to wound bed.  Cover with ABD pad.  Wrap with 2 layer wrap system from toes to knees.  Change dressings 2 times weekly.     [facility] ([phone]) for dressing change and wound assessment twice weekly. *Home health- please order 30g protein shakes. Patient to drink 1 30g protein shake/day.     Do not cut compression wraps off. If wraps become too loose or slide down, call wound center to make an appointment for dressing change. If wraps become too tight, try elevating your legs to assist fluid drainage.  Elevate legs when sitting.  Avoid prolonged standing or sitting with legs in dependent position.  Be cautious of increased salt intake as this promotes fluid retention and swelling.  Increase protein intake to promote wound healing. Hasmukh and Ensure are examples of protein supplements.  No sleeping in the recliner.     Bring compression socks to next visit.

## 2025-05-31 NOTE — PROGRESS NOTES
[facility]   [facility]   Medical Staff Progress Note     James Edward Potocki  MEDICAL RECORD NUMBER:  [id number]  AGE: 80 y.o.   GENDER: male  : 1945  EPISODE DATE:  2025    Chief complaint and reason for visit:     Chief Complaint   Patient presents with    Wound Check     BLE      Patient presenting for follow up evaluation of wound(s) per chief complaint.  Patient tolerating multilayer compression wraps bilateral lower extremities.  Wound areas are very thin and just a dot of drainage.  Much improved.    Subjective and ROS: Symptoms, wound related issues, or other pertinent wound history since last visit: no new wound care issues. Tolerating current treatment. No systemic complaints above baseline.    History of Wound Context: Per original history and physical on this patient. Changes in history since last evaluation: none    Medical Decision Making:     Problem List Items Addressed This Visit    None      Wounds and Treatment Plan:  Patient did not bring his Velcro compression wraps with him today.  Will continue with the current multilayer wraps and he will bring those next visit and possibly be able to go to those at that time.    Return Appointment:   2 weeks with Laura Solo NP        Instructions: Right and left lower legs:  Wash legs with mild soap and water.  Apply triamcinalone cream to bilateral lower legs (Rx sent to pharmacy for patient to obtain).  Xeroform- apply to wound bed.  Cover with ABD pad.  Wrap with 2 layer wrap system from toes to knees.  Change dressings 2 times weekly.     [facility] ([phone]) for dressing change and wound assessment twice weekly. *Home health- please order 30g protein shakes. Patient to drink 1 30g protein shake/day.     Do not cut compression wraps off. If wraps become too loose or slide down, call wound center to make an appointment for dressing change. If wraps become too

## 2025-06-12 ENCOUNTER — HOSPITAL ENCOUNTER (OUTPATIENT)
Dept: WOUND CARE | Age: 80
Discharge: HOME | End: 2025-06-12

## 2025-06-12 VITALS
TEMPERATURE: 97.5 F | HEART RATE: 74 BPM | DIASTOLIC BLOOD PRESSURE: 78 MMHG | SYSTOLIC BLOOD PRESSURE: 176 MMHG | RESPIRATION RATE: 18 BRPM

## 2025-06-12 DIAGNOSIS — L97.919 CHRONIC VENOUS HYPERTENSION (IDIOPATHIC) WITH ULCER OF BILATERAL LOWER EXTREMITY (HCC): Primary | ICD-10-CM

## 2025-06-12 DIAGNOSIS — E11.42 DIABETIC POLYNEUROPATHY ASSOCIATED WITH TYPE 2 DIABETES MELLITUS (HCC): ICD-10-CM

## 2025-06-12 DIAGNOSIS — L97.922 NON-PRESSURE CHRONIC ULCER OF LEFT LOWER LEG WITH FAT LAYER EXPOSED (HCC): ICD-10-CM

## 2025-06-12 DIAGNOSIS — I89.0 LYMPHEDEMA: ICD-10-CM

## 2025-06-12 DIAGNOSIS — R60.0 LOWER EXTREMITY EDEMA: ICD-10-CM

## 2025-06-12 DIAGNOSIS — L97.421 DIABETIC ULCER OF LEFT MIDFOOT ASSOCIATED WITH TYPE 2 DIABETES MELLITUS, LIMITED TO BREAKDOWN OF SKIN (HCC): ICD-10-CM

## 2025-06-12 DIAGNOSIS — E11.621 DIABETIC ULCER OF LEFT MIDFOOT ASSOCIATED WITH TYPE 2 DIABETES MELLITUS, LIMITED TO BREAKDOWN OF SKIN (HCC): ICD-10-CM

## 2025-06-12 DIAGNOSIS — I87.313 CHRONIC VENOUS HYPERTENSION (IDIOPATHIC) WITH ULCER OF BILATERAL LOWER EXTREMITY (HCC): Primary | ICD-10-CM

## 2025-06-12 DIAGNOSIS — L97.929 CHRONIC VENOUS HYPERTENSION (IDIOPATHIC) WITH ULCER OF BILATERAL LOWER EXTREMITY (HCC): Primary | ICD-10-CM

## 2025-06-12 DIAGNOSIS — L97.912 NON-PRESSURE CHRONIC ULCER OF LOWER LEG WITH FAT LAYER EXPOSED, RIGHT (HCC): ICD-10-CM

## 2025-06-12 PROCEDURE — 99213 OFFICE O/P EST LOW 20 MIN: CPT

## 2025-06-12 ASSESSMENT — ENCOUNTER SYMPTOMS
NAUSEA: 0
VOMITING: 0

## 2025-06-12 ASSESSMENT — PAIN SCALES - GENERAL: PAINLEVEL_OUTOF10: 3

## 2025-06-12 NOTE — PROGRESS NOTES
[facility]  Medical Staff Progress Note     James Edward Potocki  MEDICAL RECORD NUMBER: [id number]  AGE: 80 y.o.     GENDER: male    : 1945  EPISODE DATE: 2025    Assessment and Plan:     Problem List Items Addressed This Visit          Circulatory    Chronic venous hypertension (idiopathic) with ulcer of bilateral lower extremity (HCC) - Primary (Chronic)       Endocrine    Diabetic ulcer of left midfoot associated with type 2 diabetes mellitus, limited to breakdown of skin (HCC) (Chronic)    Assessment  Small, flat blood blister to left lateral foot; periwound ecchymosis but no erythema, no localized edema  Sees podiatry on a regular basis; has sneakers with wide toe box but no custom shoes/inserts  Plan  Foam border to left lateral foot, change 3x weekly; once healed, patient to use felt donut provided by podiatrist to prevent pressure in this area  Will request HH monitor til healed  If does not heal, or if wound develops, patient to contact our office for appointment          Diabetic polyneuropathy associated with type 2 diabetes mellitus (HCC) (Chronic)       Other    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, right (HCC) (Chronic)    Non-pressure chronic ulcer of left lower leg with fat layer exposed (HCC) (Chronic)    Lower extremity edema (Chronic)    * (Principal) Lymphedema (Chronic)    Assessment  Leg wounds are healed; patient does have OTC compression socks at home  He is concerned about being able to apply moisturizer, don stockings  Plan  Will ask HH OT to see patient once to assess need for and provide tool to apply moisturizer, help don compression socks  [facility] discharge education -  Moisturize daily with emollient of choice (Vaseline, Aquaphor, etc.)  Patient encouraged to elevate leg at/above level of the heart when possible   Recommend daily use of: OTC compression stockings; replace every 3 months  Will need

## 2025-06-12 NOTE — ASSESSMENT & PLAN NOTE
Assessment  Small, flat blood blister to left lateral foot; periwound ecchymosis but no erythema, no localized edema  Sees podiatry on a regular basis; has sneakers with wide toe box but no custom shoes/inserts  Plan  Foam border to left lateral foot, change 3x weekly; once healed, patient to use felt donut provided by podiatrist to prevent pressure in this area  Will request HH monitor til healed  If does not heal, or if wound develops, patient to contact our office for appointment

## 2025-06-12 NOTE — DISCHARGE INSTRUCTIONS
Instructions: Right and left lower legs:  Wounds have healed.  Apply vaseline to areas twice daily.  My purchase tool paddle online or [employer] to apply vaseline.  Foam applied to left lateral foot.  Home Health to have [employer] evaluate patient for tools to apply vaseline to feet.  Patient sent home in tubigrip.  Obtain and wear compression socks daily from now on.     [facility] ([phone]) for assessment of left lateral foot until blister has resolved. *Home health- please order 30g protein shakes. Patient to drink 1 30g protein shake/day.

## 2025-06-12 NOTE — ASSESSMENT & PLAN NOTE
Assessment  Leg wounds are healed; patient does have OTC compression socks at home  He is concerned about being able to apply moisturizer, don stockings  Plan  Will ask HH OT to see patient once to assess need for and provide tool to apply moisturizer, help don compression socks  [facility] discharge education -  Moisturize daily with emollient of choice (Vaseline, Aquaphor, etc.)  Patient encouraged to elevate leg at/above level of the heart when possible   Recommend daily use of: OTC compression stockings; replace every 3 months  Will need compression for life  Discharge from clinic - RTC PRN open wound

## 2025-06-12 NOTE — WOUND CARE
Discharge Instructions for  [facility]  [address] [place] [address]  [address] [place] [address]  Phone [phone]   Fax [phone]      NAME:  James Edward Potocki  YOB: 1945  MEDICAL RECORD NUMBER:  [id number]  DATE:  6/12/2025    Return Appointment:   Discharge with Laura Solo NP    Instructions: Right and left lower legs:  Wounds have healed.  Apply vaseline to areas twice daily.  My purchase tool paddle online or [employer] to apply vaseline.  Foam applied to left lateral foot.  Home Health to have [employer] evaluate patient for tools to apply vaseline to feet.  Patient sent home in tubigrip.  Obtain and wear compression socks daily from now on.    [facility] ([phone]) for assessment of left lateral foot until blister has resolved. *Home health- please order 30g protein shakes. Patient to drink 1 30g protein shake/day.    PLEASE NOTE: IF YOU ARE UNABLE TO OBTAIN WOUND SUPPLIES, CONTINUE TO USE THE SUPPLIES YOU HAVE AVAILABLE UNTIL YOU ARE ABLE TO REACH US. IT IS MOST IMPORTANT TO KEEP THE WOUND COVERED AT ALL TIMES.    Electronically signed Sixto Martínez RN on 6/12/2025 at 11:50 AM

## 2025-06-12 NOTE — FLOWSHEET NOTE
Fragile   Margins Attached edges   Wound Thickness Description not for Pressure Injury Full thickness   Pain Assessment   Pain Assessment 0-10   Pain Level 3     Taking ASA

## 2025-07-14 ENCOUNTER — TELEPHONE (OUTPATIENT)
Dept: ORTHOPEDIC SURGERY | Age: 80
End: 2025-07-14

## 2025-07-14 NOTE — TELEPHONE ENCOUNTER
Spoke with patient and answered his questions. Patient is having some edema of the lower extremities and having dressing changes by [facility] . Patient has been seeing wound center so will call the wound center for a return appt.

## 2025-07-15 NOTE — TELEPHONE ENCOUNTER
Called pt and confirmed he received the wound care apt date/time/location message. Pt stated he had and was thankful.

## (undated) PROCEDURE — 0SRC0J9 REPLACEMENT OF RIGHT KNEE JOINT WITH SYNTHETIC SUBSTITUTE, CEMENTED, OPEN APPROACH: ICD-10-PCS

## (undated) PROCEDURE — 8E0Y0CZ ROBOTIC ASSISTED PROCEDURE OF LOWER EXTREMITY, OPEN APPROACH: ICD-10-PCS

## (undated) DEVICE — GLOVE SURG SZ 8 L12IN FNGR THK79MIL GRN LTX FREE

## (undated) DEVICE — SUTURE VICRYL SZ 1 L36IN ABSRB UD L36MM CT-1 1/2 CIR J947H

## (undated) DEVICE — CURETTE SURG FOR BNE CEM REM QUIK USE

## (undated) DEVICE — GLOVE SURG SZ 75 L12IN FNGR THK79MIL GRN LTX FREE

## (undated) DEVICE — BIPOLAR SEALER 23-112-1 AQM 6.0: Brand: AQUAMANTYS ®

## (undated) DEVICE — BLADE RMR L41MM PAT PILOT H

## (undated) DEVICE — PIN BNE FIX TEMP L110MM DIA4MM MAKO

## (undated) DEVICE — KIT DRP FOR RIO ROBOTIC ARM ASST SYS

## (undated) DEVICE — SUTURE ETHIBOND EXCEL SZ 2 L30IN NONABSORBABLE GRN L40MM V-37 MX69G

## (undated) DEVICE — GLOVE SURG SZ 7 L11.33IN FNGR THK9.8MIL STRW LTX POLYMER

## (undated) DEVICE — SOLUTION IRRIG 1000ML STRL H2O USP PLAS POUR BTL

## (undated) DEVICE — TOTAL KNEE: Brand: MEDLINE INDUSTRIES, INC.

## (undated) DEVICE — SUTURE MONOCRYL STRATAFIX SPRL + SZ 2-0 L18IN ABSRB UD CT-1 SXMP1B413

## (undated) DEVICE — HOOD: Brand: T7PLUS

## (undated) DEVICE — SOLUTION WND IRRIGATION 450 ML 0.5 PVP-I 0.9 NACL

## (undated) DEVICE — SOLUTION IV 250ML 0.9% SOD CHL PH 5 INJ USP VIAFLX PLAS

## (undated) DEVICE — STERILE PRESSURE PROTECTOR PAD® FOR DE MAYO UNIVERSAL DISTRACTOR® (10/CASE): Brand: DE MAYO UNIVERSAL DISTRACTOR®

## (undated) DEVICE — GLOVE ORANGE PI 8   MSG9080

## (undated) DEVICE — PIN BNE FIX TEMP L140MM DIA4MM MAKO

## (undated) DEVICE — DRESSING HYDROFIBER AQUACEL AG ADVANTAGE 3.5X12 IN

## (undated) DEVICE — SOLUTION IRRIG 3000ML 0.9% SOD CHL USP UROMATIC PLAS CONT

## (undated) DEVICE — SUTURE STRATAFIX SPIRAL PLUS CT-1  1  27 IN

## (undated) DEVICE — SOLUTION IRRIG 1000ML 0.9% SOD CHL USP POUR PLAS BTL

## (undated) DEVICE — KIT TRK KNEE PROC VIZADISC

## (undated) DEVICE — KIT INT FIX FEM TIB CKPT MAKOPLASTY

## (undated) DEVICE — GLOVE ORANGE PI 7   MSG9070

## (undated) DEVICE — X-LARGE COTTON GLOVE: Brand: DEROYAL

## (undated) DEVICE — HANDPIECE SET WITH COAXIAL HIGH FLOW TIP AND SUCTION TUBE: Brand: INTERPULSE

## (undated) DEVICE — 450 ML BOTTLE OF 0.05% CHLORHEXIDINE GLUCONATE IN 99.95% STERILE WATER FOR IRRIGATION, USP AND APPLICATOR.: Brand: IRRISEPT ANTIMICROBIAL WOUND LAVAGE

## (undated) DEVICE — GLOVE SURG SZ 65 THK91MIL LTX FREE SYN POLYISOPRENE

## (undated) DEVICE — SUTURE ABS ANTIBACT 1-0 CTX 24IN STRATAFIX PDS+ SXPP1A445